# Patient Record
Sex: MALE | Race: ASIAN | Employment: OTHER | URBAN - METROPOLITAN AREA
[De-identification: names, ages, dates, MRNs, and addresses within clinical notes are randomized per-mention and may not be internally consistent; named-entity substitution may affect disease eponyms.]

---

## 2022-10-04 NOTE — LETTER
[address]  [address]  Consent for Procedure/Sedation  Date: 4-        Time: 0909    I hereby authorize Dr Gilbert my physician and his/her assistants (if applicable), which may include medical students, residents, and/or fellows, to perform the following surgical operation/ procedure and administer such anesthesia as may be determined necessary by my physician:  Operation/Procedure name (s)  Cardiac Catheterization, Left Ventricular Cineangiography, Bilateral Selective Coronary Angiography and/or Right Heart Catheterization; possible Percutaneous Transluminal Coronary Angioplasty, Coronary Atherectomy, Coronary Stent, Intracoronary Thrombolytic therapy, Antiplatelet therapy and/or Intravascular Ultrasound on Deven Atkinson   2.   I recognize that during the surgical operation/procedure, unforeseen conditions may necessitate additional or different procedures than those listed above.  I, therefore, further authorize and request that the above-named surgeon, assistants, or designees perform such procedures as are, in their judgment, necessary and desirable.    3.   My surgeon/physician has discussed prior to my surgery the potential benefits, risks and side effects of this procedure; the likelihood of achieving goals; and potential problems that might occur during recuperation.  They also discussed reasonable alternatives to the procedure, including risks, benefits, and side effects related to the alternatives and risks related to not receiving this procedure.  I have had all my questions answered and I acknowledge that no guarantee has been made as to the result that may be obtained.    4.   Should the need arise during my operation/procedure, which includes change of level of care prior to discharge, I also consent to the administration of blood and/or blood products.  Further, I understand that despite careful testing and screening of blood or blood products by collecting  agencies, I may still be subject to ill effects as a result of receiving a blood transfusion and/or blood products.  The following are some, but not all, of the potential risks that can occur: fever and allergic reactions, hemolytic reactions, transmission of diseases such as Hepatitis, AIDS and Cytomegalovirus (CMV) and fluid overload.  In the event that I wish to have an autologous transfusion of my own blood, or a directed donor transfusion, I will discuss this with my physician.  Check only if Refusing Blood or Blood Products  I understand refusal of blood or blood products as deemed necessary by my physician may have serious consequences to my condition to include possible death. I hereby assume responsibility for my refusal and release the hospital, its personnel, and my physicians from any responsibility for the consequences of my refusal.          o  Refuse      5.   I authorize the use of any specimen, organs, tissues, body parts or foreign objects that may be removed from my body during the operation/procedure for diagnosis, research or teaching purposes and their subsequent disposal by hospital authorities.  I also authorize the release of specimen test results and/or written reports to my treating physician on the hospital medical staff or other referring or consulting physicians involved in my care, at the discretion of the Pathologist or my treating physician.    6.   I consent to the photographing or videotaping of the operations or procedures to be performed, including appropriate portions of my body for medical, scientific, or educational purposes, provided my identity is not revealed by the pictures or by descriptive texts accompanying them.  If the procedure has been photographed/videotaped, the surgeon will obtain the original picture, image, videotape or CD.  The hospital will not be responsible for storage, release or maintenance of the picture, image, tape or CD.    7.   I consent to the  presence of a  or observers in the operating room as deemed necessary by my physician or their designees.    8.   I recognize that in the event my procedure results in extended X-Ray/fluoroscopy time, I may develop a skin reaction.    9. If I have a Do Not Attempt Resuscitation (DNAR) order in place, that status will be suspended while in the operating room, procedural suite, and during the recovery period unless otherwise explicitly stated by me (or a person authorized to consent on my behalf). The surgeon or my attending physician will determine when the applicable recovery period ends for purposes of reinstating the DNAR order.  10. Patients having a sterilization procedure: I understand that if the procedure is successful the results will be permanent and it will therefore be impossible for me to inseminate, conceive, or bear children.  I also understand that the procedure is intended to result in sterility, although the result has not been guaranteed.   11. I acknowledge that my physician has explained sedation/analgesia administration to me including the risk and benefits I consent to the administration of sedation/analgesia as may be necessary or desirable in the judgment of my physician.    I CERTIFY THAT I HAVE READ AND FULLY UNDERSTAND THE ABOVE CONSENT TO OPERATION and/or OTHER PROCEDURE.      ____________________________________       _________________________________      ______________________________  Signature of Patient         Signature of Responsible Person        Printed Name of Responsible Person   ____________________________________      _________________________________      ______________________________       Signature of Witness          Relationship to Patient                       Date                                       Time  Patient Name: Deven August Demetrio  : 1930    Reviewed: 2024   Printed: 2025  Medical Record #: [id number] Page 1 of 1            Xenograft Text: The defect edges were debeveled with a #15 scalpel blade.  Given the location of the defect, shape of the defect and the proximity to free margins a xenograft was deemed most appropriate.  The graft was then trimmed to fit the size of the defect.  The graft was then placed in the primary defect and oriented appropriately.

## 2024-07-02 ENCOUNTER — APPOINTMENT (OUTPATIENT)
Dept: CT IMAGING | Facility: HOSPITAL | Age: 89
End: 2024-07-02
Attending: EMERGENCY MEDICINE
Payer: MEDICARE

## 2024-07-02 ENCOUNTER — HOSPITAL ENCOUNTER (EMERGENCY)
Facility: HOSPITAL | Age: 89
Discharge: HOME | End: 2024-07-02
Attending: EMERGENCY MEDICINE
Payer: MEDICARE

## 2024-07-02 VITALS
SYSTOLIC BLOOD PRESSURE: 152 MMHG | WEIGHT: 158 LBS | OXYGEN SATURATION: 96 % | DIASTOLIC BLOOD PRESSURE: 52 MMHG | TEMPERATURE: 98 F | HEART RATE: 50 BPM | RESPIRATION RATE: 20 BRPM

## 2024-07-02 DIAGNOSIS — R10.9 ABDOMINAL PAIN, ACUTE: Primary | ICD-10-CM

## 2024-07-02 DIAGNOSIS — K59.00 CONSTIPATION, UNSPECIFIED CONSTIPATION TYPE: ICD-10-CM

## 2024-07-02 LAB
ALBUMIN SERPL-MCNC: 3.3 G/DL (ref 3.4–5)
ALBUMIN/GLOB SERPL: 0.8 {RATIO} (ref 1–2)
ALP LIVER SERPL-CCNC: 163 U/L
ALT SERPL-CCNC: 33 U/L
ANION GAP SERPL CALC-SCNC: 6 MMOL/L (ref 0–18)
AST SERPL-CCNC: 25 U/L (ref 15–37)
BASOPHILS # BLD AUTO: 0.1 X10(3) UL (ref 0–0.2)
BASOPHILS NFR BLD AUTO: 0.9 %
BILIRUB SERPL-MCNC: 0.4 MG/DL (ref 0.1–2)
BILIRUB UR QL STRIP.AUTO: NEGATIVE
BUN BLD-MCNC: 18 MG/DL (ref 9–23)
CALCIUM BLD-MCNC: 8.7 MG/DL (ref 8.5–10.1)
CHLORIDE SERPL-SCNC: 109 MMOL/L (ref 98–112)
CLARITY UR REFRACT.AUTO: CLEAR
CO2 SERPL-SCNC: 23 MMOL/L (ref 21–32)
CREAT BLD-MCNC: 1.12 MG/DL
EGFRCR SERPLBLD CKD-EPI 2021: 61 ML/MIN/1.73M2 (ref 60–?)
EOSINOPHIL # BLD AUTO: 0.17 X10(3) UL (ref 0–0.7)
EOSINOPHIL NFR BLD AUTO: 1.4 %
ERYTHROCYTE [DISTWIDTH] IN BLOOD BY AUTOMATED COUNT: 14.3 %
GLOBULIN PLAS-MCNC: 4.3 G/DL (ref 2.8–4.4)
GLUCOSE BLD-MCNC: 124 MG/DL (ref 70–99)
GLUCOSE UR STRIP.AUTO-MCNC: NORMAL MG/DL
HCT VFR BLD AUTO: 42.2 %
HGB BLD-MCNC: 13.9 G/DL
IMM GRANULOCYTES # BLD AUTO: 0.05 X10(3) UL (ref 0–1)
IMM GRANULOCYTES NFR BLD: 0.4 %
KETONES UR STRIP.AUTO-MCNC: NEGATIVE MG/DL
LEUKOCYTE ESTERASE UR QL STRIP.AUTO: NEGATIVE
LIPASE SERPL-CCNC: 42 U/L (ref 13–75)
LYMPHOCYTES # BLD AUTO: 6.21 X10(3) UL (ref 1–4)
LYMPHOCYTES NFR BLD AUTO: 52.9 %
MCH RBC QN AUTO: 28.1 PG (ref 26–34)
MCHC RBC AUTO-ENTMCNC: 32.9 G/DL (ref 31–37)
MCV RBC AUTO: 85.3 FL
MONOCYTES # BLD AUTO: 0.7 X10(3) UL (ref 0.1–1)
MONOCYTES NFR BLD AUTO: 6 %
NEUTROPHILS # BLD AUTO: 4.5 X10 (3) UL (ref 1.5–7.7)
NEUTROPHILS # BLD AUTO: 4.5 X10(3) UL (ref 1.5–7.7)
NEUTROPHILS NFR BLD AUTO: 38.4 %
NITRITE UR QL STRIP.AUTO: NEGATIVE
OSMOLALITY SERPL CALC.SUM OF ELEC: 289 MOSM/KG (ref 275–295)
PH UR STRIP.AUTO: 5 [PH] (ref 5–8)
PLATELET # BLD AUTO: 282 10(3)UL (ref 150–450)
POTASSIUM SERPL-SCNC: 4.5 MMOL/L (ref 3.5–5.1)
PROT SERPL-MCNC: 7.6 G/DL (ref 6.4–8.2)
PROT UR STRIP.AUTO-MCNC: NEGATIVE MG/DL
RBC # BLD AUTO: 4.95 X10(6)UL
RBC UR QL AUTO: NEGATIVE
SODIUM SERPL-SCNC: 138 MMOL/L (ref 136–145)
SP GR UR STRIP.AUTO: 1.02 (ref 1–1.03)
UROBILINOGEN UR STRIP.AUTO-MCNC: NORMAL MG/DL
WBC # BLD AUTO: 11.7 X10(3) UL (ref 4–11)

## 2024-07-02 PROCEDURE — 80053 COMPREHEN METABOLIC PANEL: CPT | Performed by: EMERGENCY MEDICINE

## 2024-07-02 PROCEDURE — 85025 COMPLETE CBC W/AUTO DIFF WBC: CPT

## 2024-07-02 PROCEDURE — 80053 COMPREHEN METABOLIC PANEL: CPT

## 2024-07-02 PROCEDURE — 99284 EMERGENCY DEPT VISIT MOD MDM: CPT

## 2024-07-02 PROCEDURE — 85025 COMPLETE CBC W/AUTO DIFF WBC: CPT | Performed by: EMERGENCY MEDICINE

## 2024-07-02 PROCEDURE — 81003 URINALYSIS AUTO W/O SCOPE: CPT | Performed by: EMERGENCY MEDICINE

## 2024-07-02 PROCEDURE — 83690 ASSAY OF LIPASE: CPT

## 2024-07-02 PROCEDURE — 93005 ELECTROCARDIOGRAM TRACING: CPT

## 2024-07-02 PROCEDURE — 99285 EMERGENCY DEPT VISIT HI MDM: CPT

## 2024-07-02 PROCEDURE — 83690 ASSAY OF LIPASE: CPT | Performed by: EMERGENCY MEDICINE

## 2024-07-02 PROCEDURE — 93010 ELECTROCARDIOGRAM REPORT: CPT

## 2024-07-02 PROCEDURE — 36415 COLL VENOUS BLD VENIPUNCTURE: CPT

## 2024-07-02 PROCEDURE — 74177 CT ABD & PELVIS W/CONTRAST: CPT | Performed by: EMERGENCY MEDICINE

## 2024-07-02 RX ORDER — ONDANSETRON 2 MG/ML
4 INJECTION INTRAMUSCULAR; INTRAVENOUS ONCE
Status: DISCONTINUED | OUTPATIENT
Start: 2024-07-02 | End: 2024-07-03

## 2024-07-02 RX ORDER — SODIUM CHLORIDE 9 MG/ML
INJECTION, SOLUTION INTRAVENOUS CONTINUOUS
Status: DISCONTINUED | OUTPATIENT
Start: 2024-07-02 | End: 2024-07-03

## 2024-07-02 NOTE — ED INITIAL ASSESSMENT (HPI)
Constipation x 2 days, no appetite, stomach bloating. + nausea and vomiting clear liquids. Last bowel movement was yesterday morning. RLQ and LLQ pain. 3/10 pain no meds taken for pain. Normally takes miralax. No blood thinners.

## 2024-07-03 LAB
ATRIAL RATE: 59 BPM
P AXIS: 47 DEGREES
P-R INTERVAL: 186 MS
Q-T INTERVAL: 466 MS
QRS DURATION: 138 MS
QTC CALCULATION (BEZET): 461 MS
R AXIS: -32 DEGREES
T AXIS: 134 DEGREES
VENTRICULAR RATE: 59 BPM

## 2024-07-03 NOTE — ED PROVIDER NOTES
Patient Seen in: [facility] Emergency Department      History     Chief Complaint   Patient presents with    Abdomen/Flank Pain    Constipation     Stated Complaint: abdominal pain, bloating and unable to have a BM for a couple days    Subjective:   HPI    Patient is a 89-year-old male who history obtained primarily from patient and daughter.  Patient has been having abdominal pain for last 2 days.  Patient has had some constipation as well.  Patient denies fevers or chills, no vomiting, no diarrhea, no hematuria or dysuria.  Remainder of review of systems negative.    Objective:   History reviewed. No pertinent past medical history.           History reviewed. No pertinent surgical history.             Social History     Socioeconomic History    Marital status:    Tobacco Use    Smoking status: Former     Types: Cigarettes    Smokeless tobacco: Never   Substance and Sexual Activity    Alcohol use: Not Currently    Drug use: Never     Social Determinants of Health     Food Insecurity: No Food Insecurity (10/8/2022)    Received from [facility] Vital Sign     Worried About Running Out of Food in the Last Year: Never true     Ran Out of Food in the Last Year: Never true              Review of Systems    Positive for stated Chief Complaint: Abdomen/Flank Pain and Constipation    Other systems are as noted in HPI.  Constitutional and vital signs reviewed.      All other systems reviewed and negative except as noted above.    Physical Exam     ED Triage Vitals   BP 07/02/24 1827 145/70   Pulse 07/02/24 1827 65   Resp 07/02/24 1827 20   Temp 07/02/24 1827 97.9 °F (36.6 °C)   Temp src 07/02/24 1827 Oral   SpO2 07/02/24 1827 95 %   O2 Device 07/02/24 1915 None (Room air)       Current Vitals:   Vital Signs  BP: 152/52  Pulse: 50  Resp: 20  Temp: 97.9 °F (36.6 °C)  Temp src: Oral  MAP (mmHg): 90    Oxygen Therapy  SpO2: 96 %  O2 Device: None (Room air)            Physical  Exam  GENERAL: Patient resting on the cart in no acute distress.  HEENT: Extraocular muscles intact, pupils equal round reactive to light, neck supple, no meningismus.  LUNGS: Lungs clear to auscultation bilaterally.  CARDIOVASCULAR: + S1-S2, regular rate and rhythm, no murmurs.  BACK: No CVA tenderness, no midline bony tenderness.  ABDOMEN: + Bowel sounds, soft, mild tenderness periumbilical and lower abdomen, nondistended.  No rebound, no guarding, no hepatosplenomegaly.  EXTREMITIES: Full range of motion, no tenderness, good capillary refill.  SKIN: No rash, good turgor.  NEURO: Patient answers questions appropriately.  No focal deficits appreciated.           ED Course     Labs Reviewed   COMP METABOLIC PANEL (14) - Abnormal; Notable for the following components:       Result Value    Glucose 124 (*)     Alkaline Phosphatase 163 (*)     Albumin 3.3 (*)     A/G Ratio 0.8 (*)     All other components within normal limits   CBC W/ DIFFERENTIAL - Abnormal; Notable for the following components:    WBC 11.7 (*)     Lymphocyte Absolute 6.21 (*)     All other components within normal limits   LIPASE - Normal   CBC WITH DIFFERENTIAL WITH PLATELET    Narrative:     The following orders were created for panel order CBC With Differential With Platelet.  Procedure                               Abnormality         Status                     ---------                               -----------         ------                     CBC W/ DIFFERENTIAL[[id number]]          Abnormal            Final result                 Please view results for these tests on the individual orders.   URINALYSIS WITH CULTURE REFLEX   SCAN SLIDE   PATH COMMENT CBC   RAINBOW DRAW LAVENDER   RAINBOW DRAW LIGHT GREEN   RAINBOW DRAW BLUE   RAINBOW DRAW GOLD     EKG    Rate, intervals and axes as noted on EKG Report.  Rate: 59  Rhythm: Sinus Rhythm  Reading: Sinus bradycardia, nonspecific ST changes, left bundle branch block                 CT abdomen  pelvis1. No acute abdominal-pelvic abnormality.   2. Cholelithiasis.   3. Small bladder calculus.   4. Enhancing prostate nodule.  Urologic follow up recommended.   5. Details as above.  Continued clinical correlation recommended.    Independent reviewed by myself, no free air         MDM      Patient stable throughout his stay in the emergency room.  Patient CT came back no acute abnormality.  Patient does have constipation.  Patient felt comfortable going home.  Daughter felt comfortable watching him at home.  Recommend MiraLAX or magnesium citrate as discussed.  Plenty of fluids.  Return if new or worse symptoms.  Follow-up for further evaluation.  Did consider small bowel obstruction, constipation, diverticulitis.                                   Medical Decision Making      Disposition and Plan     Clinical Impression:  1. Abdominal pain, acute    2. Constipation, unspecified constipation type         Disposition:  Discharge  7/2/2024 10:00 pm    Follow-up:  Paulo James MD  [address] [place] [id number]  [phone]    Follow up            Medications Prescribed:  There are no discharge medications for this patient.

## 2024-07-03 NOTE — DISCHARGE INSTRUCTIONS
Follow-up for further evaluation primary physician.  Call for an appointment.  Return if fever, vomiting, increased abdominal pain, new or worse symptoms or may use Tylenol or ibuprofen if needed.  Recommend magnesium citrate over-the-counter as discussed.  Plenty of fluids.  Increase fiber in the diet.

## 2024-07-03 NOTE — ED QUICK NOTES
Pt's wife speaks english and interpreting for pt.  service offered, pt's wife declined at this time.

## 2024-07-10 ENCOUNTER — HOSPITAL ENCOUNTER (EMERGENCY)
Facility: HOSPITAL | Age: 89
Discharge: HOME | End: 2024-07-10
Attending: EMERGENCY MEDICINE
Payer: MEDICARE

## 2024-07-10 VITALS
RESPIRATION RATE: 20 BRPM | TEMPERATURE: 98 F | WEIGHT: 156 LBS | HEART RATE: 52 BPM | HEIGHT: 67 IN | DIASTOLIC BLOOD PRESSURE: 54 MMHG | SYSTOLIC BLOOD PRESSURE: 137 MMHG | OXYGEN SATURATION: 100 % | BODY MASS INDEX: 24.48 KG/M2

## 2024-07-10 DIAGNOSIS — R39.11 URINARY HESITANCY: Primary | ICD-10-CM

## 2024-07-10 LAB
ALBUMIN SERPL-MCNC: 3.5 G/DL (ref 3.4–5)
ALBUMIN/GLOB SERPL: 0.9 {RATIO} (ref 1–2)
ALP LIVER SERPL-CCNC: 137 U/L
ALT SERPL-CCNC: 28 U/L
ANION GAP SERPL CALC-SCNC: 7 MMOL/L (ref 0–18)
AST SERPL-CCNC: 23 U/L (ref 15–37)
BASOPHILS # BLD AUTO: 0.07 X10(3) UL (ref 0–0.2)
BASOPHILS NFR BLD AUTO: 0.9 %
BILIRUB SERPL-MCNC: 1 MG/DL (ref 0.1–2)
BILIRUB UR QL STRIP.AUTO: NEGATIVE
BUN BLD-MCNC: 25 MG/DL (ref 9–23)
CALCIUM BLD-MCNC: 9 MG/DL (ref 8.5–10.1)
CHLORIDE SERPL-SCNC: 109 MMOL/L (ref 98–112)
CLARITY UR REFRACT.AUTO: CLEAR
CO2 SERPL-SCNC: 24 MMOL/L (ref 21–32)
CREAT BLD-MCNC: 1.15 MG/DL
EGFRCR SERPLBLD CKD-EPI 2021: 59 ML/MIN/1.73M2 (ref 60–?)
EOSINOPHIL # BLD AUTO: 0.13 X10(3) UL (ref 0–0.7)
EOSINOPHIL NFR BLD AUTO: 1.6 %
ERYTHROCYTE [DISTWIDTH] IN BLOOD BY AUTOMATED COUNT: 14.5 %
GLOBULIN PLAS-MCNC: 4 G/DL (ref 2.8–4.4)
GLUCOSE BLD-MCNC: 156 MG/DL (ref 70–99)
GLUCOSE UR STRIP.AUTO-MCNC: NORMAL MG/DL
HCT VFR BLD AUTO: 40.9 %
HGB BLD-MCNC: 13.8 G/DL
IMM GRANULOCYTES # BLD AUTO: 0.03 X10(3) UL (ref 0–1)
IMM GRANULOCYTES NFR BLD: 0.4 %
KETONES UR STRIP.AUTO-MCNC: NEGATIVE MG/DL
LEUKOCYTE ESTERASE UR QL STRIP.AUTO: NEGATIVE
LYMPHOCYTES # BLD AUTO: 3.32 X10(3) UL (ref 1–4)
LYMPHOCYTES NFR BLD AUTO: 41.2 %
MCH RBC QN AUTO: 28.2 PG (ref 26–34)
MCHC RBC AUTO-ENTMCNC: 33.7 G/DL (ref 31–37)
MCV RBC AUTO: 83.5 FL
MONOCYTES # BLD AUTO: 0.4 X10(3) UL (ref 0.1–1)
MONOCYTES NFR BLD AUTO: 5 %
NEUTROPHILS # BLD AUTO: 4.11 X10 (3) UL (ref 1.5–7.7)
NEUTROPHILS # BLD AUTO: 4.11 X10(3) UL (ref 1.5–7.7)
NEUTROPHILS NFR BLD AUTO: 50.9 %
NITRITE UR QL STRIP.AUTO: NEGATIVE
OSMOLALITY SERPL CALC.SUM OF ELEC: 298 MOSM/KG (ref 275–295)
PH UR STRIP.AUTO: 5 [PH] (ref 5–8)
PLATELET # BLD AUTO: 237 10(3)UL (ref 150–450)
POTASSIUM SERPL-SCNC: 4.8 MMOL/L (ref 3.5–5.1)
PROT SERPL-MCNC: 7.5 G/DL (ref 6.4–8.2)
PROT UR STRIP.AUTO-MCNC: NEGATIVE MG/DL
RBC # BLD AUTO: 4.9 X10(6)UL
RBC UR QL AUTO: NEGATIVE
SODIUM SERPL-SCNC: 140 MMOL/L (ref 136–145)
SP GR UR STRIP.AUTO: 1.02 (ref 1–1.03)
UROBILINOGEN UR STRIP.AUTO-MCNC: NORMAL MG/DL
VALPROATE SERPL-MCNC: 12.1 UG/ML (ref 50–100)
WBC # BLD AUTO: 8.1 X10(3) UL (ref 4–11)

## 2024-07-10 PROCEDURE — 36415 COLL VENOUS BLD VENIPUNCTURE: CPT

## 2024-07-10 PROCEDURE — 99283 EMERGENCY DEPT VISIT LOW MDM: CPT

## 2024-07-10 PROCEDURE — 85025 COMPLETE CBC W/AUTO DIFF WBC: CPT | Performed by: EMERGENCY MEDICINE

## 2024-07-10 PROCEDURE — 81003 URINALYSIS AUTO W/O SCOPE: CPT | Performed by: EMERGENCY MEDICINE

## 2024-07-10 PROCEDURE — 80164 ASSAY DIPROPYLACETIC ACD TOT: CPT | Performed by: EMERGENCY MEDICINE

## 2024-07-10 PROCEDURE — 80053 COMPREHEN METABOLIC PANEL: CPT | Performed by: EMERGENCY MEDICINE

## 2024-07-10 PROCEDURE — 99284 EMERGENCY DEPT VISIT MOD MDM: CPT

## 2024-07-10 RX ORDER — ENALAPRIL MALEATE 20 MG/1
1 TABLET ORAL 2 TIMES DAILY
COMMUNITY
Start: 2023-11-04

## 2024-07-10 RX ORDER — CLOPIDOGREL BISULFATE 75 MG/1
75 TABLET ORAL DAILY
COMMUNITY

## 2024-07-10 RX ORDER — LIDOCAINE HYDROCHLORIDE 20 MG/ML
10 JELLY TOPICAL ONCE
Status: DISCONTINUED | OUTPATIENT
Start: 2024-07-10 | End: 2024-07-10

## 2024-07-10 RX ORDER — AMOXICILLIN 250 MG
1 CAPSULE ORAL DAILY
COMMUNITY

## 2024-07-10 RX ORDER — ASPIRIN 81 MG/1
TABLET, CHEWABLE ORAL
COMMUNITY

## 2024-07-10 RX ORDER — HYDROCORTISONE 25 MG/G
CREAM TOPICAL
COMMUNITY
Start: 2021-10-22

## 2024-07-10 RX ORDER — AMLODIPINE BESYLATE 10 MG/1
10 TABLET ORAL DAILY
COMMUNITY
Start: 2022-09-29

## 2024-07-10 RX ORDER — ESCITALOPRAM OXALATE 20 MG/1
20 TABLET ORAL DAILY
COMMUNITY
Start: 2024-05-09

## 2024-07-10 RX ORDER — TAMSULOSIN HYDROCHLORIDE 0.4 MG/1
0.4 CAPSULE ORAL DAILY
COMMUNITY

## 2024-07-10 RX ORDER — MELOXICAM 7.5 MG/1
1 TABLET ORAL 2 TIMES DAILY PRN
COMMUNITY

## 2024-07-10 RX ORDER — METOCLOPRAMIDE 10 MG/1
10 TABLET ORAL 4 TIMES DAILY
COMMUNITY

## 2024-07-10 RX ORDER — MIRTAZAPINE 15 MG/1
15 TABLET, FILM COATED ORAL EVERY OTHER DAY
COMMUNITY
Start: 2024-02-19

## 2024-07-10 RX ORDER — DIPHENHYDRAMINE HCL 25 MG
1 CAPSULE ORAL
COMMUNITY

## 2024-07-10 RX ORDER — FLUTICASONE PROPIONATE 50 MCG
1 SPRAY, SUSPENSION (ML) NASAL DAILY
COMMUNITY
Start: 2024-01-10

## 2024-07-10 RX ORDER — VALPROIC ACID 250 MG/5ML
125 SOLUTION ORAL DAILY
COMMUNITY
Start: 2024-05-09

## 2024-07-10 RX ORDER — ATORVASTATIN CALCIUM 80 MG/1
80 TABLET, FILM COATED ORAL DAILY
COMMUNITY

## 2024-07-10 RX ORDER — PANTOPRAZOLE SODIUM 40 MG/1
40 TABLET, DELAYED RELEASE ORAL EVERY MORNING
COMMUNITY

## 2024-07-10 RX ORDER — DUTASTERIDE 0.5 MG/1
0.5 CAPSULE, LIQUID FILLED ORAL DAILY
COMMUNITY
Start: 2022-07-25

## 2024-07-10 NOTE — ED INITIAL ASSESSMENT (HPI)
Pt here with family due to having a hard time urinating. Per wife pt is having troubles urinating and some constipation. Pt's last bowel movement was yesterday and he urinated this morning. Pt denies CP and ANTONIO.

## 2024-07-10 NOTE — ED PROVIDER NOTES
Patient Seen in: [facility] Emergency Department      History     Chief Complaint   Patient presents with    Urinary Symptoms     Has not went since yesterday     Stated Complaint:     Subjective:   HPI    89-year-old male here for possible urinary retention he has had this 3 other times.  According to daughter who is translating he has not urinated since 24 hours.  But then later he told the nurse that he had urinated this morning.  No specific complaint of pain no reported fever or vomiting.    Objective:   Past Medical History:    Essential hypertension    Hyperlipidemia    Stroke (HCC)              History reviewed. No pertinent surgical history.             Social History     Socioeconomic History    Marital status:    Tobacco Use    Smoking status: Former     Types: Cigarettes    Smokeless tobacco: Never   Vaping Use    Vaping status: Never Used   Substance and Sexual Activity    Alcohol use: Not Currently    Drug use: Never     Social Determinants of Health     Food Insecurity: No Food Insecurity (10/8/2022)    Received from [facility] Vital Sign     Worried About Running Out of Food in the Last Year: Never true     Ran Out of Food in the Last Year: Never true              Review of Systems    Positive for stated Chief Complaint: Urinary Symptoms (Has not went since yesterday)    Other systems are as noted in HPI.  Constitutional and vital signs reviewed.      All other systems reviewed and negative except as noted above.    Physical Exam     ED Triage Vitals [07/10/24 0843]   /58   Pulse 63   Resp 20   Temp 97.6 °F (36.4 °C)   Temp src Temporal   SpO2 96 %   O2 Device None (Room air)       Current Vitals:   Vital Signs  BP: 137/54  Pulse: 52  Resp: 20  Temp: 97.6 °F (36.4 °C)  Temp src: Temporal  MAP (mmHg): 79    Oxygen Therapy  SpO2: 100 %  O2 Device: None (Room air)            Physical Exam    Patient is alert and orient x 3 no acute distress HEENT  exam within normal limits neck there is no lymphadenopathy or JVD lungs are clear cardiovascular exam shows regular rate and rhythm without murmurs abdomen soft there is a question of moderate distention of the lower abdomen.  Extremities normal skin is no rash.    ED Course     Labs Reviewed   COMP METABOLIC PANEL (14) - Abnormal; Notable for the following components:       Result Value    Glucose 156 (*)     BUN 25 (*)     Calculated Osmolality 298 (*)     eGFR-Cr 59 (*)     Alkaline Phosphatase 137 (*)     A/G Ratio 0.9 (*)     All other components within normal limits   VALPROIC ACID, (DEPAKENE) - Abnormal; Notable for the following components:    Valproic Acid 12.1 (*)     All other components within normal limits   CBC WITH DIFFERENTIAL WITH PLATELET    Narrative:     The following orders were created for panel order CBC With Differential With Platelet.  Procedure                               Abnormality         Status                     ---------                               -----------         ------                     CBC W/ DIFFERENTIAL[[id number]]                              Final result                 Please view results for these tests on the individual orders.   URINALYSIS WITH CULTURE REFLEX   RAINBOW DRAW LAVENDER   RAINBOW DRAW LIGHT GREEN   RAINBOW DRAW BLUE   RAINBOW DRAW GOLD   CBC W/ DIFFERENTIAL             Abnormal Labs Reviewed   COMP METABOLIC PANEL (14) - Abnormal; Notable for the following components:       Result Value    Glucose 156 (*)     BUN 25 (*)     Calculated Osmolality 298 (*)     eGFR-Cr 59 (*)     Alkaline Phosphatase 137 (*)     A/G Ratio 0.9 (*)     All other components within normal limits   VALPROIC ACID, (DEPAKENE) - Abnormal; Notable for the following components:    Valproic Acid 12.1 (*)     All other components within normal limits     Labs showed a BUN 25 urinalysis unremarkable.         MDM      Initial differential diagnoses considered but not limited to urinary  tract infection urinary retention constipation diverticulitis      Patient has no evidence of UTI or urinary retention I went back and interviewed the family ember who is translating and the patient felt like he had difficulty urinating has no abdominal pain fever vomiting and otherwise feels well he is ambulatory here and he is urinated twice.  Advised him to follow-up with his primary care doctor and urologist over the next 2 days return if worse                                   Medical Decision Making      Disposition and Plan     Clinical Impression:  1. Urinary hesitancy         Disposition:  Discharge  7/10/2024 11:10 am    Follow-up:  Paulo James MD  [address] [place] [id number]  [phone]    Follow up in 1 week(s)            Medications Prescribed:  Discharge Medication List as of 7/10/2024 11:12 AM

## 2024-07-25 ENCOUNTER — HOSPITAL ENCOUNTER (EMERGENCY)
Facility: HOSPITAL | Age: 89
Discharge: HOME | End: 2024-07-25
Attending: EMERGENCY MEDICINE
Payer: MEDICARE

## 2024-07-25 VITALS
WEIGHT: 156.5 LBS | DIASTOLIC BLOOD PRESSURE: 59 MMHG | OXYGEN SATURATION: 97 % | BODY MASS INDEX: 25 KG/M2 | SYSTOLIC BLOOD PRESSURE: 141 MMHG | RESPIRATION RATE: 16 BRPM | TEMPERATURE: 98 F | HEART RATE: 69 BPM

## 2024-07-25 DIAGNOSIS — R30.0 DYSURIA: ICD-10-CM

## 2024-07-25 DIAGNOSIS — R39.12 WEAK URINARY STREAM: Primary | ICD-10-CM

## 2024-07-25 LAB
BILIRUB UR QL STRIP.AUTO: NEGATIVE
CLARITY UR REFRACT.AUTO: CLEAR
GLUCOSE UR STRIP.AUTO-MCNC: NORMAL MG/DL
KETONES UR STRIP.AUTO-MCNC: NEGATIVE MG/DL
LEUKOCYTE ESTERASE UR QL STRIP.AUTO: 75
NITRITE UR QL STRIP.AUTO: NEGATIVE
PH UR STRIP.AUTO: 6 [PH] (ref 5–8)
PROT UR STRIP.AUTO-MCNC: NEGATIVE MG/DL
RBC UR QL AUTO: NEGATIVE
SP GR UR STRIP.AUTO: 1.02 (ref 1–1.03)
UROBILINOGEN UR STRIP.AUTO-MCNC: NORMAL MG/DL

## 2024-07-25 PROCEDURE — 51798 US URINE CAPACITY MEASURE: CPT

## 2024-07-25 PROCEDURE — 81001 URINALYSIS AUTO W/SCOPE: CPT | Performed by: EMERGENCY MEDICINE

## 2024-07-25 PROCEDURE — 99283 EMERGENCY DEPT VISIT LOW MDM: CPT

## 2024-07-25 NOTE — ED PROVIDER NOTES
Patient Seen in: [facility] Emergency Department      History     Chief Complaint   Patient presents with    Urinary Symptoms     Stated Complaint: Inability to urinate, abdominal pain    Subjective:   HPI    89-year-old male sent from urology clinic for evaluation of weak urinary stream.  He was seen in this emergency room 2 weeks ago for the same complaint.  He followed up on July 12 with urology and his wife states that he was started on tamsulosin.  They were having follow-up today but were told that the doctor was not in the [facility] today and was out in [place].  The  at the office told the patient to go to the emergency room.  He denies any abdominal pain.  He says he wakes up 2-3 times per night to urinate.  More recently he has been having some burning with urination.  No hematuria noted.    Objective:   Past Medical History:    Essential hypertension    Hyperlipidemia    Stroke (HCC)              History reviewed. No pertinent surgical history.             Social History     Socioeconomic History    Marital status:    Tobacco Use    Smoking status: Former     Types: Cigarettes    Smokeless tobacco: Never   Vaping Use    Vaping status: Never Used   Substance and Sexual Activity    Alcohol use: Not Currently    Drug use: Never     Social Determinants of Health     Financial Resource Strain: Low Risk  (7/12/2024)    Received from [facility]    Financial Resource Strain     In the past year, have you or any family members you live with been unable to get any of the following when it was really needed? Check all that apply.: None   Food Insecurity: Low Risk  (7/12/2024)    Received from [facility]    Food Insecurity     Within the past 12 months, you worried that your food would run out before you got money to buy more.  : Never true     Within the past 12 months, the food you bought just didn't last and you didn't have money to get more. : Never true    Transportation Needs: Not At Risk (7/12/2024)    Received from [facility] [place]    Transportation Needs     In the past 12 months, has lack of reliable transportation kept you from medical appointments, meetings, work or from getting things needed for daily living? : No   Physical Activity: Low Risk  (7/12/2024)    Received from [facility]    Exercise Vital Sign     On average, how many days per week do you engage in moderate to strenuous exercise (like a brisk walk)?: 5 days     On average, how many minutes do you engage in exercise at this level?: 30 min   Stress: Low Risk  (7/12/2024)    Received from [facility]    Stress     Stress is when someone feels tense, nervous, anxious, or can't sleep at night because their mind is troubled. How stressed are you? : Not at all   Social Connections: Low Risk  (7/12/2024)    Received from [facility]    Social Connections     How often do you see or talk to people that you care about and feel close to? (For example: talking to friends on the phone, visiting friends or family, going to [employer] or club meetings): 3 to 5 times a week              Review of Systems    Positive for stated Chief Complaint: Urinary Symptoms    Other systems are as noted in HPI.  Constitutional and vital signs reviewed.      All other systems reviewed and negative except as noted above.    Physical Exam     ED Triage Vitals   BP 07/25/24 0930 143/56   Pulse 07/25/24 0930 65   Resp 07/25/24 0930 16   Temp 07/25/24 1046 98.1 °F (36.7 °C)   Temp src 07/25/24 1046 Oral   SpO2 07/25/24 0930 97 %   O2 Device 07/25/24 0930 None (Room air)       Current Vitals:   Vital Signs  BP: 141/59  Pulse: 69  Resp: 16  Temp: 98.1 °F (36.7 °C)  Temp src: Oral    Oxygen Therapy  SpO2: 97 %  O2 Device: None (Room air)            Physical Exam    General:  Vitals as listed.  No acute distress   Lungs: good air exchange and clear   Heart: regular rate rhythm and no murmur    Abdomen: Soft and nontender.  No abdominal masses.  No peritoneal signs   Extremities: no edema, normal peripheral pulses   Neuro: Alert oriented and nonfocal   Skin: no rashes or nodules    ED Course     Labs Reviewed   URINALYSIS, ROUTINE - Abnormal; Notable for the following components:       Result Value    Leukocyte Esterase Urine 75 (*)     Squamous Epi. Cells Few (*)     All other components within normal limits   URINALYSIS WITH CULTURE REFLEX                      MDM      89-year-old male with history of BPH presents reporting weak urinary stream.  Seen for this previously.  Was trying to follow-up with urology today but went to the [place] office when they were supposed to go to the [place] office.  Staff at the urology office told him to come to the emergency room.  He has no acute complaints though he does report that more recently he has been having some burning with urination.    Additional history obtained by patient's wife who reports that he has required Jay catheters on multiple occasions in the past due to urinary retention.    Differential includes but is not limited to urinary retention, BPH, UTI, a life threat.    Urinalysis and bladder scan ordered for further evaluation.    My independent interpretation of urinalysis is that there is no evidence of UTI.    The patient is not in urinary retention here.  Postvoid residual around 80 mL.  Well-appearing.  Patient's wife did call urology and reschedule their missed appointment from today but it is not until the middle of October.  They are on the wait list.  I will provide on-call urology clinic information with [facility] as they may be able to follow-up sooner than 3 months from now.  Instructed to return with any concerns                                       Medical Decision Making      Disposition and Plan     Clinical Impression:  1. Weak urinary stream    2. Dysuria         Disposition:  Discharge  7/25/2024 10:50  am    Follow-up:  Paulo James MD  [address]  [facility] [address]  [facility] [address] [phone]    Schedule an appointment as soon as possible for a visit      Maksim Betancur MD  [address]  [facility] [address] [phone]    Follow up  Urology specialist with [facility].  They may be able to see you sooner than your currently scheduled appointment in October          Medications Prescribed:  Current Discharge Medication List

## 2024-07-25 NOTE — ED INITIAL ASSESSMENT (HPI)
Patient presenting with complains of unable to urinated normal, that not having a full stream, for weeks.

## 2024-07-26 ENCOUNTER — HOSPITAL ENCOUNTER (EMERGENCY)
Facility: HOSPITAL | Age: 89
Discharge: HOME | End: 2024-07-27
Attending: EMERGENCY MEDICINE
Payer: MEDICARE

## 2024-07-26 DIAGNOSIS — R19.7 DIARRHEA, UNSPECIFIED TYPE: ICD-10-CM

## 2024-07-26 DIAGNOSIS — K21.00 GASTROESOPHAGEAL REFLUX DISEASE WITH ESOPHAGITIS WITHOUT HEMORRHAGE: Primary | ICD-10-CM

## 2024-07-26 PROCEDURE — 80053 COMPREHEN METABOLIC PANEL: CPT | Performed by: EMERGENCY MEDICINE

## 2024-07-26 PROCEDURE — 96361 HYDRATE IV INFUSION ADD-ON: CPT

## 2024-07-26 PROCEDURE — 96374 THER/PROPH/DIAG INJ IV PUSH: CPT

## 2024-07-26 PROCEDURE — 85025 COMPLETE CBC W/AUTO DIFF WBC: CPT

## 2024-07-26 PROCEDURE — 84484 ASSAY OF TROPONIN QUANT: CPT | Performed by: EMERGENCY MEDICINE

## 2024-07-26 PROCEDURE — 83690 ASSAY OF LIPASE: CPT | Performed by: EMERGENCY MEDICINE

## 2024-07-26 PROCEDURE — 83690 ASSAY OF LIPASE: CPT

## 2024-07-26 PROCEDURE — 99285 EMERGENCY DEPT VISIT HI MDM: CPT

## 2024-07-26 PROCEDURE — 93005 ELECTROCARDIOGRAM TRACING: CPT

## 2024-07-26 PROCEDURE — 85025 COMPLETE CBC W/AUTO DIFF WBC: CPT | Performed by: EMERGENCY MEDICINE

## 2024-07-26 PROCEDURE — 80053 COMPREHEN METABOLIC PANEL: CPT

## 2024-07-26 PROCEDURE — 93010 ELECTROCARDIOGRAM REPORT: CPT

## 2024-07-27 ENCOUNTER — APPOINTMENT (OUTPATIENT)
Dept: GENERAL RADIOLOGY | Facility: HOSPITAL | Age: 89
End: 2024-07-27
Attending: EMERGENCY MEDICINE
Payer: MEDICARE

## 2024-07-27 VITALS
HEIGHT: 70 IN | DIASTOLIC BLOOD PRESSURE: 50 MMHG | WEIGHT: 156 LBS | TEMPERATURE: 97 F | HEART RATE: 56 BPM | OXYGEN SATURATION: 98 % | RESPIRATION RATE: 17 BRPM | BODY MASS INDEX: 22.33 KG/M2 | SYSTOLIC BLOOD PRESSURE: 106 MMHG

## 2024-07-27 LAB
ALBUMIN SERPL-MCNC: 4 G/DL (ref 3.2–4.8)
ALBUMIN/GLOB SERPL: 1.1 {RATIO} (ref 1–2)
ALP LIVER SERPL-CCNC: 138 U/L
ALT SERPL-CCNC: 40 U/L
ANION GAP SERPL CALC-SCNC: 6 MMOL/L (ref 0–18)
AST SERPL-CCNC: 46 U/L (ref ?–34)
ATRIAL RATE: 58 BPM
BASOPHILS # BLD AUTO: 0.12 X10(3) UL (ref 0–0.2)
BASOPHILS NFR BLD AUTO: 1.1 %
BILIRUB SERPL-MCNC: 0.4 MG/DL (ref 0.2–0.9)
BUN BLD-MCNC: 22 MG/DL (ref 9–23)
CALCIUM BLD-MCNC: 8.9 MG/DL (ref 8.7–10.4)
CHLORIDE SERPL-SCNC: 109 MMOL/L (ref 98–112)
CO2 SERPL-SCNC: 22 MMOL/L (ref 21–32)
CREAT BLD-MCNC: 1.18 MG/DL
EGFRCR SERPLBLD CKD-EPI 2021: 57 ML/MIN/1.73M2 (ref 60–?)
EOSINOPHIL # BLD AUTO: 0.2 X10(3) UL (ref 0–0.7)
EOSINOPHIL NFR BLD AUTO: 1.8 %
ERYTHROCYTE [DISTWIDTH] IN BLOOD BY AUTOMATED COUNT: 14.4 %
GLOBULIN PLAS-MCNC: 3.5 G/DL (ref 2–3.5)
GLUCOSE BLD-MCNC: 110 MG/DL (ref 70–99)
HCT VFR BLD AUTO: 37.6 %
HGB BLD-MCNC: 12.9 G/DL
IMM GRANULOCYTES # BLD AUTO: 0.05 X10(3) UL (ref 0–1)
IMM GRANULOCYTES NFR BLD: 0.5 %
LIPASE SERPL-CCNC: 38 U/L (ref 12–53)
LYMPHOCYTES # BLD AUTO: 5.82 X10(3) UL (ref 1–4)
LYMPHOCYTES NFR BLD AUTO: 52.8 %
MCH RBC QN AUTO: 28.5 PG (ref 26–34)
MCHC RBC AUTO-ENTMCNC: 34.3 G/DL (ref 31–37)
MCV RBC AUTO: 83.2 FL
MONOCYTES # BLD AUTO: 0.57 X10(3) UL (ref 0.1–1)
MONOCYTES NFR BLD AUTO: 5.2 %
NEUTROPHILS # BLD AUTO: 4.27 X10 (3) UL (ref 1.5–7.7)
NEUTROPHILS # BLD AUTO: 4.27 X10(3) UL (ref 1.5–7.7)
NEUTROPHILS NFR BLD AUTO: 38.6 %
OSMOLALITY SERPL CALC.SUM OF ELEC: 288 MOSM/KG (ref 275–295)
P AXIS: 55 DEGREES
P-R INTERVAL: 208 MS
PLATELET # BLD AUTO: 209 10(3)UL (ref 150–450)
PLATELETS.RETICULATED NFR BLD AUTO: 1.3 % (ref 0–7)
POTASSIUM SERPL-SCNC: 4.8 MMOL/L (ref 3.5–5.1)
PROT SERPL-MCNC: 7.5 G/DL (ref 5.7–8.2)
Q-T INTERVAL: 466 MS
QRS DURATION: 144 MS
QTC CALCULATION (BEZET): 457 MS
R AXIS: -31 DEGREES
RBC # BLD AUTO: 4.52 X10(6)UL
SODIUM SERPL-SCNC: 137 MMOL/L (ref 136–145)
T AXIS: 132 DEGREES
TROPONIN I SERPL HS-MCNC: 11 NG/L
VENTRICULAR RATE: 58 BPM
WBC # BLD AUTO: 11 X10(3) UL (ref 4–11)

## 2024-07-27 PROCEDURE — S0028 INJECTION, FAMOTIDINE, 20 MG: HCPCS | Performed by: EMERGENCY MEDICINE

## 2024-07-27 PROCEDURE — 71045 X-RAY EXAM CHEST 1 VIEW: CPT | Performed by: EMERGENCY MEDICINE

## 2024-07-27 RX ORDER — FAMOTIDINE 40 MG/1
40 TABLET, FILM COATED ORAL 2 TIMES DAILY PRN
Qty: 30 TABLET | Refills: 0 | Status: SHIPPED | OUTPATIENT
Start: 2024-07-27 | End: 2024-08-26

## 2024-07-27 RX ORDER — MAGNESIUM HYDROXIDE/ALUMINUM HYDROXICE/SIMETHICONE 120; 1200; 1200 MG/30ML; MG/30ML; MG/30ML
30 SUSPENSION ORAL ONCE
Status: COMPLETED | OUTPATIENT
Start: 2024-07-27 | End: 2024-07-27

## 2024-07-27 RX ORDER — FAMOTIDINE 10 MG/ML
20 INJECTION, SOLUTION INTRAVENOUS ONCE
Status: COMPLETED | OUTPATIENT
Start: 2024-07-27 | End: 2024-07-27

## 2024-07-27 NOTE — ED PROVIDER NOTES
Patient Seen in: [facility] Emergency Department      History     Chief Complaint   Patient presents with    Abdomen/Flank Pain     Stated Complaint: epigastric pain since this evening; denies cp or ANTONIO    Subjective:   HPI    The 89-year-old male patient presented with discomfort and a burning sensation rising to his nose. The symptoms started after eating chicken with cucumber. He has a history of GERD. He had been feeling unwell and had been sleeping for most of the day. He had also visited a podiatrist earlier in the day for a foot issue, but no antibiotics were prescribed. He did not have any chest pain or pressure.  Patient states he just had his nails trimmed.  States pain is mostly burning denies any pressure in his chest.  States pain goes from his stomach up into his throat    Objective:   Past Medical History:    Essential hypertension    Hyperlipidemia    Stroke (HCC)              History reviewed. No pertinent surgical history.             Social History     Socioeconomic History    Marital status:    Tobacco Use    Smoking status: Former     Types: Cigarettes    Smokeless tobacco: Never   Vaping Use    Vaping status: Never Used   Substance and Sexual Activity    Alcohol use: Not Currently    Drug use: Never     Social Determinants of Health     Financial Resource Strain: Low Risk  (7/12/2024)    Received from [facility]    Financial Resource Strain     In the past year, have you or any family members you live with been unable to get any of the following when it was really needed? Check all that apply.: None   Food Insecurity: Low Risk  (7/12/2024)    Received from [facility]    Food Insecurity     Within the past 12 months, you worried that your food would run out before you got money to buy more.  : Never true     Within the past 12 months, the food you bought just didn't last and you didn't have money to get more. : Never true   Transportation Needs: Not At Risk  (7/12/2024)    Received from [facility]    Transportation Needs     In the past 12 months, has lack of reliable transportation kept you from medical appointments, meetings, work or from getting things needed for daily living? : No   Physical Activity: Low Risk  (7/12/2024)    Received from [facility]    Exercise Vital Sign     On average, how many days per week do you engage in moderate to strenuous exercise (like a brisk walk)?: 5 days     On average, how many minutes do you engage in exercise at this level?: 30 min   Stress: Low Risk  (7/12/2024)    Received from [facility]    Stress     Stress is when someone feels tense, nervous, anxious, or can't sleep at night because their mind is troubled. How stressed are you? : Not at all   Social Connections: Low Risk  (7/12/2024)    Received from [facility]    Social Connections     How often do you see or talk to people that you care about and feel close to? (For example: talking to friends on the phone, visiting friends or family, going to [employer] or club meetings): 3 to 5 times a week              Review of Systems    Positive for stated Chief Complaint: Abdomen/Flank Pain    Other systems are as noted in HPI.  Constitutional and vital signs reviewed.      All other systems reviewed and negative except as noted above.    Physical Exam     ED Triage Vitals   BP 07/26/24 2345 157/61   Pulse 07/26/24 2345 60   Resp 07/26/24 2345 21   Temp 07/26/24 2350 96.7 °F (35.9 °C)   Temp src 07/26/24 2350 Temporal   SpO2 07/26/24 2345 95 %   O2 Device 07/26/24 2345 None (Room air)       Current Vitals:   Vital Signs  BP: 106/50  Pulse: 56  Resp: 17  Temp: 96.7 °F (35.9 °C)  Temp src: Temporal  MAP (mmHg): 67    Oxygen Therapy  SpO2: 98 %  O2 Device: None (Room air)            Physical Exam      Vital signs reviewed  General appearance: Patient is alert and in no acute distress  HEENT: Pupils equal react to light extraocular muscles  intact no scleral icterus, mucous membranes are moist, there is no erythema or exudate in the posterior pharynx  Neck: Supple no JVD no lymphadenopathy no meningismus no carotid bruit  CV: Regular rate and rhythm no murmur rub  Respiratory: Clear to auscultation bilaterally no crackles no wheezes no accessory muscle use  Abdomen: Soft nontender nondistended, no rebound no guarding  no hepatosplenomegaly bowel sounds are present , no pulsatile mass  Extremities: No clubbing cyanosis or edema 2+ distal pulses.  Neuro: Cranial nerves II through XII intact with no gross focal sensory or motor abnormality.      ED Course     Labs Reviewed   COMP METABOLIC PANEL (14) - Abnormal; Notable for the following components:       Result Value    Glucose 110 (*)     eGFR-Cr 57 (*)     AST 46 (*)     Alkaline Phosphatase 138 (*)     All other components within normal limits   CBC W/ DIFFERENTIAL - Abnormal; Notable for the following components:    HGB 12.9 (*)     HCT 37.6 (*)     Lymphocyte Absolute 5.82 (*)     All other components within normal limits   LIPASE - Normal   TROPONIN I HIGH SENSITIVITY - Normal   CBC WITH DIFFERENTIAL WITH PLATELET    Narrative:     The following orders were created for panel order CBC With Differential With Platelet.  Procedure                               Abnormality         Status                     ---------                               -----------         ------                     CBC W/ DIFFERENTIAL[[id number]]          Abnormal            Final result                 Please view results for these tests on the individual orders.   RAINBOW DRAW BLUE   GI STOOL PANEL BY PCR   C. DIFFICILE(TOXIGENIC)PCR     EKG    Rate, intervals and axes as noted on EKG Report.  Rate: 58  Rhythm: Sinus Rhythm  Reading: Bundle branch block                 Was evaluated the emergency department had a CBC chemistry lipase along with a troponin ordered.  Also get a chest x-ray.  Did review a prior EKG and it was  the same as today.  Will give some Pepcid along with some Maalox.  Will reassess.       CHEST RADIOGRAPH    No prior    IMPRESSION:  Patchy left retrocardiac opacities.  Minimal ill-defined right lower lung zone opacities.  Possible loop recorder overlying left chest wall.  Borderline cardiac size.  Moderate degenerative changes of the visualized spine.    Patient was feeling much better after the Pepcid and Maalox.  However he did have some very watery diarrhea so we will send that for analysis but will not be back till morning.  Patient feels comfortable going home.  He is not having any cough or shortness of breath and no elevated white count so I do not feel the left retrocardiac opacities are the issue.  Patient will be discharged home follow-up with his primary.  Return if worse  MDM      Differential diagnosis reflecting the complexity of care include: GERD, angina, reflux, esophagitis, diarrhea, gastroenteritis    Comorbidities that add complexity to management include: Striae of stroke hypertension hyperlipidemia        My independent interpretation of studies of: X-ray does show some slight retrocardiac opacities and borderline cardiac size but no signs of obvious pneumonia no pneumothorax no effusion      Shared decision making was done by myself patient and his wife he was feeling better after the Maalox and Pepcid.  Told him to make sure eating bland diet for the next few days.  Follow-up with his primary.  Will also call him if his diarrhea comes back with something infectious but could be just some gastroenteritis    Patient was screened and evaluated during this visit.  As the treating physician attending to the patient, I determined within reasonable clinical confidence and prior to discharge, that an emergency medical condition was not or was no longer present.  There was no indication for further evaluation, treatment, or admission on an emergency basis.  Comprehensive verbal and written discharge  and follow-up instructions were provided to help prevent relapse or worsening.  Patient was instructed to follow-up with primary care provider for further evaluation treatment, return immediately to ER for worsening, concerning, new, or changing/persisting symptoms.  I discussed the case with the patient and they had no questions, complaints, or concerns.  Patient was comfortable going home.      Dictation Disclaimer Note:   To increase efficiency this document may have been prepared using voice recognition technology. Every effort has been made to correct any errors made during preparation of this note. However, if a word or phrase is confusing, or does not make sense, this is likely due to a recognition error within the program which was not discovered during editing. Please do not hesitate to contact to address any significant errors.    Note to Patient:   The 21st Century Cures Act makes medical notes like these available to patients in the interest of transparency. Please be advised this is a medical document. Medical documents are intended to carry relevant information, facts as evident, and the clinical opinion of the practitioner. The medical note is intended as peer to peer communication and may appear blunt or direct. It is written in medical language and may contain abbreviations or verbiage that are unfamiliar.                                          Medical Decision Making      Disposition and Plan     Clinical Impression:  1. Gastroesophageal reflux disease with esophagitis without hemorrhage    2. Diarrhea, unspecified type         Disposition:  Discharge  7/27/2024  2:20 am    Follow-up:  Paulo James MD  [address] [place] [id number]  [phone]    Follow up            Medications Prescribed:  Current Discharge Medication List        START taking these medications    Details   famotidine (PEPCID) 40 MG Oral Tab Take 1 tablet (40 mg total) by mouth 2 (two) times daily as  needed for Heartburn.  Qty: 30 tablet, Refills: 0

## 2024-08-12 ENCOUNTER — HOSPITAL ENCOUNTER (INPATIENT)
Facility: HOSPITAL | Age: 89
LOS: 1 days | Discharge: HOME | End: 2024-08-14
Attending: EMERGENCY MEDICINE | Admitting: STUDENT IN AN ORGANIZED HEALTH CARE EDUCATION/TRAINING PROGRAM
Payer: MEDICARE

## 2024-08-12 ENCOUNTER — APPOINTMENT (OUTPATIENT)
Dept: GENERAL RADIOLOGY | Facility: HOSPITAL | Age: 89
End: 2024-08-12
Attending: EMERGENCY MEDICINE
Payer: MEDICARE

## 2024-08-12 DIAGNOSIS — N30.01 ACUTE CYSTITIS WITH HEMATURIA: Primary | ICD-10-CM

## 2024-08-12 DIAGNOSIS — G93.41 METABOLIC ENCEPHALOPATHY: ICD-10-CM

## 2024-08-12 PROBLEM — R79.89 AZOTEMIA: Status: ACTIVE | Noted: 2024-08-12

## 2024-08-12 PROBLEM — R73.9 HYPERGLYCEMIA: Status: ACTIVE | Noted: 2024-08-12

## 2024-08-12 PROBLEM — E87.20 METABOLIC ACIDOSIS: Status: ACTIVE | Noted: 2024-08-12

## 2024-08-12 PROBLEM — I10 PRIMARY HYPERTENSION: Status: ACTIVE | Noted: 2024-08-12

## 2024-08-12 PROBLEM — D64.9 ANEMIA: Status: ACTIVE | Noted: 2024-08-12

## 2024-08-12 PROBLEM — E78.5 HYPERLIPIDEMIA: Status: ACTIVE | Noted: 2024-08-12

## 2024-08-12 PROBLEM — I63.9 CEREBROVASCULAR ACCIDENT (CVA) (HCC): Status: ACTIVE | Noted: 2024-08-12

## 2024-08-12 PROBLEM — F32.A DEPRESSION: Status: ACTIVE | Noted: 2024-08-12

## 2024-08-12 LAB
ALBUMIN SERPL-MCNC: 4.1 G/DL (ref 3.2–4.8)
ALBUMIN/GLOB SERPL: 1.4 {RATIO} (ref 1–2)
ALP LIVER SERPL-CCNC: 123 U/L
ALT SERPL-CCNC: 17 U/L
ANION GAP SERPL CALC-SCNC: 10 MMOL/L (ref 0–18)
AST SERPL-CCNC: 22 U/L (ref ?–34)
BASOPHILS # BLD AUTO: 0.07 X10(3) UL (ref 0–0.2)
BASOPHILS NFR BLD AUTO: 0.8 %
BILIRUB SERPL-MCNC: 0.4 MG/DL (ref 0.2–0.9)
BILIRUB UR QL STRIP.AUTO: NEGATIVE
BUN BLD-MCNC: 20 MG/DL (ref 9–23)
CALCIUM BLD-MCNC: 9.4 MG/DL (ref 8.7–10.4)
CHLORIDE SERPL-SCNC: 111 MMOL/L (ref 98–112)
CLARITY UR REFRACT.AUTO: CLEAR
CO2 SERPL-SCNC: 19 MMOL/L (ref 21–32)
COLOR UR AUTO: YELLOW
CREAT BLD-MCNC: 0.99 MG/DL
EGFRCR SERPLBLD CKD-EPI 2021: 71 ML/MIN/1.73M2 (ref 60–?)
EOSINOPHIL # BLD AUTO: 0.04 X10(3) UL (ref 0–0.7)
EOSINOPHIL NFR BLD AUTO: 0.4 %
ERYTHROCYTE [DISTWIDTH] IN BLOOD BY AUTOMATED COUNT: 13.7 %
GLOBULIN PLAS-MCNC: 2.9 G/DL (ref 2–3.5)
GLUCOSE BLD-MCNC: 102 MG/DL (ref 70–99)
GLUCOSE UR STRIP.AUTO-MCNC: NORMAL MG/DL
HCT VFR BLD AUTO: 34.6 %
HGB BLD-MCNC: 11.9 G/DL
IMM GRANULOCYTES # BLD AUTO: 0.02 X10(3) UL (ref 0–1)
IMM GRANULOCYTES NFR BLD: 0.2 %
LEUKOCYTE ESTERASE UR QL STRIP.AUTO: 500
LYMPHOCYTES # BLD AUTO: 4.45 X10(3) UL (ref 1–4)
LYMPHOCYTES NFR BLD AUTO: 48.7 %
MCH RBC QN AUTO: 28.8 PG (ref 26–34)
MCHC RBC AUTO-ENTMCNC: 34.4 G/DL (ref 31–37)
MCV RBC AUTO: 83.8 FL
MONOCYTES # BLD AUTO: 0.62 X10(3) UL (ref 0.1–1)
MONOCYTES NFR BLD AUTO: 6.8 %
NEUTROPHILS # BLD AUTO: 3.94 X10 (3) UL (ref 1.5–7.7)
NEUTROPHILS # BLD AUTO: 3.94 X10(3) UL (ref 1.5–7.7)
NEUTROPHILS NFR BLD AUTO: 43.1 %
NITRITE UR QL STRIP.AUTO: NEGATIVE
OSMOLALITY SERPL CALC.SUM OF ELEC: 293 MOSM/KG (ref 275–295)
PH UR STRIP.AUTO: 6 [PH] (ref 5–8)
PLATELET # BLD AUTO: 229 10(3)UL (ref 150–450)
POTASSIUM SERPL-SCNC: 3.6 MMOL/L (ref 3.5–5.1)
PROT SERPL-MCNC: 7 G/DL (ref 5.7–8.2)
PROT UR STRIP.AUTO-MCNC: 20 MG/DL
RBC # BLD AUTO: 4.13 X10(6)UL
RBC UR QL AUTO: NEGATIVE
SARS-COV-2 RNA RESP QL NAA+PROBE: NOT DETECTED
SODIUM SERPL-SCNC: 140 MMOL/L (ref 136–145)
SP GR UR STRIP.AUTO: 1.03 (ref 1–1.03)
UROBILINOGEN UR STRIP.AUTO-MCNC: NORMAL MG/DL
WBC # BLD AUTO: 9.1 X10(3) UL (ref 4–11)

## 2024-08-12 PROCEDURE — 99223 1ST HOSP IP/OBS HIGH 75: CPT | Performed by: STUDENT IN AN ORGANIZED HEALTH CARE EDUCATION/TRAINING PROGRAM

## 2024-08-12 PROCEDURE — 71045 X-RAY EXAM CHEST 1 VIEW: CPT | Performed by: EMERGENCY MEDICINE

## 2024-08-12 RX ORDER — LORAZEPAM 1 MG/1
1 TABLET ORAL ONCE
Status: COMPLETED | OUTPATIENT
Start: 2024-08-12 | End: 2024-08-12

## 2024-08-12 RX ORDER — HALOPERIDOL 5 MG/ML
5 INJECTION INTRAMUSCULAR ONCE
Status: COMPLETED | OUTPATIENT
Start: 2024-08-12 | End: 2024-08-12

## 2024-08-12 NOTE — ED PROVIDER NOTES
Patient Seen in: [facility] Emergency Department      History     Chief Complaint   Patient presents with    Other     Stated Complaint: other    Subjective:   89-year-old male, history of hypertension, CVA, psychiatric illness, presents with chills.  Wife states they recently moved into a new senior center.  States he been complaining of feeling hot and cold all throughout the day.  Department for evaluation.  Limited HPI/ROS secondary to age.            Objective:   Past Medical History:    Essential hypertension    Hyperlipidemia    Stroke (HCC)              History reviewed. No pertinent surgical history.             Social History     Socioeconomic History    Marital status:    Tobacco Use    Smoking status: Former     Types: Cigarettes    Smokeless tobacco: Never   Vaping Use    Vaping status: Never Used   Substance and Sexual Activity    Alcohol use: Not Currently    Drug use: Never     Social Determinants of Health     Financial Resource Strain: Low Risk  (7/12/2024)    Received from [facility]    Financial Resource Strain     In the past year, have you or any family members you live with been unable to get any of the following when it was really needed? Check all that apply.: None   Food Insecurity: Low Risk  (7/12/2024)    Received from [facility]    Food Insecurity     Within the past 12 months, you worried that your food would run out before you got money to buy more.  : Never true     Within the past 12 months, the food you bought just didn't last and you didn't have money to get more. : Never true   Transportation Needs: Not At Risk (7/12/2024)    Received from [facility]    Transportation Needs     In the past 12 months, has lack of reliable transportation kept you from medical appointments, meetings, work or from getting things needed for daily living? : No   Physical Activity: Low Risk  (7/12/2024)    Received from [facility]    Exercise  Vital Sign     On average, how many days per week do you engage in moderate to strenuous exercise (like a brisk walk)?: 5 days     On average, how many minutes do you engage in exercise at this level?: 30 min   Stress: Low Risk  (7/12/2024)    Received from [facility]    Stress     Stress is when someone feels tense, nervous, anxious, or can't sleep at night because their mind is troubled. How stressed are you? : Not at all   Social Connections: Low Risk  (7/12/2024)    Received from [facility]    Social Connections     How often do you see or talk to people that you care about and feel close to? (For example: talking to friends on the phone, visiting friends or family, going to [employer] or club meetings): 3 to 5 times a week              Review of Systems   Unable to perform ROS: Age       Positive for stated Chief Complaint: Other    Other systems are as noted in HPI.  Constitutional and vital signs reviewed.      All other systems reviewed and negative except as noted above.    Physical Exam     ED Triage Vitals [08/12/24 1729]   /59   Pulse 53   Resp 20   Temp 97.5 °F (36.4 °C)   Temp src Temporal   SpO2 99 %   O2 Device None (Room air)       Current Vitals:   Vital Signs  BP: (!) 163/67  Pulse: 54  Resp: 21  Temp: 97.5 °F (36.4 °C)  Temp src: Temporal  MAP (mmHg): 96    Oxygen Therapy  SpO2: 100 %  O2 Device: None (Room air)            Physical Exam  Vitals and nursing note reviewed.   HENT:      Head: Normocephalic.      Mouth/Throat:      Pharynx: Oropharynx is clear.   Eyes:      Pupils: Pupils are equal, round, and reactive to light.   Cardiovascular:      Rate and Rhythm: Normal rate.      Pulses: Normal pulses.   Pulmonary:      Effort: Pulmonary effort is normal. No respiratory distress.   Abdominal:      General: There is no distension.      Palpations: Abdomen is soft.      Tenderness: There is no abdominal tenderness.   Musculoskeletal:      Cervical back: Neck supple.    Skin:     General: Skin is warm and dry.   Neurological:      Mental Status: He is alert.      Cranial Nerves: No cranial nerve deficit.         Limited neuroexam.  Moving all extremities.  Resting in no distress.  Patient does get agitated easily    ED Course     Labs Reviewed   COMP METABOLIC PANEL (14) - Abnormal; Notable for the following components:       Result Value    Glucose 102 (*)     CO2 19.0 (*)     Alkaline Phosphatase 123 (*)     All other components within normal limits   CBC WITH DIFFERENTIAL WITH PLATELET - Abnormal; Notable for the following components:    HGB 11.9 (*)     HCT 34.6 (*)     Lymphocyte Absolute 4.45 (*)     All other components within normal limits   URINALYSIS WITH CULTURE REFLEX - Abnormal; Notable for the following components:    Ketones Urine Trace (*)     Protein Urine 20 (*)     Leukocyte Esterase Urine 500 (*)     WBC Urine 6-10 (*)     RBC Urine 3-5 (*)     Squamous Epi. Cells Few (*)     All other components within normal limits   RAPID SARS-COV-2 BY PCR - Normal   RAINBOW DRAW LAVENDER   RAINBOW DRAW LIGHT GREEN   RAINBOW DRAW BLUE   RAINBOW DRAW GOLD   URINE CULTURE, ROUTINE     EKG    Rate, intervals and axes as noted on EKG Report.  Rate: 53  Rhythm: Sinus Rhythm  Reading: EKG sinus bradycardia 53 bpm.  Patient with a left bundle branch block and left axis deviation.  When compared to July 2024, no significant changes are noted    Patient placed on cardiac monitor for telemetry monitoring secondary to fatigue, chills. Interpretation at bedside by me is sinus rhythm.                            MDM      XR CHEST AP PORTABLE  (CPT=71045)    Result Date: 8/12/2024  CONCLUSION:  Confluent opacity left lung base is superimposed on reticular densities which could represent pneumonia superimposed on chronic postinflammatory scar.   LOCATION:  [facility]      Dictated by (CST): José Miguel Bradford MD on 8/12/2024 at 5:58 PM     Finalized by (CST): José Miguel Bradford MD on 8/12/2024 at 6:02  PM        Admission disposition: 8/12/2024  8:18 PM         I independently interpreted the x-ray the chest and note some haziness along the left hilar border    Wife at bedside helpful to provide information on the history of present illness, as is daughter over the phone    Differential diagnosis include, but not limited to, viral syndrome, bacterial infection, encephalopathy, dehydration, electrolyte disturbance    External chart review demonstrates his visits for psychiatric causes in the past    89-year-old male with chills.  Has UTI.  Given Rocephin.  Encephalopathic.  Think emanation of his depression, dementia as well as his acute infection is causing him to be a bit encephalopathic.  Wife states he been screaming at home, was walking around naked earlier today.  Think is reasonable to treat the UTI, bring him in and see if that resolves.  Can put psychiatry on a routine consult as well.  Discussed with Rajesh juarezist, awaiting bed assignment.  Wife not comfortable taking him home because of his mental history and the acute infection.  This is reasonable at his age and comorbidities.                                   Medical Decision Making      Disposition and Plan     Clinical Impression:  1. Acute cystitis with hematuria    2. Metabolic encephalopathy         Disposition:  Admit  8/12/2024  8:18 pm    Follow-up:  No follow-up provider specified.        Medications Prescribed:  Current Discharge Medication List                            Hospital Problems       Present on Admission             ICD-10-CM Noted POA    * (Principal) Acute cystitis with hematuria N30.01 8/12/2024 Unknown    Anemia D64.9 8/12/2024 Yes    Azotemia R79.89 8/12/2024 Yes    Hyperglycemia R73.9 8/12/2024 Yes    Metabolic acidosis E87.20 8/12/2024 Yes

## 2024-08-12 NOTE — ED INITIAL ASSESSMENT (HPI)
Pt arrives from home with \"feeling cold\" and pain to the bottom of his feet.  Pt mandarin speaking only.

## 2024-08-12 NOTE — ED QUICK NOTES
Pt yelling for nurses and stating that he is cold. Pt given several blankets. Wife at bedside and keeps leaving room.

## 2024-08-13 PROBLEM — G93.41 METABOLIC ENCEPHALOPATHY: Status: ACTIVE | Noted: 2024-08-13

## 2024-08-13 LAB
ATRIAL RATE: 53 BPM
P AXIS: -14 DEGREES
P-R INTERVAL: 186 MS
Q-T INTERVAL: 502 MS
QRS DURATION: 158 MS
QTC CALCULATION (BEZET): 471 MS
R AXIS: -30 DEGREES
T AXIS: 120 DEGREES
VENTRICULAR RATE: 53 BPM

## 2024-08-13 PROCEDURE — 99232 SBSQ HOSP IP/OBS MODERATE 35: CPT | Performed by: INTERNAL MEDICINE

## 2024-08-13 RX ORDER — QUETIAPINE FUMARATE 25 MG/1
25 TABLET, FILM COATED ORAL 3 TIMES DAILY PRN
Status: DISCONTINUED | OUTPATIENT
Start: 2024-08-13 | End: 2024-08-14

## 2024-08-13 RX ORDER — ASPIRIN 81 MG/1
81 TABLET, CHEWABLE ORAL DAILY
Status: DISCONTINUED | OUTPATIENT
Start: 2024-08-13 | End: 2024-08-14

## 2024-08-13 RX ORDER — ATORVASTATIN CALCIUM 40 MG/1
80 TABLET, FILM COATED ORAL DAILY
Status: DISCONTINUED | OUTPATIENT
Start: 2024-08-13 | End: 2024-08-14

## 2024-08-13 RX ORDER — ESCITALOPRAM OXALATE 20 MG/1
20 TABLET ORAL DAILY
Status: DISCONTINUED | OUTPATIENT
Start: 2024-08-13 | End: 2024-08-14

## 2024-08-13 RX ORDER — SUCRALFATE 1 G/1
1 TABLET ORAL
Status: ON HOLD | COMMUNITY

## 2024-08-13 RX ORDER — MIRTAZAPINE 7.5 MG/1
15 TABLET, FILM COATED ORAL EVERY OTHER DAY
Status: DISCONTINUED | OUTPATIENT
Start: 2024-08-13 | End: 2024-08-14

## 2024-08-13 RX ORDER — CLOPIDOGREL BISULFATE 75 MG/1
75 TABLET ORAL DAILY
Status: DISCONTINUED | OUTPATIENT
Start: 2024-08-13 | End: 2024-08-14

## 2024-08-13 RX ORDER — ENALAPRIL MALEATE 10 MG/1
20 TABLET ORAL 2 TIMES DAILY
Status: DISCONTINUED | OUTPATIENT
Start: 2024-08-13 | End: 2024-08-14

## 2024-08-13 RX ORDER — HALOPERIDOL 5 MG/ML
1 INJECTION INTRAMUSCULAR EVERY 6 HOURS PRN
Status: DISCONTINUED | OUTPATIENT
Start: 2024-08-13 | End: 2024-08-14

## 2024-08-13 RX ORDER — FINASTERIDE 5 MG/1
5 TABLET, FILM COATED ORAL DAILY
Status: DISCONTINUED | OUTPATIENT
Start: 2024-08-13 | End: 2024-08-14

## 2024-08-13 RX ORDER — TAMSULOSIN HYDROCHLORIDE 0.4 MG/1
0.4 CAPSULE ORAL
Status: DISCONTINUED | OUTPATIENT
Start: 2024-08-13 | End: 2024-08-14

## 2024-08-13 RX ORDER — AMLODIPINE BESYLATE 5 MG/1
10 TABLET ORAL DAILY
Status: DISCONTINUED | OUTPATIENT
Start: 2024-08-13 | End: 2024-08-14

## 2024-08-13 NOTE — ED QUICK NOTES
Orders for admission, patient is aware of plan and ready to go upstairs. Any questions, please call ED RN Thelma at extension [phone].     Patient Covid vaccination status: Unvaccinated     COVID Test Ordered in ED: Rapid SARS-CoV-2 by PCR    COVID Suspicion at Admission: N/A    Running Infusions:      Mental Status/LOC at time of transport: A&O x2    Other pertinent information:   CIWA score: N/A   NIH score:  N/A

## 2024-08-13 NOTE — H&P
[facility]     Deven Atkinson Patient Status:  Emergency    1930 MRN [id number]   Location [facility] EMERGENCY DEPARTMENT Attending Mauricio Ghosh, DO   Hosp Day # 0 PCP Paulo James MD     Chief Complaint: Confusion    History of Present Illness: Deven Atkinson is a 89 year old male with past medical history of hypertension, hyperlipidemia, CVA who presents for confusion per his wife.    Patient is AxO x 1, moaning and yelling out in the ER room.  Unable to give significant history despite use of Mandarin .  Patient keeps saying he is hot and wishes to leave.  Called patient's wife who notes that recently they moved into senior living facility together.  However notes that in the last 3 days has been more agitated, screaming at home, walking around naked earlier today.  Per wife patient has history of depression and prior suicide attempts, no recent SI or HI expressed by patient or wife.    Past Medical History:  Past Medical History:    Essential hypertension    Hyperlipidemia    Stroke (HCC)        Past Surgical History: History reviewed. No pertinent surgical history.    Social History:  reports that he has quit smoking. His smoking use included cigarettes. He has never used smokeless tobacco. He reports that he does not currently use alcohol. He reports that he does not use drugs.    Family History: No family history on file.    Allergies:   Allergies   Allergen Reactions    Losartan NAUSEA AND VOMITING     Adverse Reaction           (historical)    Adverse Reaction         Adverse Reaction        Adverse Reaction           (historical)       Medications:    Current Facility-Administered Medications on File Prior to Encounter   Medication Dose Route Frequency Provider Last Rate Last Admin    [COMPLETED] famotidine (Pepcid) 20 mg/2mL injection 20 mg  20 mg Intravenous Once Rick Holland MD   20 mg at 24 0039    [COMPLETED] sodium chloride 0.9  % IV bolus 1,000 mL  1,000 mL Intravenous Once Rick Holland MD   Stopped at 07/27/24 0141    [COMPLETED] alum-mag hydroxide-simethicone (Maalox) 200-200-20 MG/5ML oral suspension 30 mL  30 mL Oral Once Rick Holland MD   30 mL at 07/27/24 0039    [COMPLETED] iopamidol 76% (ISOVUE-370) injection for power injector  80 mL Intravenous ONCE PRN Joshua Harley MD   80 mL at 07/02/24 6193     Current Outpatient Medications on File Prior to Encounter   Medication Sig Dispense Refill    famotidine (PEPCID) 40 MG Oral Tab Take 1 tablet (40 mg total) by mouth 2 (two) times daily as needed for Heartburn. 30 tablet 0    amLODIPine 10 MG Oral Tab Take 1 tablet (10 mg total) by mouth daily.      aspirin 81 MG Oral Chew Tab CHEW AND SWALLOW ONE TABLET EVERY DAY FOR PROPHYLAXIS      atorvastatin 80 MG Oral Tab Take 1 tablet (80 mg total) by mouth daily.      clopidogrel 75 MG Oral Tab Take 1 tablet (75 mg total) by mouth daily.      Dextran 70-Hypromellose (ARTIFICIAL TEARS) 0.1-0.3 % Ophthalmic Solution Apply 1 drop to eye.      dutasteride 0.5 MG Oral Cap Take 1 capsule (0.5 mg total) by mouth daily.      enalapril 20 MG Oral Tab Take 1 tablet (20 mg total) by mouth 2 (two) times daily.      escitalopram 20 MG Oral Tab Take 1 tablet (20 mg total) by mouth daily.      fluticasone propionate 50 MCG/ACT Nasal Suspension 1 spray by Nasal route daily.      hydrocortisone 2.5 % External Cream       Meloxicam 7.5 MG Oral Tab Take 1 tablet (7.5 mg total) by mouth 2 (two) times daily as needed.      metoclopramide 10 MG Oral Tab Take 1 tablet (10 mg total) by mouth 4 (four) times daily.      mirtazapine 15 MG Oral Tab Take 1 tablet (15 mg total) by mouth every other day.      pantoprazole 40 MG Oral Tab EC Take 1 tablet (40 mg total) by mouth every morning.      Sennosides 17.2 MG Oral Tab Take 1 tablet (17.2 mg total) by mouth daily as needed.      sennosides-docusate 8.6-50 MG Oral Tab Take 1 tablet by mouth daily.      tamsulosin 0.4 MG  Oral Cap 1 capsule (0.4 mg total) daily.      Valproate Sodium (VALPROIC ACID) 250 MG/5ML Oral Solution Take 125 mg by mouth daily.         Review of Systems:   A comprehensive 14 point review of systems was completed.    Pertinent positives and negatives noted in the HPI.    Physical Exam:    BP (!) 163/67   Pulse 54   Temp 97.5 °F (36.4 °C) (Temporal)   Resp 21   Wt 156 lb 1.4 oz (70.8 kg)   SpO2 100%   BMI 22.40 kg/m²   General: No acute distress. Alert and oriented x 1.  HEENT: Normocephalic atraumatic. Moist mucous membranes. EOM-I. PERRLA. Anicteric.  Neck: No lymphadenopathy. No JVD. No carotid bruits.  Respiratory: Clear to auscultation bilaterally. No wheezes. No rhonchi.  Cardiovascular: S1, S2. Regular rate and rhythm. No murmurs, rubs or gallops. Equal pulses.   Chest and Back: No tenderness or deformity.  Abdomen: Soft, nontender, nondistended.  Positive bowel sounds. No rebound, guarding or organomegaly.  Neurologic: No focal neurological deficits. CNII-XII grossly intact.  Musculoskeletal: Moves all extremities.  Extremities: No edema or cyanosis.  Integument: No rashes or lesions.   Psychiatric: Appropriate mood and affect.    Diagnostic Data:      Labs:  Recent Labs   Lab 08/12/24  1730   WBC 9.1   HGB 11.9*   MCV 83.8   .0       Recent Labs   Lab 08/12/24  1730   *   BUN 20   CREATSERUM 0.99   CA 9.4   ALB 4.1      K 3.6      CO2 19.0*   ALKPHO 123*   AST 22   ALT 17   BILT 0.4   TP 7.0       Estimated Creatinine Clearance: 45.7 mL/min (based on SCr of 0.99 mg/dL).    No results for input(s): \"PTP\", \"INR\" in the last 168 hours.    No results for input(s): \"TROP\", \"CK\" in the last 168 hours.    Imaging: Imaging data reviewed in [facility].  XR CHEST AP PORTABLE  (CPT=71045)    Result Date: 8/12/2024  CONCLUSION:  Confluent opacity left lung base is superimposed on reticular densities which could represent pneumonia superimposed on chronic postinflammatory scar.   LOCATION:   [facility]      Dictated by (CST): José Miguel Bradford MD on 8/12/2024 at 5:58 PM     Finalized by (CST): José Miguel Bradford MD on 8/12/2024 at 6:02 PM          ASSESSMENT / PLAN:     # Acute metabolic encephalopathy  # UTI  # Suspected underlying dementia    - IV abx, f/u Ucx   - Psychiatry consulted given hx depression, unspecified psych hx per wife   - outpatient evaluation by neuropsych recommended      # Hypertension - Continue home oral antihypertensives  # Hyperlipidemia - continue home statin   # CVA - ASA, plavix, statin   # Depression - lexapro, valproate, remeron; further adjustments per psych on consult         Code Status: Not on file    Plan of care discussed with patient, ED physician    Jacob Edmondson MD  8/12/2024    Supplementary Documentation:      MDM : Patient's ER labs, imaging reviewed.  A.m. labs ordered.  ER management discussed with ED physician, decision made for patient to be admitted to the hospital for further medical management.

## 2024-08-13 NOTE — ED QUICK NOTES
Pt resting on ED cart. Occasionally c/o \"feeling hot and cold.\" Pt A&O x 2 at baseline per wife. Awaiting urine results. Pt kept comfortable, all needs attended to at this time. Wife at bedside.

## 2024-08-13 NOTE — ED QUICK NOTES
Pt sleeping comfortably on ED cart in NAD. RR even and non-labored. VSS. Pt within RN's line of sight.

## 2024-08-13 NOTE — PLAN OF CARE
NURSING ADMISSION NOTE      Patient admitted via Cart  Oriented to room.  Safety precautions initiated.  Bed in low position.  Call light in reach.  Patient lethargic upon arrival. Mandarin-speaking.  Attempted to orientate w/ language line, unsuccesful.  Admission simone and med rec done w/ spouse, Radha, over the phone.    AxO2 @ baseline per spouse. VSS on RA. Appears to be resting comfortably. Unknown LBM - per spouse. On IV rocephin Q24 for UTI. farooq Heller @ baseline. Recently moved at [facility]. Spouse updated on POC. Says plan to visit  later today. Safety measures placed. Care ongoing.

## 2024-08-13 NOTE — PLAN OF CARE
Alert and oriented x 3, forgetful. Mandarin speaking.  line used for assessments. Vitals stable on room air. Denies pain or discomfort. Voiding without difficulty. Tolerating regular diet. Tolerating antibiotic.  Safety precautions in place.

## 2024-08-13 NOTE — CM/SW NOTE
08/13/24 1300   CM/SW Referral Data   Referral Source Social Work (self-referral)   Reason for Referral Discharge planning;PHQ4/PHQ9   Informant EMR;Clinical Staff Member   Patient Info   Patient's Current Mental Status at Time of Assessment Alert;Memory Impairments   Patient Communication Issues Language barrier   Patient's Home Environment Condo/Apt with elevator   Number of Levels in Home 1   Patient lives with Spouse/Significant other   Discharge Needs   Anticipated D/C needs To be determined       Patient is a 89 y/o man admitted with UTI with hematuria and confusion.  Pt lives with his wife in the [facility] apartments.  Order received for PHQ4.  Spoke with RN who stated psych has been consulted - will defer to them for assessment and resources.  SW to follow for MD and therapy recommendations for any other DC needs.    Yamilex Haile, [facility]  Discharge Planner  [phone]

## 2024-08-14 VITALS
SYSTOLIC BLOOD PRESSURE: 149 MMHG | OXYGEN SATURATION: 95 % | TEMPERATURE: 98 F | BODY MASS INDEX: 22 KG/M2 | DIASTOLIC BLOOD PRESSURE: 58 MMHG | HEART RATE: 62 BPM | WEIGHT: 156.06 LBS | RESPIRATION RATE: 18 BRPM

## 2024-08-14 LAB — PROCALCITONIN SERPL-MCNC: 0.08 NG/ML (ref ?–0.05)

## 2024-08-14 PROCEDURE — 99239 HOSP IP/OBS DSCHRG MGMT >30: CPT | Performed by: INTERNAL MEDICINE

## 2024-08-14 RX ORDER — NIACIN 500 MG
500 TABLET ORAL
Status: ON HOLD | COMMUNITY

## 2024-08-14 RX ORDER — SENNOSIDES 8.6 MG
17.2 TABLET ORAL NIGHTLY PRN
Status: DISCONTINUED | OUTPATIENT
Start: 2024-08-14 | End: 2024-08-14

## 2024-08-14 RX ORDER — CEPHALEXIN 500 MG/1
500 CAPSULE ORAL 4 TIMES DAILY
Qty: 20 CAPSULE | Refills: 0 | Status: ON HOLD | OUTPATIENT
Start: 2024-08-14 | End: 2024-08-19

## 2024-08-14 RX ORDER — POLYETHYLENE GLYCOL 3350 17 G/17G
17 POWDER, FOR SOLUTION ORAL DAILY PRN
Status: DISCONTINUED | OUTPATIENT
Start: 2024-08-14 | End: 2024-08-14

## 2024-08-14 RX ORDER — ENEMA 19; 7 G/133ML; G/133ML
1 ENEMA RECTAL ONCE AS NEEDED
Status: DISCONTINUED | OUTPATIENT
Start: 2024-08-14 | End: 2024-08-14

## 2024-08-14 RX ORDER — ACETAMINOPHEN 500 MG
500 TABLET ORAL EVERY 4 HOURS PRN
Status: DISCONTINUED | OUTPATIENT
Start: 2024-08-14 | End: 2024-08-14

## 2024-08-14 RX ORDER — TRAZODONE HYDROCHLORIDE 50 MG/1
50 TABLET ORAL NIGHTLY
Status: ON HOLD | COMMUNITY

## 2024-08-14 RX ORDER — MELATONIN
3 NIGHTLY PRN
Status: DISCONTINUED | OUTPATIENT
Start: 2024-08-14 | End: 2024-08-14

## 2024-08-14 RX ORDER — BISACODYL 10 MG
10 SUPPOSITORY, RECTAL RECTAL
Status: DISCONTINUED | OUTPATIENT
Start: 2024-08-14 | End: 2024-08-14

## 2024-08-14 RX ORDER — ZINC SULFATE 50(220)MG
220 CAPSULE ORAL DAILY
Status: ON HOLD | COMMUNITY

## 2024-08-14 RX ORDER — ONDANSETRON 2 MG/ML
4 INJECTION INTRAMUSCULAR; INTRAVENOUS EVERY 6 HOURS PRN
Status: DISCONTINUED | OUTPATIENT
Start: 2024-08-14 | End: 2024-08-14

## 2024-08-14 RX ORDER — ENOXAPARIN SODIUM 100 MG/ML
40 INJECTION SUBCUTANEOUS DAILY
Status: DISCONTINUED | OUTPATIENT
Start: 2024-08-14 | End: 2024-08-14

## 2024-08-14 RX ORDER — MULTIVIT WITH MINERALS/LUTEIN
250 TABLET ORAL DAILY
Status: ON HOLD | COMMUNITY

## 2024-08-14 NOTE — PROGRESS NOTES
NURSING DISCHARGE NOTE    Discharged Home via Wheelchair.  Accompanied by Spouse and Support staff  Belongings Taken by patient/family.    Discharge instructions reviewed with wife at bedside, stated understanding. Patient taken down to [facility] via wheelchair for discharge home.

## 2024-08-14 NOTE — PLAN OF CARE
Pt A&Ox2, pleasantly confused and Mandarin speaking. VSS on RA. Pt denies pain at this time. Up SBA voiding in the bathroom. IV abx Q24 NOC shift, saline lock otherwise. Plan to DC home when medically cleared. Call light in reach, safety measures in place.

## 2024-08-14 NOTE — PLAN OF CARE
Patient alert, oriented x2. Pleasantly confused. Denies pain. Up with SBA. Voiding in the bathroom. Tolerating diet, no N/V. Took all medications this AM without issues.     Plan: PT/OT to see, discharge home with PO abx.

## 2024-08-14 NOTE — PHYSICAL THERAPY NOTE
PHYSICAL THERAPY EVALUATION - INPATIENT     Room Number: 366/366-A  Evaluation Date: 2024  Type of Evaluation: Initial  Physician Order: PT Eval and Treat    Presenting Problem: AMS  Co-Morbidities : HTN, HLD, CVA  Reason for Therapy: Mobility Dysfunction and Discharge Planning    PHYSICAL THERAPY ASSESSMENT   Patient is a 89 year old male admitted 2024 for AMS.   Patient is currently functioning near baseline with bed mobility, transfers, and gait. Prior to admission, patient's baseline is independent with no AD.     Patient is functioning near baseline and does not have skilled PT needs upon DC.     PLAN  Patient has been evaluated and presents with no skilled Physical Therapy needs at this time.  Patient discharged from Physical Therapy services.  Please re-order if a new functional limitation presents during this admission.    GOALS  Patient was able to achieve the following goals ...    Patient was able to transfer At previous, functional level   Patient able to ambulate on level surfaces At previous, functional level         HOME SITUATION  Type of Home: House (senior living)   Home Layout: One level  Stairs to Enter : 0     Stairs to Bedroom: 0       Lives With: Spouse  Drives: No  Patient Owned Equipment: None       Prior Level of Collin: Pt is typically mod ind for dressing and toileting but spouse assist with showers and occasionally with pulling up pants after toileting. Pt ambulates independently with no AD.     SUBJECTIVE  Pt pleasant and cooperative      OBJECTIVE  Precautions: Bed/chair alarm; needed  Fall Risk: Standard fall risk    WEIGHT BEARING RESTRICTION  Weight Bearing Restriction: None                PAIN ASSESSMENT  Ratin          COGNITION  Not formally assessed, pt answering questions appropriately and following commands well     RANGE OF MOTION AND STRENGTH ASSESSMENT  Upper extremity ROM and strength are within functional limits     Lower extremity ROM is  within functional limits     Lower extremity strength is within functional limits       BALANCE  Static Sitting: Good  Dynamic Sitting: Good  Static Standing: Good  Dynamic Standing: Good    ADDITIONAL TESTS                                    ACTIVITY TOLERANCE   Denies dizziness                      O2 WALK       NEUROLOGICAL FINDINGS                        AM-PAC '6-Clicks' INPATIENT SHORT FORM - BASIC MOBILITY  How much difficulty does the patient currently have...  Patient Difficulty: Turning over in bed (including adjusting bedclothes, sheets and blankets)?: None   Patient Difficulty: Sitting down on and standing up from a chair with arms (e.g., wheelchair, bedside commode, etc.): None   Patient Difficulty: Moving from lying on back to sitting on the side of the bed?: None   How much help from another person does the patient currently need...   Help from Another: Moving to and from a bed to a chair (including a wheelchair)?: A Little   Help from Another: Need to walk in hospital room?: A Little   Help from Another: Climbing 3-5 steps with a railing?: A Little       AM-PAC Score:  Raw Score: 21   Approx Degree of Impairment: 28.97%   Standardized Score (AM-PAC Scale): 50.25   CMS Modifier (G-Code): CJ    FUNCTIONAL ABILITY STATUS  Gait Assessment   Functional Mobility/Gait Assessment  Gait Assistance: Supervision  Distance (ft): 300  Assistive Device: None  Pattern: Within Functional Limits    Skilled Therapy Provided: Per RN valerio to work with pt. Pt received in supine and was agreeable to PT session.  utilized for session.     Bed Mobility:  Rolling: NT  Supine to sit: mod ind    Sit to supine: NT     Transfer Mobility:  Sit to stand: independent    Stand to sit: independent   Gait = pt ambulated in steele with no AD and supervision. Pt with quick nikolas. Pt steady, no LOB noted    Therapist's comments:Pt educated on role of therapy, goals for session, safety, fall prevention, and activity  recommendations.     Exercise/Education Provided:  Bed mobility  Energy conservation  Functional activity tolerated  Gait training  Posture  Strengthening  Transfer training    Patient End of Session: Up in chair;Needs met;Call light within reach;RN aware of session/findings;All patient questions and concerns addressed;Alarm set;Family present;Discussed recommendations with /    Patient Evaluation Complexity Level:  History Moderate - 1 or 2 personal factors and/or co-morbidities   Examination of body systems Low -  addressing 1-2 elements   Clinical Presentation Low- Stable   Clinical Decision Making Low Complexity       PT Session Time: 25 minutes  Gait Trainin minutes

## 2024-08-14 NOTE — DISCHARGE SUMMARY
[facility]     Deven Atkinson Patient Status:  Inpatient    1930 MRN [id number]   Location [facility] 3SW-A Attending Fiona Lee MD   Hosp Day # 1 PCP Paulo James MD     Date of Admission: 2024  Date of Discharge:   2024    Discharge Disposition: Home or Self Care    Discharge Diagnosis:  Altered mental status suspected secondary to delirium  Possible UTI  Hypertension  Hyperlipidemia  Cerebrovascular disease with history of CVA  Depression  Suspected dementia    History of Present Illness:   89 year old male with past medical history of hypertension, hyperlipidemia, CVA who presents for confusion per his wife.     Patient is AxO x 1, moaning and yelling out in the ER room.  Unable to give significant history despite use of Mandarin .  Patient keeps saying he is hot and wishes to leave.  Called patient's wife who notes that recently they moved into senior living facility together.  However notes that in the last 3 days has been more agitated, screaming at home, walking around naked earlier today.  Per wife patient has history of depression and prior suicide attempts, no recent SI or HI expressed by patient or wife.    Brief Synopsis:   #Altered mental status secondary to delirium  #Possible UTI  Per patient's spouse, patient recently moved into a new senior living facility with her.  He complained of feeling cold on 1 side of the body.  Spouse also reports confusion.  Admission blood work overall unremarkable.  Chest x-ray on admission with confluent opacity in left lung base.  Patient without pneumonia symptoms.  This finding was present on chest x-ray about 3 weeks prior to this admission.  Doubt this is a pneumonia.  Urine culture grew less than 10,000 CFU Proteus mirabilis sensitive to Keflex.  Patient was discharged with 5 days of Keflex.  Per patient's wife, he was back to his baseline mental status.    Lace+ Score: 58  59-90 High  Risk  29-58 Medium Risk  0-28   Low Risk       TCM Follow-Up Recommendation:  LACE > 58: High Risk of readmission after discharge from the hospital.  **Certification    Admission date was 8/12/2024.  Inpatient stay was shorter than expected.  Patient's Acute cystitis with hematuria was initially serious enough to expect a more lengthy hospitalization but patient improved faster than expected.                 Procedures during hospitalization:   NA    Incidental or significant findings and recommendations (brief descriptions):  NA    Lab/Test results pending at Discharge:   NA    Consultants:  NA    Discharge Medication List:     Discharge Medications        START taking these medications        Instructions Prescription details   cephalexin 500 MG Caps  Commonly known as: Keflex      Take 1 capsule (500 mg total) by mouth 4 (four) times daily for 5 days.   Stop taking on: August 19, 2024  Quantity: 20 capsule  Refills: 0            CONTINUE taking these medications        Instructions Prescription details   Artificial Tears 0.1-0.3 % Soln  Generic drug: Dextran 70-Hypromellose      Apply 1 drop to eye.   Refills: 0     ascorbic acid 250 MG Tabs  Commonly known as: Vitamin C      Take 1 tablet (250 mg total) by mouth daily.   Refills: 0     aspirin 81 MG Chew      Chew 1 tablet (81 mg total) by mouth daily.   Refills: 0     atorvastatin 80 MG Tabs  Commonly known as: Lipitor      Take 1 tablet (80 mg total) by mouth daily.   Refills: 0     cholecalciferol 1000 UNITS Caps  Commonly known as: Vitamin D3      Take 1 capsule (1,000 Units total) by mouth daily.   Refills: 0     clopidogrel 75 MG Tabs  Commonly known as: Plavix      Take 1 tablet (75 mg total) by mouth daily.   Refills: 0     dutasteride 0.5 MG Caps  Commonly known as: Avodart      Take 1 capsule (0.5 mg total) by mouth daily.   Refills: 0     enalapril 20 MG Tabs  Commonly known as: Vasotec      Take 1 tablet (20 mg total) by mouth 2 (two) times daily.    189.23 Refills: 0     escitalopram 20 MG Tabs  Commonly known as: Lexapro      Take 1 tablet (20 mg total) by mouth daily.   Refills: 0     mirtazapine 15 MG Tabs  Commonly known as: Remeron      Take 1 tablet (15 mg total) by mouth every other day.   Refills: 0     niacin 500 MG Tabs      Take 1 tablet (500 mg total) by mouth daily with breakfast.   Refills: 0     sennosides-docusate 8.6-50 MG Tabs  Commonly known as: Pericolace      Take 1 tablet by mouth daily.   Refills: 0     sucralfate 1 g Tabs  Commonly known as: Carafate      Take 1 tablet (1 g total) by mouth 3 (three) times daily before meals.   Refills: 0     tamsulosin 0.4 MG Caps  Commonly known as: Flomax      1 capsule (0.4 mg total) daily.   Refills: 0     traZODone 50 MG Tabs  Commonly known as: Desyrel      Take 1 tablet (50 mg total) by mouth nightly.   Refills: 0     Valproic Acid 250 MG/5ML Soln      Take 125 mg by mouth daily.   Refills: 0     zinc sulfate 220 (50 Zn) MG Caps  Commonly known as: Zincate      Take 1 capsule (220 mg total) by mouth daily.   Refills: 0            STOP taking these medications      amLODIPine 10 MG Tabs  Commonly known as: Norvasc        famotidine 40 MG Tabs  Commonly known as: Pepcid        Meloxicam 7.5 MG Tabs        metoclopramide 10 MG Tabs  Commonly known as: Reglan        pantoprazole 40 MG Tbec  Commonly known as: Protonix                  Where to Get Your Medications        These medications were sent to [id number] - [place], [place] - [address] [phone], [phone]  [address], [facility] [address]      Phone: [phone]   cephalexin 500 MG Caps         [place]    Follow-up appointment:   Jose Manuel Barajas PsyD [address] [facility] [address]  [place] [id number] [phone]    Call in 1 week(s)  Establish care for full neuropsych evaluation    Paulo James MD  [address] [facility] [address]  [facility] [address] [phone]    Call in 1  week(s)      Appointments for Next 30 Days 8/15/2024 - 2024      None            Vital signs:       Physical Exam:    General: No acute distress   Lungs: clear to auscultation  Cardiovascular: S1, S2  Abdomen: Soft    -----------------------------------------------------------------------------------------------  PATIENT DISCHARGE INSTRUCTIONS: See electronic chart    Fiona Lee MD    Total time spent on discharge plannin minutes     The  [employer] Cures Act makes medical notes like these available to patients in the interest of transparency. Please be advised this is a medical document. Medical documents are intended to carry relevant information, facts as evident, and the clinical opinion of the practitioner. The medical note is intended as peer to peer communication and may appear blunt or direct. It is written in medical language and may contain abbreviations or verbiage that are unfamiliar.

## 2024-08-14 NOTE — PROGRESS NOTES
[facility]   part of [facility]     Hospitalist Progress Note     Deven Mata Atkinson Patient Status:  Inpatient    1930 MRN [id number]   Location [facility] 3SW-A Attending Fiona Lee MD   Hosp Day # 0 PCP Paulo James MD     Chief Complaint: AMS    Subjective:     Mandarin interpretor assisted with today's evaluation. Patient is oriented to self, place, but lacks insight. Denies LUTS, chest pain, SOB, cough. Confused.     Objective:    Review of Systems:   A comprehensive review of systems was completed; pertinent positive and negatives stated in subjective.    Vital signs:  Temp:  [97.3 °F (36.3 °C)-98.7 °F (37.1 °C)] 98.2 °F (36.8 °C)  Pulse:  [51-98] 52  Resp:  [15-19] 16  BP: (127-155)/(54-70) 133/54  SpO2:  [93 %-99 %] 96 %    Physical Exam:    General: No acute distress  Respiratory: No wheezes, no rhonchi  Cardiovascular: S1, S2, regular rate and rhythm  Abdomen: Soft, Non-tender, non-distended, positive bowel sounds  Neuro: Confused. Oriented to self and place only.   Extremities: No edema    Diagnostic Data:    Labs:  Recent Labs   Lab 24  1730   WBC 9.1   HGB 11.9*   MCV 83.8   .0       Recent Labs   Lab 24  1730   *   BUN 20   CREATSERUM 0.99   CA 9.4   ALB 4.1      K 3.6      CO2 19.0*   ALKPHO 123*   AST 22   ALT 17   BILT 0.4   TP 7.0       Estimated Creatinine Clearance: 45.7 mL/min (based on SCr of 0.99 mg/dL).    No results for input(s): \"TROP\", \"TROPHS\", \"CK\" in the last 168 hours.    No results for input(s): \"PTP\", \"INR\" in the last 168 hours.               Microbiology    No results found for this visit on 24.      Imaging: Reviewed in Epic.    Medications:    cefTRIAXone  1 g Intravenous Q24H    amLODIPine  10 mg Oral Daily    aspirin  81 mg Oral Daily    atorvastatin  80 mg Oral Daily    clopidogrel  75 mg Oral Daily    finasteride  5 mg Oral Daily    enalapril  20 mg Oral BID    escitalopram  20 mg Oral Daily     tamsulosin  0.4 mg Oral Daily @ 0700    mirtazapine  15 mg Oral QOD    valproic acid  125 mg Oral Daily       Assessment & Plan:      #AMS, suspect delirium   Unclear etiology. UA is positive, unclear if this is a UTI   -Continue empiric antibiotics  -Awaiting cultures   -Will contact family to discuss further in AM     #Hypertension - continue home meds  #HLD-statin  #CVA-aspirin, statin, plavix  #Depression - continue home meds    Fiona Lee MD    Supplementary Documentation:     Quality:  DVT Mechanical Prophylaxis:        DVT Pharmacologic Prophylaxis   Medication   None      DVT Pharmacologic prophylaxis: Aspirin 162 mg         Code Status: Not on file  Jay: No urinary catheter in place  Jay Duration (in days):   Central line:    MAG:     Discharge is dependent on: course  At this point Mr. Atkinson is expected to be discharge to: home    The 21st Century Cures Act makes medical notes like these available to patients in the interest of transparency. Please be advised this is a medical document. Medical documents are intended to carry relevant information, facts as evident, and the clinical opinion of the practitioner. The medical note is intended as peer to peer communication and may appear blunt or direct. It is written in medical language and may contain abbreviations or verbiage that are unfamiliar.              **Certification      PHYSICIAN Certification of Need for Inpatient Hospitalization - Initial Certification    Patient will require inpatient services that will reasonably be expected to span two midnight's based on the clinical documentation in H+P.   Based on patients current state of illness, I anticipate that, after discharge, patient will require TBD.

## 2024-08-14 NOTE — PAYOR COMM NOTE
--------------  ADMISSION REVIEW     Payor: BCBS MEDICARE ADV PPO  Subscriber #:  [id number]  Authorization Number: [id number]    Admit date: 8/13/24  Admit time:  1:55 AM       REVIEW DOCUMENTATION:     ED Provider Notes          Stated Complaint: other    Subjective:   89-year-old male, history of hypertension, CVA, psychiatric illness, presents with chills.  Wife states they recently moved into a new senior center.  States he been complaining of feeling hot and cold all throughout the day.  Department for evaluation.  Limited HPI/ROS secondary to age.      Physical Exam     ED Triage Vitals [08/12/24 1729]   /59   Pulse 53   Resp 20   Temp 97.5 °F (36.4 °C)   Temp src Temporal   SpO2 99 %   O2 Device None (Room air)       Current Vitals:   Vital Signs  BP: (!) 163/67  Pulse: 54  Resp: 21  Temp: 97.5 °F (36.4 °C)  Temp src: Temporal  MAP (mmHg): 96    Oxygen Therapy  SpO2: 100 %  O2 Device: None (Room air)        Physical Exam  Vitals and nursing note reviewed.   HENT:      Head: Normocephalic.      Mouth/Throat:      Pharynx: Oropharynx is clear.   Eyes:      Pupils: Pupils are equal, round, and reactive to light.   Cardiovascular:      Rate and Rhythm: Normal rate.      Pulses: Normal pulses.   Pulmonary:      Effort: Pulmonary effort is normal. No respiratory distress.   Abdominal:      General: There is no distension.      Palpations: Abdomen is soft.      Tenderness: There is no abdominal tenderness.   Musculoskeletal:      Cervical back: Neck supple.   Skin:     General: Skin is warm and dry.   Neurological:      Mental Status: He is alert.      Cranial Nerves: No cranial nerve deficit.         Limited neuroexam.  Moving all extremities.  Resting in no distress.  Patient does get agitated easily    ED Course     Labs Reviewed   COMP METABOLIC PANEL (14) - Abnormal; Notable for the following components:       Result Value    Glucose 102 (*)     CO2 19.0 (*)     Alkaline Phosphatase 123 (*)     All  other components within normal limits   CBC WITH DIFFERENTIAL WITH PLATELET - Abnormal; Notable for the following components:    HGB 11.9 (*)     HCT 34.6 (*)     Lymphocyte Absolute 4.45 (*)     All other components within normal limits   URINALYSIS WITH CULTURE REFLEX - Abnormal; Notable for the following components:    Ketones Urine Trace (*)     Protein Urine 20 (*)     Leukocyte Esterase Urine 500 (*)     WBC Urine 6-10 (*)     RBC Urine 3-5 (*)     Squamous Epi. Cells Few (*)     All other components within normal limits   EKG    Rate, intervals and axes as noted on EKG Report.  Rate: 53  Rhythm: Sinus Rhythm  Reading: EKG sinus bradycardia 53 bpm.  Patient with a left bundle branch block and left axis deviation.  When compared to July 2024, no significant changes are noted        MDM      XR CHEST AP PORTABLE  (CPT=71045)    Result Date: 8/12/2024  CONCLUSION:  Confluent opacity left lung base is superimposed on reticular densities which could represent pneumonia superimposed on chronic postinflammatory scar.   LOCATION:  [place]      Dictated by (CST): José Miguel Bradford MD on 8/12/2024 at 5:58 PM     Finalized by (CST): José Miguel Bradford MD on 8/12/2024 at 6:02 PM        Admission disposition: 8/12/2024  8:18 PM      89-year-old male with chills.  Has UTI.  Given Rocephin.  Encephalopathic.  Think emanation of his depression, dementia as well as his acute infection is causing him to be a bit encephalopathic.  Wife states he been screaming at home, was walking around naked earlier today.  Think is reasonable to treat the UTI, bring him in and see if that resolves.  Can put psychiatry on a routine consult as well.  Discussed with Rajesh hospitalist, awaiting bed assignment.  Wife not comfortable taking him home because of his mental history and the acute infection.  This is reasonable at his age and comorbidities.        Disposition and Plan     Clinical Impression:  1. Acute cystitis with hematuria    2. Metabolic  encephalopathy         Disposition:  Admit  2024  8:18 pm    Hospital Problems       Present on Admission             ICD-10-CM Noted POA    * (Principal) Acute cystitis with hematuria N30.01 2024 Unknown    Anemia D64.9 2024 Yes    Azotemia R79.89 2024 Yes    Hyperglycemia R73.9 2024 Yes    Metabolic acidosis E87.20 2024 Yes             [place] HOSPITALIST  History and Physical            Deven Atkinson Patient Status:  Emergency    1930 MRN [id number]   Location [facility] EMERGENCY DEPARTMENT Attending Mauricio Ghosh, DO   Hosp Day # 0 PCP Paulo James MD      Chief Complaint: Confusion     History of Present Illness: Deven Atkinson is a 89 year old male with past medical history of hypertension, hyperlipidemia, CVA who presents for confusion per his wife.     Patient is AxO x 1, moaning and yelling out in the ER room.  Unable to give significant history despite use of Mandarin .  Patient keeps saying he is hot and wishes to leave.  Called patient's wife who notes that recently they moved into senior living facility together.  However notes that in the last 3 days has been more agitated, screaming at home, walking around naked earlier today.  Per wife patient has history of depression and prior suicide attempts, no recent SI or HI expressed by patient or wife.      ASSESSMENT / PLAN:      # Acute metabolic encephalopathy  # UTI  # Suspected underlying dementia    - IV abx, f/u Ucx   - Psychiatry consulted given hx depression, unspecified psych hx per wife   - outpatient evaluation by neuropsych recommended      # Hypertension - Continue home oral antihypertensives  # Hyperlipidemia - continue home statin   # CVA - ASA, plavix, statin   # Depression - lexapro, valproate, remeron; further adjustments per psych on consult           Code Status: Not on file     Plan of care discussed with patient, ED physician     Jacob Edmondson,  MD  8/12/2024 8/13 HOSPITALIST:      Chief Complaint: AMS        Subjective:  Mandarin interpretor assisted with today's evaluation. Patient is oriented to self, place, but lacks insight. Denies LUTS, chest pain, SOB, cough. Confused.            Objective:  Review of Systems:   A comprehensive review of systems was completed; pertinent positive and negatives stated in subjective.     Vital signs:  Temp:  [97.3 °F (36.3 °C)-98.7 °F (37.1 °C)] 98.2 °F (36.8 °C)  Pulse:  [51-98] 52  Resp:  [15-19] 16  BP: (127-155)/(54-70) 133/54  SpO2:  [93 %-99 %] 96 %     Physical Exam:    General: No acute distress  Respiratory: No wheezes, no rhonchi  Cardiovascular: S1, S2, regular rate and rhythm  Abdomen: Soft, Non-tender, non-distended, positive bowel sounds  Neuro: Confused. Oriented to self and place only.   Extremities: No edema     Diagnostic Data:    Labs:      Recent Labs   Lab 08/12/24  1730   WBC 9.1   HGB 11.9*   MCV 83.8   .0             Recent Labs   Lab 08/12/24  1730   *   BUN 20   CREATSERUM 0.99   CA 9.4   ALB 4.1      K 3.6      CO2 19.0*   ALKPHO 123*   AST 22   ALT 17   BILT 0.4   TP 7.0              Assessment & Plan:  #AMS, suspect delirium   Unclear etiology. UA is positive, unclear if this is a UTI   -Continue empiric antibiotics  -Awaiting cultures   -Will contact family to discuss further in AM      #Hypertension - continue home meds  #HLD-statin  #CVA-aspirin, statin, plavix  #Depression - continue home meds     Fiona Lee MD           Discharge is dependent on: course  At this point Mr. Atkinson is expected to be discharge to: home         **Certification        PHYSICIAN Certification of Need for Inpatient Hospitalization - Initial Certification     Patient will require inpatient services that will reasonably be expected to span two midnight's based on the clinical documentation in H+P.   Based on patients current state of illness, I anticipate that, after discharge,  patient will require TBD.                                 MEDICATIONS ADMINISTERED IN LAST 1 DAY:  amLODIPine (Norvasc) tab 10 mg       Date Action Dose Route User    8/14/2024 0807 Given 10 mg Oral Kori Tan RN          aspirin chewable tab 81 mg       Date Action Dose Route User    8/14/2024 0807 Given 81 mg Oral Kori Tan RN          atorvastatin (Lipitor) tab 80 mg       Date Action Dose Route User    8/14/2024 0807 Given 80 mg Oral Kori Tan RN          cefTRIAXone (Rocephin) 1 g in sodium chloride 0.9% 100 mL IVPB-ADDV       Date Action Dose Route User    8/13/2024 2027 New Bag 1 g Intravenous Oneyda Bernardo RN          clopidogrel (Plavix) tab 75 mg       Date Action Dose Route User    8/14/2024 0807 Given 75 mg Oral Kori Tan RN          enalapril (Vasotec) tab 20 mg       Date Action Dose Route User    8/14/2024 0807 Given 20 mg Oral Kori Tan RN    8/13/2024 2039 Given 20 mg Oral Oneyda Bernardo RN          escitalopram (Lexapro) tab 20 mg       Date Action Dose Route User    8/14/2024 0807 Given 20 mg Oral Kori Tan RN          finasteride (Proscar) tab 5 mg       Date Action Dose Route User    8/14/2024 0807 Given 5 mg Oral Kori Tan RN          tamsulosin (Flomax) cap 0.4 mg       Date Action Dose Route User    8/14/2024 0553 Given 0.4 mg Oral Oneyda Bernardo RN          valproic acid (Depakene) 250 MG/5ML oral solution 125 mg       Date Action Dose Route User    8/14/2024 0807 Given 125 mg Oral Kori Tan RN            Vitals (last day)       Date/Time Temp Pulse Resp BP SpO2 Weight O2 Device O2 Flow Rate (L/min) Who    08/14/24 0730 97.7 °F (36.5 °C) 62 18 149/58 95 % -- None (Room air) -- SM    08/14/24 0609 -- 74 -- -- 95 % -- -- -- AE    08/14/24 0345 97.4 °F (36.3 °C) 55 18 149/58 94 % -- None (Room air) -- AE    08/13/24 2200 -- 52 -- -- 96 % -- -- -- AE 08/13/24 2030 98.2 °F (36.8 °C) 53 16 133/54 94 % -- None (Room air) -- AE     08/13/24 1828 -- 73 -- -- 96 % -- -- -- MG    08/13/24 1700 97.8 °F (36.6 °C) 57 19 127/57 95 % -- None (Room air) -- SF    08/13/24 1340 -- 98 -- -- 94 % -- -- -- MG    08/13/24 1242 97.9 °F (36.6 °C) 60 17 129/59 95 % -- None (Room air) -- SF    08/13/24 0830 -- 67 -- -- 94 % -- -- -- MG    08/13/24 0802 97.3 °F (36.3 °C) 79 17 155/70 95 % -- None (Room air) --     08/13/24 0500 -- 51 -- -- 94 % -- -- -- AE    08/13/24 0350 98.3 °F (36.8 °C) 59 16 142/62 93 % -- None (Room air) -- AE    08/13/24 0252 -- -- -- -- -- 156 lb 1.4 oz (70.8 kg) -- -- CE    08/13/24 0204 98.7 °F (37.1 °C) 52 16 140/56 95 % -- None (Room air) -- AE

## 2024-08-15 NOTE — PAYOR COMM NOTE
--------------  DISCHARGE REVIEW    Payor: BCBS MEDICARE ADV PPO  Subscriber #:  [id number]  Authorization Number: [id number]    Admit date: 24  Admit time:   1:55 AM  Discharge Date: 2024  1:23 PM       [facility]     Deven Atkinson Patient Status:  Inpatient    1930 MRN [id number]   Location [facility] 3SW-A Attending Fiona Lee MD   Hosp Day # 1 PCP Paulo James MD     Date of Admission: 2024  Date of Discharge:   2024    Discharge Disposition: Home or Self Care    Discharge Diagnosis:  Altered mental status suspected secondary to delirium  Possible UTI  Hypertension  Hyperlipidemia  Cerebrovascular disease with history of CVA  Depression  Suspected dementia    History of Present Illness:   89 year old male with past medical history of hypertension, hyperlipidemia, CVA who presents for confusion per his wife.     Patient is AxO x 1, moaning and yelling out in the ER room.  Unable to give significant history despite use of Mandarin .  Patient keeps saying he is hot and wishes to leave.  Called patient's wife who notes that recently they moved into senior living facility together.  However notes that in the last 3 days has been more agitated, screaming at home, walking around naked earlier today.  Per wife patient has history of depression and prior suicide attempts, no recent SI or HI expressed by patient or wife.    Brief Synopsis:   #Altered mental status secondary to delirium  #Possible UTI  Per patient's spouse, patient recently moved into a new senior living facility with her.  He complained of feeling cold on 1 side of the body.  Spouse also reports confusion.  Admission blood work overall unremarkable.  Chest x-ray on admission with confluent opacity in left lung base.  Patient without pneumonia symptoms.  This finding was present on chest x-ray about 3 weeks prior to this admission.  Doubt this is a pneumonia.  Urine  culture grew less than 10,000 CFU Proteus mirabilis sensitive to Keflex.  Patient was discharged with 5 days of Keflex.  Per patient's wife, he was back to his baseline mental status.    Lace+ Score: 58  59-90 High Risk  29-58 Medium Risk  0-28   Low Risk       TCM Follow-Up Recommendation:  LACE > 58: High Risk of readmission after discharge from the hospital.  **Certification    Admission date was 8/12/2024.  Inpatient stay was shorter than expected.  Patient's Acute cystitis with hematuria was initially serious enough to expect a more lengthy hospitalization but patient improved faster than expected.                 Procedures during hospitalization:   NA    Incidental or significant findings and recommendations (brief descriptions):  NA    Lab/Test results pending at Discharge:   NA    Consultants:  ZORAIDA    Discharge Medication List:     Discharge Medications        START taking these medications        Instructions Prescription details   cephalexin 500 MG Caps  Commonly known as: Keflex      Take 1 capsule (500 mg total) by mouth 4 (four) times daily for 5 days.   Stop taking on: August 19, 2024  Quantity: 20 capsule  Refills: 0            CONTINUE taking these medications        Instructions Prescription details   Artificial Tears 0.1-0.3 % Soln  Generic drug: Dextran 70-Hypromellose      Apply 1 drop to eye.   Refills: 0     ascorbic acid 250 MG Tabs  Commonly known as: Vitamin C      Take 1 tablet (250 mg total) by mouth daily.   Refills: 0     aspirin 81 MG Chew      Chew 1 tablet (81 mg total) by mouth daily.   Refills: 0     atorvastatin 80 MG Tabs  Commonly known as: Lipitor      Take 1 tablet (80 mg total) by mouth daily.   Refills: 0     cholecalciferol 1000 UNITS Caps  Commonly known as: Vitamin D3      Take 1 capsule (1,000 Units total) by mouth daily.   Refills: 0     clopidogrel 75 MG Tabs  Commonly known as: Plavix      Take 1 tablet (75 mg total) by mouth daily.   Refills: 0     dutasteride 0.5 MG  Caps  Commonly known as: Avodart      Take 1 capsule (0.5 mg total) by mouth daily.   Refills: 0     enalapril 20 MG Tabs  Commonly known as: Vasotec      Take 1 tablet (20 mg total) by mouth 2 (two) times daily.   Refills: 0     escitalopram 20 MG Tabs  Commonly known as: Lexapro      Take 1 tablet (20 mg total) by mouth daily.   Refills: 0     mirtazapine 15 MG Tabs  Commonly known as: Remeron      Take 1 tablet (15 mg total) by mouth every other day.   Refills: 0     niacin 500 MG Tabs      Take 1 tablet (500 mg total) by mouth daily with breakfast.   Refills: 0     sennosides-docusate 8.6-50 MG Tabs  Commonly known as: Pericolace      Take 1 tablet by mouth daily.   Refills: 0     sucralfate 1 g Tabs  Commonly known as: Carafate      Take 1 tablet (1 g total) by mouth 3 (three) times daily before meals.   Refills: 0     tamsulosin 0.4 MG Caps  Commonly known as: Flomax      1 capsule (0.4 mg total) daily.   Refills: 0     traZODone 50 MG Tabs  Commonly known as: Desyrel      Take 1 tablet (50 mg total) by mouth nightly.   Refills: 0     Valproic Acid 250 MG/5ML Soln      Take 125 mg by mouth daily.   Refills: 0     zinc sulfate 220 (50 Zn) MG Caps  Commonly known as: Zincate      Take 1 capsule (220 mg total) by mouth daily.   Refills: 0            STOP taking these medications      amLODIPine 10 MG Tabs  Commonly known as: Norvasc        famotidine 40 MG Tabs  Commonly known as: Pepcid        Meloxicam 7.5 MG Tabs        metoclopramide 10 MG Tabs  Commonly known as: Reglan        pantoprazole 40 MG Tbec  Commonly known as: Protonix                  Where to Get Your Medications        These medications were sent to [id number] - [place], [place] - [address] [phone], [phone]  [address], [facility] [address]      Phone: [phone]   cephalexin 500 MG Caps         [place] reviewed[place]    Follow-up appointment:   Jose Manuel Barajas PsyD [address]  [place] [address]  [phone]    Call in 1 week(s)  Establish care for full neuropsych evaluation    Paulo James MD  [address]  [address] [address]  [phone]    Call in 1 week(s)      Appointments for Next 30 Days 8/15/2024 - 2024      None            Vital signs:       Physical Exam:    General: No acute distress   Lungs: clear to auscultation  Cardiovascular: S1, S2  Abdomen: Soft    -----------------------------------------------------------------------------------------------  PATIENT DISCHARGE INSTRUCTIONS: See electronic chart    Fiona Lee MD    Total time spent on discharge plannin minutes     The  Cures Act makes medical notes like these available to patients in the interest of transparency. Please be advised this is a medical document. Medical documents are intended to carry relevant information, facts as evident, and the clinical opinion of the practitioner. The medical note is intended as peer to peer communication and may appear blunt or direct. It is written in medical language and may contain abbreviations or verbiage that are unfamiliar.       Electronically signed by Fiona Lee MD on 8/15/2024  3:55 PM         REVIEWER COMMENTS

## 2024-08-16 ENCOUNTER — HOSPITAL ENCOUNTER (EMERGENCY)
Facility: HOSPITAL | Age: 89
Discharge: HOME | End: 2024-08-16
Attending: EMERGENCY MEDICINE
Payer: MEDICARE

## 2024-08-16 VITALS
OXYGEN SATURATION: 95 % | BODY MASS INDEX: 24.48 KG/M2 | HEIGHT: 67 IN | WEIGHT: 156 LBS | HEART RATE: 58 BPM | DIASTOLIC BLOOD PRESSURE: 53 MMHG | SYSTOLIC BLOOD PRESSURE: 148 MMHG | RESPIRATION RATE: 20 BRPM | TEMPERATURE: 98 F

## 2024-08-16 DIAGNOSIS — R39.15 URINARY URGENCY: ICD-10-CM

## 2024-08-16 DIAGNOSIS — N30.00 ACUTE CYSTITIS WITHOUT HEMATURIA: Primary | ICD-10-CM

## 2024-08-16 LAB
BILIRUB UR QL STRIP.AUTO: NEGATIVE
CLARITY UR REFRACT.AUTO: CLEAR
GLUCOSE UR STRIP.AUTO-MCNC: NORMAL MG/DL
KETONES UR STRIP.AUTO-MCNC: NEGATIVE MG/DL
LEUKOCYTE ESTERASE UR QL STRIP.AUTO: NEGATIVE
NITRITE UR QL STRIP.AUTO: NEGATIVE
PH UR STRIP.AUTO: 5 [PH] (ref 5–8)
PROT UR STRIP.AUTO-MCNC: NEGATIVE MG/DL
RBC UR QL AUTO: NEGATIVE
SP GR UR STRIP.AUTO: 1.01 (ref 1–1.03)
UROBILINOGEN UR STRIP.AUTO-MCNC: NORMAL MG/DL

## 2024-08-16 PROCEDURE — 51702 INSERT TEMP BLADDER CATH: CPT

## 2024-08-16 PROCEDURE — 99283 EMERGENCY DEPT VISIT LOW MDM: CPT

## 2024-08-16 PROCEDURE — 87086 URINE CULTURE/COLONY COUNT: CPT | Performed by: EMERGENCY MEDICINE

## 2024-08-16 PROCEDURE — 99284 EMERGENCY DEPT VISIT MOD MDM: CPT

## 2024-08-16 PROCEDURE — 81003 URINALYSIS AUTO W/O SCOPE: CPT | Performed by: EMERGENCY MEDICINE

## 2024-08-16 NOTE — ED PROVIDER NOTES
Patient Seen in: [facility] Emergency Department      History     Chief Complaint   Patient presents with    Urinary Retention     Stated Complaint: urinary retention, last output \"sometime last night\"    Subjective:   HPI    Patient is a 89-year-old gentleman, DNR select care only, presents the emergency room with difficulty urinating.  Per wife has not urine since last night and voices urge to urinate.  Patient was just hospitalized for urinary tract infection.  He is on Keflex.  History of stroke.  Patient is answering questions here.  Nonfocal.  Wife helps with his history.    Objective:   Past Medical History:    Essential hypertension    Hyperlipidemia    Stroke (HCC)              History reviewed. No pertinent surgical history.             Social History     Socioeconomic History    Marital status:    Tobacco Use    Smoking status: Former     Types: Cigarettes    Smokeless tobacco: Never   Vaping Use    Vaping status: Never Used   Substance and Sexual Activity    Alcohol use: Not Currently    Drug use: Never     Social Determinants of Health     Financial Resource Strain: Low Risk  (7/12/2024)    Received from [facility]    Financial Resource Strain     In the past year, have you or any family members you live with been unable to get any of the following when it was really needed? Check all that apply.: None   Food Insecurity: No Food Insecurity (8/13/2024)    Food Insecurity     Food Insecurity: Never true   Transportation Needs: No Transportation Needs (8/13/2024)    Transportation Needs     Lack of Transportation: No   Physical Activity: Low Risk  (7/12/2024)    Received from [facility]    Exercise Vital Sign     On average, how many days per week do you engage in moderate to strenuous exercise (like a brisk walk)?: 5 days     On average, how many minutes do you engage in exercise at this level?: 30 min   Stress: Low Risk  (7/12/2024)    Received from [facility]    Stress     Stress is when someone feels tense, nervous, anxious, or can't sleep at night because their mind is troubled. How stressed are you? : Not at all   Social Connections: Low Risk  (7/12/2024)    Received from [facility]    Social Connections     How often do you see or talk to people that you care about and feel close to? (For example: talking to friends on the phone, visiting friends or family, going to Sikh or club meetings): 3 to 5 times a week   Housing Stability: Low Risk  (8/13/2024)    Housing Stability     Housing Instability: No              Review of Systems    Positive for stated Chief Complaint: Urinary Retention    Other systems are as noted in HPI.  Constitutional and vital signs reviewed.      All other systems reviewed and negative except as noted above.    Physical Exam     ED Triage Vitals [08/16/24 0639]   /64   Pulse 69   Resp 20   Temp 97.7 °F (36.5 °C)   Temp src Oral   SpO2 95 %   O2 Device None (Room air)       Current Vitals:   Vital Signs  BP: 148/53  Pulse: 58  Resp: 20  Temp: 97.7 °F (36.5 °C)  Temp src: Oral  MAP (mmHg): 80    Oxygen Therapy  SpO2: 95 %  O2 Device: None (Room air)            Physical Exam    General: Well-appearing patient of stated age resting comfortably  HEENT: Normocephalic atraumatic.  Nonicteric sclera.  Moist mucous membranes  Lungs: No tachypnea  Cardiac: No tachycardia  Abdomen: Soft.  Mild suprapubic tenderness.  Skin: No rashes, pallor  Neuro: No focal deficits.  Answers questions.  Nonfocal  Extremities: No cyanosis/edema    ED Course     Labs Reviewed   URINALYSIS WITH CULTURE REFLEX             Urine reviewed.  Will repeat culture.       MDM      Patient presents with urge to urinate.  By reports has not urinated since last night.  Differential includes acute urinary retention, urinary tract infection, dysuria, other.  History of a recent urinary tract infection on Keflex.  Patient had a catheter placed.  Minimal urine  obtained.  Will be sent for culture.  Jay removed.  Discussed with wife and patient.  Will continue Keflex.  Stay well-hydrated.  Follow-up with his primary care doctor.  Return if worsening symptoms, new complaints.                                   MDM    Disposition and Plan     Clinical Impression:  1. Acute cystitis without hematuria    2. Urinary urgency         Disposition:  Discharge  8/16/2024  7:11 am    Follow-up:  Paulo James MD  [address] [address]  [phone]    Follow up in 1 week(s)            Medications Prescribed:  Current Discharge Medication List

## 2024-08-19 ENCOUNTER — APPOINTMENT (OUTPATIENT)
Dept: CT IMAGING | Facility: HOSPITAL | Age: 89
End: 2024-08-19
Attending: EMERGENCY MEDICINE
Payer: MEDICARE

## 2024-08-19 ENCOUNTER — HOSPITAL ENCOUNTER (OUTPATIENT)
Facility: HOSPITAL | Age: 89
Setting detail: OBSERVATION
Discharge: HOME | End: 2024-08-21
Attending: EMERGENCY MEDICINE | Admitting: HOSPITALIST
Payer: MEDICARE

## 2024-08-19 ENCOUNTER — HOSPITAL ENCOUNTER (OUTPATIENT)
Age: 89
Discharge: ANOTHER HOSPITAL | End: 2024-08-19
Payer: MEDICARE

## 2024-08-19 VITALS
DIASTOLIC BLOOD PRESSURE: 70 MMHG | HEART RATE: 79 BPM | OXYGEN SATURATION: 96 % | WEIGHT: 156 LBS | HEIGHT: 67 IN | SYSTOLIC BLOOD PRESSURE: 133 MMHG | TEMPERATURE: 98 F | BODY MASS INDEX: 24.48 KG/M2 | RESPIRATION RATE: 20 BRPM

## 2024-08-19 DIAGNOSIS — R19.7 DIARRHEA, UNSPECIFIED TYPE: Primary | ICD-10-CM

## 2024-08-19 DIAGNOSIS — R53.1 WEAKNESS: ICD-10-CM

## 2024-08-19 DIAGNOSIS — R41.82 ALTERED MENTAL STATUS, UNSPECIFIED ALTERED MENTAL STATUS TYPE: Primary | ICD-10-CM

## 2024-08-19 DIAGNOSIS — R29.6 FALLS: ICD-10-CM

## 2024-08-19 DIAGNOSIS — A04.72 CLOSTRIDIUM DIFFICILE COLITIS: ICD-10-CM

## 2024-08-19 DIAGNOSIS — F03.90 DEMENTIA, UNSPECIFIED DEMENTIA SEVERITY, UNSPECIFIED DEMENTIA TYPE, UNSPECIFIED WHETHER BEHAVIORAL, PSYCHOTIC, OR MOOD DISTURBANCE OR ANXIETY (HCC): ICD-10-CM

## 2024-08-19 LAB
ALBUMIN SERPL-MCNC: 4 G/DL (ref 3.2–4.8)
ALBUMIN/GLOB SERPL: 1.3 {RATIO} (ref 1–2)
ALP LIVER SERPL-CCNC: 131 U/L
ALT SERPL-CCNC: 17 U/L
ANION GAP SERPL CALC-SCNC: 5 MMOL/L (ref 0–18)
AST SERPL-CCNC: 18 U/L (ref ?–34)
BASOPHILS # BLD AUTO: 0.06 X10(3) UL (ref 0–0.2)
BASOPHILS NFR BLD AUTO: 0.6 %
BILIRUB SERPL-MCNC: 0.4 MG/DL (ref 0.2–0.9)
BILIRUB UR QL STRIP.AUTO: NEGATIVE
BUN BLD-MCNC: 14 MG/DL (ref 9–23)
C DIFF GDH + TOXINS A+B STL QL IA.RAPID: DETECTED
C DIFF TOX B STL QL: POSITIVE
CALCIUM BLD-MCNC: 8.6 MG/DL (ref 8.7–10.4)
CHLORIDE SERPL-SCNC: 110 MMOL/L (ref 98–112)
CLARITY UR REFRACT.AUTO: CLEAR
CO2 SERPL-SCNC: 25 MMOL/L (ref 21–32)
COLOR UR AUTO: YELLOW
CREAT BLD-MCNC: 1 MG/DL
EGFRCR SERPLBLD CKD-EPI 2021: 70 ML/MIN/1.73M2 (ref 60–?)
EOSINOPHIL # BLD AUTO: 0.06 X10(3) UL (ref 0–0.7)
EOSINOPHIL NFR BLD AUTO: 0.6 %
ERYTHROCYTE [DISTWIDTH] IN BLOOD BY AUTOMATED COUNT: 14.2 %
GLOBULIN PLAS-MCNC: 3 G/DL (ref 2–3.5)
GLUCOSE BLD-MCNC: 136 MG/DL (ref 70–99)
GLUCOSE UR STRIP.AUTO-MCNC: NORMAL MG/DL
HCT VFR BLD AUTO: 36.7 %
HGB BLD-MCNC: 12.5 G/DL
IMM GRANULOCYTES # BLD AUTO: 0.02 X10(3) UL (ref 0–1)
IMM GRANULOCYTES NFR BLD: 0.2 %
KETONES UR STRIP.AUTO-MCNC: NEGATIVE MG/DL
LEUKOCYTE ESTERASE UR QL STRIP.AUTO: NEGATIVE
LYMPHOCYTES # BLD AUTO: 3.39 X10(3) UL (ref 1–4)
LYMPHOCYTES NFR BLD AUTO: 33.4 %
MCH RBC QN AUTO: 28.9 PG (ref 26–34)
MCHC RBC AUTO-ENTMCNC: 34.1 G/DL (ref 31–37)
MCV RBC AUTO: 84.8 FL
MONOCYTES # BLD AUTO: 0.57 X10(3) UL (ref 0.1–1)
MONOCYTES NFR BLD AUTO: 5.6 %
NEUTROPHILS # BLD AUTO: 6.05 X10 (3) UL (ref 1.5–7.7)
NEUTROPHILS # BLD AUTO: 6.05 X10(3) UL (ref 1.5–7.7)
NEUTROPHILS NFR BLD AUTO: 59.6 %
NITRITE UR QL STRIP.AUTO: NEGATIVE
OSMOLALITY SERPL CALC.SUM OF ELEC: 293 MOSM/KG (ref 275–295)
PH UR STRIP.AUTO: 5.5 [PH] (ref 5–8)
PLATELET # BLD AUTO: 246 10(3)UL (ref 150–450)
POTASSIUM SERPL-SCNC: 3.7 MMOL/L (ref 3.5–5.1)
PROT SERPL-MCNC: 7 G/DL (ref 5.7–8.2)
PROT UR STRIP.AUTO-MCNC: 30 MG/DL
RBC # BLD AUTO: 4.33 X10(6)UL
RBC UR QL AUTO: NEGATIVE
SODIUM SERPL-SCNC: 140 MMOL/L (ref 136–145)
SP GR UR STRIP.AUTO: 1.03 (ref 1–1.03)
UROBILINOGEN UR STRIP.AUTO-MCNC: 2 MG/DL
WBC # BLD AUTO: 10.2 X10(3) UL (ref 4–11)

## 2024-08-19 PROCEDURE — 99205 OFFICE O/P NEW HI 60 MIN: CPT | Performed by: PHYSICIAN ASSISTANT

## 2024-08-19 PROCEDURE — 99223 1ST HOSP IP/OBS HIGH 75: CPT | Performed by: INTERNAL MEDICINE

## 2024-08-19 PROCEDURE — 70450 CT HEAD/BRAIN W/O DYE: CPT | Performed by: EMERGENCY MEDICINE

## 2024-08-19 RX ORDER — ACETAMINOPHEN 500 MG
500 TABLET ORAL EVERY 4 HOURS PRN
Status: DISCONTINUED | OUTPATIENT
Start: 2024-08-19 | End: 2024-08-21

## 2024-08-19 RX ORDER — VANCOMYCIN HYDROCHLORIDE 125 MG/1
125 CAPSULE ORAL 4 TIMES DAILY
Status: DISCONTINUED | OUTPATIENT
Start: 2024-08-19 | End: 2024-08-21

## 2024-08-19 RX ORDER — SODIUM CHLORIDE 9 MG/ML
INJECTION, SOLUTION INTRAVENOUS CONTINUOUS
Status: ACTIVE | OUTPATIENT
Start: 2024-08-19 | End: 2024-08-20

## 2024-08-19 RX ORDER — BISACODYL 10 MG
10 SUPPOSITORY, RECTAL RECTAL
Status: DISCONTINUED | OUTPATIENT
Start: 2024-08-19 | End: 2024-08-21

## 2024-08-19 RX ORDER — SODIUM PHOSPHATE, DIBASIC AND SODIUM PHOSPHATE, MONOBASIC 7; 19 G/230ML; G/230ML
1 ENEMA RECTAL ONCE AS NEEDED
Status: DISCONTINUED | OUTPATIENT
Start: 2024-08-19 | End: 2024-08-21

## 2024-08-19 RX ORDER — POLYETHYLENE GLYCOL 3350 17 G/17G
17 POWDER, FOR SOLUTION ORAL DAILY PRN
Status: DISCONTINUED | OUTPATIENT
Start: 2024-08-19 | End: 2024-08-21

## 2024-08-19 RX ORDER — SENNOSIDES 8.6 MG
17.2 TABLET ORAL NIGHTLY PRN
Status: DISCONTINUED | OUTPATIENT
Start: 2024-08-19 | End: 2024-08-21

## 2024-08-19 RX ORDER — MELATONIN
3 NIGHTLY PRN
Status: DISCONTINUED | OUTPATIENT
Start: 2024-08-19 | End: 2024-08-21

## 2024-08-19 RX ORDER — ONDANSETRON 2 MG/ML
4 INJECTION INTRAMUSCULAR; INTRAVENOUS EVERY 6 HOURS PRN
Status: DISCONTINUED | OUTPATIENT
Start: 2024-08-19 | End: 2024-08-21

## 2024-08-19 RX ORDER — ENOXAPARIN SODIUM 100 MG/ML
40 INJECTION SUBCUTANEOUS DAILY
Status: DISCONTINUED | OUTPATIENT
Start: 2024-08-20 | End: 2024-08-21

## 2024-08-19 RX ORDER — METOCLOPRAMIDE HYDROCHLORIDE 5 MG/ML
10 INJECTION INTRAMUSCULAR; INTRAVENOUS EVERY 8 HOURS PRN
Status: DISCONTINUED | OUTPATIENT
Start: 2024-08-19 | End: 2024-08-21

## 2024-08-19 NOTE — DISCHARGE INSTRUCTIONS
Cardiologist recommend Mobile Cardiac Telemetry heart monitoring for 7 days. Cardiologist's office will call you for appointment.      Stitches  on head needs to be removed at Doctor's office in 1 week.

## 2024-08-19 NOTE — ED PROVIDER NOTES
Patient Seen in: [facility] Emergency Department      History     Chief Complaint   Patient presents with    Abdomen/Flank Pain    Fall     Stated Complaint: Abd pain, weakness, falls    Subjective:   HPI    Patient is a 89-year-old gentleman known to me from a recent previous visit, DNR selective care here with his wife who gives most of his history.  Patient has recent urinary retention and ER tract infection.  Treated with Keflex.  Developed diarrhea.  3-4 episodes a day.  Nausea, fatigue.  Crampy abdominal pain.  Concerned that he may have C. difficile per wife.  Went to the immediate care and they sent him to the ER.  No other specific complaints.  Patient is otherwise at his medical baseline.    Objective:   Past Medical History:    Essential hypertension    Hyperlipidemia    Stroke (HCC)              History reviewed. No pertinent surgical history.             Social History     Socioeconomic History    Marital status:    Tobacco Use    Smoking status: Former     Types: Cigarettes    Smokeless tobacco: Never   Vaping Use    Vaping status: Never Used   Substance and Sexual Activity    Alcohol use: Not Currently    Drug use: Never     Social Determinants of Health     Financial Resource Strain: Low Risk  (7/12/2024)    Received from [facility]    Financial Resource Strain     In the past year, have you or any family members you live with been unable to get any of the following when it was really needed? Check all that apply.: None   Food Insecurity: No Food Insecurity (8/13/2024)    Food Insecurity     Food Insecurity: Never true   Transportation Needs: No Transportation Needs (8/13/2024)    Transportation Needs     Lack of Transportation: No   Physical Activity: Low Risk  (7/12/2024)    Received from [facility]    Exercise Vital Sign     On average, how many days per week do you engage in moderate to strenuous exercise (like a brisk walk)?: 5 days     On average, how many  minutes do you engage in exercise at this level?: 30 min   Stress: Low Risk  (7/12/2024)    Received from [facility]    Stress     Stress is when someone feels tense, nervous, anxious, or can't sleep at night because their mind is troubled. How stressed are you? : Not at all   Social Connections: Low Risk  (7/12/2024)    Received from [facility]    Social Connections     How often do you see or talk to people that you care about and feel close to? (For example: talking to friends on the phone, visiting friends or family, going to Latter-day or club meetings): 3 to 5 times a week   Housing Stability: Low Risk  (8/13/2024)    Housing Stability     Housing Instability: No              Review of Systems    Positive for stated Chief Complaint: Abdomen/Flank Pain and Fall    Other systems are as noted in HPI.  Constitutional and vital signs reviewed.      All other systems reviewed and negative except as noted above.    Physical Exam     ED Triage Vitals [08/19/24 1442]   BP (!) 163/49   Pulse 75   Resp 20   Temp 97.8 °F (36.6 °C)   Temp src Temporal   SpO2 96 %   O2 Device None (Room air)       Current Vitals:   Vital Signs  BP: 134/57  Pulse: 50  Resp: 17  Temp: 97.8 °F (36.6 °C)  Temp src: Temporal  MAP (mmHg): 80    Oxygen Therapy  SpO2: 95 %  O2 Device: None (Room air)            Physical Exam    General: Well-appearing patient of stated age resting comfortably  HEENT: Normocephalic atraumatic.  Nonicteric sclera.  Mildly dry mucous membranes  Lungs: No tachypnea  Cardiac: No tachycardia  Abdomen: Soft.  No focal tenderness.  Skin: No rashes, pallor  Neuro: No focal deficits.  Extremities: No cyanosis/edema    ED Course     Labs Reviewed   CBC WITH DIFFERENTIAL WITH PLATELET - Abnormal; Notable for the following components:       Result Value    HGB 12.5 (*)     HCT 36.7 (*)     All other components within normal limits   COMP METABOLIC PANEL (14) - Abnormal; Notable for the following components:     Glucose 136 (*)     Calcium, Total 8.6 (*)     Alkaline Phosphatase 131 (*)     All other components within normal limits             Blood work reviewed.  Electrolytes normal.  White count 10.2.  Patient unable to produce stool or urine here.       CT brain: No acute bleed.  Parenchymal loss.  Report reviewed  MDM      Patient is a 89-year-old gentleman with a recent hospitalization followed by an episode of UTI with urinary retention who presents with diarrhea, fatigue.  Differential includes viral gastroenteritis, C. difficile, other.  Will hydrate.  Will check CBC, electrolytes.  Will attempt to get stool studies for C. difficile, other cultures.  Will reassess after fluids.    Patient unable to produce stool or urine here.  I did have  evaluate patient and talk to his wife that his wife is having trouble caring for patient.  They gave resources.  Unable to produce stool here.  Switch to an outpatient order and they can bring stool back.  Encouraged return if new symptoms or complaints.    Patient was to be discharged.  Patient fell, reportedly tripped when getting out of bed.  Hit his head.  Has a 3 cm left parietal laceration.  1% lidocaine instilled locally.  Copiously irrigated through an 18-gauge pressurized system.  Wound closed with 5-0 Prolene sutures.  Head CT performed to rule out intercerebral hemorrhage, subdural which was negative.  Long discussion with patient's wife.  She feels very uncomfortable taking him home at this point.  Advancing dementia, weakness.  Diarrhea.  Frequent falls.  Did have  evaluate.  At this point will admit for treatment of his C. difficile, hydration, consider placement as wife is having increasing difficulty caring for him at home.                           Medical Decision Making      Disposition and Plan     Clinical Impression:  1. Diarrhea, unspecified type    2. Weakness    3. Dementia, unspecified dementia severity, unspecified dementia  type, unspecified whether behavioral, psychotic, or mood disturbance or anxiety (HCC)         Disposition:  There is no disposition on file for this visit.  There is no disposition time on file for this visit.    Follow-up:  Paulo James MD  [address] [place] [id number]  [phone]    Follow up in 1 week(s)            Medications Prescribed:  Current Discharge Medication List

## 2024-08-19 NOTE — ED PROVIDER NOTES
Patient Seen in: [facility]      History     Chief Complaint   Patient presents with    Abdomen/Flank Pain     Stated Complaint: weak, stomach pain    Subjective:   HPI    89 YO male with PMHx of metabolic encephalopathy, CVA, hypertension, hyperlipidemia, anemia presents to immediate care with his wife with weakness, altered mental status, generalized abdominal pain, diarrhea, persistent dysuria despite being on Keflex for acute cystitis. Symptoms started 4 days ago.   Of note, patient's wife assisted with HPI interpretation.    Chart reviewed. Recent admission on 8/12/24, discharged on Keflex for acute cystitis with hematuria, LACE score >58.        Objective:   Past Medical History:    Essential hypertension    Hyperlipidemia    Stroke (HCC)              History reviewed. No pertinent surgical history.             Social History     Socioeconomic History    Marital status:    Tobacco Use    Smoking status: Former     Types: Cigarettes    Smokeless tobacco: Never   Vaping Use    Vaping status: Never Used   Substance and Sexual Activity    Alcohol use: Not Currently    Drug use: Never     Social Determinants of Health     Financial Resource Strain: Low Risk  (7/12/2024)    Received from [facility]    Financial Resource Strain     In the past year, have you or any family members you live with been unable to get any of the following when it was really needed? Check all that apply.: None   Food Insecurity: No Food Insecurity (8/13/2024)    Food Insecurity     Food Insecurity: Never true   Transportation Needs: No Transportation Needs (8/13/2024)    Transportation Needs     Lack of Transportation: No   Physical Activity: Low Risk  (7/12/2024)    Received from [facility]    Exercise Vital Sign     On average, how many days per week do you engage in moderate to strenuous exercise (like a brisk walk)?: 5 days     On average, how many minutes do you engage in exercise  at this level?: 30 min   Stress: Low Risk  (7/12/2024)    Received from [facility]    Stress     Stress is when someone feels tense, nervous, anxious, or can't sleep at night because their mind is troubled. How stressed are you? : Not at all   Social Connections: Low Risk  (7/12/2024)    Received from [facility]    Social Connections     How often do you see or talk to people that you care about and feel close to? (For example: talking to friends on the phone, visiting friends or family, going to Gnosticism or club meetings): 3 to 5 times a week   Housing Stability: Low Risk  (8/13/2024)    Housing Stability     Housing Instability: No              Review of Systems    Positive for stated Chief Complaint: Abdomen/Flank Pain    Other systems are as noted in HPI.  Constitutional and vital signs reviewed.      All other systems reviewed and negative except as noted above.    Physical Exam     ED Triage Vitals [08/19/24 0918]   /70   Pulse 79   Resp 20   Temp 97.6 °F (36.4 °C)   Temp src Temporal   SpO2 96 %   O2 Device None (Room air)       Current Vitals:   Vital Signs  BP: 133/70  Pulse: 79  Resp: 20  Temp: 97.6 °F (36.4 °C)  Temp src: Temporal    Oxygen Therapy  SpO2: 96 %  O2 Device: None (Room air)            Physical Exam  Vitals and nursing note reviewed.   Constitutional:       General: He is not in acute distress.     Appearance: Normal appearance. He is well-developed. He is not ill-appearing, toxic-appearing or diaphoretic.   Cardiovascular:      Rate and Rhythm: Normal rate.   Pulmonary:      Effort: Pulmonary effort is normal. No respiratory distress.   Abdominal:      Palpations: Abdomen is soft.      Tenderness: There is abdominal tenderness. There is no guarding.   Neurological:      Mental Status: He is alert and oriented to person, place, and time.   Psychiatric:         Mood and Affect: Mood normal.         Behavior: Behavior normal.         ED Course   Labs Reviewed - No  data to display         MDM      Differential diagnosis considered but not limited to acute cystitis, pyelonephritis, electrolyte abnormality, cardiac arrhythmia, gastroenteritis, C.diff, diverticulitis, other acute intra-abdominal pathology    Physical exam as above.  I reviewed the limitations of this immediate care and recommended patient seek higher level of care.    Dr. Taylor was consulted and is in agreement with ER transfer.      Medical Decision Making      Disposition and Plan     Clinical Impression:  1. Altered mental status, unspecified altered mental status type         Disposition:  Ic to ed  8/19/2024  9:59 am    Follow-up:  No follow-up provider specified.        Medications Prescribed:  Current Discharge Medication List

## 2024-08-19 NOTE — ED INITIAL ASSESSMENT (HPI)
Attempted to use interpretor line (#[id number])  Pt stated hard of hearing and could not understand interpretor.  Wife hear - speaks fluent English & interpreting.  Extensive medical hx.  On cephalexin for urinary issues..  Denies hematuria but still having pain w urination.  Also having abd pain.  Recent admission.  Having diarrhea x 4 days, going 3x/hour- hx of c diff.  C/o dizziness, fatigue & unsteady gait.  Wife is only care giver.   No vomiting.

## 2024-08-19 NOTE — ED INITIAL ASSESSMENT (HPI)
Patient recently discharged from inpatient d/t recent infection- wife does not remember why he was inpatient. Patient is also on keflex- wife does not know why he is on antibiotic but finished the course this AM. Patient here today d/t excessive diarrhea, fatigue and abd pain. Wife is concerned patient has c-diff.

## 2024-08-19 NOTE — CM/SW NOTE
[facility] assistance requested.  Talked to spouse at bedside.  Patient and spouse moved to [facility] in [place] 2 weeks ago.  They previously lived in [place].  Per spouse, she has been taking care of patient for the past three years \"when he got sick\".  Per wife, patient does have a dementia diagnosis and takes medication for it.  Spouse does not leave patient alone.  Spouse cannot think of anything she needs assistance with at this time.  Spouse is going to call the [facility] office to get more information on available services.  [facility] provided spouse with a caregiver list and encouraged her to call Anuja at [facility] [employer].  Spouse verbalized understanding.  RN and MD updated.

## 2024-08-20 LAB
ADENOVIRUS F 40/41 PCR: NEGATIVE
ALBUMIN SERPL-MCNC: 3.6 G/DL (ref 3.2–4.8)
ALBUMIN/GLOB SERPL: 1.3 {RATIO} (ref 1–2)
ALP LIVER SERPL-CCNC: 115 U/L
ALT SERPL-CCNC: 12 U/L
ANION GAP SERPL CALC-SCNC: 5 MMOL/L (ref 0–18)
AST SERPL-CCNC: 18 U/L (ref ?–34)
ASTROVIRUS PCR: NEGATIVE
BILIRUB SERPL-MCNC: 0.7 MG/DL (ref 0.2–0.9)
BUN BLD-MCNC: 14 MG/DL (ref 9–23)
C CAYETANENSIS DNA SPEC QL NAA+PROBE: NEGATIVE
CALCIUM BLD-MCNC: 8.3 MG/DL (ref 8.7–10.4)
CAMPY SP DNA.DIARRHEA STL QL NAA+PROBE: NEGATIVE
CHLORIDE SERPL-SCNC: 114 MMOL/L (ref 98–112)
CO2 SERPL-SCNC: 22 MMOL/L (ref 21–32)
CREAT BLD-MCNC: 0.81 MG/DL
CRYPTOSP DNA SPEC QL NAA+PROBE: NEGATIVE
EAEC PAA PLAS AGGR+AATA ST NAA+NON-PRB: NEGATIVE
EC STX1+STX2 + H7 FLIC SPEC NAA+PROBE: NEGATIVE
EGFRCR SERPLBLD CKD-EPI 2021: 82 ML/MIN/1.73M2 (ref 60–?)
ENTAMOEBA HISTOLYTICA PCR: NEGATIVE
EPEC EAE GENE STL QL NAA+NON-PROBE: NEGATIVE
ERYTHROCYTE [DISTWIDTH] IN BLOOD BY AUTOMATED COUNT: 14 %
ETEC LTA+ST1A+ST1B TOX ST NAA+NON-PROBE: NEGATIVE
GIARDIA LAMBLIA PCR: NEGATIVE
GLOBULIN PLAS-MCNC: 2.7 G/DL (ref 2–3.5)
GLUCOSE BLD-MCNC: 125 MG/DL (ref 70–99)
HCT VFR BLD AUTO: 34.3 %
HGB BLD-MCNC: 11.7 G/DL
MAGNESIUM SERPL-MCNC: 1.7 MG/DL (ref 1.6–2.6)
MCH RBC QN AUTO: 29 PG (ref 26–34)
MCHC RBC AUTO-ENTMCNC: 34.1 G/DL (ref 31–37)
MCV RBC AUTO: 85.1 FL
NOROVIRUS GI/GII PCR: NEGATIVE
OSMOLALITY SERPL CALC.SUM OF ELEC: 294 MOSM/KG (ref 275–295)
P SHIGELLOIDES DNA STL QL NAA+PROBE: NEGATIVE
PHOSPHATE SERPL-MCNC: 2.4 MG/DL (ref 2.4–5.1)
PLATELET # BLD AUTO: 224 10(3)UL (ref 150–450)
POTASSIUM SERPL-SCNC: 3.6 MMOL/L (ref 3.5–5.1)
PROT SERPL-MCNC: 6.3 G/DL (ref 5.7–8.2)
RBC # BLD AUTO: 4.03 X10(6)UL
ROTAVIRUS A PCR: NEGATIVE
SALMONELLA DNA SPEC QL NAA+PROBE: NEGATIVE
SAPOVIRUS PCR: NEGATIVE
SHIGELLA SP+EIEC IPAH ST NAA+NON-PROBE: NEGATIVE
SODIUM SERPL-SCNC: 141 MMOL/L (ref 136–145)
V CHOLERAE DNA SPEC QL NAA+PROBE: NEGATIVE
VIBRIO DNA SPEC NAA+PROBE: NEGATIVE
WBC # BLD AUTO: 7.4 X10(3) UL (ref 4–11)
YERSINIA DNA SPEC NAA+PROBE: NEGATIVE

## 2024-08-20 PROCEDURE — 99232 SBSQ HOSP IP/OBS MODERATE 35: CPT | Performed by: STUDENT IN AN ORGANIZED HEALTH CARE EDUCATION/TRAINING PROGRAM

## 2024-08-20 RX ORDER — SODIUM CHLORIDE 9 MG/ML
INJECTION, SOLUTION INTRAVENOUS CONTINUOUS
Status: DISCONTINUED | OUTPATIENT
Start: 2024-08-20 | End: 2024-08-20

## 2024-08-20 RX ORDER — SODIUM CHLORIDE 9 MG/ML
INJECTION, SOLUTION INTRAVENOUS CONTINUOUS
Status: DISCONTINUED | OUTPATIENT
Start: 2024-08-20 | End: 2024-08-21

## 2024-08-20 RX ORDER — ENALAPRIL MALEATE 10 MG/1
20 TABLET ORAL 2 TIMES DAILY
Status: DISCONTINUED | OUTPATIENT
Start: 2024-08-20 | End: 2024-08-21

## 2024-08-20 RX ORDER — FINASTERIDE 5 MG/1
5 TABLET, FILM COATED ORAL DAILY
Status: DISCONTINUED | OUTPATIENT
Start: 2024-08-20 | End: 2024-08-21

## 2024-08-20 RX ORDER — MIRTAZAPINE 7.5 MG/1
15 TABLET, FILM COATED ORAL EVERY OTHER DAY
Status: DISCONTINUED | OUTPATIENT
Start: 2024-08-20 | End: 2024-08-21

## 2024-08-20 RX ORDER — TAMSULOSIN HYDROCHLORIDE 0.4 MG/1
0.4 CAPSULE ORAL
Status: DISCONTINUED | OUTPATIENT
Start: 2024-08-20 | End: 2024-08-21

## 2024-08-20 RX ORDER — SUCRALFATE 1 G/1
1 TABLET ORAL
Status: DISCONTINUED | OUTPATIENT
Start: 2024-08-20 | End: 2024-08-21

## 2024-08-20 RX ORDER — ATORVASTATIN CALCIUM 80 MG/1
80 TABLET, FILM COATED ORAL DAILY
Status: DISCONTINUED | OUTPATIENT
Start: 2024-08-20 | End: 2024-08-21

## 2024-08-20 RX ORDER — CLOPIDOGREL BISULFATE 75 MG/1
75 TABLET ORAL DAILY
Status: DISCONTINUED | OUTPATIENT
Start: 2024-08-20 | End: 2024-08-21

## 2024-08-20 RX ORDER — ESCITALOPRAM OXALATE 10 MG/1
20 TABLET ORAL DAILY
Status: DISCONTINUED | OUTPATIENT
Start: 2024-08-20 | End: 2024-08-21

## 2024-08-20 RX ORDER — SENNA AND DOCUSATE SODIUM 50; 8.6 MG/1; MG/1
1 TABLET, FILM COATED ORAL DAILY
Status: DISCONTINUED | OUTPATIENT
Start: 2024-08-20 | End: 2024-08-21

## 2024-08-20 RX ORDER — TRAZODONE HYDROCHLORIDE 50 MG/1
50 TABLET, FILM COATED ORAL NIGHTLY
Status: DISCONTINUED | OUTPATIENT
Start: 2024-08-20 | End: 2024-08-21

## 2024-08-20 RX ORDER — ASPIRIN 81 MG/1
81 TABLET, CHEWABLE ORAL DAILY
Status: DISCONTINUED | OUTPATIENT
Start: 2024-08-20 | End: 2024-08-21

## 2024-08-20 NOTE — ED QUICK NOTES
Orders for admission, patient is aware of plan and ready to go upstairs. Any questions, please call ED RN Naveen at extension [phone].     Patient Covid vaccination status: Unvaccinated     COVID Test Ordered in ED: None    COVID Suspicion at Admission: N/A    Running Infusions:  None    Mental Status/LOC at time of transport: Patient only speaks mandarin. Wife is at bedside to translate. Patient is alert to self only sometimes    Other pertinent information:   CIWA score: N/A   NIH score:  N/A

## 2024-08-20 NOTE — CM/SW NOTE
[facility] assistance requested. Spoke to Dr. PRIMITIVO Hodges. Patient was to discharge home but fell while trying to get into the wheelchair, hit his head and got a laceration. Per Dr. PRIMITIVO Hodges, patient to get a CT scan.    Patient in bed, noted stitches to left side of head, talking in Mandarin mainly. Spouse at bedside who verbalized she doesn't feel safe bringing patient home. They have no help at home, they have no kids, patient has no other family and spouse has brothers who are out of state.     Dr. PRIMITIVO Hodges and pt's RN updated of the above.

## 2024-08-20 NOTE — PHYSICAL THERAPY NOTE
PHYSICAL THERAPY EVALUATION - INPATIENT     Room Number: 413/413-A  Evaluation Date: 8/20/2024  Type of Evaluation: Initial  Physician Order: PT Eval and Treat    Presenting Problem: Diarrhea, dementia  Co-Morbidities : CVA, primary HTN  Reason for Therapy: Mobility Dysfunction and Discharge Planning    PHYSICAL THERAPY ASSESSMENT   Patient is a 89 year old male admitted on 8/19/2024 for diarrhea, C.Diff, and dementia, and a fall in the ED.   Patient is currently functioning at baseline with bed mobility, transfers, and gait. Prior to admission, patient's baseline is ambulatory with inconsistent use of a rollator. Per the pt's spouse, the pt's falls frequently, up to daily.    Given the pt's dementia and chronic balance impairments, the pt's baseline functional needs are supervision to minimum assistance for safety with ambulation with the use of a rollator and I/ADL's.  The pt would benefit from a more supportive living situation.  Memory care would be ideal.  The pt would benefit from home health care for home safety evaluation and to address/reduce falls risk.    PLAN  Patient will remain on Inpatient Mobility Team and will continue with ambulation to maintain current level of mobility. The rehab aide will perform treatment activities prescribed by this physical therapist. The rehab aide will communicate with overseeing PT regarding any change in functional mobility. RN aware.       GOALS  Patient was able to achieve the following goals ...    Patient was able to transfer At previous, functional level   Patient able to ambulate on level surfaces At previous, functional level         HOME SITUATION  Type of Home: Apartment (Senior Living)   Home Layout: One level                Lives With: Spouse  Drives: No  Patient Owned Equipment: Rollator       Prior Level of Rice: Spoke with pt's spouse, Radha, via telephone at [phone].  Radha reports that she provides the pt assistance with all I/ADL's.  Pt has a  rollator and uses it consistently when leaving the home, inconsistently when in the apartment.  Radha estimates that the pt falls on average every 2-3 days, but at times up to everyday.    SUBJECTIVE  Mandarin  used throughout session.  \"I'm okay.  I can walk.\"        OBJECTIVE  Precautions: Bed/chair alarm  Fall Risk: High fall risk    WEIGHT BEARING RESTRICTION  Weight Bearing Restriction: None                PAIN ASSESSMENT  Rating:  (Denies pain)          COGNITION  Overall Cognitive Status:  Impaired  Memory:  decreased recall of biographical information, decreased recall of precautions, and decreased recall of recent events  Following Commands:  follows one step commands consistently, follows multistep commands with increased time, and follows multistep commands with repetition  Safety Judgement:  decreased awareness of need for assistance and decreased awareness of need for safety  Awareness of Deficits:  decreased awareness of deficits    RANGE OF MOTION AND STRENGTH ASSESSMENT  Upper extremity ROM and strength are within functional limits     Lower extremity ROM is within functional limits     Lower extremity strength is within functional limits, grossly 5/5      BALANCE  Static Sitting: Good  Dynamic Sitting: Fair +  Static Standing: Fair -  Dynamic Standing: Poor +      AM-PAC '6-Clicks' INPATIENT SHORT FORM - BASIC MOBILITY  How much difficulty does the patient currently have...  Patient Difficulty: Turning over in bed (including adjusting bedclothes, sheets and blankets)?: None   Patient Difficulty: Sitting down on and standing up from a chair with arms (e.g., wheelchair, bedside commode, etc.): A Little   Patient Difficulty: Moving from lying on back to sitting on the side of the bed?: None   How much help from another person does the patient currently need...   Help from Another: Moving to and from a bed to a chair (including a wheelchair)?: A Little   Help from Another: Need to walk in  hospital room?: A Little   Help from Another: Climbing 3-5 steps with a railing?: A Little       AM-PAC Score:  Raw Score: 20   Approx Degree of Impairment: 35.83%   Standardized Score (AM-PAC Scale): 47.67   CMS Modifier (G-Code): CJ    FUNCTIONAL ABILITY STATUS  Gait Assessment   Functional Mobility/Gait Assessment  Gait Assistance: Contact guard assist  Distance (ft): 300  Assistive Device: Rolling walker  Pattern:  (increased trunk flexion, step through gait, impulsive with turning)    Skilled Therapy Provided     Bed Mobility:  Rolling: Mod I  Supine to sit: Mod I   Sit to supine: Mod I     Transfer Mobility:  Sit to stand: SBA   Stand to sit: SBA  Gait = CGA    Therapist's comments:The pt requires verbal cues for safety throughout session as he is impulsive with movement.  For example, standing without being cued and without regard for lines and upon standing immediately donning slip on shoes in standing.  Pt required Min A for stability to don shoes in standing.  Pt was then provided a RW.  Pt required verbal cues for upright posture and RW throughout session.  Pt responsive to cues and makes corrections appropriately.  When cued to turn for path, pt turns quickly, requiring CGA for safety.    Exercise/Education Provided:  Gait training  Neuromuscular re-educate  Transfer training    Patient End of Session: In bed;Needs met;Call light within reach;RN aware of session/findings;All patient questions and concerns addressed;Alarm set    Patient Evaluation Complexity Level:  History Moderate - 1 or 2 personal factors and/or co-morbidities   Examination of body systems Low -  addressing 1-2 elements   Clinical Presentation  Moderate - Evolving   Clinical Decision Making Low Complexity       PT Session Time: 20 minutes  Gait Training: 10 minutes  Therapeutic Activity: 5 minutes

## 2024-08-20 NOTE — PROGRESS NOTES
[facility]   part of [facility]     Hospitalist Progress Note     Deven Atkinson Patient Status:  Observation    1930 MRN [id number]   Location [facility] 4NW-A Attending Bertha Moy MD   Hosp Day # 0 PCP Paulo James MD     Chief Complaint: Fall, Diarrhea     Subjective:     Patient  resting in bed    Objective:    Review of Systems:   A comprehensive review of systems was NOT completed; pertinent positive and negatives stated in subjective.    Vital signs:  Temp:  [97.8 °F (36.6 °C)-98.4 °F (36.9 °C)] 97.9 °F (36.6 °C)  Pulse:  [49-75] 50  Resp:  [16-20] 18  BP: (134-163)/(49-57) 139/50  SpO2:  [95 %-98 %] 97 %    Physical Exam:    General: No acute distress, scalp laceration   Respiratory: No wheezes, no rhonchi  Cardiovascular: S1, S2, regular rate and rhythm  Abdomen: Soft, Non-tender, non-distended, positive bowel sounds  Neuro: No new focal deficits.   Extremities: No edema      Diagnostic Data:    Labs:  Recent Labs   Lab 24  1619 24  0821   WBC 10.2 7.4   HGB 12.5* 11.7*   MCV 84.8 85.1   .0 224.0       Recent Labs   Lab 24  1619 24  0821   * 125*   BUN 14 14   CREATSERUM 1.00 0.81   CA 8.6* 8.3*   ALB 4.0 3.6    141   K 3.7 3.6    114*   CO2 25.0 22.0   ALKPHO 131* 115   AST 18 18   ALT 17 12   BILT 0.4 0.7   TP 7.0 6.3       Estimated Creatinine Clearance: 55.8 mL/min (based on SCr of 0.81 mg/dL).    No results for input(s): \"TROP\", \"TROPHS\", \"CK\" in the last 168 hours.    No results for input(s): \"PTP\", \"INR\" in the last 168 hours.               Microbiology    No results found for this visit on 24.      Imaging: Reviewed in Epic.    Medications:    aspirin  81 mg Oral Daily    atorvastatin  80 mg Oral Daily    clopidogrel  75 mg Oral Daily    finasteride  5 mg Oral Daily    enalapril  20 mg Oral BID    escitalopram  20 mg Oral Daily    mirtazapine  15 mg Oral QOD    senna-docusate  1 tablet Oral Daily    sucralfate   1 g Oral TID AC    tamsulosin  0.4 mg Oral Daily @ 0700    traZODone  50 mg Oral Nightly    valproic acid  125 mg Oral BID    vancomycin  125 mg Oral QID    enoxaparin  40 mg Subcutaneous Daily       Assessment & Plan:      #C Diff diarrhea  PO vnaco    #? Urine retention  Conitnue PVR  #Fall  PT/OT   #Head laceration s/p fall   #h/o CVA  DAPT, statin   #Hypertension     Bertha Moy MD    Supplementary Documentation:     Quality:  DVT Mechanical Prophylaxis:     Early ambuation  DVT Pharmacologic Prophylaxis   Medication    enoxaparin (Lovenox) 40 MG/0.4ML SUBQ injection 40 mg      DVT Pharmacologic prophylaxis: Aspirin 162 mg         Code Status: DNAR/Selective Treatment  Jay: No urinary catheter in place  Jay Duration (in days):   Central line:    MAG:     Discharge is dependent on: clinical   At this point Mr. Atkinson is expected to be discharge to: home     The 21st Century Cures Act makes medical notes like these available to patients in the interest of transparency. Please be advised this is a medical document. Medical documents are intended to carry relevant information, facts as evident, and the clinical opinion of the practitioner. The medical note is intended as peer to peer communication and may appear blunt or direct. It is written in medical language and may contain abbreviations or verbiage that are unfamiliar.

## 2024-08-20 NOTE — OCCUPATIONAL THERAPY NOTE
OCCUPATIONAL THERAPY EVALUATION - INPATIENT    Room Number: 413/413-A  Evaluation Date: 8/20/2024     Type of Evaluation: Initial  Presenting Problem: diarrhea    Physician Order: IP Consult to Occupational Therapy  Reason for Therapy:  ADL/IADL Dysfunction and Discharge Planning      OCCUPATIONAL THERAPY ASSESSMENT   Patient is a 89 year old male admitted on 8/19/2024 with Presenting Problem: diarrhea. Co-Morbidities : CVA, primary HTN  Patient is currently functioning near baseline with ADLs and functional mobility.  Prior to admission, patient's baseline is MOD I at home with self-care and ADLs, spouse assists with medication management.  Patient met all OT goals at or close to baseline level.  Patient reports no further questions/concerns at this time.     Patient  is cleared for discharge from acute OT services viewpoint. OT will sign off.       WEIGHT BEARING RESTRICTION  Weight Bearing Restriction: None                Recommendations for nursing staff:   Transfers: one person with RW  Toileting location: Toilet    EVALUATION SESSION:  Patient at start of session: semi-supine in bed  FUNCTIONAL TRANSFER ASSESSMENT  Sit to Stand: Edge of Bed  Edge of Bed: Supervision    BED MOBILITY  Supine to Sit : Supervision  Sit to Supine (OT): Supervision  Scooting: Supervision    BALANCE ASSESSMENT  Static Sitting: Supervision  Sitting Bilateral: Supervision  Static Standing: Supervision  Standing Unilateral: Supervision    FUNCTIONAL ADL ASSESSMENT  LB Dressing Seated: Supervision      ACTIVITY TOLERANCE: WFL                         O2 SATURATIONS       COGNITION  Overall Cognitive Status:  Impaired  Following Commands:  follows all commands and directions without difficulty  Safety Judgement:  decreased awareness of need for assistance and decreased awareness of need for safety  COGNITION ASSESSMENTS       Upper Extremity:   ROM: within functional limits   Strength: is within functional limits   Coordination:  Gross  motor: WFL  Fine motor: WFL  Sensation: Light touch:  intact    EDUCATION PROVIDED  Patient: Role of Occupational Therapy; Plan of Care; Functional Transfer Techniques; Fall Prevention; Posture/Positioning; Proper Body Mechanics  Patient's Response to Education: Verbalized Understanding    Equipment used: RW  Demonstrates functional use    Therapist comments: Pt is pleasant and cooperative throughout session. Pt requires verbal cues for safety during mobility and dressing tasks.     Patient End of Session: In bed;Needs met;Call light within reach;RN aware of session/findings;All patient questions and concerns addressed;Alarm set    OCCUPATIONAL PROFILE    HOME SITUATION  Type of Home: House  Home Layout: One level  Lives With: Spouse               Occupation/Status: RW     Drives: No       Prior Level of Function: Pt is typically mod ind for dressing and toileting but spouse assist with showers and occasionally with pulling up pants after toileting. Pt ambulates independently with no AD.     SUBJECTIVE  \"Thank you.\"    PAIN ASSESSMENT  Ratin  Location: denies       OBJECTIVE  Precautions: Bed/chair alarm  Fall Risk: High fall risk    WEIGHT BEARING RESTRICTION  Weight Bearing Restriction: None                AM-PAC ‘6-Clicks’ Inpatient Daily Activity Short Form  -   Putting on and taking off regular lower body clothing?: A Little  -   Bathing (including washing, rinsing, drying)?: A Little  -   Toileting, which includes using toilet, bedpan or urinal? : A Little  -   Putting on and taking off regular upper body clothing?: None  -   Taking care of personal grooming such as brushing teeth?: None  -   Eating meals?: None    AM-PAC Score:  Score: 21  Approx Degree of Impairment: 32.79%  Standardized Score (AM-PAC Scale): 44.27      ADDITIONAL TESTS     NEUROLOGICAL FINDINGS        PLAN   Patient has been evaluated and presents with no skilled Occupational Therapy needs at this time.  Patient discharged from  Occupational Therapy services.  Please re-order if a new functional limitation presents during this admission.      Patient Evaluation Complexity Level:   Occupational Profile/Medical History LOW - Brief history including review of medical or therapy records    Specific performance deficits impacting engagement in ADL/IADL LOW  1 - 3 performance deficits    Client Assessment/Performance Deficits MODERATE - Comorbidities and min to mod modifications of tasks    Clinical Decision Making LOW - Analysis of occupational profile, problem-focused assessments, limited treatment options    Overall Complexity LOW     OT Session Time: 19 minutes  Self-Care Home Management: 6 minutes  Therapeutic Activity: 10 minutes

## 2024-08-20 NOTE — PROGRESS NOTES
Pt A&Ox 2 confused and forgetful, completed admission with wife over the phone. IVF infusing. Laceration from fall in the ED on forehead. No complaints of pain. VSS. Call light within reach, frequent checks made, needs met.     0300 pt urinated and we did a PVR bladder scanner showed 131.

## 2024-08-20 NOTE — PLAN OF CARE
Problem: GASTROINTESTINAL - ADULT  Goal: Maintains or returns to baseline bowel function  Description: INTERVENTIONS:  - Assess bowel function  - Maintain adequate hydration with IV or PO as ordered and tolerated  - Evaluate effectiveness of GI medications  - Encourage mobilization and activity  - Obtain nutritional consult as needed  - Establish a toileting routine/schedule  - Consider collaborating with pharmacy to review patient's medication profile  Outcome: Not Progressing   Diarrhea slowing down,Denies any pain, Maintained on isolation for c/diff,  Confused and forgetful,Has fair appetite, Laceration on left side of head is dry.  Seen by PT/OT, Up w/ walker x 1 assist.

## 2024-08-20 NOTE — H&P
[facility]     Deven Atkinson Patient Status:  Emergency    1930 MRN [id number]   Location [facility] EMERGENCY DEPARTMENT Attending Mekhi Hodges MD   Hosp Day # 0 PCP Paulo James MD     Chief Complaint: Weakness, AMS, abdominal pain, diarrhea, dysuria    Subjective:    History of Present Illness:     Deven Atkinson is a 89 year old male with history of cerebrovascular disease with history of CVA, hypertension, hyperlipidemia, depression, suspected dementia is being readmitted with abdominal pain, diarrhea.    Patient was admitted 2024 to 2020 for with altered mental status suspected to be secondary to delirium.  Patient is Mandarin speaking only and lives at a senior living facility with his spouse.  He recently moved into this new facility.  During prior admission he was treated for possible UTI with course of Keflex.  Per his spouse, patient has not returned to his baseline mental status at the time of discharge.  He cleared PT and OT.  He presented to the ER 2 days after his discharge with difficulty urinating.  He was discharged home.  He presented today with abdominal pain, diarrhea up to 3-4 episodes daily with associated nausea and fatigue.  He was sent from the immediate care to the ER for further evaluation.    He was afebrile, nontachycardic and hypertensive to 163/49 upon arrival to the ER.  CMP and CBC overall unremarkable.  UA is not concerning for UTI.  C. difficile PCR is positive/EIA pending.  GI PCR pending.  Patient was given IV fluids and oral vancomycin.  Plan was to discharge him home however he fell.  He reportedly tripped when getting out of bed.  There was reported head trauma with 3 cm left parietal laceration.  This was sutured in the ER.  CT head was negative for acute bleed.  Spouse was very uncomfortable taking patient home given advancing dementia, weakness.    History/Other:    Past Medical History:  Past Medical  History:    Essential hypertension    Hyperlipidemia    Stroke (HCC)     Past Surgical History:   History reviewed. No pertinent surgical history.   Family History:   History reviewed. No pertinent family history.  Social History:    reports that he has quit smoking. His smoking use included cigarettes. He has never used smokeless tobacco. He reports that he does not currently use alcohol. He reports that he does not use drugs.     Allergies:   Allergies   Allergen Reactions    Losartan NAUSEA AND VOMITING     Adverse Reaction           (historical)    Adverse Reaction         Adverse Reaction        Adverse Reaction           (historical)       Medications:    Current Facility-Administered Medications on File Prior to Encounter   Medication Dose Route Frequency Provider Last Rate Last Admin    [COMPLETED] cefTRIAXone (Rocephin) 1 g in sodium chloride 0.9% 100 mL IVPB-ADDV  1 g Intravenous Once Mauricio Ghosh DO   Stopped at 08/12/24 2102    [COMPLETED] haloperidol lactate (Haldol) 5 MG/ML injection 5 mg  5 mg Intramuscular Once Mauricio Ghosh DO   5 mg at 08/12/24 2108    [COMPLETED] LORazepam (Ativan) tab 1 mg  1 mg Oral Once Mauricio Ghosh DO   1 mg at 08/12/24 2244    [COMPLETED] famotidine (Pepcid) 20 mg/2mL injection 20 mg  20 mg Intravenous Once Rick Holland MD   20 mg at 07/27/24 0039    [COMPLETED] sodium chloride 0.9 % IV bolus 1,000 mL  1,000 mL Intravenous Once Rick Holland MD   Stopped at 07/27/24 0141    [COMPLETED] alum-mag hydroxide-simethicone (Maalox) 200-200-20 MG/5ML oral suspension 30 mL  30 mL Oral Once Rick Holland MD   30 mL at 07/27/24 0039    [COMPLETED] iopamidol 76% (ISOVUE-370) injection for power injector  80 mL Intravenous ONCE PRN Joshua Harley MD   80 mL at 07/02/24 2109     Current Outpatient Medications on File Prior to Encounter   Medication Sig Dispense Refill    traZODone 50 MG Oral Tab Take 1 tablet (50 mg total) by mouth nightly.      zinc sulfate 220 (50 Zn) MG  Oral Cap Take 1 capsule (220 mg total) by mouth daily.      ascorbic acid 250 MG Oral Tab Take 1 tablet (250 mg total) by mouth daily.      cholecalciferol 1000 UNITS Oral Cap Take 1 capsule (1,000 Units total) by mouth daily.      [] cephalexin 500 MG Oral Cap Take 1 capsule (500 mg total) by mouth 4 (four) times daily for 5 days. 20 capsule 0    aspirin 81 MG Oral Chew Tab Chew 1 tablet (81 mg total) by mouth daily.      atorvastatin 80 MG Oral Tab Take 1 tablet (80 mg total) by mouth daily.      clopidogrel 75 MG Oral Tab Take 1 tablet (75 mg total) by mouth daily.      Dextran 70-Hypromellose (ARTIFICIAL TEARS) 0.1-0.3 % Ophthalmic Solution Apply 1 drop to eye.      dutasteride 0.5 MG Oral Cap Take 1 capsule (0.5 mg total) by mouth daily.      enalapril 20 MG Oral Tab Take 1 tablet (20 mg total) by mouth 2 (two) times daily.      escitalopram 20 MG Oral Tab Take 1 tablet (20 mg total) by mouth daily.      mirtazapine 15 MG Oral Tab Take 1 tablet (15 mg total) by mouth every other day.      sennosides-docusate 8.6-50 MG Oral Tab Take 1 tablet by mouth daily.      tamsulosin 0.4 MG Oral Cap 1 capsule (0.4 mg total) daily.      Valproate Sodium (VALPROIC ACID) 250 MG/5ML Oral Solution Take 125 mg by mouth daily.      niacin 500 MG Oral Tab Take 1 tablet (500 mg total) by mouth daily with breakfast.      sucralfate 1 g Oral Tab Take 1 tablet (1 g total) by mouth 3 (three) times daily before meals.         Review of Systems:   A comprehensive review of systems was completed.    Pertinent positives and negatives noted in the HPI.    Objective:   Physical Exam:    /57 (BP Location: Left arm)   Pulse 52   Temp 98.4 °F (36.9 °C) (Oral)   Resp 16   Ht 5' 9\" (1.753 m)   Wt 157 lb (71.2 kg)   SpO2 98%   BMI 23.18 kg/m²   General: No acute distress, Alert  Respiratory: No rhonchi, no wheezes  Cardiovascular: S1, S2. Regular rate and rhythm  Abdomen: Soft, lower abdominal discomfort, non-distended,  positive bowel sounds  Neuro: No new focal deficits  Extremities: No edema    Results:    Labs:      Labs Last 24 Hours:    Recent Labs   Lab 08/19/24  1619   RBC 4.33   HGB 12.5*   HCT 36.7*   MCV 84.8   MCH 28.9   MCHC 34.1   RDW 14.2   NEPRELIM 6.05   WBC 10.2   .0       Recent Labs   Lab 08/19/24  1619   *   BUN 14   CREATSERUM 1.00   EGFRCR 70   CA 8.6*   ALB 4.0      K 3.7      CO2 25.0   ALKPHO 131*   AST 18   ALT 17   BILT 0.4   TP 7.0       No results found for: \"PT\", \"INR\"    No results for input(s): \"TROP\", \"TROPHS\", \"CK\" in the last 168 hours.    No results for input(s): \"TROP\", \"PBNP\" in the last 168 hours.    Recent Labs   Lab 08/14/24  1206   PCT 0.08*       Imaging: Imaging data reviewed in Epic.    Assessment & Plan:      # Abdominal pain, diarrhea  Recent antibiotic use for UTI.  Suspected C. difficile.  -C. difficile PCR positive/EIA pending  -GI PCR pending  -Continue oral vancomycin; may discontinue pending GI PCR/C. difficile EIA results    #Concern for urinary retention-monitor postvoid residuals    #Fatigue  -PT, OT, social work consult    #Mechanical fall while in the ER  #Head laceration status postrepair in the ER  CT brain negative.  -Monitor    #Mild normocytic anemia-trend    #Hypertension-continue home meds  #Hyperlipidemia-statin  #CVA-aspirin, statin, Plavix  #Depression-continue home meds    Plan of care discussed with patient    Fiona Lee MD    Supplementary Documentation:     The 21st Century Cures Act makes medical notes like these available to patients in the interest of transparency. Please be advised this is a medical document. Medical documents are intended to carry relevant information, facts as evident, and the clinical opinion of the practitioner. The medical note is intended as peer to peer communication and may appear blunt or direct. It is written in medical language and may contain abbreviations or verbiage that are unfamiliar.

## 2024-08-20 NOTE — CM/SW NOTE
SW completed chart review and noted that patient may benefit from home health services at [place]. SW sent referral for HH via [facility] and will follow up with patient once list of accepting agencies is available.    SW noted that patient was presented with information for [facility] in the ER.     SW will continue to follow for plan of care changes and remain available for any additional DC needs or concerns.     Xena Crockett MSW, LSW  Discharge Planner   [id number]

## 2024-08-21 VITALS
DIASTOLIC BLOOD PRESSURE: 49 MMHG | HEIGHT: 69 IN | RESPIRATION RATE: 18 BRPM | WEIGHT: 157 LBS | BODY MASS INDEX: 23.25 KG/M2 | HEART RATE: 58 BPM | OXYGEN SATURATION: 97 % | TEMPERATURE: 98 F | SYSTOLIC BLOOD PRESSURE: 135 MMHG

## 2024-08-21 LAB
ATRIAL RATE: 52 BPM
ATRIAL RATE: 54 BPM
P AXIS: 18 DEGREES
P AXIS: 55 DEGREES
P-R INTERVAL: 196 MS
P-R INTERVAL: 222 MS
Q-T INTERVAL: 494 MS
Q-T INTERVAL: 496 MS
QRS DURATION: 134 MS
QRS DURATION: 146 MS
QTC CALCULATION (BEZET): 461 MS
QTC CALCULATION (BEZET): 468 MS
R AXIS: -31 DEGREES
R AXIS: -36 DEGREES
T AXIS: 177 DEGREES
T AXIS: 182 DEGREES
VENTRICULAR RATE: 52 BPM
VENTRICULAR RATE: 54 BPM

## 2024-08-21 PROCEDURE — 99239 HOSP IP/OBS DSCHRG MGMT >30: CPT | Performed by: HOSPITALIST

## 2024-08-21 RX ORDER — VANCOMYCIN HYDROCHLORIDE 125 MG/1
125 CAPSULE ORAL 4 TIMES DAILY
Qty: 28 CAPSULE | Refills: 0 | Status: SHIPPED | OUTPATIENT
Start: 2024-08-21 | End: 2024-08-28

## 2024-08-21 RX ORDER — MAGNESIUM OXIDE 400 MG/1
400 TABLET ORAL ONCE
Status: COMPLETED | OUTPATIENT
Start: 2024-08-21 | End: 2024-08-21

## 2024-08-21 NOTE — PROGRESS NOTES
NURSING DISCHARGE NOTE    Discharged Home via Ambulatory.  Accompanied by Spouse  Belongings Taken by patient/family.

## 2024-08-21 NOTE — CM/SW NOTE
SW attempted to speak with patient's wife at bedside but no family was present. SW left voicemail for patient's wife to discuss HH services.    SW will continue to follow for plan of care changes and remain available for any additional DC needs or concerns.     Xena Crockett MSW, LSW  Discharge Planner   [id number]

## 2024-08-21 NOTE — DISCHARGE SUMMARY
[facility]     Deven Atkinson Patient Status:  Observation    1930 MRN [id number]   Location [facility] 4NW-A Attending Dom Damico MD   Hosp Day # 0 PCP Paulo James MD     Date of Admission: 2024  Date of Discharge:   24    Discharge Disposition: home    Discharge Diagnosis:  #C Diff diarrhea  PO vnaco    Improving    #bradycardia, sinus  -dizziness, neg orthos  -cards consulted. Outpt holter to monitor    #? Urine retention  Conitnue PVR    #Fall  PT/OT     #Head laceration s/p fall   #h/o CVA  DAPT, statin   #Hypertension     History of Present Illness: Deven Atkinson is a 89 year old male with history of cerebrovascular disease with history of CVA, hypertension, hyperlipidemia, depression, suspected dementia is being readmitted with abdominal pain, diarrhea.     Patient was admitted 2024 to 2020 for with altered mental status suspected to be secondary to delirium.  Patient is Mandarin speaking only and lives at a senior living facility with his spouse.  He recently moved into this new facility.  During prior admission he was treated for possible UTI with course of Keflex.  Per his spouse, patient has not returned to his baseline mental status at the time of discharge.  He cleared PT and OT.  He presented to the ER 2 days after his discharge with difficulty urinating.  He was discharged home.  He presented today with abdominal pain, diarrhea up to 3-4 episodes daily with associated nausea and fatigue.  He was sent from the immediate care to the ER for further evaluation.     He was afebrile, nontachycardic and hypertensive to 163/49 upon arrival to the ER.  CMP and CBC overall unremarkable.  UA is not concerning for UTI.  C. difficile PCR is positive/EIA pending.  GI PCR pending.  Patient was given IV fluids and oral vancomycin.  Plan was to discharge him home however he fell.  He reportedly tripped when getting out of bed.  There was reported  head trauma with 3 cm left parietal laceration.  This was sutured in the ER.  CT head was negative for acute bleed.  Spouse was very uncomfortable taking patient home given advancing dementia, weakness.    Brief Synopsis: pt w/ cdiff diarrhea after recent abx for UTI. Improving slowly on PO vanc. Dizzines and noted sinus damion. Cards suspects 2/2 diarrhea rather than HR. Orthos neg. Cleared for DC on PO vanc.     Lace+ Score: 82  59-90 High Risk  29-58 Medium Risk  0-28   Low Risk       TCM Follow-Up Recommendation:  LACE > 58: High Risk of readmission after discharge from the hospital.      Procedures during hospitalization:   none    Incidental or significant findings and recommendations (brief descriptions):  none    Lab/Test results pending at Discharge:   none    Consultants:  lorelei    Discharge Medication List:     Discharge Medications        START taking these medications        Instructions Prescription details   vancomycin 125 MG Caps  Commonly known as: Vancocin      Take 1 capsule (125 mg total) by mouth 4 (four) times daily for 7 days.   Stop taking on: August 28, 2024  Quantity: 28 capsule  Refills: 0            CONTINUE taking these medications        Instructions Prescription details   Artificial Tears 0.1-0.3 % Soln  Generic drug: Dextran 70-Hypromellose      Apply 1 drop to eye.   Refills: 0     ascorbic acid 250 MG Tabs  Commonly known as: Vitamin C      Take 1 tablet (250 mg total) by mouth daily.   Refills: 0     aspirin 81 MG Chew      Chew 1 tablet (81 mg total) by mouth daily.   Refills: 0     atorvastatin 80 MG Tabs  Commonly known as: Lipitor      Take 1 tablet (80 mg total) by mouth daily.   Refills: 0     cholecalciferol 1000 UNITS Caps  Commonly known as: Vitamin D3      Take 1 capsule (1,000 Units total) by mouth daily.   Refills: 0     clopidogrel 75 MG Tabs  Commonly known as: Plavix      Take 1 tablet (75 mg total) by mouth daily.   Refills: 0     dutasteride 0.5 MG Caps  Commonly  known as: Avodart      Take 1 capsule (0.5 mg total) by mouth daily.   Refills: 0     enalapril 20 MG Tabs  Commonly known as: Vasotec      Take 1 tablet (20 mg total) by mouth 2 (two) times daily.   Refills: 0     escitalopram 20 MG Tabs  Commonly known as: Lexapro      Take 1 tablet (20 mg total) by mouth daily.   Refills: 0     mirtazapine 15 MG Tabs  Commonly known as: Remeron      Take 1 tablet (15 mg total) by mouth every other day.   Refills: 0     niacin 500 MG Tabs      Take 1 tablet (500 mg total) by mouth daily with breakfast.   Refills: 0     sennosides-docusate 8.6-50 MG Tabs  Commonly known as: Pericolace      Take 1 tablet by mouth daily.   Refills: 0     sucralfate 1 g Tabs  Commonly known as: Carafate      Take 1 tablet (1 g total) by mouth 3 (three) times daily before meals.   Refills: 0     tamsulosin 0.4 MG Caps  Commonly known as: Flomax      1 capsule (0.4 mg total) daily.   Refills: 0     traZODone 50 MG Tabs  Commonly known as: Desyrel      Take 1 tablet (50 mg total) by mouth nightly.   Refills: 0     Valproic Acid 250 MG/5ML Soln      Take 125 mg by mouth daily.   Refills: 0     zinc sulfate 220 (50 Zn) MG Caps  Commonly known as: Zincate      Take 1 capsule (220 mg total) by mouth daily.   Refills: 0            STOP taking these medications      cephalexin 500 MG Caps  Commonly known as: Keflex                  Where to Get Your Medications        These medications were sent to [id number] - [place], [place] - [address] [phone], [phone]  [address], [facility] [address]      Phone: [phone]   vancomycin 125 MG Caps         [place] reviewed[place]    Follow-up appointment:   Paulo James MD  [address] [place] [id number] [phone]    Follow up in 1 week(s)      [place] [address] [address] [facility] [address] [phone]  Follow up  Our office will call to schedule appointment for cardiac monitor and  electrophysiology follow-up    Appointments for Next 30 Days 2024 - 2024      None            Vital signs:  Temp:  [97.6 °F (36.4 °C)-98.7 °F (37.1 °C)] 98.4 °F (36.9 °C)  Pulse:  [41-58] 58  Resp:  [14-18] 18  BP: (132-153)/(45-53) 135/49  SpO2:  [94 %-97 %] 97 %    Physical Exam:    General: No acute distress   Lungs: clear to auscultation  Cardiovascular: S1, S2  Abdomen: Soft      -----------------------------------------------------------------------------------------------  PATIENT DISCHARGE INSTRUCTIONS: See electronic chart    Dom Damico MD    Total time spent on discharge plannin minutes     The  [employer] Cures Act makes medical notes like these available to patients in the interest of transparency. Please be advised this is a medical document. Medical documents are intended to carry relevant information, facts as evident, and the clinical opinion of the practitioner. The medical note is intended as peer to peer communication and may appear blunt or direct. It is written in medical language and may contain abbreviations or verbiage that are unfamiliar.

## 2024-08-21 NOTE — PROGRESS NOTES
08/21/24 1131 08/21/24 1134 08/21/24 1136   Vital Signs   /49 132/49 135/49   MAP (mmHg) 70 72 71   Patient Position Lying Sitting Standing

## 2024-08-21 NOTE — CM/SW NOTE
SW was able to reach patient's wife to discuss DC planning. She reported that she is no longer in the ER and was cleared for DC this morning. She is agreeable to patient having [facility] .     She reported that they have a family friend that will transport her and the patient home today. RUBY notified MD.     SW will continue to follow for plan of care changes and remain available for any additional DC needs or concerns.     Xena Crockett MSW, LSW  Discharge Planner   [id number]

## 2024-08-21 NOTE — CONSULTS
[facility]  Consultation    Deven Mata Atkinson Patient Status:  Observation    1930 MRN [id number]   Location [facility] 4NW-A Attending Dom Damico MD   Hosp Day # 0 PCP Paulo James MD       Reason for consultation;  Bradycardia     HPI:  This is a 89 year old male patient with PMH of HTN. He presented following a fall, and with diarrhea. He was found to have C.Diff and urinary retention. Cardiology were consulted for bradycardia and dizziness. Hx limited due to language barrier.     MDM / Diagnostics reviewed & interpreted:  ECG shows sinus bradycardia    Assessment:    Sinus bradycardia  HTN    Plan:  -Dizziness and bradycardia, not surprising in the setting of C.Diff diarrhea, and urinary retention. Noted to have chronic asymptomatic sinus bradycardia before  -Recommend outpt MCT x 1 week and follow up with EP. Avoid rate control agents  -Tele while inpatient    Cardiology will sign off.    Please call with questions. Thank you for your consult.    Level of care: C4      Nicholas Saeed MD  Interventional Cardiology  [facility]    --------------------------------------------------------------------------------------------------------------------------------  ROS 10 systems reviewed, pertinent findings above.    History:  Past Medical History:    Essential hypertension    Hyperlipidemia    Stroke (HCC)     History reviewed. No pertinent surgical history.  History reviewed. No pertinent family history.   reports that he has quit smoking. His smoking use included cigarettes. He has never used smokeless tobacco. He reports that he does not currently use alcohol. He reports that he does not use drugs.    Objective:   Temp: 98.4 °F (36.9 °C)  Pulse: 58  Resp: 18  BP: 135/49    Intake/Output:     Intake/Output Summary (Last 24 hours) at 2024 1407  Last data filed at 2024 2200  Gross per 24 hour   Intake 100 ml   Output --   Net 100 ml       Physical Exam:      General: NAD. Conversant.   HEENT: Normocephalic, atraumatic.  Neck: Supple.  Cardiac: Regular, bradycardic. Normal S1, S2 . No murmur.  Lungs: GAEB, no wheezing.  GI: Soft, non-distended  Extremities: Rt radial pulses 2+. No edema.  Skin: Warm and dry.      Nicholas Saeed MD  8/21/2024  2:07 PM

## 2024-08-21 NOTE — PLAN OF CARE
Patient had 2 formed stool this shift. Patient c/o dizziness when getting up, Dr. Damico aware. Tele shows Sinus Bradycardia with bundle branc block, heart rate high 40's when resting,50's when moving around, patient seen by Dr. Kuo, Cardiology okay with discharge.

## 2024-08-22 LAB
ATRIAL RATE: 53 BPM
P AXIS: 15 DEGREES
P-R INTERVAL: 186 MS
Q-T INTERVAL: 500 MS
QRS DURATION: 152 MS
QTC CALCULATION (BEZET): 469 MS
R AXIS: -28 DEGREES
T AXIS: 192 DEGREES
VENTRICULAR RATE: 53 BPM

## 2024-08-23 ENCOUNTER — HOSPITAL ENCOUNTER (EMERGENCY)
Facility: HOSPITAL | Age: 89
Discharge: HOME | End: 2024-08-23
Attending: EMERGENCY MEDICINE
Payer: MEDICARE

## 2024-08-23 VITALS
BODY MASS INDEX: 24.33 KG/M2 | TEMPERATURE: 98 F | RESPIRATION RATE: 18 BRPM | SYSTOLIC BLOOD PRESSURE: 156 MMHG | OXYGEN SATURATION: 98 % | HEIGHT: 67 IN | HEART RATE: 46 BPM | WEIGHT: 155 LBS | DIASTOLIC BLOOD PRESSURE: 78 MMHG

## 2024-08-23 DIAGNOSIS — R33.9 URINARY RETENTION: Primary | ICD-10-CM

## 2024-08-23 LAB
ALBUMIN SERPL-MCNC: 3.4 G/DL (ref 3.2–4.8)
ALBUMIN/GLOB SERPL: 1.3 {RATIO} (ref 1–2)
ALP LIVER SERPL-CCNC: 109 U/L
ALT SERPL-CCNC: 16 U/L
ANION GAP SERPL CALC-SCNC: <0 MMOL/L (ref 0–18)
AST SERPL-CCNC: 22 U/L (ref ?–34)
BASOPHILS # BLD AUTO: 0.04 X10(3) UL (ref 0–0.2)
BASOPHILS NFR BLD AUTO: 0.6 %
BILIRUB SERPL-MCNC: 0.4 MG/DL (ref 0.2–0.9)
BILIRUB UR QL STRIP.AUTO: NEGATIVE
BUN BLD-MCNC: 10 MG/DL (ref 9–23)
CALCIUM BLD-MCNC: 8.4 MG/DL (ref 8.7–10.4)
CHLORIDE SERPL-SCNC: 112 MMOL/L (ref 98–112)
CLARITY UR REFRACT.AUTO: CLEAR
CO2 SERPL-SCNC: 31 MMOL/L (ref 21–32)
CREAT BLD-MCNC: 0.83 MG/DL
EGFRCR SERPLBLD CKD-EPI 2021: 81 ML/MIN/1.73M2 (ref 60–?)
EOSINOPHIL # BLD AUTO: 0.06 X10(3) UL (ref 0–0.7)
EOSINOPHIL NFR BLD AUTO: 0.9 %
ERYTHROCYTE [DISTWIDTH] IN BLOOD BY AUTOMATED COUNT: 14.1 %
GLOBULIN PLAS-MCNC: 2.6 G/DL (ref 2–3.5)
GLUCOSE BLD-MCNC: 118 MG/DL (ref 70–99)
GLUCOSE UR STRIP.AUTO-MCNC: NORMAL MG/DL
HCT VFR BLD AUTO: 32.9 %
HGB BLD-MCNC: 11 G/DL
IMM GRANULOCYTES # BLD AUTO: 0.01 X10(3) UL (ref 0–1)
IMM GRANULOCYTES NFR BLD: 0.2 %
KETONES UR STRIP.AUTO-MCNC: NEGATIVE MG/DL
LEUKOCYTE ESTERASE UR QL STRIP.AUTO: NEGATIVE
LYMPHOCYTES # BLD AUTO: 2.67 X10(3) UL (ref 1–4)
LYMPHOCYTES NFR BLD AUTO: 41 %
MCH RBC QN AUTO: 28.8 PG (ref 26–34)
MCHC RBC AUTO-ENTMCNC: 33.4 G/DL (ref 31–37)
MCV RBC AUTO: 86.1 FL
MONOCYTES # BLD AUTO: 0.39 X10(3) UL (ref 0.1–1)
MONOCYTES NFR BLD AUTO: 6 %
NEUTROPHILS # BLD AUTO: 3.34 X10 (3) UL (ref 1.5–7.7)
NEUTROPHILS # BLD AUTO: 3.34 X10(3) UL (ref 1.5–7.7)
NEUTROPHILS NFR BLD AUTO: 51.3 %
NITRITE UR QL STRIP.AUTO: NEGATIVE
OSMOLALITY SERPL CALC.SUM OF ELEC: 294 MOSM/KG (ref 275–295)
PH UR STRIP.AUTO: 5.5 [PH] (ref 5–8)
PLATELET # BLD AUTO: 222 10(3)UL (ref 150–450)
POTASSIUM SERPL-SCNC: 4.1 MMOL/L (ref 3.5–5.1)
PROT SERPL-MCNC: 6 G/DL (ref 5.7–8.2)
PROT UR STRIP.AUTO-MCNC: NEGATIVE MG/DL
RBC # BLD AUTO: 3.82 X10(6)UL
RBC UR QL AUTO: NEGATIVE
SODIUM SERPL-SCNC: 142 MMOL/L (ref 136–145)
SP GR UR STRIP.AUTO: 1.01 (ref 1–1.03)
UROBILINOGEN UR STRIP.AUTO-MCNC: NORMAL MG/DL
WBC # BLD AUTO: 6.5 X10(3) UL (ref 4–11)

## 2024-08-23 PROCEDURE — 96360 HYDRATION IV INFUSION INIT: CPT

## 2024-08-23 PROCEDURE — 85025 COMPLETE CBC W/AUTO DIFF WBC: CPT | Performed by: EMERGENCY MEDICINE

## 2024-08-23 PROCEDURE — 99285 EMERGENCY DEPT VISIT HI MDM: CPT

## 2024-08-23 PROCEDURE — 80053 COMPREHEN METABOLIC PANEL: CPT | Performed by: EMERGENCY MEDICINE

## 2024-08-23 PROCEDURE — 81003 URINALYSIS AUTO W/O SCOPE: CPT | Performed by: EMERGENCY MEDICINE

## 2024-08-23 PROCEDURE — 96361 HYDRATE IV INFUSION ADD-ON: CPT

## 2024-08-23 PROCEDURE — 51798 US URINE CAPACITY MEASURE: CPT

## 2024-08-23 RX ORDER — SODIUM CHLORIDE 9 MG/ML
INJECTION, SOLUTION INTRAVENOUS CONTINUOUS
Status: DISCONTINUED | OUTPATIENT
Start: 2024-08-23 | End: 2024-08-23

## 2024-08-23 NOTE — ED PROVIDER NOTES
Patient Seen in: [facility] Emergency Department      History     Chief Complaint   Patient presents with    Urinary Symptoms     Ongoing issue. Spouse states, it's been like this for 6 months.      Stated Complaint: unable to urinate    Subjective:   HPI    Patient is a 89-year-old male presents with recurrent urinary retention.  Patient primarily speaks Mandarin so his wife is at bedside to help with history.  She states this has been happening recurrently to him for the last several months.  Started having decreased urinary frequency again last night, only very small amounts of dribbling overnight and he feels uncomfortable.  No fevers, no vomiting.  Patient was recently admitted here for diarrhea, found to have C. difficile and he is still on oral vancomycin.  Per his wife diarrhea is almost resolved.    Objective:   Past Medical History:    Essential hypertension    Hyperlipidemia    Stroke (HCC)              History reviewed. No pertinent surgical history.             Social History     Socioeconomic History    Marital status:    Tobacco Use    Smoking status: Former     Types: Cigarettes    Smokeless tobacco: Never   Vaping Use    Vaping status: Never Used   Substance and Sexual Activity    Alcohol use: Not Currently    Drug use: Never     Social Determinants of Health     Financial Resource Strain: Low Risk  (7/12/2024)    Received from [facility]    Financial Resource Strain     In the past year, have you or any family members you live with been unable to get any of the following when it was really needed? Check all that apply.: None   Food Insecurity: No Food Insecurity (8/19/2024)    Food Insecurity     Food Insecurity: Never true   Transportation Needs: No Transportation Needs (8/19/2024)    Transportation Needs     Lack of Transportation: No   Physical Activity: Low Risk  (7/12/2024)    Received from [facility]    Exercise Vital Sign     On average, how many days  per week do you engage in moderate to strenuous exercise (like a brisk walk)?: 5 days     On average, how many minutes do you engage in exercise at this level?: 30 min   Stress: Low Risk  (7/12/2024)    Received from [facility]    Stress     Stress is when someone feels tense, nervous, anxious, or can't sleep at night because their mind is troubled. How stressed are you? : Not at all   Social Connections: Low Risk  (7/12/2024)    Received from [facility]    Social Connections     How often do you see or talk to people that you care about and feel close to? (For example: talking to friends on the phone, visiting friends or family, going to Christian or club meetings): 3 to 5 times a week   Housing Stability: Low Risk  (8/19/2024)    Housing Stability     Housing Instability: No              Review of Systems    Positive for stated Chief Complaint: Urinary Symptoms (Ongoing issue. Spouse states, it's been like this for 6 months. )    Other systems are as noted in HPI.  Constitutional and vital signs reviewed.      All other systems reviewed and negative except as noted above.    Physical Exam     ED Triage Vitals   BP 08/23/24 0836 (!) 171/57   Pulse 08/23/24 0834 (!) 48   Resp 08/23/24 0834 16   Temp 08/23/24 0956 98.1 °F (36.7 °C)   Temp src 08/23/24 0956 Temporal   SpO2 08/23/24 0834 98 %   O2 Device 08/23/24 0834 None (Room air)       Current Vitals:   Vital Signs  BP: (!) 167/55  Pulse: (!) 44  Resp: 12  Temp: 98.1 °F (36.7 °C)  Temp src: Temporal  MAP (mmHg): 89    Oxygen Therapy  SpO2: 99 %  O2 Device: None (Room air)            Physical Exam  Vitals and nursing note reviewed.   Constitutional:       Appearance: He is well-developed.   HENT:      Head: Normocephalic and atraumatic.   Eyes:      Conjunctiva/sclera: Conjunctivae normal.      Pupils: Pupils are equal, round, and reactive to light.   Cardiovascular:      Rate and Rhythm: Normal rate and regular rhythm.      Heart sounds: Normal  heart sounds.   Pulmonary:      Effort: Pulmonary effort is normal.      Breath sounds: Normal breath sounds.   Abdominal:      General: Bowel sounds are normal.      Palpations: Abdomen is soft.   Musculoskeletal:         General: Normal range of motion.   Skin:     General: Skin is warm and dry.   Neurological:      Mental Status: He is alert and oriented to person, place, and time.               ED Course     Labs Reviewed   COMP METABOLIC PANEL (14) - Abnormal; Notable for the following components:       Result Value    Glucose 118 (*)     Anion Gap <0 (*)     Calcium, Total 8.4 (*)     All other components within normal limits   CBC WITH DIFFERENTIAL WITH PLATELET - Abnormal; Notable for the following components:    HGB 11.0 (*)     HCT 32.9 (*)     All other components within normal limits   URINALYSIS WITH CULTURE REFLEX                      MDM      Pleasant 89-year-old male presenting for evaluation of possible urinary retention.  This has been a recurrent problem for him over the last few months, has followed up with urology and has a cystoscopy scheduled in the office in a couple of weeks.  Recent admission for C. difficile colitis and he is still on vancomycin.  Symptoms of retention started again last night with dribbling, his wife states really no significant urine output overnight at all.  He appears fairly comfortable here.  Check a bladder scan and if it does look like he is retaining he will need Jay catheter.      Update at 12:35 PM.  Labs unremarkable.  No leukocytosis, no renal insufficiency patient was able to urinate spontaneously here.  Postvoid residual after that was about 150 cc on bladder scan.  Case was discussed with Dr. Rosales of urology, he would not recommend Jay catheter placement and the patient appears comfortable, no distress.  No evidence of UTI.  Close follow-up with urology in the office and patient and wife are comfortable with the plan.        Past Medical  History-hypertension, dementia, BPH    Differential diagnosis before testing included PRABHA, infection    Co-morbidities that add to the complexity of management include: Frequent episodes of urinary retention    Testing ordered during this visit included labs, UA      External chart review showed reviewed prior urology notes    History obtained by an independent source included from wife at bedside    Discussion of management with urology            Disposition:          Discharge  I have discussed with the patient the results of test, differential diagnosis, treatment plan, warning signs and symptoms which should prompt immediate return.  They expressed understanding of these instructions and agrees to the following plan provided.  They were given written discharge instructions and agrees to return for any concerns and voiced understanding and all questions were answered.                           Medical Decision Making      Disposition and Plan     Clinical Impression:  1. Urinary retention         Disposition:  Discharge  8/23/2024 12:40 pm    Follow-up:  Ryan Yeung MD  [address] [place] [id number] [phone]    Call            Medications Prescribed:  Current Discharge Medication List

## 2024-08-23 NOTE — DISCHARGE INSTRUCTIONS
Continue to push fluids to stay hydrated.  Continue the vancomycin you are prescribed for the C. difficile.  Call Dr. Yeung's office to make a follow-up appointment.

## 2024-08-23 NOTE — ED INITIAL ASSESSMENT (HPI)
Pt to ER via wheelchair with wife, only speaks Mandarin. States was admitted 2 days ago for dificulty urinating, states \"only dribble\" Feels as if he has to urinate but is unable to. Abd bloating pain 4/10

## 2024-08-25 ENCOUNTER — HOSPITAL ENCOUNTER (EMERGENCY)
Facility: HOSPITAL | Age: 89
Discharge: HOME | End: 2024-08-25
Attending: EMERGENCY MEDICINE
Payer: MEDICARE

## 2024-08-25 VITALS
SYSTOLIC BLOOD PRESSURE: 132 MMHG | TEMPERATURE: 98 F | DIASTOLIC BLOOD PRESSURE: 54 MMHG | RESPIRATION RATE: 18 BRPM | OXYGEN SATURATION: 93 % | HEART RATE: 54 BPM

## 2024-08-25 DIAGNOSIS — N13.8 BPH W URINARY OBS/LUTS: Primary | ICD-10-CM

## 2024-08-25 DIAGNOSIS — N40.1 BPH W URINARY OBS/LUTS: Primary | ICD-10-CM

## 2024-08-25 LAB
ANION GAP SERPL CALC-SCNC: 6 MMOL/L (ref 0–18)
BASOPHILS # BLD AUTO: 0.05 X10(3) UL (ref 0–0.2)
BASOPHILS NFR BLD AUTO: 0.9 %
BILIRUB UR QL STRIP.AUTO: NEGATIVE
BUN BLD-MCNC: 8 MG/DL (ref 9–23)
CALCIUM BLD-MCNC: 8.4 MG/DL (ref 8.7–10.4)
CHLORIDE SERPL-SCNC: 110 MMOL/L (ref 98–112)
CLARITY UR REFRACT.AUTO: CLEAR
CO2 SERPL-SCNC: 24 MMOL/L (ref 21–32)
CREAT BLD-MCNC: 0.88 MG/DL
EGFRCR SERPLBLD CKD-EPI 2021: 80 ML/MIN/1.73M2 (ref 60–?)
EOSINOPHIL # BLD AUTO: 0.05 X10(3) UL (ref 0–0.7)
EOSINOPHIL NFR BLD AUTO: 0.9 %
ERYTHROCYTE [DISTWIDTH] IN BLOOD BY AUTOMATED COUNT: 14 %
GLUCOSE BLD-MCNC: 140 MG/DL (ref 70–99)
GLUCOSE UR STRIP.AUTO-MCNC: NORMAL MG/DL
HCT VFR BLD AUTO: 32.2 %
HGB BLD-MCNC: 11.1 G/DL
IMM GRANULOCYTES # BLD AUTO: 0.02 X10(3) UL (ref 0–1)
IMM GRANULOCYTES NFR BLD: 0.4 %
KETONES UR STRIP.AUTO-MCNC: NEGATIVE MG/DL
LEUKOCYTE ESTERASE UR QL STRIP.AUTO: NEGATIVE
LYMPHOCYTES # BLD AUTO: 2.48 X10(3) UL (ref 1–4)
LYMPHOCYTES NFR BLD AUTO: 44.1 %
MCH RBC QN AUTO: 29.1 PG (ref 26–34)
MCHC RBC AUTO-ENTMCNC: 34.5 G/DL (ref 31–37)
MCV RBC AUTO: 84.3 FL
MONOCYTES # BLD AUTO: 0.27 X10(3) UL (ref 0.1–1)
MONOCYTES NFR BLD AUTO: 4.8 %
NEUTROPHILS # BLD AUTO: 2.75 X10 (3) UL (ref 1.5–7.7)
NEUTROPHILS # BLD AUTO: 2.75 X10(3) UL (ref 1.5–7.7)
NEUTROPHILS NFR BLD AUTO: 48.9 %
NITRITE UR QL STRIP.AUTO: NEGATIVE
OSMOLALITY SERPL CALC.SUM OF ELEC: 291 MOSM/KG (ref 275–295)
PH UR STRIP.AUTO: 6.5 [PH] (ref 5–8)
PLATELET # BLD AUTO: 233 10(3)UL (ref 150–450)
POTASSIUM SERPL-SCNC: 3.8 MMOL/L (ref 3.5–5.1)
PROT UR STRIP.AUTO-MCNC: NEGATIVE MG/DL
RBC # BLD AUTO: 3.82 X10(6)UL
RBC UR QL AUTO: NEGATIVE
SODIUM SERPL-SCNC: 140 MMOL/L (ref 136–145)
SP GR UR STRIP.AUTO: 1.01 (ref 1–1.03)
UROBILINOGEN UR STRIP.AUTO-MCNC: NORMAL MG/DL
WBC # BLD AUTO: 5.6 X10(3) UL (ref 4–11)

## 2024-08-25 PROCEDURE — 36415 COLL VENOUS BLD VENIPUNCTURE: CPT

## 2024-08-25 PROCEDURE — 99284 EMERGENCY DEPT VISIT MOD MDM: CPT

## 2024-08-25 PROCEDURE — 80048 BASIC METABOLIC PNL TOTAL CA: CPT | Performed by: EMERGENCY MEDICINE

## 2024-08-25 PROCEDURE — 85025 COMPLETE CBC W/AUTO DIFF WBC: CPT | Performed by: EMERGENCY MEDICINE

## 2024-08-25 PROCEDURE — 99283 EMERGENCY DEPT VISIT LOW MDM: CPT

## 2024-08-25 PROCEDURE — 81003 URINALYSIS AUTO W/O SCOPE: CPT | Performed by: EMERGENCY MEDICINE

## 2024-08-25 PROCEDURE — 51798 US URINE CAPACITY MEASURE: CPT

## 2024-08-25 NOTE — ED INITIAL ASSESSMENT (HPI)
Pt arrives in wheelchair with wife, has been having urinary retention x2 days with irritation to urethra. Pt has had aaron several times per wife. A&O x2 per norm.

## 2024-08-25 NOTE — ED PROVIDER NOTES
Patient Seen in: [facility] Emergency Department      History     Chief Complaint   Patient presents with    Urinary Symptoms     UNABLE TO URINATE FOR 1 DAY      Stated Complaint: URINARY SYMPTOMS    Subjective:   HPI    89-year-old male with a past medical history as below including dementia, hypertension, hyperlipidemia, CVA and BPH brought by wife due to difficulty urinating.  Patient is mandrin speaking with wife interpreting, declined video .  Wife states that he is only dribbling small amounts of urine at a time.  Patient denies any abdominal pain, dysuria or hematuria.  Wife states he was recently admitted for C. difficile and is taking medication with improvement in diarrhea.  Denies fever or chills.  Denies flank pain.  Denies nausea or vomiting.    Objective:   Past Medical History:    Essential hypertension    Hyperlipidemia    Stroke (HCC)              History reviewed. No pertinent surgical history.             Social History     Socioeconomic History    Marital status:    Tobacco Use    Smoking status: Former     Types: Cigarettes    Smokeless tobacco: Never   Vaping Use    Vaping status: Never Used   Substance and Sexual Activity    Alcohol use: Not Currently    Drug use: Never     Social Determinants of Health     Financial Resource Strain: Low Risk  (7/12/2024)    Received from [facility]    Financial Resource Strain     In the past year, have you or any family members you live with been unable to get any of the following when it was really needed? Check all that apply.: None   Food Insecurity: No Food Insecurity (8/19/2024)    Food Insecurity     Food Insecurity: Never true   Transportation Needs: No Transportation Needs (8/19/2024)    Transportation Needs     Lack of Transportation: No   Physical Activity: Low Risk  (7/12/2024)    Received from [facility]    Exercise Vital Sign     On average, how many days per week do you engage in moderate to  strenuous exercise (like a brisk walk)?: 5 days     On average, how many minutes do you engage in exercise at this level?: 30 min   Stress: Low Risk  (7/12/2024)    Received from [facility]    Stress     Stress is when someone feels tense, nervous, anxious, or can't sleep at night because their mind is troubled. How stressed are you? : Not at all   Social Connections: Low Risk  (7/12/2024)    Received from [facility]    Social Connections     How often do you see or talk to people that you care about and feel close to? (For example: talking to friends on the phone, visiting friends or family, going to Jain or club meetings): 3 to 5 times a week   Housing Stability: Low Risk  (8/19/2024)    Housing Stability     Housing Instability: No              Review of Systems    Positive for stated Chief Complaint: Urinary Symptoms (UNABLE TO URINATE FOR 1 DAY )    Other systems are as noted in HPI.  Constitutional and vital signs reviewed.      All other systems reviewed and negative except as noted above.    Physical Exam     ED Triage Vitals [08/25/24 0912]   BP (!) 168/73   Pulse 60   Resp 16   Temp 97.9 °F (36.6 °C)   Temp src Temporal   SpO2 95 %   O2 Device None (Room air)       Current Vitals:   Vital Signs  BP: 132/54  Pulse: 54  Resp: 18  Temp: 97.9 °F (36.6 °C)  Temp src: Temporal  MAP (mmHg): 75    Oxygen Therapy  SpO2: 93 %  O2 Device: None (Room air)            Physical Exam  Vitals and nursing note reviewed.   Constitutional:       General: He is not in acute distress.     Appearance: He is well-developed. He is not ill-appearing.   HENT:      Head: Normocephalic and atraumatic.      Mouth/Throat:      Mouth: Mucous membranes are moist.   Eyes:      General: No scleral icterus.     Extraocular Movements: Extraocular movements intact.   Cardiovascular:      Rate and Rhythm: Normal rate and regular rhythm.   Pulmonary:      Effort: Pulmonary effort is normal.      Breath sounds: Normal  breath sounds.   Abdominal:      General: There is no distension.      Palpations: Abdomen is soft.      Comments: Minimal suprapubic tenderness   Musculoskeletal:      Cervical back: Neck supple.   Skin:     General: Skin is warm and dry.      Capillary Refill: Capillary refill takes less than 2 seconds.   Neurological:      Mental Status: He is alert and oriented to person, place, and time.      GCS: GCS eye subscore is 4. GCS verbal subscore is 5. GCS motor subscore is 6.   Psychiatric:         Mood and Affect: Mood normal.         Behavior: Behavior normal.               ED Course     Labs Reviewed   CBC WITH DIFFERENTIAL WITH PLATELET - Abnormal; Notable for the following components:       Result Value    HGB 11.1 (*)     HCT 32.2 (*)     All other components within normal limits   BASIC METABOLIC PANEL (8) - Abnormal; Notable for the following components:    Glucose 140 (*)     BUN 8 (*)     Calcium, Total 8.4 (*)     All other components within normal limits   URINALYSIS WITH CULTURE REFLEX                    MDM      89-year-old male with a past medical history as below including dementia, hypertension, hyperlipidemia, CVA and BPH brought by wife due to difficulty urinating.      Chart review shows patient has been seen multiple times in the ED and has seen his urologist for similar symptoms.  Patient last seen in ED 2 days ago and had urinalysis that showed no evidence of UTI.  Postvoid residual was 150 mL.  Case discussed with urology who did not recommend Jay catheter.    Differential includes but is not limited to urinary retention, UTI, BPH with weak stream, PRABHA    Labs are unremarkable.  UA without evidence UTI.  Renal function is normal.    Patient was able to void 50 mL and postvoid residual was only 125 mL    Suspect patient's lower urinary tract symptoms are due to BPH.  Chart review shows patient was seen by urology for similar symptoms on 8/8/2024.  Patient is already taking tamsulosin,  dutasteride and was given samples for mirabegron.  Patient was instructed to follow-up in 4 weeks for possible cystoscopy.  Wife states they have a follow-up appointment in 4 days.  Instructed to follow-up as scheduled.                                   Medical Decision Making  Amount and/or Complexity of Data Reviewed  Independent Historian: spouse  External Data Reviewed: labs and notes.     Details: See MDM  Labs: ordered. Decision-making details documented in ED Course.        Disposition and Plan     Clinical Impression:  1. BPH w urinary obs/LUTS         Disposition:  Discharge  8/25/2024 10:47 am    Follow-up:  Ryan Yeung MD  [address]  [address]  [facility] [id number] [phone]    Go in 4 day(s)      Paulo James MD  [address] [facility] [address]  [facility] [address] [phone]    Schedule an appointment as soon as possible for a visit            Medications Prescribed:  Current Discharge Medication List

## 2024-08-30 ENCOUNTER — HOSPITAL ENCOUNTER (EMERGENCY)
Facility: HOSPITAL | Age: 89
Discharge: HOME | End: 2024-08-30
Attending: EMERGENCY MEDICINE
Payer: MEDICARE

## 2024-08-30 VITALS
SYSTOLIC BLOOD PRESSURE: 159 MMHG | TEMPERATURE: 98 F | OXYGEN SATURATION: 98 % | RESPIRATION RATE: 16 BRPM | HEART RATE: 78 BPM | BODY MASS INDEX: 24.33 KG/M2 | HEIGHT: 67 IN | WEIGHT: 155 LBS | DIASTOLIC BLOOD PRESSURE: 66 MMHG

## 2024-08-30 DIAGNOSIS — N39.0 URINARY TRACT INFECTION WITH HEMATURIA, SITE UNSPECIFIED: ICD-10-CM

## 2024-08-30 DIAGNOSIS — R30.0 DYSURIA: Primary | ICD-10-CM

## 2024-08-30 DIAGNOSIS — R31.9 URINARY TRACT INFECTION WITH HEMATURIA, SITE UNSPECIFIED: ICD-10-CM

## 2024-08-30 LAB
BILIRUB UR QL STRIP.AUTO: NEGATIVE
COLOR UR AUTO: YELLOW
GLUCOSE UR STRIP.AUTO-MCNC: NORMAL MG/DL
KETONES UR STRIP.AUTO-MCNC: NEGATIVE MG/DL
LEUKOCYTE ESTERASE UR QL STRIP.AUTO: 500
NITRITE UR QL STRIP.AUTO: NEGATIVE
PH UR STRIP.AUTO: 7 [PH] (ref 5–8)
PROT UR STRIP.AUTO-MCNC: 50 MG/DL
RBC #/AREA URNS AUTO: >10 /HPF
SP GR UR STRIP.AUTO: 1.01 (ref 1–1.03)
UROBILINOGEN UR STRIP.AUTO-MCNC: NORMAL MG/DL
WBC #/AREA URNS AUTO: >50 /HPF
WBC CLUMPS UR QL AUTO: PRESENT /HPF

## 2024-08-30 PROCEDURE — 87186 SC STD MICRODIL/AGAR DIL: CPT | Performed by: EMERGENCY MEDICINE

## 2024-08-30 PROCEDURE — 87184 SC STD DISK METHOD PER PLATE: CPT | Performed by: EMERGENCY MEDICINE

## 2024-08-30 PROCEDURE — 99284 EMERGENCY DEPT VISIT MOD MDM: CPT

## 2024-08-30 PROCEDURE — 99283 EMERGENCY DEPT VISIT LOW MDM: CPT

## 2024-08-30 PROCEDURE — 51798 US URINE CAPACITY MEASURE: CPT

## 2024-08-30 PROCEDURE — 87077 CULTURE AEROBIC IDENTIFY: CPT | Performed by: EMERGENCY MEDICINE

## 2024-08-30 PROCEDURE — 87086 URINE CULTURE/COLONY COUNT: CPT | Performed by: EMERGENCY MEDICINE

## 2024-08-30 PROCEDURE — 81001 URINALYSIS AUTO W/SCOPE: CPT | Performed by: EMERGENCY MEDICINE

## 2024-08-30 RX ORDER — CEPHALEXIN 500 MG/1
500 CAPSULE ORAL ONCE
Status: COMPLETED | OUTPATIENT
Start: 2024-08-30 | End: 2024-08-30

## 2024-08-30 RX ORDER — CEFUROXIME AXETIL 500 MG/1
500 TABLET ORAL 2 TIMES DAILY
Qty: 20 TABLET | Refills: 0 | Status: SHIPPED | OUTPATIENT
Start: 2024-08-30 | End: 2024-09-09

## 2024-08-30 RX ORDER — PHENAZOPYRIDINE HYDROCHLORIDE 200 MG/1
200 TABLET, FILM COATED ORAL ONCE
Status: COMPLETED | OUTPATIENT
Start: 2024-08-30 | End: 2024-08-30

## 2024-08-30 RX ORDER — CEFUROXIME AXETIL 500 MG/1
500 TABLET ORAL 2 TIMES DAILY
Qty: 20 TABLET | Refills: 0 | Status: SHIPPED | OUTPATIENT
Start: 2024-08-30 | End: 2024-08-30

## 2024-08-30 RX ORDER — PHENAZOPYRIDINE HYDROCHLORIDE 200 MG/1
200 TABLET, FILM COATED ORAL 3 TIMES DAILY PRN
Qty: 6 TABLET | Refills: 0 | Status: SHIPPED | OUTPATIENT
Start: 2024-08-30 | End: 2024-09-06

## 2024-08-30 NOTE — ED INITIAL ASSESSMENT (HPI)
Pt in with his wife. Pt has hx of dementia and his wife is translating for him. Pt has been having to urinate a little bit every half hour according to his wife. Pt reports pain with urination. This has been happening for 3 years and has a urologist. Pt's wife says he can't pee and is worried about retention, and yesterday he started getting up every half an hour which is abnormal for him. Pt usually has to get up a few times but not every half an hour.

## 2024-08-30 NOTE — ED PROVIDER NOTES
Patient Seen in: [facility] Emergency Department      History     Chief Complaint   Patient presents with    Urinary Symptoms     Stated Complaint: Urinary retention    Subjective:   HPI    This is a 89-year-old gentleman, speaks Mandarin, prefers that his wife interpret for him.  Presents today with complaints of frequent urination discomfort with urination patient reportedly had a cystoscopy yesterday.  Denies any fevers, is able to urinate, denies any gross hematuria or any other complaints or concerns.    Objective:   Past Medical History:    Essential hypertension    Hyperlipidemia    Stroke (HCC)              History reviewed. No pertinent surgical history.             Social History     Socioeconomic History    Marital status:    Tobacco Use    Smoking status: Former     Types: Cigarettes    Smokeless tobacco: Never   Vaping Use    Vaping status: Never Used   Substance and Sexual Activity    Alcohol use: Not Currently    Drug use: Never     Social Determinants of Health     Financial Resource Strain: Low Risk  (7/12/2024)    Received from [facility]    Financial Resource Strain     In the past year, have you or any family members you live with been unable to get any of the following when it was really needed? Check all that apply.: None   Food Insecurity: No Food Insecurity (8/19/2024)    Food Insecurity     Food Insecurity: Never true   Transportation Needs: No Transportation Needs (8/19/2024)    Transportation Needs     Lack of Transportation: No   Physical Activity: Low Risk  (7/12/2024)    Received from [facility]    Exercise Vital Sign     On average, how many days per week do you engage in moderate to strenuous exercise (like a brisk walk)?: 5 days     On average, how many minutes do you engage in exercise at this level?: 30 min   Stress: Low Risk  (7/12/2024)    Received from [facility]    Stress     Stress is when someone feels tense, nervous, anxious,  or can't sleep at night because their mind is troubled. How stressed are you? : Not at all   Social Connections: Low Risk  (7/12/2024)    Received from [facility]    Social Connections     How often do you see or talk to people that you care about and feel close to? (For example: talking to friends on the phone, visiting friends or family, going to Yarsanism or club meetings): 3 to 5 times a week   Housing Stability: Low Risk  (8/19/2024)    Housing Stability     Housing Instability: No              Review of Systems    Positive for stated Chief Complaint: Urinary Symptoms    Other systems are as noted in HPI.  Constitutional and vital signs reviewed.      All other systems reviewed and negative except as noted above.    Physical Exam     ED Triage Vitals [08/30/24 0936]   BP (!) 167/69   Pulse 69   Resp 16   Temp 98 °F (36.7 °C)   Temp src Temporal   SpO2 97 %   O2 Device None (Room air)       Current Vitals:   Vital Signs  BP: 159/66  Pulse: 78  Resp: 16  Temp: 98 °F (36.7 °C)  Temp src: Temporal  MAP (mmHg): 91    Oxygen Therapy  SpO2: 98 %  O2 Device: None (Room air)            Physical Exam        Physical Exam  Vitals signs and nursing note reviewed.   General:   Elderly gentleman sitting in the bed in no acute distress   HEENT:  Mucous membranes are moist.   Cardiovascular:  Normal rate and regular rhythm.  No Edema  Pulmonary:  Pulmonary effort is normal.  Normal breath sounds. No wheezing, rhonchi or rales.   Abdominal: Soft nontender nondistended, normal bowel sounds, no guarding no rebound tenderness  Skin: Warm and dry  Neurological: Awake alert, speech is normal        ED Course     Labs Reviewed   URINALYSIS WITH CULTURE REFLEX - Abnormal; Notable for the following components:       Result Value    Clarity Urine Ex.Turbid (*)     Blood Urine 2+ (*)     Protein Urine 50 (*)     Leukocyte Esterase Urine 500 (*)     WBC Urine >50 (*)     RBC Urine >10 (*)     Bacteria Urine 1+ (*)     WBC Clump  Present (*)     All other components within normal limits   URINE CULTURE, ROUTINE             I performed bedside ultrasound shows 100 cc of urine postvoid residual       MDM      This is a 89-year-old gentleman here for evaluation of discomfort with urination increased urinary frequency after recent cystoscopy yesterday.  Differential includes urinary retention urinary tract infection, normal symptoms after recent cystoscopy.  Postvoid residuals 100 cc, urinalysis does show elevated leuk esterase white counts and bacteria.  Given patient's symptoms we will treat with a course of antibiotics, did receive 1 dose of antibiotics here, urine culture sent, also will start patient on Pyridium which should help with discomfort he will follow-up with his urologist return precautions discussed with patient's wife who is in agreement with plan.    Reviewed urology note from yesterday, cystoscopy performed for evaluation of BPH and urinary retention                           Medical Decision Making      Disposition and Plan     Clinical Impression:  1. Dysuria    2. Urinary tract infection with hematuria, site unspecified         Disposition:  Discharge  8/30/2024 12:57 pm    Follow-up:  Paulo James MD  [address] [place] [address] [address]  [facility] [id number] [phone]    Follow up      Ryan Yeung MD  [address] [facility]  [address]  [facility] [id number] [phone]    Call in 1 day(s)            Medications Prescribed:  Discharge Medication List as of 8/30/2024  1:15 PM        START taking these medications    Details   phenazopyridine 200 MG Oral Tab Take 1 tablet (200 mg total) by mouth 3 (three) times daily as needed for Pain., Normal, Disp-6 tablet, R-0      cefuroxime 500 MG Oral Tab Take 1 tablet (500 mg total) by mouth 2 (two) times daily for 10 days., Normal, Disp-20 tablet, R-0

## 2024-08-30 NOTE — ED QUICK NOTES
Pt reevaluated by er physician. Pt and wife both informed pt's test report and plan of care. Verbalizing understanding

## 2024-08-31 ENCOUNTER — APPOINTMENT (OUTPATIENT)
Dept: CT IMAGING | Facility: HOSPITAL | Age: 89
End: 2024-08-31
Attending: EMERGENCY MEDICINE
Payer: MEDICARE

## 2024-08-31 ENCOUNTER — HOSPITAL ENCOUNTER (EMERGENCY)
Facility: HOSPITAL | Age: 89
Discharge: HOME | End: 2024-08-31
Attending: EMERGENCY MEDICINE
Payer: MEDICARE

## 2024-08-31 ENCOUNTER — APPOINTMENT (OUTPATIENT)
Dept: GENERAL RADIOLOGY | Facility: HOSPITAL | Age: 89
End: 2024-08-31
Attending: EMERGENCY MEDICINE
Payer: MEDICARE

## 2024-08-31 VITALS
DIASTOLIC BLOOD PRESSURE: 56 MMHG | HEIGHT: 67 IN | HEART RATE: 45 BPM | TEMPERATURE: 97 F | WEIGHT: 155 LBS | RESPIRATION RATE: 14 BRPM | BODY MASS INDEX: 24.33 KG/M2 | OXYGEN SATURATION: 99 % | SYSTOLIC BLOOD PRESSURE: 149 MMHG
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
## 2024-08-31 DIAGNOSIS — S51.019A SKIN TEAR OF ELBOW WITHOUT COMPLICATION, INITIAL ENCOUNTER: ICD-10-CM

## 2024-08-31 PROCEDURE — 99284 EMERGENCY DEPT VISIT MOD MDM: CPT

## 2024-08-31 PROCEDURE — 70450 CT HEAD/BRAIN W/O DYE: CPT | Performed by: EMERGENCY MEDICINE

## 2024-08-31 PROCEDURE — 73080 X-RAY EXAM OF ELBOW: CPT | Performed by: EMERGENCY MEDICINE

## 2024-08-31 NOTE — ED INITIAL ASSESSMENT (HPI)
Per pts wife, they were out at [employer], pt tried to get out of wheelchair at [employer] and fell noted skin tear to left elbow. Pt deneis any other pain. Pt is Mandarin speaking, wife does speak english and is translating for pt. Per wife pt did hit his head, no LOC, pt is on thinners.

## 2024-09-02 RX ORDER — SULFAMETHOXAZOLE/TRIMETHOPRIM 800-160 MG
1 TABLET ORAL 2 TIMES DAILY
Qty: 14 TABLET | Refills: 0 | Status: SHIPPED | OUTPATIENT
Start: 2024-09-02 | End: 2024-09-09

## 2024-09-02 RX ORDER — SULFAMETHOXAZOLE/TRIMETHOPRIM 800-160 MG
1 TABLET ORAL 2 TIMES DAILY
Qty: 6 TABLET | Refills: 0 | Status: SHIPPED | OUTPATIENT
Start: 2024-09-02 | End: 2024-09-02 | Stop reason: CLARIF

## 2024-09-02 NOTE — ED PROVIDER NOTES
Urine cultures resistant to Ceftin though sensitive to Bactrim.  Will stop Ceftin and start Bactrim 1 tablet twice a day for 3 days.  Return if fevers, vomiting, new complaints.

## 2024-09-04 NOTE — PROGRESS NOTES
ED Culture Callback Results Review    Pharmacist reviewed culture results from ED visit .    Final urine culture positive for Enterobacter cloacae complex that is not susceptible to previously prescribed Cefuroxime (Zinacef) therapy.     A new prescription for Bactrim was electronically sent to [facility] as discussed with Dr. Hodges. Unable to reach patient after three attempts. A certified letter has been requested.    Patient's primary language is Mandarin. Communication was conducted via  from the language line.    Booker Cornejo, Clint  Emergency Medicine Pharmacist Specialist  09/03/24; 8:18 PM

## 2024-09-13 ENCOUNTER — HOSPITAL ENCOUNTER (EMERGENCY)
Facility: HOSPITAL | Age: 89
Discharge: HOME | End: 2024-09-13
Attending: EMERGENCY MEDICINE
Payer: MEDICARE

## 2024-09-13 ENCOUNTER — APPOINTMENT (OUTPATIENT)
Dept: GENERAL RADIOLOGY | Facility: HOSPITAL | Age: 89
End: 2024-09-13
Attending: EMERGENCY MEDICINE
Payer: MEDICARE

## 2024-09-13 VITALS
HEIGHT: 66 IN | DIASTOLIC BLOOD PRESSURE: 64 MMHG | WEIGHT: 170 LBS | TEMPERATURE: 99 F | SYSTOLIC BLOOD PRESSURE: 171 MMHG | HEART RATE: 61 BPM | RESPIRATION RATE: 24 BRPM | BODY MASS INDEX: 27.32 KG/M2 | OXYGEN SATURATION: 100 %

## 2024-09-13 DIAGNOSIS — R06.02 SHORTNESS OF BREATH: Primary | ICD-10-CM

## 2024-09-13 LAB
ALBUMIN SERPL-MCNC: 3.9 G/DL (ref 3.2–4.8)
ALBUMIN/GLOB SERPL: 1.3 {RATIO} (ref 1–2)
ALP LIVER SERPL-CCNC: 132 U/L
ALT SERPL-CCNC: 51 U/L
ANION GAP SERPL CALC-SCNC: 8 MMOL/L (ref 0–18)
AST SERPL-CCNC: 38 U/L (ref ?–34)
ATRIAL RATE: 63 BPM
BASOPHILS # BLD AUTO: 0.08 X10(3) UL (ref 0–0.2)
BASOPHILS NFR BLD AUTO: 0.9 %
BILIRUB SERPL-MCNC: 0.6 MG/DL (ref 0.2–0.9)
BUN BLD-MCNC: 20 MG/DL (ref 9–23)
CALCIUM BLD-MCNC: 9.3 MG/DL (ref 8.7–10.4)
CHLORIDE SERPL-SCNC: 107 MMOL/L (ref 98–112)
CO2 SERPL-SCNC: 24 MMOL/L (ref 21–32)
CREAT BLD-MCNC: 0.91 MG/DL
D DIMER PPP FEU-MCNC: 0.61 UG/ML FEU (ref ?–0.94)
EGFRCR SERPLBLD CKD-EPI 2021: 78 ML/MIN/1.73M2 (ref 60–?)
EOSINOPHIL # BLD AUTO: 0.12 X10(3) UL (ref 0–0.7)
EOSINOPHIL NFR BLD AUTO: 1.3 %
ERYTHROCYTE [DISTWIDTH] IN BLOOD BY AUTOMATED COUNT: 13.4 %
FLUAV + FLUBV RNA SPEC NAA+PROBE: NEGATIVE
FLUAV + FLUBV RNA SPEC NAA+PROBE: NEGATIVE
GLOBULIN PLAS-MCNC: 3.1 G/DL (ref 2–3.5)
GLUCOSE BLD-MCNC: 127 MG/DL (ref 70–99)
HCT VFR BLD AUTO: 38.9 %
HGB BLD-MCNC: 13.3 G/DL
IMM GRANULOCYTES # BLD AUTO: 0.03 X10(3) UL (ref 0–1)
IMM GRANULOCYTES NFR BLD: 0.3 %
LYMPHOCYTES # BLD AUTO: 3.56 X10(3) UL (ref 1–4)
LYMPHOCYTES NFR BLD AUTO: 38.5 %
MCH RBC QN AUTO: 29.2 PG (ref 26–34)
MCHC RBC AUTO-ENTMCNC: 34.2 G/DL (ref 31–37)
MCV RBC AUTO: 85.3 FL
MONOCYTES # BLD AUTO: 0.46 X10(3) UL (ref 0.1–1)
MONOCYTES NFR BLD AUTO: 5 %
NEUTROPHILS # BLD AUTO: 5 X10 (3) UL (ref 1.5–7.7)
NEUTROPHILS # BLD AUTO: 5 X10(3) UL (ref 1.5–7.7)
NEUTROPHILS NFR BLD AUTO: 54 %
NT-PROBNP SERPL-MCNC: 558 PG/ML (ref ?–450)
OSMOLALITY SERPL CALC.SUM OF ELEC: 292 MOSM/KG (ref 275–295)
P AXIS: 34 DEGREES
P-R INTERVAL: 206 MS
PLATELET # BLD AUTO: 246 10(3)UL (ref 150–450)
POTASSIUM SERPL-SCNC: 4.3 MMOL/L (ref 3.5–5.1)
PROT SERPL-MCNC: 7 G/DL (ref 5.7–8.2)
Q-T INTERVAL: 460 MS
QRS DURATION: 138 MS
QTC CALCULATION (BEZET): 470 MS
R AXIS: -36 DEGREES
RBC # BLD AUTO: 4.56 X10(6)UL
RSV RNA SPEC NAA+PROBE: NEGATIVE
SARS-COV-2 RNA RESP QL NAA+PROBE: NOT DETECTED
SODIUM SERPL-SCNC: 139 MMOL/L (ref 136–145)
T AXIS: 132 DEGREES
TROPONIN I SERPL HS-MCNC: 8 NG/L
VENTRICULAR RATE: 63 BPM
WBC # BLD AUTO: 9.3 X10(3) UL (ref 4–11)

## 2024-09-13 PROCEDURE — 99285 EMERGENCY DEPT VISIT HI MDM: CPT

## 2024-09-13 PROCEDURE — 83880 ASSAY OF NATRIURETIC PEPTIDE: CPT | Performed by: EMERGENCY MEDICINE

## 2024-09-13 PROCEDURE — 84484 ASSAY OF TROPONIN QUANT: CPT | Performed by: EMERGENCY MEDICINE

## 2024-09-13 PROCEDURE — 0241U SARS-COV-2/FLU A AND B/RSV BY PCR (GENEXPERT): CPT | Performed by: EMERGENCY MEDICINE

## 2024-09-13 PROCEDURE — 93005 ELECTROCARDIOGRAM TRACING: CPT

## 2024-09-13 PROCEDURE — 96374 THER/PROPH/DIAG INJ IV PUSH: CPT

## 2024-09-13 PROCEDURE — 93010 ELECTROCARDIOGRAM REPORT: CPT

## 2024-09-13 PROCEDURE — 80053 COMPREHEN METABOLIC PANEL: CPT | Performed by: EMERGENCY MEDICINE

## 2024-09-13 PROCEDURE — 85379 FIBRIN DEGRADATION QUANT: CPT | Performed by: EMERGENCY MEDICINE

## 2024-09-13 PROCEDURE — 85025 COMPLETE CBC W/AUTO DIFF WBC: CPT | Performed by: EMERGENCY MEDICINE

## 2024-09-13 PROCEDURE — 71045 X-RAY EXAM CHEST 1 VIEW: CPT | Performed by: EMERGENCY MEDICINE

## 2024-09-13 RX ORDER — KETOROLAC TROMETHAMINE 15 MG/ML
15 INJECTION, SOLUTION INTRAMUSCULAR; INTRAVENOUS ONCE
Status: COMPLETED | OUTPATIENT
Start: 2024-09-13 | End: 2024-09-13

## 2024-09-13 NOTE — ED INITIAL ASSESSMENT (HPI)
Pt presents with c/o shortness of breath this morning. SOB resolved at the moment. No other complaints. Denies CP or recent illness.

## 2024-09-13 NOTE — ED PROVIDER NOTES
Patient Seen in: [facility] Emergency Department      History     Chief Complaint   Patient presents with    Difficulty Breathing     Stated Complaint: ANTONIO SOB this morning    Subjective:   HPI    89-year-old male presents reporting shortness of breath.  His wife is at the bedside and is interpreting.  Offered video interpretive services but she declined.  She states when she woke up this morning he asked to be taken to the hospital.  She states they have frequent visits to the emergency room.  She denies any fevers or cough.  She says he feels out of breath today.  He is denying any chest pain or palpitations.  Later in the visit he complained of feeling bloated in his abdomen.  He does not use oxygen at home.  No chronic lung issues according to the patient's wife.    Objective:   Past Medical History:    Essential hypertension    Hyperlipidemia    Stroke (HCC)              History reviewed. No pertinent surgical history.             Social History     Socioeconomic History    Marital status:    Tobacco Use    Smoking status: Former     Types: Cigarettes    Smokeless tobacco: Never   Vaping Use    Vaping status: Never Used   Substance and Sexual Activity    Alcohol use: Not Currently    Drug use: Never     Social Determinants of Health     Financial Resource Strain: Low Risk  (7/12/2024)    Received from [facility]    Financial Resource Strain     In the past year, have you or any family members you live with been unable to get any of the following when it was really needed? Check all that apply.: None   Food Insecurity: No Food Insecurity (8/19/2024)    Food Insecurity     Food Insecurity: Never true   Transportation Needs: No Transportation Needs (8/19/2024)    Transportation Needs     Lack of Transportation: No   Physical Activity: Low Risk  (7/12/2024)    Received from [facility]    Exercise Vital Sign     On average, how many days per week do you engage in moderate to  strenuous exercise (like a brisk walk)?: 5 days     On average, how many minutes do you engage in exercise at this level?: 30 min   Stress: Low Risk  (7/12/2024)    Received from [facility]    Stress     Stress is when someone feels tense, nervous, anxious, or can't sleep at night because their mind is troubled. How stressed are you? : Not at all   Social Connections: Low Risk  (7/12/2024)    Received from [facility]    Social Connections     How often do you see or talk to people that you care about and feel close to? (For example: talking to friends on the phone, visiting friends or family, going to Hinduism or club meetings): 3 to 5 times a week   Housing Stability: Low Risk  (8/19/2024)    Housing Stability     Housing Instability: No              Review of Systems    Positive for stated Chief Complaint: Difficulty Breathing    Other systems are as noted in HPI.  Constitutional and vital signs reviewed.      All other systems reviewed and negative except as noted above.    Physical Exam     ED Triage Vitals [09/13/24 1030]   /60   Pulse 69   Resp 22   Temp 98.6 °F (37 °C)   Temp src Temporal   SpO2 98 %   O2 Device None (Room air)       Current Vitals:   Vital Signs  BP: (!) 171/64  Pulse: 61  Resp: 24  Temp: 98.6 °F (37 °C)  Temp src: Temporal  MAP (mmHg): 94    Oxygen Therapy  SpO2: 100 %  O2 Device: None (Room air)            Physical Exam    General:  Vitals as listed.  No acute distress   HEENT: Sclerae anicteric.  Conjunctivae show no pallor.  Oropharynx clear, mucous membranes moist   Neck: supple, no rigidity   Lungs: good air exchange and clear   Heart: regular rate rhythm and no murmur   Abdomen: Soft and nontender.  No abdominal masses.  No peritoneal signs   Extremities: no edema, normal peripheral pulses   Neuro: Alert oriented and nonfocal   Skin: no rashes or nodules    ED Course     Labs Reviewed   COMP METABOLIC PANEL (14) - Abnormal; Notable for the following  components:       Result Value    Glucose 127 (*)     AST 38 (*)     ALT 51 (*)     Alkaline Phosphatase 132 (*)     All other components within normal limits   CBC WITH DIFFERENTIAL WITH PLATELET - Abnormal; Notable for the following components:    HCT 38.9 (*)     All other components within normal limits   PRO BETA NATRIURETIC PEPTIDE - Abnormal; Notable for the following components:    Pro-Beta Natriuretic Peptide 558 (*)     All other components within normal limits   TROPONIN I HIGH SENSITIVITY - Normal   D-DIMER - Normal   SARS-COV-2/FLU A AND B/RSV BY PCR (GENEXPERT) - Normal    Narrative:     This test is intended for the qualitative detection and differentiation of SARS-CoV-2, influenza A, influenza B, and respiratory syncytial virus (RSV) viral RNA in nasopharyngeal or nares swabs from individuals suspected of respiratory viral infection consistent with COVID-19 by their healthcare provider. Signs and symptoms of respiratory viral infection due to SARS-CoV-2, influenza, and RSV can be similar.    Test performed using the Xpert Xpress SARS-CoV-2/FLU/RSV (real time RT-PCR)  assay on the [employer] instrument, [employer], [place] [address].   This test is being used under the Food and Drug Administration's Emergency Use Authorization.    The authorized Fact Sheet for Healthcare Providers for this assay is available upon request from the laboratory.   RAINBOW DRAW LAVENDER   RAINBOW DRAW LIGHT GREEN   RAINBOW DRAW BLUE     EKG    Rate, intervals and axes as noted on EKG Report.  Rate: 63  Rhythm: Sinus Rhythm  Reading: No evidence of acute ischemia.  LBBB                 XR CHEST AP PORTABLE  (CPT=71045)    Result Date: 9/13/2024  PROCEDURE:  XR CHEST AP PORTABLE  (CPT=71045)  TECHNIQUE:  AP chest radiograph was obtained.  COMPARISON:  [facility] , XR, XR CHEST AP PORTABLE  (CPT=71045), 8/12/2024, 5:41 PM.  INDICATIONS:  ANTONIO SOB this morning  PATIENT STATED HISTORY: (As transcribed by Technologist)  Per patient's  guest, patient has been short of breath since this morning.               CONCLUSION:   Stable cardiac and mediastinal contours.  Loop recorder device of the left anterior chest wall noted.  No pulmonary edema or focal airspace consolidation.  Few calcified granulomata noted.  The pleural spaces are clear.   LOCATION:  [facility]      Dictated by (CST): Niki Jiménez MD on 9/13/2024 at 11:31 AM     Finalized by (CST): Niki Jiménez MD on 9/13/2024 at 11:32 AM                MDM        89-year-old male brought in for evaluation of shortness of breath.  Lungs are clear to auscultation.  O2 saturation within the normal range.    Differential includes but is not limited to anxiety, pneumonia, viral URI, cardiac ischemia, anemia, metabolic disturbances, a life threat.    CBC, CMP, viral nasal swab, chest x-ray, proBNP, D-dimer, troponin ordered for further evaluation.    My independent interpretation of chest x-ray is that there is no significant cardiomegaly    Radiology reports no acute findings on chest x-ray.  Cardiac enzyme within normal limits.  D-dimer is negative and does not suggest pulmonary embolism or cardiac ischemia.  Slight increase in proBNP with a chest x-ray that does not show evidence of fluid overload.  Lungs are clear to auscultation on examination.  O2 saturation is 99% on room air.  He is in no respiratory distress.  He continues to point to the chest wall implant (watchman?) as something that is making him have pain and shortness of breath.  His wife says he has been complaining about this for months.  Explained to his wife that there is no evidence of any treatable abnormalities.  She feels this might be related more to anxiety.  She is requesting clinic information for a cardiologist locally since they now live in this area and previously followed with cardiology in the city.  I did provide [facility] clinic information.  Also encouraged to follow-up with primary care doctor.  I had them meet with the case  manager to discuss home health options versus nursing home options as well.  Should continue monitoring symptoms and return with worsening or any concerns.                                     Medical Decision Making      Disposition and Plan     Clinical Impression:  1. Shortness of breath         Disposition:  Discharge  9/13/2024  2:35 pm    Follow-up:  Paulo James MD  [address] [place] [id number]  [phone]    Schedule an appointment as soon as possible for a visit      [address] [address] [phone]  Follow up  Cardiology, As needed          Medications Prescribed:  Current Discharge Medication List

## 2024-09-13 NOTE — CM/SW NOTE
EDCM asked to provide resources for the family for placement/aide upon discharge. Spoke to wife, Radha, at the bedside. They recently moved into a senior living and is looking for a little more care for her spouse. Provided a list of assisted living facilities in the area, caregiver list, and information on Respite as well as the card for Anuja at [facility] as a resource to reach out to to help set up care once she has decided the level of care needed upon return home. She was informed to call and tour facilities as well.

## 2024-09-28 ENCOUNTER — APPOINTMENT (OUTPATIENT)
Dept: GENERAL RADIOLOGY | Facility: HOSPITAL | Age: 89
End: 2024-09-28
Attending: EMERGENCY MEDICINE
Payer: MEDICARE

## 2024-09-28 ENCOUNTER — HOSPITAL ENCOUNTER (EMERGENCY)
Facility: HOSPITAL | Age: 89
Discharge: HOME | End: 2024-09-28
Attending: EMERGENCY MEDICINE
Payer: MEDICARE

## 2024-09-28 VITALS
TEMPERATURE: 98 F | HEIGHT: 68.9 IN | BODY MASS INDEX: 25.14 KG/M2 | HEART RATE: 48 BPM | DIASTOLIC BLOOD PRESSURE: 64 MMHG | SYSTOLIC BLOOD PRESSURE: 169 MMHG | OXYGEN SATURATION: 99 % | RESPIRATION RATE: 22 BRPM | WEIGHT: 169.75 LBS

## 2024-09-28 DIAGNOSIS — K56.0 PARALYTIC ILEUS (HCC): ICD-10-CM

## 2024-09-28 DIAGNOSIS — N39.0 URINARY TRACT INFECTION WITHOUT HEMATURIA, SITE UNSPECIFIED: Primary | ICD-10-CM

## 2024-09-28 LAB
ALBUMIN SERPL-MCNC: 4.3 G/DL (ref 3.2–4.8)
ALBUMIN/GLOB SERPL: 1.3 {RATIO} (ref 1–2)
ALP LIVER SERPL-CCNC: 137 U/L
ALT SERPL-CCNC: 43 U/L
ANION GAP SERPL CALC-SCNC: 8 MMOL/L (ref 0–18)
AST SERPL-CCNC: 30 U/L (ref ?–34)
BASOPHILS # BLD AUTO: 0.08 X10(3) UL (ref 0–0.2)
BASOPHILS NFR BLD AUTO: 0.9 %
BILIRUB SERPL-MCNC: 0.4 MG/DL (ref 0.2–0.9)
BILIRUB UR QL STRIP.AUTO: NEGATIVE
BUN BLD-MCNC: 18 MG/DL (ref 9–23)
CALCIUM BLD-MCNC: 9.6 MG/DL (ref 8.7–10.4)
CHLORIDE SERPL-SCNC: 108 MMOL/L (ref 98–112)
CO2 SERPL-SCNC: 24 MMOL/L (ref 21–32)
COLOR UR AUTO: YELLOW
CREAT BLD-MCNC: 0.92 MG/DL
EGFRCR SERPLBLD CKD-EPI 2021: 77 ML/MIN/1.73M2 (ref 60–?)
EOSINOPHIL # BLD AUTO: 0.08 X10(3) UL (ref 0–0.7)
EOSINOPHIL NFR BLD AUTO: 0.9 %
ERYTHROCYTE [DISTWIDTH] IN BLOOD BY AUTOMATED COUNT: 13 %
GLOBULIN PLAS-MCNC: 3.2 G/DL (ref 2–3.5)
GLUCOSE BLD-MCNC: 87 MG/DL (ref 70–99)
GLUCOSE UR STRIP.AUTO-MCNC: NORMAL MG/DL
HCT VFR BLD AUTO: 39.4 %
HGB BLD-MCNC: 13.6 G/DL
IMM GRANULOCYTES # BLD AUTO: 0.03 X10(3) UL (ref 0–1)
IMM GRANULOCYTES NFR BLD: 0.3 %
KETONES UR STRIP.AUTO-MCNC: NEGATIVE MG/DL
LEUKOCYTE ESTERASE UR QL STRIP.AUTO: 500
LYMPHOCYTES # BLD AUTO: 3.58 X10(3) UL (ref 1–4)
LYMPHOCYTES NFR BLD AUTO: 38.5 %
MCH RBC QN AUTO: 29.1 PG (ref 26–34)
MCHC RBC AUTO-ENTMCNC: 34.5 G/DL (ref 31–37)
MCV RBC AUTO: 84.2 FL
MONOCYTES # BLD AUTO: 0.67 X10(3) UL (ref 0.1–1)
MONOCYTES NFR BLD AUTO: 7.2 %
NEUTROPHILS # BLD AUTO: 4.87 X10 (3) UL (ref 1.5–7.7)
NEUTROPHILS # BLD AUTO: 4.87 X10(3) UL (ref 1.5–7.7)
NEUTROPHILS NFR BLD AUTO: 52.2 %
NITRITE UR QL STRIP.AUTO: NEGATIVE
OSMOLALITY SERPL CALC.SUM OF ELEC: 291 MOSM/KG (ref 275–295)
PH UR STRIP.AUTO: 7.5 [PH] (ref 5–8)
PLATELET # BLD AUTO: 269 10(3)UL (ref 150–450)
POTASSIUM SERPL-SCNC: 4.6 MMOL/L (ref 3.5–5.1)
PROT SERPL-MCNC: 7.5 G/DL (ref 5.7–8.2)
PROT UR STRIP.AUTO-MCNC: 30 MG/DL
RBC # BLD AUTO: 4.68 X10(6)UL
RBC #/AREA URNS AUTO: >10 /HPF
SODIUM SERPL-SCNC: 140 MMOL/L (ref 136–145)
SP GR UR STRIP.AUTO: 1.02 (ref 1–1.03)
UROBILINOGEN UR STRIP.AUTO-MCNC: NORMAL MG/DL
WBC # BLD AUTO: 9.3 X10(3) UL (ref 4–11)
WBC #/AREA URNS AUTO: >50 /HPF
WBC CLUMPS UR QL AUTO: PRESENT /HPF

## 2024-09-28 PROCEDURE — 87086 URINE CULTURE/COLONY COUNT: CPT | Performed by: EMERGENCY MEDICINE

## 2024-09-28 PROCEDURE — 96360 HYDRATION IV INFUSION INIT: CPT

## 2024-09-28 PROCEDURE — 87184 SC STD DISK METHOD PER PLATE: CPT | Performed by: EMERGENCY MEDICINE

## 2024-09-28 PROCEDURE — 87186 SC STD MICRODIL/AGAR DIL: CPT | Performed by: EMERGENCY MEDICINE

## 2024-09-28 PROCEDURE — 96361 HYDRATE IV INFUSION ADD-ON: CPT

## 2024-09-28 PROCEDURE — 81001 URINALYSIS AUTO W/SCOPE: CPT | Performed by: EMERGENCY MEDICINE

## 2024-09-28 PROCEDURE — 87077 CULTURE AEROBIC IDENTIFY: CPT | Performed by: EMERGENCY MEDICINE

## 2024-09-28 PROCEDURE — 99284 EMERGENCY DEPT VISIT MOD MDM: CPT

## 2024-09-28 PROCEDURE — 85025 COMPLETE CBC W/AUTO DIFF WBC: CPT | Performed by: EMERGENCY MEDICINE

## 2024-09-28 PROCEDURE — 80053 COMPREHEN METABOLIC PANEL: CPT | Performed by: EMERGENCY MEDICINE

## 2024-09-28 PROCEDURE — 74019 RADEX ABDOMEN 2 VIEWS: CPT | Performed by: EMERGENCY MEDICINE

## 2024-09-28 RX ORDER — DICYCLOMINE HYDROCHLORIDE 10 MG/1
10 CAPSULE ORAL
COMMUNITY
Start: 2024-09-18 | End: 2024-09-28

## 2024-09-28 RX ORDER — LEVOFLOXACIN 500 MG/1
500 TABLET, FILM COATED ORAL ONCE
Status: COMPLETED | OUTPATIENT
Start: 2024-09-28 | End: 2024-09-28

## 2024-09-28 RX ORDER — CIPROFLOXACIN 250 MG/1
250 TABLET, FILM COATED ORAL 2 TIMES DAILY
Qty: 14 TABLET | Refills: 0 | Status: SHIPPED | OUTPATIENT
Start: 2024-09-28 | End: 2024-10-05

## 2024-09-28 NOTE — ED INITIAL ASSESSMENT (HPI)
Pt states w/interpretor that he is having \"a constipation and urination issue\"; when asked what prompted him coming to ED today, pt stated \"he has come to the hospital about 8 times\"; pt is [place] and mandarin speaking;  Pt states \"I pee'd a little this morning but he forced it out\"; Pt states \"he [pooped] a little bit\"; pt's wife states he has catheterized 3x in the recent past; wife states hx of UTIs; denies n/v; has had fever at home; eating and drinking normally; pt states he took tylenol at 0630 this morning 500mg  Hx of cardiac stents and blood thinners

## 2024-09-28 NOTE — DISCHARGE INSTRUCTIONS
Push fluids.  [place] diet.  You are given a dose of antibiotics today you will not need to take another dose until tomorrow morning.

## 2024-09-28 NOTE — ED QUICK NOTES
Jay placement NOT COMPLETED; straight cath done instead per MD; pt only had 89mls on bladder scan and voided a small amount prior to admission to ED; straight cath done to get sample for UTI evaluation due to hx and complaints

## 2024-09-28 NOTE — ED PROVIDER NOTES
Patient Seen in: [facility] Emergency Department      History     Chief Complaint   Patient presents with    Urinary Symptoms     Stated Complaint: unable to void urine or stool today. able to yesterday per family    Subjective:   HPI    89-year-old male presents to the emergency department with issues with urination.  Patient is a poor historian despite using a [employer] .  He states that he is only been able to urinate a little bit.  There does seem to be history of urinary tract infections as well as urinary retention.  He is concerned about the potential for urinary retention.  He also is constipated.  He states he had a small bowel movement yesterday.  Per the family he takes very little orally.  But this is his baseline.  No vomiting.  No other acute complaints    Objective:   Past Medical History:    Essential hypertension    Hyperlipidemia    Stroke (HCC)              Past Surgical History:   Procedure Laterality Date    Surgical stent      wife states Lft side of [facility]                Social History     Socioeconomic History    Marital status:    Tobacco Use    Smoking status: Former     Types: Cigarettes    Smokeless tobacco: Never   Vaping Use    Vaping status: Never Used   Substance and Sexual Activity    Alcohol use: Not Currently    Drug use: Never     Social Determinants of Health     Financial Resource Strain: Low Risk  (7/12/2024)    Received from [facility]    Financial Resource Strain     In the past year, have you or any family members you live with been unable to get any of the following when it was really needed? Check all that apply.: None   Food Insecurity: No Food Insecurity (8/19/2024)    Food Insecurity     Food Insecurity: Never true   Transportation Needs: No Transportation Needs (8/19/2024)    Transportation Needs     Lack of Transportation: No   Physical Activity: Low Risk  (7/12/2024)    Received from [facility]     Exercise Vital Sign     On average, how many days per week do you engage in moderate to strenuous exercise (like a brisk walk)?: 5 days     On average, how many minutes do you engage in exercise at this level?: 30 min   Stress: Low Risk  (7/12/2024)    Received from [facility]    Stress     Stress is when someone feels tense, nervous, anxious, or can't sleep at night because their mind is troubled. How stressed are you? : Not at all   Social Connections: Low Risk  (7/12/2024)    Received from [facility]    Social Connections     How often do you see or talk to people that you care about and feel close to? (For example: talking to friends on the phone, visiting friends or family, going to [employer] or club meetings): 3 to 5 times a week   Housing Stability: Low Risk  (8/19/2024)    Housing Stability     Housing Instability: No              Review of Systems   All other systems reviewed and are negative.      Positive for stated Chief Complaint: Urinary Symptoms    Other systems are as noted in HPI.  Constitutional and vital signs reviewed.      All other systems reviewed and negative except as noted above.    Physical Exam     ED Triage Vitals [09/28/24 1030]   BP (!) 172/66   Pulse 58   Resp 22   Temp 98.1 °F (36.7 °C)   Temp src Oral   SpO2 99 %   O2 Device None (Room air)       Current Vitals:   Vital Signs  BP: (!) 169/64  Pulse: (!) 48  Resp: 22  Temp: 98.1 °F (36.7 °C)  Temp src: Oral  MAP (mmHg): 95    Oxygen Therapy  SpO2: 99 %  O2 Device: None (Room air)            Physical Exam  Vitals and nursing note reviewed.   Constitutional:       General: He is not in acute distress.     Appearance: Normal appearance. He is well-developed.   HENT:      Head: Normocephalic and atraumatic.      Mouth/Throat:      Mouth: Mucous membranes are dry.   Cardiovascular:      Rate and Rhythm: Normal rate and regular rhythm.      Pulses: Normal pulses.      Heart sounds: Normal heart sounds.   Pulmonary:       Effort: Pulmonary effort is normal.      Breath sounds: Normal breath sounds. No stridor.   Abdominal:      General: Bowel sounds are normal.      Palpations: Abdomen is soft.      Tenderness: There is no abdominal tenderness. There is no right CVA tenderness or left CVA tenderness.   Musculoskeletal:         General: Normal range of motion.      Cervical back: Normal range of motion and neck supple.   Lymphadenopathy:      Cervical: No cervical adenopathy.   Skin:     General: Skin is warm and dry.   Neurological:      General: No focal deficit present.      Mental Status: He is alert.      Comments: Sometimes needs to be asked repeatedly and does seem to get confused per the Mandarin  but per family this is his baseline              ED Course     Labs Reviewed   COMP METABOLIC PANEL (14) - Abnormal; Notable for the following components:       Result Value    Alkaline Phosphatase 137 (*)     All other components within normal limits   URINALYSIS WITH CULTURE REFLEX - Abnormal; Notable for the following components:    Clarity Urine Ex.Turbid (*)     Blood Urine 2+ (*)     Protein Urine 30 (*)     Leukocyte Esterase Urine 500 (*)     WBC Urine >50 (*)     RBC Urine >10 (*)     Squamous Epi. Cells Few (*)     WBC Clump Present (*)     All other components within normal limits   CBC WITH DIFFERENTIAL WITH PLATELET   RAINBOW DRAW LAVENDER   RAINBOW DRAW LIGHT GREEN   RAINBOW DRAW BLUE   RAINBOW DRAW GOLD   URINE CULTURE, ROUTINE             XR ABDOMEN OBSTRUCTIVE SERIES ROUTINE(2 VW)(CPT=74019)    Result Date: 9/28/2024  PROCEDURE:  XR ABDOMEN OBSTRUCTIVE SERIES ROUTINE(2 VW)(CPT=74019)  TECHNIQUE:  2 view obstructive series of the abdomen and pelvis were obtained.  COMPARISON:  None.  INDICATIONS:  unable to void urine or stool today. able to yesterday per family  PATIENT STATED HISTORY: (As transcribed by Technologist)  Patient stated not having a bowel movement for today.    FINDINGS:  Gas in multiple  nondistended small bowel loops throughout the entire abdomen.  Mild colonic stool and gas.  Atherosclerotic calcifications.  No mass.  Degenerative changes of spine.            CONCLUSION:  Moderate small bowel gas without distention, suggestive of mild paralytic ileus.   LOCATION:  [facility]    Dictated by (CST): Drew Kennedy MD on 9/28/2024 at 12:19 PM     Finalized by (CST): Drew Kennedy MD on 9/28/2024 at 12:19 PM              MDM      Patient had an IV established and baseline blood work obtained.  His previous records were reviewed and he does have a history of urinary tract infections as well as urinary retention.  We did a bladder scan and patient only had about 85 cc of urine in his bladder.  A straight cath was performed and he had right around 100 cc this was sent for urinalysis.  Urine does seem consistent with infection.  Patient had other blood work obtained he does not have a significantly elevated white count nor does he have a fever and is nontoxic-appearing.  He is eating and drinking.  He was concerned about being very constipated but on obstructive series that personally reviewed he is not have any free air or any significant obstruction he does seem to have quite a bit of gas.  Radiology reviewed the films as well and felt that he may have an early ileus but there is no bowel obstruction.  This is most likely all related to the urinary tract infection.  I reviewed his previous records and previous culture results.  His urine typically is resistant to cephalosporins and sulfa drugs.  Therefore he was given quinolones.  He was given a dose of Levaquin while in the emergency department he was given this orally and he tolerated it without any difficulty.  Results were reviewed with the patient.  Patient is to continue to push fluids.  He is return for any sudden worsening or any evidence of vomiting.  He is to have a bland diet.  He may start his antibiotics tomorrow.  He was discharged in good  condition                                   Medical Decision Making      Disposition and Plan     Clinical Impression:  1. Urinary tract infection without hematuria, site unspecified    2. Paralytic ileus (HCC)         Disposition:  Discharge  9/28/2024  1:15 pm    Follow-up:  Paulo James MD  [address] [place] [id number]  [phone]    Schedule an appointment as soon as possible for a visit            Medications Prescribed:  Discharge Medication List as of 9/28/2024  1:42 PM        START taking these medications    Details   ciprofloxacin 250 MG Oral Tab Take 1 tablet (250 mg total) by mouth 2 (two) times daily for 7 days., Normal, Disp-14 tablet, R-0

## 2024-09-29 ENCOUNTER — APPOINTMENT (OUTPATIENT)
Dept: GENERAL RADIOLOGY | Facility: HOSPITAL | Age: 89
End: 2024-09-29
Payer: MEDICARE

## 2024-09-29 ENCOUNTER — HOSPITAL ENCOUNTER (EMERGENCY)
Facility: HOSPITAL | Age: 89
Discharge: HOME | End: 2024-09-29
Attending: EMERGENCY MEDICINE
Payer: MEDICARE

## 2024-09-29 VITALS
WEIGHT: 155 LBS | TEMPERATURE: 98 F | HEIGHT: 67 IN | BODY MASS INDEX: 24.33 KG/M2 | HEART RATE: 48 BPM | RESPIRATION RATE: 21 BRPM | OXYGEN SATURATION: 100 % | DIASTOLIC BLOOD PRESSURE: 71 MMHG | SYSTOLIC BLOOD PRESSURE: 179 MMHG

## 2024-09-29 DIAGNOSIS — J34.89 IRRITATION OF NOSE: Primary | ICD-10-CM

## 2024-09-29 PROCEDURE — 99283 EMERGENCY DEPT VISIT LOW MDM: CPT

## 2024-09-29 PROCEDURE — 71045 X-RAY EXAM CHEST 1 VIEW: CPT | Performed by: EMERGENCY MEDICINE

## 2024-09-29 PROCEDURE — 93010 ELECTROCARDIOGRAM REPORT: CPT

## 2024-09-29 PROCEDURE — 99284 EMERGENCY DEPT VISIT MOD MDM: CPT

## 2024-09-29 PROCEDURE — 93005 ELECTROCARDIOGRAM TRACING: CPT

## 2024-09-29 NOTE — ED INITIAL ASSESSMENT (HPI)
Pt wheeled into ED, per wife patient was drinking water this morning and began coughing, states he now feels shortness of breath/nasal congestion and is concerned he aspirated while drinking, speaking clearly in triage/swallowing without difficulty

## 2024-09-29 NOTE — ED PROVIDER NOTES
Patient Seen in: [facility] Emergency Department      History     Chief Complaint   Patient presents with    Difficulty Breathing    Swallowing Problem     Stated Complaint: pt drank a cup of water and felt his ears and nose get clogged.  pt thinks \"nara*    Subjective:   HPI    89-year-old male presents today for evaluation.  Patient was in the ER yesterday and diagnosed with a urinary tract infection.  He has been trying to drink water at home to stay hydrated.  While drinking earlier today, he felt the water go up his nose.  He felt like his nose was plugged up and he could not blow the water out.  He did not have any headache, chest pain, shortness of breath, fevers or vomiting.  Since that time, he feels better.  He denies any other symptoms and both he and his wife are requesting discharge at the time of my assessment.  Patient and wife declined mandarin .  Wife served as     Objective:   Past Medical History:    Essential hypertension    Hyperlipidemia    Stroke (HCC)              Past Surgical History:   Procedure Laterality Date    Surgical stent      wife states Lft side of heart- [facility]                Social History     Socioeconomic History    Marital status:    Tobacco Use    Smoking status: Former     Types: Cigarettes    Smokeless tobacco: Never   Vaping Use    Vaping status: Never Used   Substance and Sexual Activity    Alcohol use: Not Currently    Drug use: Never     Social Determinants of Health     Financial Resource Strain: Low Risk  (7/12/2024)    Received from [facility]    Financial Resource Strain     In the past year, have you or any family members you live with been unable to get any of the following when it was really needed? Check all that apply.: None   Food Insecurity: No Food Insecurity (8/19/2024)    Food Insecurity     Food Insecurity: Never true   Transportation Needs: No Transportation Needs (8/19/2024)     Transportation Needs     Lack of Transportation: No   Physical Activity: Low Risk  (7/12/2024)    Received from [facility]    Exercise Vital Sign     On average, how many days per week do you engage in moderate to strenuous exercise (like a brisk walk)?: 5 days     On average, how many minutes do you engage in exercise at this level?: 30 min   Stress: Low Risk  (7/12/2024)    Received from [facility] [facility]    Stress     Stress is when someone feels tense, nervous, anxious, or can't sleep at night because their mind is troubled. How stressed are you? : Not at all   Social Connections: Low Risk  (7/12/2024)    Received from [facility]    Social Connections     How often do you see or talk to people that you care about and feel close to? (For example: talking to friends on the phone, visiting friends or family, going to [employer] or club meetings): 3 to 5 times a week   Housing Stability: Low Risk  (8/19/2024)    Housing Stability     Housing Instability: No              Review of Systems    Positive for stated Chief Complaint: Difficulty Breathing and Swallowing Problem    Other systems are as noted in HPI.  Constitutional and vital signs reviewed.      All other systems reviewed and negative except as noted above.    Physical Exam     ED Triage Vitals [09/29/24 1704]   BP (!) 170/76   Pulse 67   Resp 20   Temp 97.5 °F (36.4 °C)   Temp src Temporal   SpO2 99 %   O2 Device None (Room air)       Current Vitals:   Vital Signs  BP: (!) 179/71  Pulse: (!) 48  Resp: 21  Temp: 97.5 °F (36.4 °C)  Temp src: Temporal  MAP (mmHg): (!) 103    Oxygen Therapy  SpO2: 100 %  O2 Device: None (Room air)            Physical Exam  Vitals and nursing note reviewed.   Constitutional:       Appearance: Normal appearance.   HENT:      Head: Normocephalic and atraumatic.      Nose: Nose normal.      Mouth/Throat:      Mouth: Mucous membranes are moist.   Eyes:      Extraocular Movements: Extraocular movements  intact.      Pupils: Pupils are equal, round, and reactive to light.   Cardiovascular:      Rate and Rhythm: Normal rate and regular rhythm.   Pulmonary:      Effort: Pulmonary effort is normal.      Breath sounds: Normal breath sounds.   Abdominal:      Palpations: Abdomen is soft.      Tenderness: There is no abdominal tenderness.   Musculoskeletal:         General: Normal range of motion.      Cervical back: Neck supple.   Skin:     General: Skin is warm.   Neurological:      General: No focal deficit present.      Mental Status: He is alert.      Cranial Nerves: No cranial nerve deficit.      Sensory: No sensory deficit.      Motor: No weakness.      Coordination: Coordination normal.   Psychiatric:         Mood and Affect: Mood normal.           ED Course   Labs Reviewed - No data to display  EKG    Rate, intervals and axes as noted on EKG Report.  Rate: 60  Rhythm: Sinus Rhythm  Reading: Left bundle branch block                 XR CHEST AP PORTABLE  (CPT=71045)    Result Date: 9/29/2024  PROCEDURE:  XR CHEST AP PORTABLE  (CPT=71045)  TECHNIQUE:  AP chest radiograph was obtained.  COMPARISON:  [facility] , XR, XR CHEST AP PORTABLE  (CPT=71045), 9/13/2024, 10:51 AM.  INDICATIONS:  pt drank a cup of water and felt his ears and nose get clogged.  pt thinks water stuck in his throat  PATIENT STATED HISTORY: (As transcribed by Technologist)  Patient offered no additional history at this time.    FINDINGS:  Lung volumes are low.  Reticular increased density in lower lungs is probably related to portable technique and dependent atelectasis.  There is cardiomegaly.  Cardiac monitoring device projects over left chest.  Mediastinum and krunal are unchanged.  Chest wall structures are unchanged.            CONCLUSION:  There are low lung volumes.  Dependent reticular densities in lungs are probably related to low lung volumes and atelectasis.   LOCATION:  [facility]      Dictated by (CST): José Miguel Bradford MD on 9/29/2024 at 7:04 PM      Finalized by (CST): José Miguel Bradford MD on 9/29/2024 at 7:05 PM                [facility]      Differential Diagnosis  89-year-old male presents today after some water inadvertently got to his nasal cavity his nose.  He presently reports feeling better and both he and his wife are requesting discharge home.  His lungs are grossly clear and his breathing is nonlabored.  He is moving all extremities equally and has no facial asymmetry.  His abdomen is soft without any tenderness, guarding or rigidity.  He denies any pain.  Chest x-ray ordered prior to my evaluation, will await to assess for any signs of aspiration, although based on the history my suspicion is low.    7:10 pm  X-ray does not reveal significant acute abnormality.  Patient and wife have requested discharge multiple times now.  He is asymptomatic, will DC at their request.    External Chart Reviewed  Patient's urine culture from yesterday's preliminary although did grow Enterobacter.  The previous urine culture from August 30 was susceptible to Cipro which she was prescribed.  The current sensitivity is pending    History from [facility]  Wife helps supplement history                                   Medical Decision Making      Disposition and Plan     Clinical Impression:  1. Irritation of nose         Disposition:  Discharge  9/29/2024  7:10 pm    Follow-up:  Paulo James MD  [address] [place] [id number] [phone]    Call            Medications Prescribed:  Discharge Medication List as of 9/29/2024  7:10 PM

## 2024-09-30 LAB
ATRIAL RATE: 60 BPM
P AXIS: 36 DEGREES
P-R INTERVAL: 200 MS
Q-T INTERVAL: 476 MS
QRS DURATION: 138 MS
QTC CALCULATION (BEZET): 476 MS
R AXIS: -3 DEGREES
T AXIS: 186 DEGREES
VENTRICULAR RATE: 60 BPM

## 2024-09-30 NOTE — DISCHARGE INSTRUCTIONS
Follow-up with your primary care doctor within the next week for reassessment.  Return for any new or worsening pain, fevers, difficulty breathing or any other concerning symptoms.

## 2024-10-24 ENCOUNTER — APPOINTMENT (OUTPATIENT)
Dept: GENERAL RADIOLOGY | Facility: HOSPITAL | Age: 89
End: 2024-10-24
Attending: EMERGENCY MEDICINE
Payer: MEDICARE

## 2024-10-24 ENCOUNTER — HOSPITAL ENCOUNTER (EMERGENCY)
Facility: HOSPITAL | Age: 89
Discharge: HOME | End: 2024-10-24
Attending: EMERGENCY MEDICINE
Payer: MEDICARE

## 2024-10-24 VITALS
TEMPERATURE: 99 F | WEIGHT: 155 LBS | SYSTOLIC BLOOD PRESSURE: 157 MMHG | RESPIRATION RATE: 14 BRPM | DIASTOLIC BLOOD PRESSURE: 61 MMHG | OXYGEN SATURATION: 100 % | BODY MASS INDEX: 24 KG/M2 | HEART RATE: 62 BPM

## 2024-10-24 DIAGNOSIS — R09.81 NASAL CONGESTION: Primary | ICD-10-CM

## 2024-10-24 LAB
ALBUMIN SERPL-MCNC: 4.5 G/DL (ref 3.2–4.8)
ALBUMIN/GLOB SERPL: 1.4 {RATIO} (ref 1–2)
ALP LIVER SERPL-CCNC: 146 U/L
ALT SERPL-CCNC: 25 U/L
ANION GAP SERPL CALC-SCNC: 8 MMOL/L (ref 0–18)
APTT PPP: 30.6 SECONDS (ref 23–36)
AST SERPL-CCNC: 27 U/L (ref ?–34)
BASOPHILS # BLD AUTO: 0.05 X10(3) UL (ref 0–0.2)
BASOPHILS NFR BLD AUTO: 0.6 %
BILIRUB SERPL-MCNC: 0.4 MG/DL (ref 0.2–0.9)
BUN BLD-MCNC: 18 MG/DL (ref 9–23)
CALCIUM BLD-MCNC: 10 MG/DL (ref 8.7–10.4)
CHLORIDE SERPL-SCNC: 104 MMOL/L (ref 98–112)
CO2 SERPL-SCNC: 21 MMOL/L (ref 21–32)
CREAT BLD-MCNC: 1.15 MG/DL
EGFRCR SERPLBLD CKD-EPI 2021: 59 ML/MIN/1.73M2 (ref 60–?)
EOSINOPHIL # BLD AUTO: 0.02 X10(3) UL (ref 0–0.7)
EOSINOPHIL NFR BLD AUTO: 0.2 %
ERYTHROCYTE [DISTWIDTH] IN BLOOD BY AUTOMATED COUNT: 12.7 %
GLOBULIN PLAS-MCNC: 3.3 G/DL (ref 2–3.5)
GLUCOSE BLD-MCNC: 133 MG/DL (ref 70–99)
HCT VFR BLD AUTO: 38.7 %
HGB BLD-MCNC: 13.4 G/DL
IMM GRANULOCYTES # BLD AUTO: 0.04 X10(3) UL (ref 0–1)
IMM GRANULOCYTES NFR BLD: 0.5 %
INR BLD: 0.97 (ref 0.8–1.2)
LYMPHOCYTES # BLD AUTO: 3.06 X10(3) UL (ref 1–4)
LYMPHOCYTES NFR BLD AUTO: 35 %
MCH RBC QN AUTO: 28.7 PG (ref 26–34)
MCHC RBC AUTO-ENTMCNC: 34.6 G/DL (ref 31–37)
MCV RBC AUTO: 82.9 FL
MONOCYTES # BLD AUTO: 0.5 X10(3) UL (ref 0.1–1)
MONOCYTES NFR BLD AUTO: 5.7 %
NEUTROPHILS # BLD AUTO: 5.08 X10 (3) UL (ref 1.5–7.7)
NEUTROPHILS # BLD AUTO: 5.08 X10(3) UL (ref 1.5–7.7)
NEUTROPHILS NFR BLD AUTO: 58 %
NT-PROBNP SERPL-MCNC: 447 PG/ML (ref ?–450)
OSMOLALITY SERPL CALC.SUM OF ELEC: 280 MOSM/KG (ref 275–295)
PLATELET # BLD AUTO: 276 10(3)UL (ref 150–450)
POTASSIUM SERPL-SCNC: 4.4 MMOL/L (ref 3.5–5.1)
PROT SERPL-MCNC: 7.8 G/DL (ref 5.7–8.2)
PROTHROMBIN TIME: 13 SECONDS (ref 11.6–14.8)
Q-T INTERVAL: 458 MS
QRS DURATION: 142 MS
QTC CALCULATION (BEZET): 472 MS
R AXIS: -14 DEGREES
RBC # BLD AUTO: 4.67 X10(6)UL
SODIUM SERPL-SCNC: 133 MMOL/L (ref 136–145)
T AXIS: 166 DEGREES
TROPONIN I SERPL HS-MCNC: 9 NG/L
VENTRICULAR RATE: 64 BPM
WBC # BLD AUTO: 8.8 X10(3) UL (ref 4–11)

## 2024-10-24 PROCEDURE — 85610 PROTHROMBIN TIME: CPT | Performed by: EMERGENCY MEDICINE

## 2024-10-24 PROCEDURE — 93010 ELECTROCARDIOGRAM REPORT: CPT

## 2024-10-24 PROCEDURE — 93005 ELECTROCARDIOGRAM TRACING: CPT

## 2024-10-24 PROCEDURE — 83880 ASSAY OF NATRIURETIC PEPTIDE: CPT | Performed by: EMERGENCY MEDICINE

## 2024-10-24 PROCEDURE — 80053 COMPREHEN METABOLIC PANEL: CPT | Performed by: EMERGENCY MEDICINE

## 2024-10-24 PROCEDURE — 99284 EMERGENCY DEPT VISIT MOD MDM: CPT

## 2024-10-24 PROCEDURE — 84484 ASSAY OF TROPONIN QUANT: CPT | Performed by: EMERGENCY MEDICINE

## 2024-10-24 PROCEDURE — 85730 THROMBOPLASTIN TIME PARTIAL: CPT | Performed by: EMERGENCY MEDICINE

## 2024-10-24 PROCEDURE — 36415 COLL VENOUS BLD VENIPUNCTURE: CPT

## 2024-10-24 PROCEDURE — 99285 EMERGENCY DEPT VISIT HI MDM: CPT

## 2024-10-24 PROCEDURE — 85025 COMPLETE CBC W/AUTO DIFF WBC: CPT | Performed by: EMERGENCY MEDICINE

## 2024-10-24 PROCEDURE — 71045 X-RAY EXAM CHEST 1 VIEW: CPT | Performed by: EMERGENCY MEDICINE

## 2024-10-24 NOTE — ED PROVIDER NOTES
No food for anybody  Patient Seen in: [facility] Emergency Department      History     Chief Complaint   Patient presents with    Difficulty Breathing     Stated Complaint: C/O DB since yesterday    Subjective:   HPI      Patient history through his wife who is interpreting, as well as toya .  Somewhat difficult history, but patient appears to have multiple ED visits for shortness of breath and constipation.  He has many episodes of what he calls shortness of breath, but on further discussion seem to be episodes where his nostrils seem to be clogged and he has to breathe through his mouth.  He terms this shortness of breath.  He does not actually feel starved for air.  According to his wife he is using some sort of saline nasal spray and a prescription nasal spray, though I am unable to find this in his medication list.  Similar episode occurred today.  Patient was swallowing something felt like it was stuck in his throat, this combined with the feeling of nasal congestion made him feel like she could not breathe properly.  We discussed the possibility of anxiety.  Patient's wife says she thinks this is at the root of many of his problems.  Objective:     Past Medical History:    Essential hypertension    Hyperlipidemia    Stroke (HCC)              Past Surgical History:   Procedure Laterality Date    Surgical stent      wife states Lft side of heart- [facility]                Social History     Socioeconomic History    Marital status:    Tobacco Use    Smoking status: Former     Types: Cigarettes    Smokeless tobacco: Never   Vaping Use    Vaping status: Never Used   Substance and Sexual Activity    Alcohol use: Not Currently    Drug use: Never     Social Drivers of Health     Financial Resource Strain: Low Risk  (7/12/2024)    Received from [facility]    Financial Resource Strain     In the past year, have you or any family members you live with been unable to get  any of the following when it was really needed? Check all that apply.: None   Food Insecurity: No Food Insecurity (8/19/2024)    Food Insecurity     Food Insecurity: Never true   Transportation Needs: No Transportation Needs (8/19/2024)    Transportation Needs     Lack of Transportation: No   Physical Activity: Low Risk  (10/14/2024)    Received from [facility]    Exercise Vital Sign     On average, how many days per week do you engage in moderate to strenuous exercise (like a brisk walk)?: 5 days     On average, how many minutes do you engage in exercise at this level?: 30 min   Stress: Low Risk  (7/12/2024)    Received from [facility]    Stress     Stress is when someone feels tense, nervous, anxious, or can't sleep at night because their mind is troubled. How stressed are you? : Not at all   Social Connections: Low Risk  (7/12/2024)    Received from [email or web address]    Social Connections     How often do you see or talk to people that you care about and feel close to? (For example: talking to friends on the phone, visiting friends or family, going to [employer] or club meetings): 3 to 5 times a week   Housing Stability: Low Risk  (8/19/2024)    Housing Stability     Housing Instability: No                  Physical Exam     ED Triage Vitals   BP 10/24/24 0729 (!) 161/63   Pulse 10/24/24 0729 67   Resp 10/24/24 0729 18   Temp 10/24/24 0729 98.6 °F (37 °C)   Temp src 10/24/24 0729 Oral   SpO2 10/24/24 0729 97 %   O2 Device 10/24/24 0806 None (Room air)       Current Vitals:   Vital Signs  BP: 157/61  Pulse: 62  Resp: 14  Temp: 98.9 °F (37.2 °C)  Temp src: Temporal  MAP (mmHg): 90    Oxygen Therapy  SpO2: 100 %  O2 Device: None (Room air)        Physical Exam  General: Well-developed, well-nourished, comfortable, no acute distress  Head and neck: Normocephalic, atraumatic, prominent turbinates on the left  Cardiovascular: Regular rate and rhythm, normal S1 and S2, no obvious murmurs, rubs,  or gallops   Lungs: Clear to auscultation bilaterally with equal breath sounds, no wheezing, no rhonchi   Abdomen: Positive bowel sounds,  soft, no tenderness, no distension, no rebound, no guarding   Extremities: No cyanosis, no clubbing, no edema   Musculoskeletal: No tenderness, swelling, deformity   Skin: No significant lesions   Neuro: Grossly intact to patient's baseline    ED Course     Labs Reviewed   COMP METABOLIC PANEL (14) - Abnormal; Notable for the following components:       Result Value    Glucose 133 (*)     Sodium 133 (*)     eGFR-Cr 59 (*)     Alkaline Phosphatase 146 (*)     All other components within normal limits   CBC WITH DIFFERENTIAL WITH PLATELET - Abnormal; Notable for the following components:    HCT 38.7 (*)     All other components within normal limits   TROPONIN I HIGH SENSITIVITY - Normal   PRO BETA NATRIURETIC PEPTIDE - Normal   PROTHROMBIN TIME (PT) - Normal   PTT, ACTIVATED - Normal     EKG    Rate, intervals and axes as noted on EKG Report.  Rate: 64  Rhythm: Sinus Rhythm  Reading: Left bundle branch block, no significant change from the 29th           Differential diagnosis includes nasal congestion, respiratory issues, cardiac issues, among other processes       Chest x-ray image reviewed by myself shows no acute process.  Radiology read concurs       MDM      89-year-old, history of hypertension, hyperlipidemia, stroke, multiple ED visits, all seem to be around the same issue of nasal congestion which he interprets as shortness of breath.  While in the ED patient started to mouth breathe to show the level of issues he is having with his nose.  He was in no actual respiratory distress during any of this.  Workup started here.  Blood work including troponin, BNP within normal limits.  Chest x-ray shows stable cardiomegaly.  No acute issues.  We discussed using saline spray, Flonase, humidifier in the bedroom, offered ENT follow-up rather than repetitive ER visits.  Patient's  wife feels that he is extremely anxious.  As the at some point during his stay, patient somewhat suddenly decided that he wanted to go home.  He did not want to discuss any of these issues further.  His wife did verbalize understanding of ENT referral and the above recommendations.  His anxiety I do not believe is being addressed.  He does not wish to have any further discussion at this time.        Medical Decision Making      Disposition and Plan     Clinical Impression:  1. Nasal congestion         Disposition:  Discharge  10/24/2024  9:37 am    Follow-up:  Paulo James MD  [address] [facility] [address]  [place] [id number] [phone]    Follow up      Terrence Mcgarry MD  [address]  [address]  [place] [id number] [phone]    Follow up            Medications Prescribed:  Discharge Medication List as of 10/24/2024  9:46 AM              Supplementary Documentation:

## 2024-10-24 NOTE — DISCHARGE INSTRUCTIONS
Please use Flonase nasal spray every day regularly.  Nasal saline spray twice a day  Consider a humidifier in the bedroom to keep the nasal passages moisturized.  Return for any problems

## 2024-10-24 NOTE — ED INITIAL ASSESSMENT (HPI)
Pt speaks Mandarin. Pt was eating his breakfast and suddenly felt like the food got stuck and he couldn't breathe. He felt dizzy and warm all over. Pt states he cannot breathe through his nose.. Pt also c/o constipation x 2 years.

## 2024-10-24 NOTE — ED QUICK NOTES
[facility]  [id number] used for assessment at this time. PT confirms that he is in the ED today due to constipation. PT adds that he also developed shortness of breath since October 18 ( six days ago) . PT states that he feel that a medication caused the symptoms. PT is concerned about insurance coverage .

## 2024-10-28 ENCOUNTER — HOSPITAL ENCOUNTER (EMERGENCY)
Facility: HOSPITAL | Age: 89
Discharge: HOME | End: 2024-10-28
Attending: EMERGENCY MEDICINE
Payer: MEDICARE

## 2024-10-28 ENCOUNTER — HOSPITAL ENCOUNTER (EMERGENCY)
Facility: HOSPITAL | Age: 89
Discharge: LEFT BEFORE DISCHARGE | End: 2024-10-28
Payer: MEDICARE

## 2024-10-28 VITALS
WEIGHT: 155 LBS | RESPIRATION RATE: 18 BRPM | BODY MASS INDEX: 24 KG/M2 | SYSTOLIC BLOOD PRESSURE: 137 MMHG | TEMPERATURE: 98 F | DIASTOLIC BLOOD PRESSURE: 69 MMHG | HEART RATE: 76 BPM | OXYGEN SATURATION: 97 %

## 2024-10-28 VITALS
TEMPERATURE: 99 F | SYSTOLIC BLOOD PRESSURE: 147 MMHG | OXYGEN SATURATION: 100 % | HEART RATE: 64 BPM | DIASTOLIC BLOOD PRESSURE: 56 MMHG | RESPIRATION RATE: 16 BRPM

## 2024-10-28 DIAGNOSIS — R30.0 DYSURIA: Primary | ICD-10-CM

## 2024-10-28 LAB
BILIRUB UR QL STRIP.AUTO: NEGATIVE
CLARITY UR REFRACT.AUTO: CLEAR
GLUCOSE UR STRIP.AUTO-MCNC: NORMAL MG/DL
KETONES UR STRIP.AUTO-MCNC: NEGATIVE MG/DL
LEUKOCYTE ESTERASE UR QL STRIP.AUTO: NEGATIVE
NITRITE UR QL STRIP.AUTO: NEGATIVE
PH UR STRIP.AUTO: 6 [PH] (ref 5–8)
PROT UR STRIP.AUTO-MCNC: NEGATIVE MG/DL
RBC UR QL AUTO: NEGATIVE
SP GR UR STRIP.AUTO: 1.02 (ref 1–1.03)
UROBILINOGEN UR STRIP.AUTO-MCNC: NORMAL MG/DL

## 2024-10-28 PROCEDURE — 99284 EMERGENCY DEPT VISIT MOD MDM: CPT

## 2024-10-28 PROCEDURE — 81003 URINALYSIS AUTO W/O SCOPE: CPT | Performed by: EMERGENCY MEDICINE

## 2024-10-28 NOTE — ED PROVIDER NOTES
Patient Seen in: [facility] Emergency Department      History     Chief Complaint   Patient presents with    Urinary Symptoms     Stated Complaint: difficulty urinating    Subjective:   HPI      This is a 89-year-old gentleman history of BPH, previous urinary tract infections, here for evaluation of discomfort with urination starting today.  Denies any fevers vomiting any focal abdominal pain any other complaints or concerns at this time.    Objective:     Past Medical History:    Essential hypertension    Hyperlipidemia    Stroke (HCC)              Past Surgical History:   Procedure Laterality Date    Surgical stent      wife states Lft side of heart- [facility]                Social History     Socioeconomic History    Marital status:    Tobacco Use    Smoking status: Former     Types: Cigarettes    Smokeless tobacco: Never   Vaping Use    Vaping status: Never Used   Substance and Sexual Activity    Alcohol use: Not Currently    Drug use: Never     Social Drivers of Health     Financial Resource Strain: Low Risk  (7/12/2024)    Received from [facility]    Financial Resource Strain     In the past year, have you or any family members you live with been unable to get any of the following when it was really needed? Check all that apply.: None   Food Insecurity: No Food Insecurity (8/19/2024)    Food Insecurity     Food Insecurity: Never true   Transportation Needs: No Transportation Needs (8/19/2024)    Transportation Needs     Lack of Transportation: No   Physical Activity: Low Risk  (10/14/2024)    Received from [facility]    Exercise Vital Sign     On average, how many days per week do you engage in moderate to strenuous exercise (like a brisk walk)?: 5 days     On average, how many minutes do you engage in exercise at this level?: 30 min   Stress: Low Risk  (7/12/2024)    Received from [facility]    Stress     Stress is when someone feels tense,  nervous, anxious, or can't sleep at night because their mind is troubled. How stressed are you? : Not at all   Social Connections: Low Risk  (7/12/2024)    Received from [facility]    Social Connections     How often do you see or talk to people that you care about and feel close to? (For example: talking to friends on the phone, visiting friends or family, going to [employer] or club meetings): 3 to 5 times a week   Housing Stability: Low Risk  (8/19/2024)    Housing Stability     Housing Instability: No                  Physical Exam     ED Triage Vitals [10/28/24 1023]   /66   Pulse 91   Resp 16   Temp 98.9 °F (37.2 °C)   Temp src Temporal   SpO2 100 %   O2 Device None (Room air)       Current Vitals:   Vital Signs  BP: 147/56  Pulse: 64  Resp: 16  Temp: 98.9 °F (37.2 °C)  Temp src: Temporal  MAP (mmHg): 83    Oxygen Therapy  SpO2: 100 %  O2 Device: None (Room air)        Physical Exam      Physical Exam  Vitals signs and nursing note reviewed.   General:  Patient laying supine in the bed in no acute distress  Head: Normocephalic and atraumatic.   HEENT:  Mucous membranes are moist.   Cardiovascular:  Normal rate and regular rhythm.  No Edema  Pulmonary:  Pulmonary effort is normal.  Normal breath sounds. No wheezing, rhonchi or rales.   Abdominal: Soft nontender nondistended, normal bowel sounds, no guarding no rebound tenderness  Skin: Warm and dry  Neurological: Awake alert, speech is normal        ED Course     Labs Reviewed   URINALYSIS WITH CULTURE REFLEX                   MDM      89-year-old gentleman here for evaluation of discomfort with urination, differential includes UTI dysuria urinary retention.  Bedside ultrasound was used to evaluate patient's PVR, shows approximately 90 cc of urine.  Urinalysis is bland, not consistent with infection.    I reviewed the urology note from 10/24 show patient is on empiric prophylaxis with Bactrim daily, wife at bedside states patient is continuing to  take this medication.  Given the dysuria, recommend patient follow-up with urology for further evaluation return precautions discussed patient and wife in agreement with plan.      Medical Decision Making      Disposition and Plan     Clinical Impression:  1. Dysuria         Disposition:  Discharge  10/28/2024 11:50 am    Follow-up:  Ryan Yeung MD  [address]  [facility] [address]  [place] [address]  [phone]    Schedule an appointment as soon as possible for a visit in 1 day(s)            Medications Prescribed:  Discharge Medication List as of 10/28/2024 11:54 AM              Supplementary Documentation:

## 2024-10-28 NOTE — ED INITIAL ASSESSMENT (HPI)
Pt states today when going to the bathroom only a few drops, pt has urge to urinate but is unable to.

## 2024-10-28 NOTE — ED INITIAL ASSESSMENT (HPI)
Patient arrives to the ED in wheelchair with spouse. Patient endorses \"I blew my nose and there were chunks of black this afternoon\" Patient denies any pain. Patient is on blood thinners per spouse

## 2024-11-02 ENCOUNTER — HOSPITAL ENCOUNTER (OUTPATIENT)
Facility: HOSPITAL | Age: 89
Setting detail: OBSERVATION
Discharge: HOME | End: 2024-11-03
Attending: EMERGENCY MEDICINE | Admitting: INTERNAL MEDICINE
Payer: MEDICARE

## 2024-11-02 ENCOUNTER — APPOINTMENT (OUTPATIENT)
Dept: CT IMAGING | Facility: HOSPITAL | Age: 89
End: 2024-11-02
Attending: EMERGENCY MEDICINE
Payer: MEDICARE

## 2024-11-02 ENCOUNTER — APPOINTMENT (OUTPATIENT)
Dept: GENERAL RADIOLOGY | Facility: HOSPITAL | Age: 89
End: 2024-11-02
Attending: EMERGENCY MEDICINE
Payer: MEDICARE

## 2024-11-02 DIAGNOSIS — K35.80 ACUTE APPENDICITIS, UNSPECIFIED ACUTE APPENDICITIS TYPE: ICD-10-CM

## 2024-11-02 DIAGNOSIS — R19.7 DIARRHEA OF PRESUMED INFECTIOUS ORIGIN: Primary | ICD-10-CM

## 2024-11-02 LAB
ALBUMIN SERPL-MCNC: 4.5 G/DL (ref 3.2–4.8)
ALBUMIN/GLOB SERPL: 1.5 {RATIO} (ref 1–2)
ALP LIVER SERPL-CCNC: 132 U/L
ALT SERPL-CCNC: 18 U/L
ANION GAP SERPL CALC-SCNC: 11 MMOL/L (ref 0–18)
AST SERPL-CCNC: 22 U/L (ref ?–34)
BASOPHILS # BLD AUTO: 0.05 X10(3) UL (ref 0–0.2)
BASOPHILS NFR BLD AUTO: 0.6 %
BILIRUB SERPL-MCNC: 0.6 MG/DL (ref 0.2–0.9)
BUN BLD-MCNC: 20 MG/DL (ref 9–23)
CALCIUM BLD-MCNC: 9.4 MG/DL (ref 8.7–10.4)
CHLORIDE SERPL-SCNC: 105 MMOL/L (ref 98–112)
CO2 SERPL-SCNC: 20 MMOL/L (ref 21–32)
CREAT BLD-MCNC: 1.33 MG/DL
EGFRCR SERPLBLD CKD-EPI 2021: 50 ML/MIN/1.73M2 (ref 60–?)
EOSINOPHIL # BLD AUTO: 0.04 X10(3) UL (ref 0–0.7)
EOSINOPHIL NFR BLD AUTO: 0.4 %
ERYTHROCYTE [DISTWIDTH] IN BLOOD BY AUTOMATED COUNT: 12.8 %
FLUAV + FLUBV RNA SPEC NAA+PROBE: NEGATIVE
FLUAV + FLUBV RNA SPEC NAA+PROBE: NEGATIVE
GLOBULIN PLAS-MCNC: 3.1 G/DL (ref 2–3.5)
GLUCOSE BLD-MCNC: 174 MG/DL (ref 70–99)
HCT VFR BLD AUTO: 38.9 %
HGB BLD-MCNC: 13.5 G/DL
IMM GRANULOCYTES # BLD AUTO: 0.03 X10(3) UL (ref 0–1)
IMM GRANULOCYTES NFR BLD: 0.3 %
LYMPHOCYTES # BLD AUTO: 3.18 X10(3) UL (ref 1–4)
LYMPHOCYTES NFR BLD AUTO: 35.3 %
MCH RBC QN AUTO: 29 PG (ref 26–34)
MCHC RBC AUTO-ENTMCNC: 34.7 G/DL (ref 31–37)
MCV RBC AUTO: 83.5 FL
MONOCYTES # BLD AUTO: 0.4 X10(3) UL (ref 0.1–1)
MONOCYTES NFR BLD AUTO: 4.4 %
NEUTROPHILS # BLD AUTO: 5.31 X10 (3) UL (ref 1.5–7.7)
NEUTROPHILS # BLD AUTO: 5.31 X10(3) UL (ref 1.5–7.7)
NEUTROPHILS NFR BLD AUTO: 59 %
OSMOLALITY SERPL CALC.SUM OF ELEC: 289 MOSM/KG (ref 275–295)
PLATELET # BLD AUTO: 250 10(3)UL (ref 150–450)
POTASSIUM SERPL-SCNC: 4.1 MMOL/L (ref 3.5–5.1)
PROT SERPL-MCNC: 7.6 G/DL (ref 5.7–8.2)
RBC # BLD AUTO: 4.66 X10(6)UL
RSV RNA SPEC NAA+PROBE: NEGATIVE
SARS-COV-2 RNA RESP QL NAA+PROBE: NOT DETECTED
SODIUM SERPL-SCNC: 136 MMOL/L (ref 136–145)
TROPONIN I SERPL HS-MCNC: 7 NG/L
WBC # BLD AUTO: 9 X10(3) UL (ref 4–11)

## 2024-11-02 PROCEDURE — 74176 CT ABD & PELVIS W/O CONTRAST: CPT | Performed by: EMERGENCY MEDICINE

## 2024-11-02 PROCEDURE — 99223 1ST HOSP IP/OBS HIGH 75: CPT | Performed by: STUDENT IN AN ORGANIZED HEALTH CARE EDUCATION/TRAINING PROGRAM

## 2024-11-02 PROCEDURE — 99223 1ST HOSP IP/OBS HIGH 75: CPT | Performed by: INTERNAL MEDICINE

## 2024-11-02 PROCEDURE — 71045 X-RAY EXAM CHEST 1 VIEW: CPT | Performed by: EMERGENCY MEDICINE

## 2024-11-02 RX ORDER — ALFUZOSIN HYDROCHLORIDE 10 MG/1
1 TABLET, EXTENDED RELEASE ORAL DAILY
COMMUNITY
Start: 2024-10-03

## 2024-11-02 RX ORDER — ENOXAPARIN SODIUM 100 MG/ML
40 INJECTION SUBCUTANEOUS DAILY
Status: DISCONTINUED | OUTPATIENT
Start: 2024-11-03 | End: 2024-11-03

## 2024-11-02 RX ORDER — TAMSULOSIN HYDROCHLORIDE 0.4 MG/1
0.4 CAPSULE ORAL
Status: DISCONTINUED | OUTPATIENT
Start: 2024-11-03 | End: 2024-11-03

## 2024-11-02 RX ORDER — SODIUM CHLORIDE 9 MG/ML
INJECTION, SOLUTION INTRAVENOUS CONTINUOUS
Status: ACTIVE | OUTPATIENT
Start: 2024-11-02 | End: 2024-11-03

## 2024-11-02 RX ORDER — ESCITALOPRAM OXALATE 20 MG/1
20 TABLET ORAL NIGHTLY
Status: DISCONTINUED | OUTPATIENT
Start: 2024-11-02 | End: 2024-11-03

## 2024-11-02 RX ORDER — TRAZODONE HYDROCHLORIDE 50 MG/1
50 TABLET, FILM COATED ORAL NIGHTLY
Status: DISCONTINUED | OUTPATIENT
Start: 2024-11-02 | End: 2024-11-03

## 2024-11-02 RX ORDER — SUCRALFATE 1 G/1
1 TABLET ORAL
Status: DISCONTINUED | OUTPATIENT
Start: 2024-11-03 | End: 2024-11-03

## 2024-11-02 RX ORDER — ASPIRIN 81 MG/1
81 TABLET, CHEWABLE ORAL DAILY
Status: DISCONTINUED | OUTPATIENT
Start: 2024-11-03 | End: 2024-11-03

## 2024-11-02 RX ORDER — TOBRAMYCIN AND DEXAMETHASONE 3; 1 MG/ML; MG/ML
1 SUSPENSION/ DROPS OPHTHALMIC 2 TIMES DAILY
Status: DISCONTINUED | OUTPATIENT
Start: 2024-11-02 | End: 2024-11-03

## 2024-11-02 RX ORDER — TOBRAMYCIN AND DEXAMETHASONE 3; 1 MG/ML; MG/ML
1 SUSPENSION/ DROPS OPHTHALMIC 2 TIMES DAILY
COMMUNITY
Start: 2024-10-18

## 2024-11-02 RX ORDER — DICYCLOMINE HCL 20 MG
20 TABLET ORAL 4 TIMES DAILY PRN
Qty: 30 TABLET | Refills: 0 | Status: SHIPPED | OUTPATIENT
Start: 2024-11-02 | End: 2024-11-03

## 2024-11-02 RX ORDER — POLYETHYLENE GLYCOL 3350 17 G/17G
17 POWDER, FOR SOLUTION ORAL DAILY PRN
Status: DISCONTINUED | OUTPATIENT
Start: 2024-11-02 | End: 2024-11-03

## 2024-11-02 RX ORDER — OXYMETAZOLINE HYDROCHLORIDE 0.05 G/100ML
1 SPRAY NASAL EVERY 12 HOURS PRN
Status: DISCONTINUED | OUTPATIENT
Start: 2024-11-02 | End: 2024-11-03

## 2024-11-02 RX ORDER — IPRATROPIUM BROMIDE 42 UG/1
1 SPRAY, METERED NASAL 2 TIMES DAILY
COMMUNITY
Start: 2024-10-30

## 2024-11-02 RX ORDER — METOCLOPRAMIDE HYDROCHLORIDE 5 MG/ML
5 INJECTION INTRAMUSCULAR; INTRAVENOUS EVERY 8 HOURS PRN
Status: DISCONTINUED | OUTPATIENT
Start: 2024-11-02 | End: 2024-11-03

## 2024-11-02 RX ORDER — CLOPIDOGREL BISULFATE 75 MG/1
75 TABLET ORAL DAILY
Status: DISCONTINUED | OUTPATIENT
Start: 2024-11-03 | End: 2024-11-03

## 2024-11-02 RX ORDER — NIACIN 100 MG
500 TABLET ORAL
Status: DISCONTINUED | OUTPATIENT
Start: 2024-11-03 | End: 2024-11-03

## 2024-11-02 RX ORDER — SENNOSIDES 8.6 MG
17.2 TABLET ORAL NIGHTLY PRN
Status: DISCONTINUED | OUTPATIENT
Start: 2024-11-02 | End: 2024-11-03

## 2024-11-02 RX ORDER — SODIUM PHOSPHATE, DIBASIC AND SODIUM PHOSPHATE, MONOBASIC 7; 19 G/230ML; G/230ML
1 ENEMA RECTAL ONCE AS NEEDED
Status: DISCONTINUED | OUTPATIENT
Start: 2024-11-02 | End: 2024-11-03

## 2024-11-02 RX ORDER — FLUTICASONE PROPIONATE 50 MCG
1 SPRAY, SUSPENSION (ML) NASAL DAILY
Status: DISCONTINUED | OUTPATIENT
Start: 2024-11-02 | End: 2024-11-03

## 2024-11-02 RX ORDER — ONDANSETRON 2 MG/ML
4 INJECTION INTRAMUSCULAR; INTRAVENOUS EVERY 6 HOURS PRN
Status: DISCONTINUED | OUTPATIENT
Start: 2024-11-02 | End: 2024-11-03

## 2024-11-02 RX ORDER — MIRTAZAPINE 15 MG/1
15 TABLET, FILM COATED ORAL NIGHTLY
Status: DISCONTINUED | OUTPATIENT
Start: 2024-11-02 | End: 2024-11-03

## 2024-11-02 RX ORDER — IPRATROPIUM BROMIDE 42 UG/1
1 SPRAY, METERED NASAL 2 TIMES DAILY
Status: DISCONTINUED | OUTPATIENT
Start: 2024-11-02 | End: 2024-11-03

## 2024-11-02 RX ORDER — ENALAPRIL MALEATE 10 MG/1
10 TABLET ORAL 2 TIMES DAILY
COMMUNITY
Start: 2024-10-14

## 2024-11-02 RX ORDER — CEFDINIR 300 MG/1
300 CAPSULE ORAL 2 TIMES DAILY
COMMUNITY
Start: 2024-10-31 | End: 2024-11-03

## 2024-11-02 RX ORDER — POLYETHYLENE GLYCOL 3350 17 G/17G
17 POWDER, FOR SOLUTION ORAL DAILY
COMMUNITY
Start: 2024-10-18

## 2024-11-02 RX ORDER — TRIMETHOPRIM 100 MG/1
100 TABLET ORAL 2 TIMES DAILY
COMMUNITY
Start: 2024-10-07 | End: 2024-11-03

## 2024-11-02 RX ORDER — ATORVASTATIN CALCIUM 80 MG/1
80 TABLET, FILM COATED ORAL DAILY
Status: DISCONTINUED | OUTPATIENT
Start: 2024-11-03 | End: 2024-11-03

## 2024-11-02 RX ORDER — BISACODYL 10 MG
10 SUPPOSITORY, RECTAL RECTAL
Status: DISCONTINUED | OUTPATIENT
Start: 2024-11-02 | End: 2024-11-03

## 2024-11-02 RX ORDER — ACETAMINOPHEN 500 MG
500 TABLET ORAL EVERY 4 HOURS PRN
Status: DISCONTINUED | OUTPATIENT
Start: 2024-11-02 | End: 2024-11-03

## 2024-11-02 RX ORDER — FINASTERIDE 5 MG/1
5 TABLET, FILM COATED ORAL DAILY
Status: DISCONTINUED | OUTPATIENT
Start: 2024-11-03 | End: 2024-11-03

## 2024-11-02 RX ORDER — ECHINACEA PURPUREA EXTRACT 125 MG
1 TABLET ORAL
Status: DISCONTINUED | OUTPATIENT
Start: 2024-11-02 | End: 2024-11-03

## 2024-11-02 NOTE — ED PROVIDER NOTES
Patient Seen in: [facility] Emergency Department      History     Chief Complaint   Patient presents with    Difficulty Breathing    Diarrhea     Diarrhea 4 days 7x a day     Stated Complaint: runny stool    Subjective:   HPI      89-year-old male presents to the emergency department with diarrhea.  Per the wife she just noticed that he had diarrhea in the last day.  He was put on cefdinir for a sinus infection a couple days ago.  She states he still having some shortness of breath but it is more nasal congestion.  He did have C. difficile in the past.  No blood in the stool that wife is aware of.  Patient states he feels bloated but does not have distinct chest pain or abdominal pain.    Objective:     Past Medical History:    Essential hypertension    Hyperlipidemia    Stroke (HCC)              Past Surgical History:   Procedure Laterality Date    Surgical stent      wife states Lft side of heart- [facility]                Social History     Socioeconomic History    Marital status:    Tobacco Use    Smoking status: Former     Types: Cigarettes    Smokeless tobacco: Never   Vaping Use    Vaping status: Never Used   Substance and Sexual Activity    Alcohol use: Not Currently    Drug use: Never     Social Drivers of Health     Financial Resource Strain: Low Risk  (7/12/2024)    Received from [facility]    Financial Resource Strain     In the past year, have you or any family members you live with been unable to get any of the following when it was really needed? Check all that apply.: None   Food Insecurity: No Food Insecurity (8/19/2024)    Food Insecurity     Food Insecurity: Never true   Transportation Needs: No Transportation Needs (8/19/2024)    Transportation Needs     Lack of Transportation: No   Physical Activity: Low Risk  (10/14/2024)    Received from [employer] [facility]    Exercise Vital Sign     On average, how many days per week do you engage in moderate to  strenuous exercise (like a brisk walk)?: 5 days     On average, how many minutes do you engage in exercise at this level?: 30 min   Stress: Low Risk  (7/12/2024)    Received from [facility]    Stress     Stress is when someone feels tense, nervous, anxious, or can't sleep at night because their mind is troubled. How stressed are you? : Not at all   Social Connections: Low Risk  (7/12/2024)    Received from [facility]    Social Connections     How often do you see or talk to people that you care about and feel close to? (For example: talking to friends on the phone, visiting friends or family, going to [employer] or club meetings): 3 to 5 times a week   Housing Stability: Low Risk  (8/19/2024)    Housing Stability     Housing Instability: No                  Physical Exam     ED Triage Vitals   BP 11/02/24 0814 152/71   Pulse 11/02/24 0812 74   Resp 11/02/24 0812 22   Temp 11/02/24 0812 98.2 °F (36.8 °C)   Temp src 11/02/24 0812 Temporal   SpO2 11/02/24 0812 99 %   O2 Device 11/02/24 0812 None (Room air)       Current Vitals:   Vital Signs  BP: 152/71  Pulse: 62  Resp: 21  Temp: 98.2 °F (36.8 °C)  Temp src: Temporal    Oxygen Therapy  SpO2: 100 %  O2 Device: None (Room air)        Physical Exam  Vitals and nursing note reviewed. Chaperone present: Wife in room helping with interpretation.   Constitutional:       General: He is not in acute distress.     Appearance: Normal appearance. He is well-developed.   HENT:      Head: Normocephalic and atraumatic.      Nose: Congestion and rhinorrhea present.   Cardiovascular:      Rate and Rhythm: Normal rate and regular rhythm.      Pulses: Normal pulses.      Heart sounds: Normal heart sounds.   Pulmonary:      Effort: Pulmonary effort is normal.      Breath sounds: Normal breath sounds. No stridor.   Abdominal:      General: Bowel sounds are normal.      Palpations: Abdomen is soft.      Comments: Patient is slightly distended with diffuse lower  abdominal tenderness slightly more on the right than the left but no flank pain   Musculoskeletal:         General: Normal range of motion.      Cervical back: Normal range of motion and neck supple.   Lymphadenopathy:      Cervical: No cervical adenopathy.   Skin:     General: Skin is warm and dry.      Capillary Refill: Capillary refill takes less than 2 seconds.   Neurological:      General: No focal deficit present.      Mental Status: He is alert and oriented to person, place, and time.            ED Course     Labs Reviewed   COMP METABOLIC PANEL (14) - Abnormal; Notable for the following components:       Result Value    Glucose 174 (*)     CO2 20.0 (*)     Creatinine 1.33 (*)     eGFR-Cr 50 (*)     Alkaline Phosphatase 132 (*)     All other components within normal limits   CBC WITH DIFFERENTIAL WITH PLATELET - Abnormal; Notable for the following components:    HCT 38.9 (*)     All other components within normal limits   TROPONIN I HIGH SENSITIVITY - Normal   SARS-COV-2/FLU A AND B/RSV BY PCR (GENEXPERT) - Normal    Narrative:     This test is intended for the qualitative detection and differentiation of SARS-CoV-2, influenza A, influenza B, and respiratory syncytial virus (RSV) viral RNA in nasopharyngeal or nares swabs from individuals suspected of respiratory viral infection consistent with COVID-19 by their healthcare provider. Signs and symptoms of respiratory viral infection due to SARS-CoV-2, influenza, and RSV can be similar.    Test performed using the Xpert Xpress SARS-CoV-2/FLU/RSV (real time RT-PCR)  assay on the GeneXpert instrument, [employer], [place] [address].   This test is being used under the Food and Drug Administration's Emergency Use Authorization.    The authorized Fact Sheet for Healthcare Providers for this assay is available upon request from the laboratory.   RAINBOW DRAW LAVENDER   RAINBOW DRAW LIGHT GREEN   RAINBOW DRAW BLUE   C. DIFFICILE(TOXIGENIC)PCR   GI STOOL PANEL BY PCR      EKG    Rate, intervals and axes as noted on EKG Report.  Rate: 67  Rhythm: Sinus Rhythm  Reading: Left bundle branch block with PVC, no change from previous EKG October 24, 2024                CT ABDOMEN+PELVIS(CPT=74176)    Result Date: 11/2/2024  PROCEDURE:  CT ABDOMEN+PELVIS (CPT=74176)  COMPARISON:  [facility] , CT, CT ABDOMEN+PELVIS(CONTRAST ONLY)(CPT=74177), 7/02/2024, 9:05 PM.  INDICATIONS:  runny stool  TECHNIQUE:  Unenhanced multislice CT scanning was performed from the dome of the diaphragm to the pubic symphysis.  Dose reduction techniques were used. Dose information is transmitted to the ACR (American College of Radiology) NRDR (National Radiology Data Registry) which includes the Dose Index Registry.  PATIENT STATED HISTORY: (As transcribed by Technologist)  Patient is here with diarrhea since 4 days    FINDINGS:  LUNG BASE:  Atelectasis. LIVER:  Calcified granulomata in the right lobe of the liver. BILIARY:  Gallbladder is contracted.  No biliary ductal dilatation. PANCREAS:  Unremarkable SPLEEN:  13 cm in diameter KIDNEYS:  No obstructing urinary calculus or hydronephrosis.  ADRENALS:  Normal. AORTA/VASCULAR:  Dense aortoiliac calcification.  No aneurysm.  RETROPERITONEUM:  No enlarged adenopathy. BOWEL/MESENTERY:  Prominent appendix measuring up to 7 mm in maximum diameter.  There is some intraluminal gas.  There is mild wall thickening with pericolonic soft tissue stranding adjacent to the ascending colon and base of the cecum with scattered normal caliber mesenteric lymph nodes in the right lower quadrant.  Moderate stool in the transverse and descending colon.  There is scattered colonic diverticula. ABDOMINAL WALL:  Fat containing inguinal hernias.  PELVIC ORGANS:  There is diverticula arising from the anterior wall the urinary bladder.  There is urinary bladder wall thickening and trabeculation.  Recommend clinical correlation to exclude cystitis.  There is prostatic hypertrophy and  calcification.  BONES:  Mild degenerative changes in the lower lumbar facets.             CONCLUSION:  The appendix is mildly prominent measuring up to 7 mm.  Mild wall thickening with pericolonic inflammatory stranding involving the ascending colon.  There is some adjacent normal caliber lymph nodes with some ground-glass opacity in the mesentery.  Findings may represent a right-sided colitis or diverticulitis.  The pellet ping appendicitis cannot entirely be excluded.  Bladder wall trabeculation and diverticula with bladder wall thickening.  There is prostatic hypertrophy and impingement of the bladder base.  No significant change from 7/2/2024.     LOCATION:  [facility]   Dictated by (CST): Tyesha Lind MD on 11/02/2024 at 9:34 AM     Finalized by (CST): Tyesha Lind MD on 11/02/2024 at 9:41 AM       XR CHEST AP PORTABLE  (CPT=71045)    Result Date: 11/2/2024  PROCEDURE:  XR CHEST AP PORTABLE  (CPT=71045)  TECHNIQUE:  AP chest radiograph was obtained.  COMPARISON:  [facility] , XR, XR CHEST AP PORTABLE  (CPT=71045), 10/24/2024, 9:18 AM.  INDICATIONS:  Chest pain, cardiac device  PATIENT STATED HISTORY: (As transcribed by Technologist)  Patient offered no additional history at this time.             CONCLUSION:    Stable cardiomegaly.  No sign of CHF.  Bilateral mild basilar atelectasis.  No sign of pleural effusion or pneumothorax.  Calcified granuloma left upper lobe.  LOCATION:  [facility]      Dictated by (CST): Yovany Fonseca MD on 11/02/2024 at 8:49 AM     Finalized by (CST): Yovany Fonseca MD on 11/02/2024 at 8:49 AM       XR CHEST AP PORTABLE  (CPT=71045)    Result Date: 10/24/2024  PROCEDURE:  XR CHEST AP PORTABLE  (CPT=71045)  TECHNIQUE:  AP chest radiograph was obtained.  COMPARISON:  [facility] , XR, XR CHEST AP PORTABLE  (CPT=71045), 9/29/2024, 6:10 PM.  INDICATIONS:  C/O DB since yesterday  PATIENT STATED HISTORY: (As transcribed by Technologist)  Per patient's family member, patient has had difficulty  breathing since yesterday.    FINDINGS:  Stable cardiomegaly.  Stable cardiac monitoring device overlying the anterior chest wall.  Lungs are clear.  No evidence of pleural disease.  Normal pulmonary vasculature.            CONCLUSION:  1. Stable cardiomegaly. 2. No acute process or significant interval changes.   LOCATION:  [facility]      Dictated by (CST): Cristal Cummings, DO on 10/24/2024 at 9:35 AM     Finalized by (CST): Cristal Cummings,  on 10/24/2024 at 9:35 AM            MDM    Patient had an IV established and blood work obtained.  We initially collected a stool specimen but he had urinated in it.  Certainly there is concern about him having C. difficile.  There would be concern also with colitis or diverticulitis.  They are concerned about his congestion.  We did do a chest x-ray reviewed this film there is no clear consolidation pneumothorax or pleural effusion.  Reviewed radiology report they do not feel that there is any acute process they did note that he has cardiomegaly.  He has blood work consistent with a diarrhea with a metabolic acidosis with a decrease in his bicarb otherwise unremarkable he does not have a significantly elevated white count.  Patient did undergo CT scan I do not appreciate any obvious free air or bowel obstruction reviewed radiology report and there is some concerns about a colitis and potentially a more generous appendix I did go back and reevaluate him he is tender in the that right lower quadrant but also diffusely in his lower abdomen.  Perhaps slightly more again on that right side but no distinct rebound.  Patient initially wanted to sign out AGAINST MEDICAL ADVICE I advised that he would be wise to stay in for observation at minimum and repeat abdominal exams and evaluation by general surgery.  General surgery did review the films and at this time they agree this most likely a colitis and mild inflammatory changes related to that but we will do serial exams.  At this time they  request that we hold antibiotics.  I discussed the case with the [facility] hospitalist.  Further care and treatment will be per the admitting team      Admission disposition: 11/2/2024 10:06 AM           Medical Decision Making      Disposition and Plan     Clinical Impression:  1. Diarrhea of presumed infectious origin    2. Acute appendicitis, unspecified acute appendicitis type         Disposition:  Admit  11/2/2024 10:06 am    Follow-up:  Paulo James MD  [address] [address]  [phone]    Schedule an appointment as soon as possible for a visit      [facility] Emergency Department  [address] [facility] [address]  [phone]  Follow up  As needed          Medications Prescribed:  Current Discharge Medication List        START taking these medications    Details   dicyclomine 20 MG Oral Tab Take 1 tablet (20 mg total) by mouth 4 (four) times daily as needed.  Qty: 30 tablet, Refills: 0                 Supplementary Documentation:         Hospital Problems       Present on Admission  Date Reviewed: 9/28/2024            ICD-10-CM Noted POA    * (Principal) Diarrhea of presumed infectious origin R19.7 11/2/2024 Unknown

## 2024-11-02 NOTE — CONSULTS
[facility]  Report of Consultation    Deven Atkinson Patient Status:  Emergency    1930 MRN [id number]   Location [facility] EMERGENCY DEPARTMENT Attending Liv Ferreira MD   Hosp Day # 0 PCP Paulo James MD     Requesting Physician:  Dr. Ferreira    Reason for Consultation:  Diarrhea    Chief Complaint:  Abdominal pain, diarrhea    History of Present Illness:  Deven Atkinson is a 89 year old male who presents to [facility] with right lower quadrant abdominal pain and diarrhea.    The patient's wife aids in translation as the patient is primarily Mandarin speaking.    Per the patient's wife, the patient has had diarrhea for the past 4 days.  She reports about 7 episodes of loose bowel movements a day.  He was recently put on cefdinir for a sinus infection a couple days ago.  He continues to report nasal congestion.  The patient also reports sudden onset of right lower quadrant abdominal pain today.  He denies nausea or vomiting.  He denies fevers or chills.    Upon presentation to the hospital, the patient was hemodynamically stable.  CBC reveals WBC 9.1 with no left shift, hemoglobin 13.5, platelets 250,000.  Hyperglycemic at 174.  No electrolyte abnormalities.  BUN 20, creatinine 1.33.  C. difficile pending and stool sample pending.    CT of the abdomen and pelvis reveals a prominent appendix measuring up to 7 mm.  There is some intraluminal gas.  There is mild wall thickening with pericolonic soft tissue stranding adjacent to the ascending colon and base of the cecum with scattered normal caliber mesenteric lymph nodes in the right lower quadrant.  Moderate stool in the transverse and descending colon.  There is scattered colonic diverticula.    The patient denies significant past surgical history.  He has a significant past medical history of CVA, hypertension, hyperlipidemia.  He takes Plavix daily.      History:  Past Medical History:    Essential hypertension    Hyperlipidemia     Stroke (HCC)     Past Surgical History:   Procedure Laterality Date    Surgical stent      wife states Lft side of heart- [facility]     No family history on file.   reports that he has quit smoking. His smoking use included cigarettes. He has never used smokeless tobacco. He reports that he does not currently use alcohol. He reports that he does not use drugs.    Allergies:  Allergies[1]    Medications:  (Not in a hospital admission)        Current Facility-Administered Medications:     oxymetazoline (Nasal Decongestant) 0.05 % nasal solution 1 spray, 1 spray, Nasal, Q12H PRN    Review of Systems:  Pertinent items are noted in HPI.  Allergic/Immuno:  Review of patient's allergies performed.  Cardiovascular:  Negative for cool extremity and irregular heartbeat/palpitations  Constitutional:  Negative for decreased activity, fever, irritability and lethargy  Endocrine:  Negative for abnormal sleep patterns, increased activity, polydipsia and polyphagia  ENMT:  Negative for ear drainage, hearing loss and nasal drainage  Eyes:  Negative for eye discharge and vision loss  Gastrointestinal:  Positive for abdominal pain, diarrhea.   Genitourinary:  Negative for dysuria and hematuria  Hema/Lymph:  Negative for easy bleeding and easy bruising  Integumentary:  Negative for pruritus and rash  Musculoskeletal:  Negative for bone/joint symptoms  Neurological:  Negative for gait disturbance  Psychiatric:  Negative for inappropriate interaction and psychiatric symptoms  Respiratory:  Negative for cough, dyspnea and wheezing      Physical Exam:  Blood pressure 152/71, pulse 65, temperature 98.2 °F (36.8 °C), temperature source Temporal, resp. rate 21, height 71\", weight 190 lb (86.2 kg), SpO2 100%.    General: Alert, orientated x3.  Cooperative.  No apparent distress.    HEENT: Exam is unremarkable.  Without scleral icterus.  Mucous membranes are moist. EOM are WNL.    Neck: No tenderness to palpitation.  Full range of  motion to flexion and extension, lateral rotation and lateral flexion of cervical spine.  No JVD. Supple.     Lungs: Clear to auscultation bilaterally. No wheezes, no rales, no rhonchi. Good excursion of the diaphragms. No secondary use of accessory respiratory musculature.    Cardiac: Regular rate and rhythm. No murmur.    Abdomen:   Soft, distended, tender to palpation in RLQ, tympanic to percussion. No peritoneal signs. No guarding.     Extremities:  No lower extremity edema noted.  Without clubbing or cyanosis.      Skin: Normal texture and turgor.    Laboratory Data:  Recent Labs   Lab 11/02/24  0818   RBC 4.66   HGB 13.5   HCT 38.9*   MCV 83.5   MCH 29.0   MCHC 34.7   RDW 12.8   NEPRELIM 5.31   WBC 9.0   .0       Recent Labs   Lab 11/02/24  0818   *   BUN 20   CREATSERUM 1.33*   CA 9.4   ALB 4.5      K 4.1      CO2 20.0*   ALKPHO 132*   AST 22   ALT 18   BILT 0.6   TP 7.6         No results for input(s): \"PTP\", \"INR\", \"PTT\" in the last 168 hours.      CT ABDOMEN+PELVIS(CPT=74176)    Result Date: 11/2/2024  CONCLUSION:  The appendix is mildly prominent measuring up to 7 mm.  Mild wall thickening with pericolonic inflammatory stranding involving the ascending colon.  There is some adjacent normal caliber lymph nodes with some ground-glass opacity in the mesentery.  Findings may represent a right-sided colitis or diverticulitis.  The pellet ping appendicitis cannot entirely be excluded.  Bladder wall trabeculation and diverticula with bladder wall thickening.  There is prostatic hypertrophy and impingement of the bladder base.  No significant change from 7/2/2024.     LOCATION:  [facility]   Dictated by (CST): Tyesha Lind MD on 11/02/2024 at 9:34 AM     Finalized by (CST): Tyesha Lind MD on 11/02/2024 at 9:41 AM       XR CHEST AP PORTABLE  (CPT=71045)    Result Date: 11/2/2024  CONCLUSION:    Stable cardiomegaly.  No sign of CHF.  Bilateral mild basilar atelectasis.  No sign of pleural  effusion or pneumothorax.  Calcified granuloma left upper lobe.  LOCATION:  [facility]      Dictated by (CST): Yovany Fonseca MD on 11/02/2024 at 8:49 AM     Finalized by (CST): Yovany Fonseca MD on 11/02/2024 at 8:49 AM          NIKI Olivia  11/2/2024  11:22 AM    Is this a shared or split note between Advanced Practice Provider and Physician? Yes      Medical Decision Making         Impression:  Patient Active Problem List   Diagnosis    Hyperglycemia    Azotemia    Anemia    Metabolic acidosis    Acute cystitis with hematuria    Depression    Cerebrovascular accident (CVA) (HCC)    Primary hypertension    Hyperlipidemia    Metabolic encephalopathy    Diarrhea, unspecified type    Weakness    Dementia, unspecified dementia severity, unspecified dementia type, unspecified whether behavioral, psychotic, or mood disturbance or anxiety (HCC)    Clostridium difficile colitis    Falls    Diarrhea of presumed infectious origin       89 year old male with history of c diff colitis presents with 4 days of diarrhea.  I reviewed his lab work and his CT scan. He has no leukocytosis. He has no tenderness in the right lower quadrant. His CT scan shows a 7 mm appendix that contains air and fluid. There are no signs of inflammation surrounding the appendix. The ascending colon has mild stranding adjacent to it. The clinical and radiographic picture is not consistent with acute appendicitis and more so of colitis and potentially c diff colitis. I discussed this with the patient and his wife. Plan to obtain C diff testing. Ok for clear liquid. No antibiotics at this time. Will re-assess tomorrow with serial abdominal exam and repeat CBC.       Nancy Dillon MD                                    [1]   Allergies  Allergen Reactions    Losartan NAUSEA AND VOMITING and OTHER (SEE COMMENTS)     Adverse Reaction           (historical)    Adverse Reaction         Adverse Reaction        Adverse Reaction            (historical)    Adverse Reaction      Adverse Reaction       (historical)  Adverse Reaction       Adverse Reaction      Adverse Reaction       (historical)    Adverse Reaction       (historical)  Adverse Reaction       Adverse Reaction      Adverse Reaction       (historical)      Adverse Reaction       (historical)      Adverse Reaction      Adverse Reaction       (historical)      Adverse Reaction  Adverse Reaction       (historical)  Adverse Reaction       Adverse Reaction      Adverse Reaction       (historical)

## 2024-11-02 NOTE — Clinical Note
Date: 11/2/2024  Patient: Deven Atkinson  Admitted: 11/2/2024  8:06 AM  Attending Provider: Liv Ferreira MD    Deven Atkinson or his authorized caregiver has made the decision for the patient to leave the emergency department against the advice of the e mergency department staff. He or his authorized caregiver has been informed and understands the inherent risks, including death, dehydration, perforation of bowel.  He or his authorized caregiver has decided to accept the responsibility for this deci grupo. Deven Atkinson and all necessary parties have been advised that he may return for further evaluation or treatment. His condition at time of discharge was stable.  Deven Atkinson had current vital signs as follows:  /71   Pulse 74   Temp 98.2  °F (36.8 °C) (Temporal)   Resp 22   Ht 180.3 cm (5' 11\")   Wt 86.2 kg

## 2024-11-02 NOTE — ED QUICK NOTES
Orders for admission, patient is aware of plan and ready to go upstairs. Any questions, please call ED RN Logan  at extension [phone].     Vaccinated?  Type of COVID test sent:genexpert  COVID Suspicion level: Low/High      Titratable drug(s) infusing:none  Rate:    LOC at time of transport:alert    Other pertinent information:  Speaks mandarin, wife translates.   Has been up to commode many times.  CIWA score=  NIH score=

## 2024-11-02 NOTE — H&P
[facility]  [facility]     Deven Atkinson Patient Status:  Emergency    1930 MRN [id number]   Location [facility] EMERGENCY DEPARTMENT Attending Liv Ferreira MD   Hosp Day # 0 PCP Paulo James MD     Chief Complaint: diarrhea    Subjective:    History of Present Illness:     Deven Atkinson is a 89 year old male with history of hypertension, HLD, hx of CDI, dementia presented with 4 day history of diarrhea. He is mandarin speaker and unable to provide history. Spouse left and was unavailable for history at the time of this evaluation so history obtained from chart review.     Patient was recently on course of cefdinir for sinus infection. He developed diarrhea 4 days ago. Today, he complained of abdominal pain on the right side. No nausea, vomiting.     CT AP in the ER concerning for acute appendicitis. Surgery was consulted.     History/Other:    Past Medical History:  Past Medical History:    Essential hypertension    Hyperlipidemia    Stroke (HCC)     Past Surgical History:   Past Surgical History:   Procedure Laterality Date    Surgical stent      wife states Lft side of heart- [facility]      Family History:   History reviewed. No pertinent family history.  Social History:    reports that he has quit smoking. His smoking use included cigarettes. He has never used smokeless tobacco. He reports that he does not currently use alcohol. He reports that he does not use drugs.     Allergies: Allergies[1]    Medications:  Medications Ordered Prior to Encounter[2]    Review of Systems:   A comprehensive review of systems was completed.    Pertinent positives and negatives noted in the HPI.    Objective:   Physical Exam:    /61 (BP Location: Right arm)   Pulse 60   Temp 98.2 °F (36.8 °C) (Oral)   Resp 18   Ht 5' 11\" (1.803 m)   Wt 190 lb (86.2 kg)   SpO2 99%   BMI 26.50 kg/m²   General: No acute distress, Alert  Respiratory: No rhonchi, no wheezes. Nasal  congestion  Cardiovascular: S1, S2. Regular rate and rhythm  Abdomen: Soft, Non-tender, non-distended, positive bowel sounds  Neuro: No new focal deficits  Extremities: No edema    Results:    Labs:      Labs Last 24 Hours:    Recent Labs   Lab 11/02/24  0818   RBC 4.66   HGB 13.5   HCT 38.9*   MCV 83.5   MCH 29.0   MCHC 34.7   RDW 12.8   NEPRELIM 5.31   WBC 9.0   .0       Recent Labs   Lab 11/02/24  0818   *   BUN 20   CREATSERUM 1.33*   EGFRCR 50*   CA 9.4   ALB 4.5      K 4.1      CO2 20.0*   ALKPHO 132*   AST 22   ALT 18   BILT 0.6   TP 7.6       Lab Results   Component Value Date    INR 0.97 10/24/2024       Recent Labs   Lab 11/02/24  0818   TROPHS 7       No results for input(s): \"TROP\", \"PBNP\" in the last 168 hours.    No results for input(s): \"PCT\" in the last 168 hours.    Imaging: Imaging data reviewed in Epic.    Assessment & Plan:      #Abdominal pain, diarrhea  #Possible acute appendicitis  -Stool studies  -Surgery following  -Supportive care for diarrhea    #Renal insufficiency - monitor   #Metabolic acidosis 2/2 diarrhea - monitor     #Nasal congestion - supportive care    #Htn- hold home acei  #Hx of CDI  #Dementia    Plan of care discussed with RN    Fiona Lee MD    Supplementary Documentation:     The 21st Century Cures Act makes medical notes like these available to patients in the interest of transparency. Please be advised this is a medical document. Medical documents are intended to carry relevant information, facts as evident, and the clinical opinion of the practitioner. The medical note is intended as peer to peer communication and may appear blunt or direct. It is written in medical language and may contain abbreviations or verbiage that are unfamiliar.                                       [1]   Allergies  Allergen Reactions    Losartan NAUSEA AND VOMITING and OTHER (SEE COMMENTS)     Adverse Reaction           (historical)    Adverse Reaction          Adverse Reaction        Adverse Reaction           (historical)    Adverse Reaction      Adverse Reaction       (historical)  Adverse Reaction       Adverse Reaction      Adverse Reaction       (historical)    Adverse Reaction       (historical)  Adverse Reaction       Adverse Reaction      Adverse Reaction       (historical)      Adverse Reaction       (historical)      Adverse Reaction      Adverse Reaction       (historical)      Adverse Reaction  Adverse Reaction       (historical)  Adverse Reaction       Adverse Reaction      Adverse Reaction       (historical)   [2]   Current Facility-Administered Medications on File Prior to Encounter   Medication Dose Route Frequency Provider Last Rate Last Admin    [COMPLETED] sodium chloride 0.9 % IV bolus 1,000 mL  1,000 mL Intravenous Once Liv Ferreira MD   Stopped at 24 1341    [COMPLETED] levoFLOXacin (Levaquin) tab 500 mg  500 mg Oral Once Liv Ferreira MD   500 mg at 24 1301    [COMPLETED] ketorolac (Toradol) 15 MG/ML injection 15 mg  15 mg Intravenous Once Jero Wright MD   15 mg at 24 1149    [COMPLETED] phenazopyridine (Pyridum) tab 200 mg  200 mg Oral Once Joshua Ferreira MD   200 mg at 24 1304    [COMPLETED] cephALEXin (Keflex) cap 500 mg  500 mg Oral Once Joshua Ferreira MD   500 mg at 24 1304    [COMPLETED] sodium chloride 0.9 % IV bolus 500 mL  500 mL Intravenous Once Dk Silver MD   Paused at 24 1020    [COMPLETED] magnesium oxide (Mag-Ox) tab 400 mg  400 mg Oral Once Dom Damico MD   400 mg at 24 0837    [COMPLETED] sodium phosphate 15 mmol in 0.9% NaCl 100mL IVPB premix  15 mmol Intravenous Once Dom Damico MD   15 mmol at 24 0836    [COMPLETED] sodium chloride 0.9 % IV bolus 1,000 mL  1,000 mL Intravenous Once Mekhi Hodegs MD   Stopped at 24 1835    [] sodium chloride 0.9% infusion   Intravenous Continuous Fiona Lee MD   Stopped at 24 1000    [COMPLETED]  cefTRIAXone (Rocephin) 1 g in sodium chloride 0.9% 100 mL IVPB-ADDV  1 g Intravenous Once Mauricio Ghosh,    Stopped at 08/12/24 2102    [COMPLETED] haloperidol lactate (Haldol) 5 MG/ML injection 5 mg  5 mg Intramuscular Once Mauricio Ghosh DO   5 mg at 08/12/24 2108    [COMPLETED] LORazepam (Ativan) tab 1 mg  1 mg Oral Once Mauricio Ghosh DO   1 mg at 08/12/24 2244     Current Outpatient Medications on File Prior to Encounter   Medication Sig Dispense Refill    enalapril 10 MG Oral Tab Take 1 tablet (10 mg total) by mouth 2 (two) times daily.      alfuzosin ER 10 MG Oral Tablet 24 Hr Take 1 tablet (10 mg total) by mouth daily.      cefdinir 300 MG Oral Cap Take 1 capsule (300 mg total) by mouth 2 (two) times daily.      ipratropium 0.06 % Nasal Solution 1 spray by Nasal route in the morning and 1 spray before bedtime.      Polyethylene Glycol 3350 17 g Oral Powd Pack Take 17 g by mouth daily.      tobramycin-dexamethasone 0.3-0.1 % Ophthalmic Suspension Place 1 drop into both eyes in the morning and 1 drop before bedtime.      trimethoprim 100 MG Oral Tab Take 1 tablet (100 mg total) by mouth 2 (two) times daily.      dicyclomine 10 MG Oral Cap Take 1 capsule (10 mg total) by mouth.      traZODone 50 MG Oral Tab Take 1 tablet (50 mg total) by mouth nightly.      zinc sulfate 220 (50 Zn) MG Oral Cap Take 1 capsule (220 mg total) by mouth daily.      niacin 500 MG Oral Tab Take 1 tablet (500 mg total) by mouth daily with breakfast.      sucralfate 1 g Oral Tab Take 1 tablet (1 g total) by mouth 3 (three) times daily before meals.      aspirin 81 MG Oral Chew Tab Chew 1 tablet (81 mg total) by mouth daily.      atorvastatin 80 MG Oral Tab Take 1 tablet (80 mg total) by mouth daily.      clopidogrel 75 MG Oral Tab Take 1 tablet (75 mg total) by mouth daily.      Dextran 70-Hypromellose (ARTIFICIAL TEARS) 0.1-0.3 % Ophthalmic Solution Apply 1 drop to eye.      dutasteride 0.5 MG Oral Cap Take 1 capsule  (0.5 mg total) by mouth daily.      escitalopram 20 MG Oral Tab Take 1 tablet (20 mg total) by mouth at bedtime.      mirtazapine 15 MG Oral Tab Take 1 tablet (15 mg total) by mouth nightly.      sennosides-docusate 8.6-50 MG Oral Tab Take 1 tablet by mouth daily.      [] ciprofloxacin 250 MG Oral Tab Take 1 tablet (250 mg total) by mouth 2 (two) times daily for 7 days. 14 tablet 0

## 2024-11-02 NOTE — ED INITIAL ASSESSMENT (HPI)
Pt to ER via wheelchair with wife, speaks Mandarin, wife is translating. States loose stool for approximately 4 days approximately 7x diarrhea a day. States chills unknown fever. Denies abd pain but feels bloated. States nausea denies vomiting. States feels dizzy and SOB with CP for about 1 week. During triage pt stating having difficulty breathing, asking wife to hit his back. To finish triage in room

## 2024-11-03 VITALS
SYSTOLIC BLOOD PRESSURE: 128 MMHG | TEMPERATURE: 98 F | HEART RATE: 56 BPM | WEIGHT: 190 LBS | OXYGEN SATURATION: 98 % | BODY MASS INDEX: 26.6 KG/M2 | RESPIRATION RATE: 18 BRPM | DIASTOLIC BLOOD PRESSURE: 55 MMHG | HEIGHT: 71 IN

## 2024-11-03 LAB
ADENOVIRUS F 40/41 PCR: NEGATIVE
ADENOVIRUS F 40/41 PCR: NEGATIVE
ALBUMIN SERPL-MCNC: 3.9 G/DL (ref 3.2–4.8)
ALBUMIN/GLOB SERPL: 1.5 {RATIO} (ref 1–2)
ALP LIVER SERPL-CCNC: 113 U/L
ALT SERPL-CCNC: 15 U/L
ANION GAP SERPL CALC-SCNC: 8 MMOL/L (ref 0–18)
AST SERPL-CCNC: 22 U/L (ref ?–34)
ASTROVIRUS PCR: NEGATIVE
ASTROVIRUS PCR: NEGATIVE
BILIRUB SERPL-MCNC: 0.7 MG/DL (ref 0.2–0.9)
BUN BLD-MCNC: 15 MG/DL (ref 9–23)
C CAYETANENSIS DNA SPEC QL NAA+PROBE: NEGATIVE
C CAYETANENSIS DNA SPEC QL NAA+PROBE: NEGATIVE
C DIFF GDH + TOXINS A+B STL QL IA.RAPID: DETECTED
C DIFF TOX B STL QL: POSITIVE
CALCIUM BLD-MCNC: 8.3 MG/DL (ref 8.7–10.4)
CAMPY SP DNA.DIARRHEA STL QL NAA+PROBE: NEGATIVE
CAMPY SP DNA.DIARRHEA STL QL NAA+PROBE: NEGATIVE
CHLORIDE SERPL-SCNC: 112 MMOL/L (ref 98–112)
CO2 SERPL-SCNC: 18 MMOL/L (ref 21–32)
CREAT BLD-MCNC: 1.01 MG/DL
CRYPTOSP DNA SPEC QL NAA+PROBE: NEGATIVE
CRYPTOSP DNA SPEC QL NAA+PROBE: NEGATIVE
EAEC PAA PLAS AGGR+AATA ST NAA+NON-PRB: NEGATIVE
EAEC PAA PLAS AGGR+AATA ST NAA+NON-PRB: NEGATIVE
EC STX1+STX2 + H7 FLIC SPEC NAA+PROBE: NEGATIVE
EC STX1+STX2 + H7 FLIC SPEC NAA+PROBE: NEGATIVE
EGFRCR SERPLBLD CKD-EPI 2021: 69 ML/MIN/1.73M2 (ref 60–?)
ENTAMOEBA HISTOLYTICA PCR: NEGATIVE
ENTAMOEBA HISTOLYTICA PCR: NEGATIVE
EPEC EAE GENE STL QL NAA+NON-PROBE: NEGATIVE
EPEC EAE GENE STL QL NAA+NON-PROBE: NEGATIVE
ERYTHROCYTE [DISTWIDTH] IN BLOOD BY AUTOMATED COUNT: 13.1 %
ETEC LTA+ST1A+ST1B TOX ST NAA+NON-PROBE: NEGATIVE
ETEC LTA+ST1A+ST1B TOX ST NAA+NON-PROBE: NEGATIVE
GIARDIA LAMBLIA PCR: NEGATIVE
GIARDIA LAMBLIA PCR: NEGATIVE
GLOBULIN PLAS-MCNC: 2.6 G/DL (ref 2–3.5)
GLUCOSE BLD-MCNC: 100 MG/DL (ref 70–99)
HCT VFR BLD AUTO: 35.3 %
HGB BLD-MCNC: 12.1 G/DL
MAGNESIUM SERPL-MCNC: 1.9 MG/DL (ref 1.6–2.6)
MCH RBC QN AUTO: 28.9 PG (ref 26–34)
MCHC RBC AUTO-ENTMCNC: 34.3 G/DL (ref 31–37)
MCV RBC AUTO: 84.2 FL
NOROVIRUS GI/GII PCR: NEGATIVE
NOROVIRUS GI/GII PCR: NEGATIVE
OSMOLALITY SERPL CALC.SUM OF ELEC: 287 MOSM/KG (ref 275–295)
P SHIGELLOIDES DNA STL QL NAA+PROBE: NEGATIVE
P SHIGELLOIDES DNA STL QL NAA+PROBE: NEGATIVE
PHOSPHATE SERPL-MCNC: 3.3 MG/DL (ref 2.4–5.1)
PLATELET # BLD AUTO: 209 10(3)UL (ref 150–450)
POTASSIUM SERPL-SCNC: 4.1 MMOL/L (ref 3.5–5.1)
PROT SERPL-MCNC: 6.5 G/DL (ref 5.7–8.2)
RBC # BLD AUTO: 4.19 X10(6)UL
ROTAVIRUS A PCR: NEGATIVE
ROTAVIRUS A PCR: NEGATIVE
SALMONELLA DNA SPEC QL NAA+PROBE: NEGATIVE
SALMONELLA DNA SPEC QL NAA+PROBE: NEGATIVE
SAPOVIRUS PCR: NEGATIVE
SAPOVIRUS PCR: NEGATIVE
SHIGELLA SP+EIEC IPAH ST NAA+NON-PROBE: NEGATIVE
SHIGELLA SP+EIEC IPAH ST NAA+NON-PROBE: NEGATIVE
SODIUM SERPL-SCNC: 138 MMOL/L (ref 136–145)
V CHOLERAE DNA SPEC QL NAA+PROBE: NEGATIVE
V CHOLERAE DNA SPEC QL NAA+PROBE: NEGATIVE
VIBRIO DNA SPEC NAA+PROBE: NEGATIVE
VIBRIO DNA SPEC NAA+PROBE: NEGATIVE
WBC # BLD AUTO: 8.6 X10(3) UL (ref 4–11)
YERSINIA DNA SPEC NAA+PROBE: NEGATIVE
YERSINIA DNA SPEC NAA+PROBE: NEGATIVE

## 2024-11-03 PROCEDURE — 99239 HOSP IP/OBS DSCHRG MGMT >30: CPT | Performed by: INTERNAL MEDICINE

## 2024-11-03 RX ORDER — OXYMETAZOLINE HYDROCHLORIDE 0.05 G/100ML
1 SPRAY NASAL EVERY 12 HOURS PRN
Qty: 15 ML | Refills: 0 | Status: SHIPPED | OUTPATIENT
Start: 2024-11-03

## 2024-11-03 RX ORDER — DICYCLOMINE HCL 20 MG
20 TABLET ORAL 4 TIMES DAILY PRN
Qty: 30 TABLET | Refills: 0 | Status: SHIPPED | OUTPATIENT
Start: 2024-11-03 | End: 2024-12-03

## 2024-11-03 RX ORDER — VANCOMYCIN HYDROCHLORIDE 125 MG/1
CAPSULE ORAL
Qty: 100 CAPSULE | Refills: 0 | Status: SHIPPED | OUTPATIENT
Start: 2024-11-03 | End: 2024-12-15

## 2024-11-03 RX ORDER — ENALAPRIL MALEATE 10 MG/1
10 TABLET ORAL 2 TIMES DAILY
Status: DISCONTINUED | OUTPATIENT
Start: 2024-11-03 | End: 2024-11-03

## 2024-11-03 RX ORDER — VANCOMYCIN HYDROCHLORIDE 125 MG/1
125 CAPSULE ORAL 4 TIMES DAILY
Status: DISCONTINUED | OUTPATIENT
Start: 2024-11-03 | End: 2024-11-03

## 2024-11-03 NOTE — PLAN OF CARE
A&Ox4. Hard of Hearing. Madarin speaking, refuses a , relies on his wife for translation. VSS. RA.   GI: Abdomen soft, rounded. Patient has not had a BM this shift to collect a sample for c diff testing. GI panel is pending. He reports having an appetite and repeatedly asked to eat and drink.  : Up to the bedside commode for voids.  Up independently with SBA. Wife has been at the bedside the whole shift.  Diet: NPO after midnight.  IVF running per order. Surgery to evaluate the patient. Per his note, the patient is dealing with colitis vs cdiff colitis.        Please verify with the surgeon if patient should resume his lovenox, aspirin and plavix today.    All appropriate safety measures in place. All questions and concerns addressed to the best of my ability

## 2024-11-03 NOTE — PROGRESS NOTES
[facility]   part of [facility]     Hospitalist Progress Note     Deven Atkinson Patient Status:  Observation    1930 MRN [id number]   Location [facility] 3NW-A Attending Fiona Lee MD   Hosp Day # 0 PCP Paulo James MD     Chief Complaint: Diarrhea    Subjective:     Patient reports nasal congestion is improved. Pebbling stools per nursing.     Objective:    Review of Systems:   A comprehensive review of systems was completed; pertinent positive and negatives stated in subjective.    Vital signs:  Temp:  [97.7 °F (36.5 °C)-98.4 °F (36.9 °C)] 97.7 °F (36.5 °C)  Pulse:  [48-67] 54  Resp:  [16-20] 18  BP: (125-168)/(61-78) 143/78  SpO2:  [98 %-100 %] 98 %    Physical Exam:    General: No acute distress  Respiratory: No wheezes, no rhonchi  Cardiovascular: S1, S2, regular rate and rhythm  Abdomen: Soft, Non-tender, non-distended, positive bowel sounds  Neuro: No new focal deficits.   Extremities: No edema    Diagnostic Data:    Labs:  Recent Labs   Lab 24  0818 24  0555   WBC 9.0 8.6   HGB 13.5 12.1*   MCV 83.5 84.2   .0 209.0       Recent Labs   Lab 24  0818 24  0555   * 100*   BUN 20 15   CREATSERUM 1.33* 1.01   CA 9.4 8.3*   ALB 4.5 3.9    138   K 4.1 4.1    112   CO2 20.0* 18.0*   ALKPHO 132* 113   AST 22 22   ALT 18 15   BILT 0.6 0.7   TP 7.6 6.5       Estimated Creatinine Clearance: 47.6 mL/min (based on SCr of 1.01 mg/dL).    Recent Labs   Lab 24  0818   TROPHS 7       No results for input(s): \"PTP\", \"INR\" in the last 168 hours.               Microbiology    No results found for this visit on 24.      Imaging: Reviewed in Epic.    Medications:    enalapril  10 mg Oral BID    enoxaparin  40 mg Subcutaneous Daily    fluticasone propionate  1 spray Each Nare Daily    tamsulosin  0.4 mg Oral Daily @ 0700    aspirin  81 mg Oral Daily    atorvastatin  80 mg Oral Daily    clopidogrel  75 mg Oral Daily    finasteride  5 mg  Oral Daily    escitalopram  20 mg Oral Nightly    ipratropium  1 spray Nasal BID    mirtazapine  15 mg Oral Nightly    niacin  500 mg Oral Daily with breakfast    sucralfate  1 g Oral TID AC    traZODone  50 mg Oral Nightly    tobramycin-dexamethasone  1 drop Both Eyes BID       Assessment & Plan:      #Abdominal pain, diarrhea  #Cdiff colitis  #Exam not consistent with acute appendicitis, ruled out  -Consult ID; this is first recurrence of CDI so will defer dificid vs. Vancomycin for treatment though per nursing patient has formed stools. Per spouse, 2 days of diarrhea prior to admission.   -Patient also dx with acute sinusitis and was on cefdinir prior to admission   -he is also on trimethoprim to prevent UTIs   -GI PCR pending    #Acute uncomplicated sinusitis   Evaluated by ENT 4 days PTA and was placed on course of cefdinir   -Patient with persistent symptoms  -Was placed on 14 day course of Cefdinir   -Continue supportive care with flonase, atrovent nasal spray, saline nasal spra, short course of oxymetazoline   -Monitor off antibiotics for now given risk outweighs benefit     #Renal insufficiency - monitor   #Metabolic acidosis 2/2 diarrhea - monitor      #Nasal congestion - supportive care     #Htn- enalapril  #Dementia    Fiona Lee MD    Supplementary Documentation:     Quality:  DVT Mechanical Prophylaxis:     Early ambuation  DVT Pharmacologic Prophylaxis   Medication    enoxaparin (Lovenox) 40 MG/0.4ML SUBQ injection 40 mg      DVT Pharmacologic prophylaxis: Aspirin 162 mg         Code Status: DNAR/Selective Treatment  Jay: No urinary catheter in place  Jay Duration (in days):   Central line:    MAG:     Discharge is dependent on: course  At this point Mr. Atkinson is expected to be discharge to: home    The 21st Century Cures Act makes medical notes like these available to patients in the interest of transparency. Please be advised this is a medical document. Medical documents are intended to carry  relevant information, facts as evident, and the clinical opinion of the practitioner. The medical note is intended as peer to peer communication and may appear blunt or direct. It is written in medical language and may contain abbreviations or verbiage that are unfamiliar.              **Certification      PHYSICIAN Certification of Need for Inpatient Hospitalization - Initial Certification    Patient will require inpatient services that will reasonably be expected to span two midnight's based on the clinical documentation in H+P.   Based on patients current state of illness, I anticipate that, after discharge, patient will require TBD.

## 2024-11-03 NOTE — PHYSICAL THERAPY NOTE
PHYSICAL THERAPY EVALUATION - INPATIENT     Room Number: 323/323-A  Evaluation Date: 11/3/2024  Type of Evaluation: Initial  Physician Order: PT Eval and Treat    Presenting Problem: diarrhea  Co-Morbidities : HTN, HLD, h/o CDI, dementia, h/o CVA  Reason for Therapy: Mobility Dysfunction and Discharge Planning    PHYSICAL THERAPY ASSESSMENT   Patient is a 89 year old male admitted 11/2/2024 for diarrhea.   Patient is currently functioning at baseline with bed mobility, transfers, gait, and stair negotiation. Prior to admission, patient's baseline is independent. Pt currently functioning at baseline and does not require the need for skilled PT services.     PLAN  Patient has been evaluated and presents with no skilled Physical Therapy needs at this time.  Patient discharged from Physical Therapy services.  Please re-order if a new functional limitation presents during this admission.    PT Device Recommendation: None    GOALS  Patient was able to achieve the following goals ...    Patient was able to transfer At previous, functional level  Safely and independently   Patient able to ambulate on level surfaces At previous, functional level  Safely and independently     HOME SITUATION  Type of Home: Apartment (senior living, 5 story building, they live on 5th floor, with elevator)  Home Layout: One level;Elevator                     Lives With: Spouse    Drives: No         Prior Level of [place]: Pt independent with ADL, and ambulating without device. Sometimes uses a RW for going to grocery store, the doctor, etc.     Spoke with pt's spouse over the phone to get information regarding home environment and pt's PLOF.       SUBJECTIVE  Pt is pleasant and cooperative. Spouse not present, pt declined any interpretation services such as ipad or phone. Therefore, used some English and pt was able to understanding and hand gestures.     OBJECTIVE  Precautions: Bed/chair alarm  Fall Risk: Standard fall risk    WEIGHT  BEARING RESTRICTION     PAIN ASSESSMENT  Ratin  Location: denies pain       COGNITION  Unable to assess cognition, but pt able to follow commands 100% of the time    RANGE OF MOTION AND STRENGTH ASSESSMENT  Upper extremity ROM and strength are within functional limits     Lower extremity ROM is within functional limits     Lower extremity strength is within functional limits     BALANCE  Static Sitting: Good  Dynamic Sitting: Good  Static Standing: Good  Dynamic Standing: Good    ADDITIONAL TESTS  Additional Tests: Elderly Mobility Scale     Elderly Mobility Scale: 20/20                           ACTIVITY TOLERANCE  Pulse: 55  Heart Rate Source: Monitor                   O2 WALK  Oxygen Therapy  SPO2% on Room Air at Rest: 100    NEUROLOGICAL FINDINGS                        AM-PAC '6-Clicks' INPATIENT SHORT FORM - BASIC MOBILITY  How much difficulty does the patient currently have...  Patient Difficulty: Turning over in bed (including adjusting bedclothes, sheets and blankets)?: None   Patient Difficulty: Sitting down on and standing up from a chair with arms (e.g., wheelchair, bedside commode, etc.): None   Patient Difficulty: Moving from lying on back to sitting on the side of the bed?: None   How much help from another person does the patient currently need...   Help from Another: Moving to and from a bed to a chair (including a wheelchair)?: None   Help from Another: Need to walk in hospital room?: None   Help from Another: Climbing 3-5 steps with a railing?: None       AM-PAC Score:  Raw Score: 24   Approx Degree of Impairment: 0%   Standardized Score (AM-PAC Scale): 61.14   CMS Modifier (G-Code): CH    FUNCTIONAL ABILITY STATUS  Gait Assessment   Functional Mobility/Gait Assessment  Gait Assistance: Independent  Distance (ft): 200  Assistive Device: None  Pattern: Within Functional Limits  Stairs: Stairs  How Many Stairs: 12  Device: 1 Rail  Assist: Independent  Pattern: Ascend and Descend  Ascend and  Descend : Reciprocal    Skilled Therapy Provided     Bed Mobility:  Rolling: independent  Supine to sit: independent   Sit to supine: independent     Transfer Mobility:  Sit to stand: independent   Stand to sit: independent  Gait = independent    Therapist's comments:PT orders received, chart reviewed, and RN approved PT session. Pt in bathroom alone. When finished, pt agreeable to amb, perform stair assessment. Pt demonstrates independence with bathroom activities, transfers, ambulation, and stairs. Pt demonstrates good safety awareness. Pt left in bed, alarm on. All needs in reach. RN notified of session.         Patient End of Session: In bed;Needs met;Call light within reach;RN aware of session/findings;All patient questions and concerns addressed;Hospital anti-slip socks;Alarm set    Patient Evaluation Complexity Level:  History Moderate - 1 or 2 personal factors and/or co-morbidities   Examination of body systems Moderate - addressing a total of 3 or more elements   Clinical Presentation Low- Stable   Clinical Decision Making Low Complexity       PT Session Time: 20 minutes  Gait Trainin minutes  Therapeutic Activity: 0 minutes  Neuromuscular Re-education: 0 minutes  Therapeutic Exercise: 0 minutes   Eucrisa Pregnancy And Lactation Text: This medication has not been assigned a Pregnancy Risk Category but animal studies failed to show danger with the topical medication. It is unknown if the medication is excreted in breast milk.

## 2024-11-03 NOTE — PROGRESS NOTES
NURSING DISCHARGE NOTE    Discharged Home via Ambulatory.  Wheelchair offered, pt declined.  Accompanied by Family member  Belongings Taken by patient/family.    VSS, tolerating diet, voiding adequately, pain controlled, tolerating ambulating. Per ID, Hospitalist, and SX, pt ok to dc. Discharge education provided. Reviewed medications and follow up appts. All questions answered and concerns addressed, pt verbalized understanding. Pt dc in stable condition.    Pt declines , prefers wife to translate. Upon assessment, A&Ox4. Forgetful at times. RA.  WDL. Congested, see MAR. Lovenox for VTE proph. Tolerating diet. Abdomen soft. Active bowel sounds. Voids. Denies SOB, CP, lightheadedness, N/V, and pain. SBA, tolerating ambulating. Consulted ID. POC discussed, all questions answered and concerns addressed. Safety precautions in place. Frequent rounds performed.

## 2024-11-03 NOTE — CONSULTS
[facility]   part of [facility] ID CONSULT NOTE    Deven Atkinson Patient Status:  Observation    1930 MRN [id number]   Location [facility] 3NW-A Attending No att. providers found   Hosp Day # 0 PCP Paulo James MD       Reason for Consultation:   C diff colitis    Antibiotics: none    ASSESSMENT:    # recurrent C diff  Hx of C diff in Aug 2024. Per wife, had initial episode in 2024.   Diarrhea has now improved  C diff EIA and toxin positive    #HTN, HLD    PLAN:    -  start PO vanc. Ok to discharge home on prolonged PO vanc taper.   -  supportive care  -  Follow fever curve, wbc  -  Reviewed labs, micro, imaging reports, available old records    D/w pt, staff.    Thank you for allowing us to participate in the care of this patient. Please do not hesitate to call if you have any questions.   We will continue to follow with you and will make further recommendations based on his progress.    History of Present Illness:  Deven Atkinson is a a(n) 89 year old male with PMHx of HTN, HLD and stroke who presented to ED on 24 with diarrhea at home. He has hx of C diff and completed PO vanc course in the past. He denies any abd pain and reports no diarrhea at this time. He is anxious to go home. Afebrile. C diff EIA and toxin is positive. ID consulted for recurrent C diff infection.     History:  Past Medical History:    Essential hypertension    Hyperlipidemia    Stroke (HCC)     Past Surgical History:   Procedure Laterality Date    Surgical stent      wife states Lft side of heart- [facility] [place]     History reviewed. No pertinent family history.   reports that he has quit smoking. His smoking use included cigarettes. He has never used smokeless tobacco. He reports that he does not currently use alcohol. He reports that he does not use drugs.    Allergies:  Allergies[1]    Medications:    Current Facility-Administered Medications:     enalapril (Vasotec) tab 10 mg, 10  mg, Oral, BID    vancomycin (Vancocin) cap 125 mg, 125 mg, Oral, QID    enoxaparin (Lovenox) 40 MG/0.4ML SUBQ injection 40 mg, 40 mg, Subcutaneous, Daily    acetaminophen (Tylenol Extra Strength) tab 500 mg, 500 mg, Oral, Q4H PRN    melatonin tab 3 mg, 3 mg, Oral, Nightly PRN    polyethylene glycol (PEG 3350) (Miralax) 17 g oral packet 17 g, 17 g, Oral, Daily PRN    sennosides (Senokot) tab 17.2 mg, 17.2 mg, Oral, Nightly PRN    bisacodyl (Dulcolax) 10 MG rectal suppository 10 mg, 10 mg, Rectal, Daily PRN    fleet enema (Fleet) rectal enema 133 mL, 1 enema, Rectal, Once PRN    ondansetron (Zofran) 4 MG/2ML injection 4 mg, 4 mg, Intravenous, Q6H PRN    metoclopramide (Reglan) 5 mg/mL injection 5 mg, 5 mg, Intravenous, Q8H PRN    oxymetazoline (Nasal Decongestant) 0.05 % nasal solution 1 spray, 1 spray, Nasal, Q12H PRN    fluticasone propionate (Flonase) 50 MCG/ACT nasal suspension 1 spray, 1 spray, Each Nare, Daily    sodium chloride (Saline Mist) 0.65 % nasal solution 1 spray, 1 spray, Each Nare, Q3H PRN    tamsulosin (Flomax) cap 0.4 mg, 0.4 mg, Oral, Daily @ 0700    aspirin chewable tab 81 mg, 81 mg, Oral, Daily    atorvastatin (Lipitor) tab 80 mg, 80 mg, Oral, Daily    clopidogrel (Plavix) tab 75 mg, 75 mg, Oral, Daily    finasteride (Proscar) tab 5 mg, 5 mg, Oral, Daily    escitalopram (Lexapro) tab 20 mg, 20 mg, Oral, Nightly    ipratropium (Atrovent) 0.06 % nasal solution 1 spray, 1 spray, Nasal, BID    mirtazapine (Remeron) tab 15 mg, 15 mg, Oral, Nightly    niacin tab 500 mg, 500 mg, Oral, Daily with breakfast    sucralfate (Carafate) tab 1 g, 1 g, Oral, TID AC    traZODone (Desyrel) tab 50 mg, 50 mg, Oral, Nightly    tobramycin-dexamethasone (Tobradex) 0.3-0.1 % ophthalmic suspension 1 drop, 1 drop, Both Eyes, BID    Review of Systems:  CONSTITUTIONAL:  No weight loss, weakness or fatigue.  HEENT:  Eyes:  No visual loss, blurred vision, double vision or yellow sclerae. Ears, Nose, Throat:  No hearing loss,  sneezing, congestion, runny nose or sore throat.  SKIN:  No rash or itching.  CARDIOVASCULAR:  No chest pain, chest pressure or chest discomfort  RESPIRATORY:  No shortness of breath, cough or sputum.  GASTROINTESTINAL:  No anorexia, nausea, vomiting or abdominal pain. +diarrhea GENITOURINARY:  No Burning on urination.   NEUROLOGICAL:  No headache, dizziness, syncope, paralysis, ataxia, numbness or tingling in the extremities.  MUSCULOSKELETAL:  No muscle, back pain, joint pain or stiffness.    Physical Exam:  Vital signs: Blood pressure 128/55, pulse 56, temperature 97.7 °F (36.5 °C), temperature source Oral, resp. rate 18, height 5' 11\" (1.803 m), weight 190 lb (86.2 kg), SpO2 98%.    General: Alert, oriented, NAD  HEENT: Moist mucous membranes. EOMI  Neck: No lymphadenopathy.  Supple.  Cardiovascular: RRR  Respiratory: Clear to auscultation bilaterally.  No wheezes. No rhonchi.  Abdomen: Soft, nontender, nondistended.   Musculoskeletal: No edema noted  Integument: No lesions. No erythema.    Laboratory Data:  Recent Labs   Lab 11/02/24  0818 11/03/24  0555   RBC 4.66 4.19   HGB 13.5 12.1*   HCT 38.9* 35.3*   MCV 83.5 84.2   MCH 29.0 28.9   MCHC 34.7 34.3   RDW 12.8 13.1   NEPRELIM 5.31  --    WBC 9.0 8.6   .0 209.0     Recent Labs   Lab 11/02/24  0818 11/03/24  0555   * 100*   BUN 20 15   CREATSERUM 1.33* 1.01   CA 9.4 8.3*   ALB 4.5 3.9    138   K 4.1 4.1    112   CO2 20.0* 18.0*   ALKPHO 132* 113   AST 22 22   ALT 18 15   BILT 0.6 0.7   TP 7.6 6.5       Microbiology: Reviewed in EMR    Radiology: Reviewed      MD [facility]  [phone]  11/3/2024       [1]   Allergies  Allergen Reactions    Losartan NAUSEA AND VOMITING and OTHER (SEE COMMENTS)     Adverse Reaction           (historical)    Adverse Reaction         Adverse Reaction        Adverse Reaction           (historical)    Adverse Reaction      Adverse Reaction       (historical)   Adverse Reaction       Adverse Reaction      Adverse Reaction       (historical)    Adverse Reaction       (historical)  Adverse Reaction       Adverse Reaction      Adverse Reaction       (historical)      Adverse Reaction       (historical)      Adverse Reaction      Adverse Reaction       (historical)      Adverse Reaction  Adverse Reaction       (historical)  Adverse Reaction       Adverse Reaction      Adverse Reaction       (historical)

## 2024-11-03 NOTE — PROGRESS NOTES
[facility]  Progress Note    Deven Atkinson Patient Status:  Observation    1930 MRN [id number]   Location [facility] 3NW-A Attending Fiona Lee MD   Hosp Day # 0 PCP Paulo James MD     Subjective:  89 year old male who presented with diarrhea and nausea. Patient has no pain today. C diff was positive.     Objective/Physical Exam:  /55 (BP Location: Right arm)   Pulse 56   Temp 97.7 °F (36.5 °C) (Oral)   Resp 18   Ht 71\"   Wt 190 lb (86.2 kg)   SpO2 98%   BMI 26.50 kg/m²     Intake/Output Summary (Last 24 hours) at 11/3/2024 1443  Last data filed at 11/3/2024 0500  Gross per 24 hour   Intake 2208 ml   Output 100 ml   Net 2108 ml       General: Alert, orientated x3.  Cooperative.  No apparent distress.  Abdomen:  Soft, non-distended,non tender, with no rebound or guarding.  No peritoneal signs.    Labs:  Lab Results   Component Value Date    WBC 8.6 2024    HGB 12.1 2024    HCT 35.3 2024    .0 2024      Lab Results   Component Value Date    INR 0.97 10/24/2024     Lab Results   Component Value Date     2024    K 4.1 2024     2024    CO2 18.0 2024    BUN 15 2024    CREATSERUM 1.01 2024     2024    CA 8.3 2024    ALKPHO 113 2024    ALT 15 2024    AST 22 2024    BILT 0.7 2024    ALB 3.9 2024    TP 6.5 2024          Assessment/Plan:  89 year old male who presented with diarrhea. Has no pain today. Non tender in the right lower quadrant. No leukocytosis or left shift. C diff positive. Diagnosis consistent with c diff colitis. No appendicitis. ID for management of c diff. Surgery will sign off. Ok for discharge from my standpoint.     DVT Prophylaxis while inpatient     Nancy Dillon MD  11/3/2024  2:43 PM

## 2024-11-04 LAB
ATRIAL RATE: 67 BPM
P AXIS: 55 DEGREES
P-R INTERVAL: 174 MS
Q-T INTERVAL: 452 MS
QRS DURATION: 140 MS
QTC CALCULATION (BEZET): 477 MS
R AXIS: -26 DEGREES
T AXIS: 148 DEGREES
VENTRICULAR RATE: 67 BPM

## 2024-11-05 NOTE — DISCHARGE SUMMARY
[facility]     Deven Atkinson Patient Status:  Observation    1930 MRN [id number]   Location [facility] 3NW-A Attending No att. providers found   Hosp Day # 0 PCP Paulo James MD     Date of Admission: 2024  Date of Discharge:  11/3/2024     Discharge Disposition: Home or Self Care    Discharge Diagnosis:  Cdiff diarrhea  Renal insufficiency   Acute uncomplicated sinusitis  Metabolic acidosis   Nasal congestion   Hypertension   Dementia     History of Present Illness:   89 year old male with history of hypertension, HLD, hx of CDI, dementia presented with 4 day history of diarrhea. He is mandarin speaker and unable to provide history. Spouse left and was unavailable for history at the time of this evaluation so history obtained from chart review.      Patient was recently on course of cefdinir for sinus infection. He developed diarrhea 4 days ago. Today, he complained of abdominal pain on the right side. No nausea, vomiting.      CT AP in the ER concerning for acute appendicitis. Surgery was consulted.     Brief Synopsis:   #Abdominal pain, diarrhea  #Cdiff colitis  #Exam not consistent with acute appendicitis, ruled out  This is first recurrence of CDI. Per spouse, 2 days of diarrhea prior to admission. Patient also dx with acute sinusitis and was on cefdinir prior to admission. He is also on trimethoprim to prevent UTIs. Patient's spouse to discuss with urologist about continuing trimethoprim. GI PCR negative.      #Acute uncomplicated sinusitis   Evaluated by ENT 4 days PTA and was placed on course of cefdinir. Patient with persistent symptoms at the time of admission. Continue supportive care with flonase, atrovent nasal spray, saline nasal spra, short course of oxymetazoline. Symptoms improving off antibiotics at the time of discharge and risk outweighs benefit of continuing antibiotics.     Lace+ Score: 82  59-90 High Risk  29-58 Medium Risk  0-28   Low  Risk       TCM Follow-Up Recommendation:  LACE > 58: High Risk of readmission after discharge from the hospital.      Procedures during hospitalization:   NA    Incidental or significant findings and recommendations (brief descriptions):  NA    Lab/Test results pending at Discharge:   NA    Consultants:  ID    Discharge Medication List:     Discharge Medications        START taking these medications        Instructions Prescription details   dicyclomine 20 MG Tabs  Commonly known as: Bentyl  Replaces: dicyclomine 10 MG Caps      Take 1 tablet (20 mg total) by mouth 4 (four) times daily as needed.   Stop taking on: December 3, 2024  Quantity: 30 tablet  Refills: 0     oxymetazoline 0.05 % Soln  Commonly known as: Nasal Decongestant      1 spray by Nasal route every 12 (twelve) hours as needed for congestion.   Quantity: 15 mL  Refills: 0     vancomycin 125 MG Caps  Commonly known as: Vancocin  Start taking on: November 3, 2024      Take 1 capsule (125 mg total) by mouth 4 (four) times daily for 14 days, THEN 1 capsule (125 mg total) in the morning, at noon, and at bedtime for 7 days, THEN 1 capsule (125 mg total) 2 (two) times a day for 7 days, THEN 1 capsule (125 mg total) daily for 7 days, THEN 1 capsule (125 mg total) every other day for 7 days.   Stop taking on: December 15, 2024  Quantity: 100 capsule  Refills: 0            CONTINUE taking these medications        Instructions Prescription details   alfuzosin ER 10 MG Tb24  Commonly known as: Uroxatral ER      Take 1 tablet (10 mg total) by mouth daily.   Refills: 0     Artificial Tears 0.1-0.3 % Soln  Generic drug: Dextran 70-Hypromellose      Apply 1 drop to eye.   Refills: 0     aspirin 81 MG Chew      Chew 1 tablet (81 mg total) by mouth daily.   Refills: 0     atorvastatin 80 MG Tabs  Commonly known as: Lipitor      Take 1 tablet (80 mg total) by mouth daily.   Refills: 0     clopidogrel 75 MG Tabs  Commonly known as: Plavix      Take 1 tablet (75 mg total)  by mouth daily.   Refills: 0     dutasteride 0.5 MG Caps  Commonly known as: Avodart      Take 1 capsule (0.5 mg total) by mouth daily.   Refills: 0     enalapril 10 MG Tabs  Commonly known as: Vasotec      Take 1 tablet (10 mg total) by mouth 2 (two) times daily.   Refills: 0     escitalopram 20 MG Tabs  Commonly known as: Lexapro      Take 1 tablet (20 mg total) by mouth at bedtime.   Refills: 0     ipratropium 0.06 % Soln  Commonly known as: Atrovent      1 spray by Nasal route in the morning and 1 spray before bedtime.   Refills: 0     mirtazapine 15 MG Tabs  Commonly known as: Remeron      Take 1 tablet (15 mg total) by mouth nightly.   Refills: 0     niacin 500 MG Tabs      Take 1 tablet (500 mg total) by mouth daily with breakfast.   Refills: 0     Polyethylene Glycol 3350 17 g Pack  Commonly known as: MIRALAX      Take 17 g by mouth daily.   Refills: 0     sennosides-docusate 8.6-50 MG Tabs  Commonly known as: Pericolace      Take 1 tablet by mouth daily.   Refills: 0     sucralfate 1 g Tabs  Commonly known as: Carafate      Take 1 tablet (1 g total) by mouth 3 (three) times daily before meals.   Refills: 0     tobramycin-dexamethasone 0.3-0.1 % Susp  Commonly known as: Tobradex      Place 1 drop into both eyes in the morning and 1 drop before bedtime.   Refills: 0     traZODone 50 MG Tabs  Commonly known as: Desyrel      Take 1 tablet (50 mg total) by mouth nightly.   Refills: 0     zinc sulfate 220 (50 Zn) MG Caps  Commonly known as: Zincate      Take 1 capsule (220 mg total) by mouth daily.   Refills: 0            STOP taking these medications      cefdinir 300 MG Caps  Commonly known as: Omnicef        ciprofloxacin 250 MG Tabs  Commonly known as: Cipro        dicyclomine 10 MG Caps  Commonly known as: Bentyl  Replaced by: dicyclomine 20 MG Tabs        trimethoprim 100 MG Tabs  Commonly known as: Trimpex                  Where to Get Your Medications        These medications were sent to [facility]  [address] [facility] [address] [phone], [phone]  [address], [facility] [address]      Phone: [phone]   dicyclomine 20 MG Tabs  oxymetazoline 0.05 % Soln  vancomycin 125 MG Caps         [place]    Follow-up appointment:   Paulo James MD  [address]  [address]  [facility] [address] [phone]    Schedule an appointment as soon as possible for a visit      [facility] Emergency Department  [address] [facility] [address]  [phone]  Follow up  As needed    Appointments for Next 30 Days 2024 - 2024      None            Vital signs:       Physical Exam:    General: No acute distress   Lungs: clear to auscultation  Cardiovascular: S1, S2  Abdomen: Soft    -----------------------------------------------------------------------------------------------  PATIENT DISCHARGE INSTRUCTIONS: See electronic chart    Fiona Lee MD    Total time spent on discharge plannin minutes     The  [employer] Cures Act makes medical notes like these available to patients in the interest of transparency. Please be advised this is a medical document. Medical documents are intended to carry relevant information, facts as evident, and the clinical opinion of the practitioner. The medical note is intended as peer to peer communication and may appear blunt or direct. It is written in medical language and may contain abbreviations or verbiage that are unfamiliar.

## 2024-11-12 ENCOUNTER — HOSPITAL ENCOUNTER (EMERGENCY)
Facility: HOSPITAL | Age: 89
Discharge: HOME | End: 2024-11-12
Attending: EMERGENCY MEDICINE
Payer: MEDICARE

## 2024-11-12 ENCOUNTER — APPOINTMENT (OUTPATIENT)
Dept: GENERAL RADIOLOGY | Facility: HOSPITAL | Age: 89
End: 2024-11-12
Attending: EMERGENCY MEDICINE
Payer: MEDICARE

## 2024-11-12 VITALS
DIASTOLIC BLOOD PRESSURE: 66 MMHG | OXYGEN SATURATION: 99 % | WEIGHT: 190.06 LBS | RESPIRATION RATE: 18 BRPM | HEART RATE: 57 BPM | SYSTOLIC BLOOD PRESSURE: 155 MMHG | TEMPERATURE: 99 F | BODY MASS INDEX: 27 KG/M2

## 2024-11-12 DIAGNOSIS — K59.00 CONSTIPATION, UNSPECIFIED CONSTIPATION TYPE: Primary | ICD-10-CM

## 2024-11-12 LAB
ALBUMIN SERPL-MCNC: 3.9 G/DL (ref 3.2–4.8)
ALBUMIN/GLOB SERPL: 1.4 {RATIO} (ref 1–2)
ALP LIVER SERPL-CCNC: 121 U/L
ALT SERPL-CCNC: 49 U/L
ANION GAP SERPL CALC-SCNC: 2 MMOL/L (ref 0–18)
AST SERPL-CCNC: 47 U/L (ref ?–34)
BASOPHILS # BLD AUTO: 0.05 X10(3) UL (ref 0–0.2)
BASOPHILS NFR BLD AUTO: 0.7 %
BILIRUB SERPL-MCNC: 0.3 MG/DL (ref 0.2–0.9)
BUN BLD-MCNC: 11 MG/DL (ref 9–23)
CALCIUM BLD-MCNC: 9.3 MG/DL (ref 8.7–10.4)
CHLORIDE SERPL-SCNC: 107 MMOL/L (ref 98–112)
CO2 SERPL-SCNC: 28 MMOL/L (ref 21–32)
CREAT BLD-MCNC: 0.94 MG/DL
EGFRCR SERPLBLD CKD-EPI 2021: 75 ML/MIN/1.73M2 (ref 60–?)
EOSINOPHIL # BLD AUTO: 0.04 X10(3) UL (ref 0–0.7)
EOSINOPHIL NFR BLD AUTO: 0.6 %
ERYTHROCYTE [DISTWIDTH] IN BLOOD BY AUTOMATED COUNT: 13.2 %
GLOBULIN PLAS-MCNC: 2.8 G/DL (ref 2–3.5)
GLUCOSE BLD-MCNC: 131 MG/DL (ref 70–99)
HCT VFR BLD AUTO: 35.9 %
HGB BLD-MCNC: 12.5 G/DL
IMM GRANULOCYTES # BLD AUTO: 0.01 X10(3) UL (ref 0–1)
IMM GRANULOCYTES NFR BLD: 0.1 %
LYMPHOCYTES # BLD AUTO: 2.6 X10(3) UL (ref 1–4)
LYMPHOCYTES NFR BLD AUTO: 37.1 %
MCH RBC QN AUTO: 28.9 PG (ref 26–34)
MCHC RBC AUTO-ENTMCNC: 34.8 G/DL (ref 31–37)
MCV RBC AUTO: 82.9 FL
MONOCYTES # BLD AUTO: 0.47 X10(3) UL (ref 0.1–1)
MONOCYTES NFR BLD AUTO: 6.7 %
NEUTROPHILS # BLD AUTO: 3.84 X10 (3) UL (ref 1.5–7.7)
NEUTROPHILS # BLD AUTO: 3.84 X10(3) UL (ref 1.5–7.7)
NEUTROPHILS NFR BLD AUTO: 54.8 %
OSMOLALITY SERPL CALC.SUM OF ELEC: 285 MOSM/KG (ref 275–295)
PLATELET # BLD AUTO: 224 10(3)UL (ref 150–450)
POTASSIUM SERPL-SCNC: 4.2 MMOL/L (ref 3.5–5.1)
PROT SERPL-MCNC: 6.7 G/DL (ref 5.7–8.2)
RBC # BLD AUTO: 4.33 X10(6)UL
SODIUM SERPL-SCNC: 137 MMOL/L (ref 136–145)
WBC # BLD AUTO: 7 X10(3) UL (ref 4–11)

## 2024-11-12 PROCEDURE — 99283 EMERGENCY DEPT VISIT LOW MDM: CPT

## 2024-11-12 PROCEDURE — 74018 RADEX ABDOMEN 1 VIEW: CPT | Performed by: EMERGENCY MEDICINE

## 2024-11-12 PROCEDURE — 85025 COMPLETE CBC W/AUTO DIFF WBC: CPT | Performed by: EMERGENCY MEDICINE

## 2024-11-12 PROCEDURE — 99284 EMERGENCY DEPT VISIT MOD MDM: CPT

## 2024-11-12 PROCEDURE — 80053 COMPREHEN METABOLIC PANEL: CPT | Performed by: EMERGENCY MEDICINE

## 2024-11-12 PROCEDURE — 36415 COLL VENOUS BLD VENIPUNCTURE: CPT

## 2024-11-12 RX ORDER — BISACODYL 10 MG
10 SUPPOSITORY, RECTAL RECTAL
Status: DISCONTINUED | OUTPATIENT
Start: 2024-11-12 | End: 2024-11-12

## 2024-11-12 RX ORDER — POLYETHYLENE GLYCOL 3350 17 G/17G
17 POWDER, FOR SOLUTION ORAL 2 TIMES DAILY
Qty: 476 G | Refills: 0 | Status: SHIPPED | OUTPATIENT
Start: 2024-11-12 | End: 2024-11-26

## 2024-11-12 NOTE — DISCHARGE INSTRUCTIONS
Increase daily fiber and drink lots of water restart MiraLAX twice daily.  Spoke with infectious diseases, they do think is a good idea to do the entire vancomycin taper to make sure that the C. difficile infection does not come back.  You can continue to use Colace suppositories to help soften the stool but would be careful using enemas- do not want to risk toxic megacolon

## 2024-11-12 NOTE — ED PROVIDER NOTES
Patient Seen in: [facility] Emergency Department      History     Chief Complaint   Patient presents with    Urinary Symptoms             Constipation     Stated Complaint: unable to urinate or have BM last two days    Subjective:   HPI      Admitted with c diff- d/c'd on vancomycin- has been on treatment for 9 days and now no BM for 3 days- no fevers or  chills      Objective:     Past Medical History:    Essential hypertension    Hyperlipidemia    Stroke (HCC)              Past Surgical History:   Procedure Laterality Date    Surgical stent      wife states Lft side of heart- [facility]                Social History     Socioeconomic History    Marital status:    Tobacco Use    Smoking status: Former     Types: Cigarettes    Smokeless tobacco: Never   Vaping Use    Vaping status: Never Used   Substance and Sexual Activity    Alcohol use: Not Currently    Drug use: Never     Social Drivers of Health     Financial Resource Strain: Low Risk  (7/12/2024)    Received from [facility]    Financial Resource Strain     In the past year, have you or any family members you live with been unable to get any of the following when it was really needed? Check all that apply.: None   Food Insecurity: No Food Insecurity (11/2/2024)    Food Insecurity     Food Insecurity: Never true   Transportation Needs: No Transportation Needs (11/2/2024)    Transportation Needs     Lack of Transportation: No   Physical Activity: Low Risk  (10/14/2024)    Received from [facility]    Exercise Vital Sign     On average, how many days per week do you engage in moderate to strenuous exercise (like a brisk walk)?: 5 days     On average, how many minutes do you engage in exercise at this level?: 30 min   Stress: Low Risk  (7/12/2024)    Received from [facility]    Stress     Stress is when someone feels tense, nervous, anxious, or can't sleep at night because their mind is troubled. How  stressed are you? : Not at all   Social Connections: Low Risk  (7/12/2024)    Received from [facility]    Social Connections     How often do you see or talk to people that you care about and feel close to? (For example: talking to friends on the phone, visiting friends or family, going to [employer] or club meetings): 3 to 5 times a week   Housing Stability: Low Risk  (11/2/2024)    Housing Stability     Housing Instability: No                  Physical Exam     ED Triage Vitals [11/12/24 0821]   BP (!) 170/60   Pulse 62   Resp 18   Temp 98.7 °F (37.1 °C)   Temp src Oral   SpO2 100 %   O2 Device None (Room air)       Current Vitals:   Vital Signs  BP: 145/58  Pulse: 55  Resp: 16  Temp: 98.7 °F (37.1 °C)  Temp src: Oral  MAP (mmHg): 84    Oxygen Therapy  SpO2: 98 %  O2 Device: None (Room air)        Physical Exam  ***      ED Course     Labs Reviewed   CBC WITH DIFFERENTIAL WITH PLATELET - Abnormal; Notable for the following components:       Result Value    HGB 12.5 (*)     HCT 35.9 (*)     All other components within normal limits   COMP METABOLIC PANEL (14) - Abnormal; Notable for the following components:    Glucose 131 (*)     AST 47 (*)     Alkaline Phosphatase 121 (*)     All other components within normal limits            ***       MDM      ***        Medical Decision Making      Disposition and Plan     Clinical Impression:  No diagnosis found.     Disposition:  There is no disposition on file for this visit.  There is no disposition time on file for this visit.    Follow-up:  No follow-up provider specified.        Medications Prescribed:  Current Discharge Medication List              Supplementary Documentation:                                                            palpation his KUB reveals constipation but no signs of obstruction there is no free air no signs of perforated viscus no significantly dilated bowel loops to suggest ileus.       MDM      Very pleasant 89-year-old presents to the emergency department he was having quite a lot of loose stools and diarrhea and he was seen and evaluated and then treated for C. difficile.  He feels improved he has no fevers no chills but now he has not had a bowel movement in 2 days.  He is concerned because he is constipated now.  His abdominal exam is incredibly benign I do not suspect mesenteric ischemia despite his advanced age.  While patients with C. difficile generally have colitis and could be at risk for toxic megacolon, his KUB was done and is very reassuring that he does not have a significant ileus or toxic megacolon.  Lab work including CBC and CMP is reassuring.  Discussed this with the patient and her daughter.  Collect suppository was given patient was able to have a bowel movement here in the emergency department and feels better.  Will be discharged home to continue gentle treatment of constipation at home.        Medical Decision Making      Disposition and Plan     Clinical Impression:  1. Constipation, unspecified constipation type         Disposition:  Discharge  11/12/2024 11:43 am    Follow-up:  No follow-up provider specified.        Medications Prescribed:  Discharge Medication List as of 11/12/2024 11:55 AM        START taking these medications    Details   !! polyethylene glycol, PEG 3350, 17 g Oral Powd Pack Take 17 g by mouth in the morning and 17 g before bedtime. Do all this for 14 days., Normal, Disp-476 g, R-0       !! - Potential duplicate medications found. Please discuss with provider.              Supplementary Documentation:

## 2024-11-29 ENCOUNTER — HOSPITAL ENCOUNTER (OUTPATIENT)
Age: 89
Discharge: HOME | End: 2024-11-29
Payer: MEDICARE

## 2024-11-29 VITALS
RESPIRATION RATE: 20 BRPM | DIASTOLIC BLOOD PRESSURE: 64 MMHG | SYSTOLIC BLOOD PRESSURE: 139 MMHG | HEART RATE: 68 BPM | TEMPERATURE: 97 F | OXYGEN SATURATION: 96 %

## 2024-11-29 DIAGNOSIS — B02.9 HERPES ZOSTER WITHOUT COMPLICATION: Primary | ICD-10-CM

## 2024-11-29 PROBLEM — N40.1 BENIGN PROSTATIC HYPERPLASIA WITH URINARY OBSTRUCTION: Chronic | Status: ACTIVE | Noted: 2018-06-25

## 2024-11-29 PROBLEM — I44.7 LBBB (LEFT BUNDLE BRANCH BLOCK): Status: ACTIVE | Noted: 2024-07-05

## 2024-11-29 PROBLEM — N13.8 BENIGN PROSTATIC HYPERPLASIA WITH URINARY OBSTRUCTION: Chronic | Status: ACTIVE | Noted: 2018-06-25

## 2024-11-29 PROBLEM — I50.32 CHRONIC HEART FAILURE WITH PRESERVED EJECTION FRACTION (HFPEF) (HCC): Chronic | Status: ACTIVE | Noted: 2022-10-13

## 2024-11-29 PROBLEM — F51.04 PSYCHOPHYSIOLOGICAL INSOMNIA: Status: ACTIVE | Noted: 2024-07-19

## 2024-11-29 PROBLEM — F01.B0 MODERATE VASCULAR DEMENTIA WITHOUT BEHAVIORAL DISTURBANCE, PSYCHOTIC DISTURBANCE, MOOD DISTURBANCE, OR ANXIETY (HCC): Status: ACTIVE | Noted: 2024-07-05

## 2024-11-29 PROBLEM — K58.1 IRRITABLE BOWEL SYNDROME WITH CONSTIPATION: Status: ACTIVE | Noted: 2024-08-01

## 2024-11-29 PROBLEM — K55.1 CHRONIC VASCULAR INSUFFICIENCY OF INTESTINE (HCC): Status: ACTIVE | Noted: 2022-09-19

## 2024-11-29 PROCEDURE — 99203 OFFICE O/P NEW LOW 30 MIN: CPT | Performed by: NURSE PRACTITIONER

## 2024-11-29 RX ORDER — VALACYCLOVIR HYDROCHLORIDE 1 G/1
1000 TABLET, FILM COATED ORAL 3 TIMES DAILY
Qty: 21 TABLET | Refills: 0 | Status: SHIPPED | OUTPATIENT
Start: 2024-11-29 | End: 2024-12-06

## 2024-11-29 NOTE — ED PROVIDER NOTES
History     Chief Complaint   Patient presents with    Lesion       Subjective:   HPI    Deven Atkinson, 89 year old male with notable medical history of CVA, dementia, HTN who presents with rash. Per wife, patient developed rash to Left flank approx 1-week ago with symptoms worsening. Patient has been scratching. Denies drainage, fever, chills, trauma.         Objective:   No pertinent past medical history.            No pertinent past surgical history.              No pertinent social history.            Medications Ordered Prior to Encounter[1]      Review of Systems   Skin:  Positive for rash.   All other systems reviewed and are negative.        Constitutional and vital signs reviewed.      All other systems reviewed and negative except as noted above.    I have reviewed the family history, social history, allergies, and outpatient medications.     History reviewed from EMR: Encounters, problem list, allergies, medications      Physical Exam     ED Triage Vitals [11/29/24 1042]   /64   Pulse 68   Resp 20   Temp 97.3 °F (36.3 °C)   Temp src Temporal   SpO2 96 %   O2 Device None (Room air)       Current:/64   Pulse 68   Temp 97.3 °F (36.3 °C) (Temporal)   Resp 20   SpO2 96%       Physical Exam  Vitals and nursing note reviewed.   Constitutional:       General: He is not in acute distress.     Appearance: Normal appearance. He is normal weight. He is not ill-appearing or toxic-appearing.   HENT:      Head: Normocephalic and atraumatic.      Right Ear: External ear normal.      Left Ear: External ear normal.      Nose: Nose normal. No congestion or rhinorrhea.      Mouth/Throat:      Mouth: Mucous membranes are moist.   Eyes:      Extraocular Movements: Extraocular movements intact.      Conjunctiva/sclera: Conjunctivae normal.      Pupils: Pupils are equal, round, and reactive to light.   Cardiovascular:      Rate and Rhythm: Normal rate.      Pulses: Normal pulses.   Pulmonary:      Effort:  Pulmonary effort is normal. No respiratory distress.   Musculoskeletal:         General: No swelling, tenderness or signs of injury. Normal range of motion.      Cervical back: Normal range of motion.   Skin:     General: Skin is warm and dry.      Capillary Refill: Capillary refill takes less than 2 seconds.      Findings: Rash present. Rash is vesicular.      Comments: Vesicular rash to Left flank to spine (not crossing), and across Left side / front abdomen   Neurological:      General: No focal deficit present.      Mental Status: He is alert and oriented to person, place, and time. Mental status is at baseline.   Psychiatric:         Mood and Affect: Mood normal.         Behavior: Behavior normal.         Thought Content: Thought content normal.         Judgment: Judgment normal.            ED Course     Labs Reviewed - No data to display  No orders to display       Vitals:    11/29/24 1042   BP: 139/64   Pulse: 68   Resp: 20   Temp: 97.3 °F (36.3 °C)   TempSrc: Temporal   SpO2: 96%            MDM        Deven Atkinson, 89 year old male with medical history as noted above who presents with skin lesions   - Patient in NAD, VSS   - shingles vs cellulitis vs eczema vs other   - Exam c/w shingles ~T10 dermatome   - Supportive care and infection control measures discussed   - Advised close f/u with primary care provider   - RTED precautions discussed       ** See ED course below for additional information on care provided / interventions / notable events throughout patient's encounter.         ** I have independently reviewed the radiology images, clinical lab results, and ECG tracings as described above (if applicable)    ** Concerning co-morbidities possibly affecting complaint / care: CVA, HTN, current c.diff infection    ** See below for home care instructions (if applicable)          Medical Decision Making  Amount and/or Complexity of Data Reviewed  Independent Historian: caregiver and spouse     Details:  wife    Risk  OTC drugs.  Prescription drug management.        Disposition and Plan     Clinical Impression:  1. Herpes zoster without complication         Disposition:  Discharge  11/29/2024 11:14 am    Follow-up:  Paulo James MD  [address] [place] [id number]  [phone]                Medications Prescribed:  Current Discharge Medication List        START taking these medications    Details   valACYclovir 1 G Oral Tab Take 1 tablet (1,000 mg total) by mouth 3 (three) times daily for 7 days.  Qty: 21 tablet, Refills: 0             The above patient (and/or guardian) was made aware that an appropriate evaluation has been performed, and that no additional testing is required at this time. In my medical judgment, there is currently no evidence of an immediate life-threatening or surgical condition, therefore discharge is indicated at this time. The patient (and/or guardian) was advised that a small risk still exists that a serious condition could develop. The patient was instructed to arrange close follow-up with their primary care provider (or the referral provider given today). The patient received written and verbal instructions regarding their condition / concerns, demonstrated understanding, and is agreement with the outpatient treatment plan.        Home care instructions:    Shingles Supportive Care Measures:   - Take Valacyclovir as directed and to completion   - Take Tylenol / Ibuprofen / Naproxen as needed for pain   - DO NOT scratch you rash as this can open the lesions and spread the infection   - DO NOT rub / touch your eyes as this can lead a severe eye infection   - Avoid wearing clothing that rubs against lesions   - If you accidentally touch / scratch your rash, immediately wash your hands and nails   - Change linens frequently until scabbed over / resolved   - Avoid direct sun exposure   - Drink plenty of water   - Keep lesions covered until all are scabbed over   -  Start taking multivitamins and vitamins D (2000IU) daily, year round   - Go to ER if you develop lesions around your eye      Zbigniew Garcia, DNP, APRN, AGACNP-BC, FNP-C, CNL  Adult-Gerontology Acute Care & Family Nurse Practitioner  [facility]                   [1]   Current Facility-Administered Medications on File Prior to Encounter   Medication Dose Route Frequency Provider Last Rate Last Admin    [] sodium chloride 0.9% infusion   Intravenous Continuous Fiona Lee MD   Stopped at 24 1528    [COMPLETED] sodium chloride 0.9 % IV bolus 1,000 mL  1,000 mL Intravenous Once Liv Ferreira MD   Stopped at 24 1341    [COMPLETED] levoFLOXacin (Levaquin) tab 500 mg  500 mg Oral Once Liv Ferreira MD   500 mg at 24 1301    [COMPLETED] ketorolac (Toradol) 15 MG/ML injection 15 mg  15 mg Intravenous Once Jero Wright MD   15 mg at 24 1149     Current Outpatient Medications on File Prior to Encounter   Medication Sig Dispense Refill    vancomycin (VANCOCIN) 125 MG Oral Cap Take 1 capsule (125 mg total) by mouth 4 (four) times daily for 14 days, THEN 1 capsule (125 mg total) in the morning, at noon, and at bedtime for 7 days, THEN 1 capsule (125 mg total) 2 (two) times a day for 7 days, THEN 1 capsule (125 mg total) daily for 7 days, THEN 1 capsule (125 mg total) every other day for 7 days. 100 capsule 0    dicyclomine 20 MG Oral Tab Take 1 tablet (20 mg total) by mouth 4 (four) times daily as needed. 30 tablet 0    enalapril 10 MG Oral Tab Take 1 tablet (10 mg total) by mouth 2 (two) times daily.      alfuzosin ER 10 MG Oral Tablet 24 Hr Take 1 tablet (10 mg total) by mouth daily.      Polyethylene Glycol 3350 17 g Oral Powd Pack Take 17 g by mouth daily.      traZODone 50 MG Oral Tab Take 1 tablet (50 mg total) by mouth nightly.      zinc sulfate 220 (50 Zn) MG Oral Cap Take 1 capsule (220 mg total) by mouth daily.      niacin 500 MG Oral Tab Take 1 tablet (500 mg  total) by mouth daily with breakfast.      sucralfate 1 g Oral Tab Take 1 tablet (1 g total) by mouth 3 (three) times daily before meals.      aspirin 81 MG Oral Chew Tab Chew 1 tablet (81 mg total) by mouth daily.      atorvastatin 80 MG Oral Tab Take 1 tablet (80 mg total) by mouth daily.      clopidogrel 75 MG Oral Tab Take 1 tablet (75 mg total) by mouth daily.      Dextran 70-Hypromellose (ARTIFICIAL TEARS) 0.1-0.3 % Ophthalmic Solution Apply 1 drop to eye.      dutasteride 0.5 MG Oral Cap Take 1 capsule (0.5 mg total) by mouth daily.      escitalopram 20 MG Oral Tab Take 1 tablet (20 mg total) by mouth at bedtime.      mirtazapine 15 MG Oral Tab Take 1 tablet (15 mg total) by mouth nightly.      sennosides-docusate 8.6-50 MG Oral Tab Take 1 tablet by mouth daily.      [] polyethylene glycol, PEG 3350, 17 g Oral Powd Pack Take 17 g by mouth in the morning and 17 g before bedtime. Do all this for 14 days. 476 g 0    oxymetazoline 0.05 % Nasal Solution 1 spray by Nasal route every 12 (twelve) hours as needed for congestion. (Patient not taking: Reported on 2024) 15 mL 0    ipratropium 0.06 % Nasal Solution 1 spray by Nasal route in the morning and 1 spray before bedtime. (Patient not taking: Reported on 2024)      tobramycin-dexamethasone 0.3-0.1 % Ophthalmic Suspension Place 1 drop into both eyes in the morning and 1 drop before bedtime.

## 2024-11-29 NOTE — DISCHARGE INSTRUCTIONS
Shingles Supportive Care Measures:   - Take Valacyclovir as directed and to completion   - Take Tylenol / Ibuprofen / Naproxen as needed for pain   - DO NOT scratch you rash as this can open the lesions and spread the infection   - DO NOT rub / touch your eyes as this can lead a severe eye infection   - Avoid wearing clothing that rubs against lesions   - If you accidentally touch / scratch your rash, immediately wash your hands and nails   - Change linens frequently until scabbed over / resolved   - Avoid direct sun exposure   - Drink plenty of water   - Keep lesions covered until all are scabbed over   - Start taking multivitamins and vitamins D (2000IU) daily, year round   - Go to ER if you develop lesions around your eye

## 2024-12-01 ENCOUNTER — APPOINTMENT (OUTPATIENT)
Dept: CT IMAGING | Facility: HOSPITAL | Age: 89
End: 2024-12-01
Attending: EMERGENCY MEDICINE
Payer: MEDICARE

## 2024-12-01 ENCOUNTER — HOSPITAL ENCOUNTER (EMERGENCY)
Facility: HOSPITAL | Age: 89
Discharge: HOME | End: 2024-12-02
Attending: EMERGENCY MEDICINE
Payer: MEDICARE

## 2024-12-01 DIAGNOSIS — K59.00 CONSTIPATION, UNSPECIFIED CONSTIPATION TYPE: Primary | ICD-10-CM

## 2024-12-01 LAB
ALBUMIN SERPL-MCNC: 3.4 G/DL (ref 3.2–4.8)
ALBUMIN/GLOB SERPL: 1 {RATIO} (ref 1–2)
ALP LIVER SERPL-CCNC: 113 U/L
ALT SERPL-CCNC: 52 U/L
ANION GAP SERPL CALC-SCNC: 9 MMOL/L (ref 0–18)
AST SERPL-CCNC: 36 U/L (ref ?–34)
BASOPHILS # BLD AUTO: 0.07 X10(3) UL (ref 0–0.2)
BASOPHILS NFR BLD AUTO: 0.4 %
BILIRUB SERPL-MCNC: 0.6 MG/DL (ref 0.2–0.9)
BILIRUB UR QL STRIP.AUTO: NEGATIVE
BUN BLD-MCNC: 12 MG/DL (ref 9–23)
CALCIUM BLD-MCNC: 8.9 MG/DL (ref 8.7–10.4)
CHLORIDE SERPL-SCNC: 100 MMOL/L (ref 98–112)
CLARITY UR REFRACT.AUTO: CLEAR
CO2 SERPL-SCNC: 22 MMOL/L (ref 21–32)
COLOR UR AUTO: YELLOW
CREAT BLD-MCNC: 0.78 MG/DL
EGFRCR SERPLBLD CKD-EPI 2021: 83 ML/MIN/1.73M2 (ref 60–?)
EOSINOPHIL # BLD AUTO: 0.04 X10(3) UL (ref 0–0.7)
EOSINOPHIL NFR BLD AUTO: 0.2 %
ERYTHROCYTE [DISTWIDTH] IN BLOOD BY AUTOMATED COUNT: 13.4 %
GLOBULIN PLAS-MCNC: 3.5 G/DL (ref 2–3.5)
GLUCOSE BLD-MCNC: 130 MG/DL (ref 70–99)
GLUCOSE UR STRIP.AUTO-MCNC: NORMAL MG/DL
HCT VFR BLD AUTO: 32.7 %
HGB BLD-MCNC: 11.4 G/DL
IMM GRANULOCYTES # BLD AUTO: 0.06 X10(3) UL (ref 0–1)
IMM GRANULOCYTES NFR BLD: 0.3 %
KETONES UR STRIP.AUTO-MCNC: NEGATIVE MG/DL
LEUKOCYTE ESTERASE UR QL STRIP.AUTO: NEGATIVE
LYMPHOCYTES # BLD AUTO: 4.51 X10(3) UL (ref 1–4)
LYMPHOCYTES NFR BLD AUTO: 26.2 %
MCH RBC QN AUTO: 28.8 PG (ref 26–34)
MCHC RBC AUTO-ENTMCNC: 34.9 G/DL (ref 31–37)
MCV RBC AUTO: 82.6 FL
MONOCYTES # BLD AUTO: 0.91 X10(3) UL (ref 0.1–1)
MONOCYTES NFR BLD AUTO: 5.3 %
NEUTROPHILS # BLD AUTO: 11.61 X10 (3) UL (ref 1.5–7.7)
NEUTROPHILS # BLD AUTO: 11.61 X10(3) UL (ref 1.5–7.7)
NEUTROPHILS NFR BLD AUTO: 67.6 %
NITRITE UR QL STRIP.AUTO: NEGATIVE
OSMOLALITY SERPL CALC.SUM OF ELEC: 274 MOSM/KG (ref 275–295)
PH UR STRIP.AUTO: 6 [PH] (ref 5–8)
PLATELET # BLD AUTO: 200 10(3)UL (ref 150–450)
POTASSIUM SERPL-SCNC: 4 MMOL/L (ref 3.5–5.1)
PROT SERPL-MCNC: 6.9 G/DL (ref 5.7–8.2)
PROT UR STRIP.AUTO-MCNC: NEGATIVE MG/DL
RBC # BLD AUTO: 3.96 X10(6)UL
RBC UR QL AUTO: NEGATIVE
SODIUM SERPL-SCNC: 131 MMOL/L (ref 136–145)
SP GR UR STRIP.AUTO: 1.02 (ref 1–1.03)
UROBILINOGEN UR STRIP.AUTO-MCNC: NORMAL MG/DL
WBC # BLD AUTO: 17.2 X10(3) UL (ref 4–11)

## 2024-12-01 PROCEDURE — 81003 URINALYSIS AUTO W/O SCOPE: CPT | Performed by: EMERGENCY MEDICINE

## 2024-12-01 PROCEDURE — 99284 EMERGENCY DEPT VISIT MOD MDM: CPT

## 2024-12-01 PROCEDURE — 36415 COLL VENOUS BLD VENIPUNCTURE: CPT

## 2024-12-01 PROCEDURE — 80053 COMPREHEN METABOLIC PANEL: CPT | Performed by: EMERGENCY MEDICINE

## 2024-12-01 PROCEDURE — 74176 CT ABD & PELVIS W/O CONTRAST: CPT | Performed by: EMERGENCY MEDICINE

## 2024-12-01 PROCEDURE — 85025 COMPLETE CBC W/AUTO DIFF WBC: CPT | Performed by: EMERGENCY MEDICINE

## 2024-12-01 PROCEDURE — 99285 EMERGENCY DEPT VISIT HI MDM: CPT

## 2024-12-01 PROCEDURE — 51798 US URINE CAPACITY MEASURE: CPT

## 2024-12-02 ENCOUNTER — HOSPITAL ENCOUNTER (EMERGENCY)
Dept: CT IMAGING | Facility: HOSPITAL | Age: 89
Discharge: HOME | End: 2024-12-02
Attending: INTERNAL MEDICINE
Payer: MEDICARE

## 2024-12-02 VITALS
SYSTOLIC BLOOD PRESSURE: 138 MMHG | OXYGEN SATURATION: 95 % | HEART RATE: 56 BPM | DIASTOLIC BLOOD PRESSURE: 60 MMHG | TEMPERATURE: 98 F | RESPIRATION RATE: 18 BRPM

## 2024-12-02 DIAGNOSIS — S09.90XA INJURY OF HEAD: ICD-10-CM

## 2024-12-02 PROCEDURE — 70450 CT HEAD/BRAIN W/O DYE: CPT | Performed by: INTERNAL MEDICINE

## 2024-12-02 RX ORDER — DOCUSATE SODIUM 100 MG/1
100 CAPSULE, LIQUID FILLED ORAL 2 TIMES DAILY
Qty: 60 CAPSULE | Refills: 0 | Status: SHIPPED | OUTPATIENT
Start: 2024-12-02 | End: 2025-01-01

## 2024-12-02 RX ORDER — POLYETHYLENE GLYCOL 3350 17 G/17G
17 POWDER, FOR SOLUTION ORAL DAILY PRN
Qty: 12 EACH | Refills: 0 | Status: SHIPPED | OUTPATIENT
Start: 2024-12-02 | End: 2025-01-01

## 2024-12-02 NOTE — DISCHARGE INSTRUCTIONS
Please have Mr. Atkinson increase his water intake.  Please have him take docusate as prescribed as needed for constipation.   If h his abdomen is getting enlarged or distended and he is still complaining of having difficulty urinating return to the ER as he may need to be evaluated for catheter placement. If his symptoms return or progress or if he is having worsening abdominal pain return to the ER for further evaluation.    Please follow-up with his primary care physician within 1 week for close reevaluation. Please discuss with his primary care physician about his symptoms and his shingles.

## 2024-12-02 NOTE — ED INITIAL ASSESSMENT (HPI)
Pt here with c/o constipation for past 3 days. Dx with shingles 3 days ago. Denies any fevers/n/v/pain.

## 2024-12-02 NOTE — ED PROVIDER NOTES
Patient Seen in: [facility] Emergency Department      History     Chief Complaint   Patient presents with    Constipation    Urinary Symptoms     Stated Complaint: constipation and urinary retention for a couple days per wife    Subjective:   HPI      89-year-old male presents ED with complaints of constipation and urinary retention.  Patient's wife reports patient's been complaining of the symptoms however patient does have a history of hypertension hyperlipidemia stroke as well as dementia.  Generally confused to timing and generally forgetful.  She does report that he is very self-sufficient otherwise generally Does his own ADLs.  Reports that he has been complaining of difficulty urinating and constipation.  Currently on vancomycin for C. difficile.  Also on acyclovir for recent diagnosis of shingles overlying the left flank region.  Currently complaining of 4 out of 10 abdominal pain and left flank pain    Objective:     Past Medical History:    Essential hypertension    Hyperlipidemia    Stroke (HCC)              Past Surgical History:   Procedure Laterality Date    Surgical stent      wife states Lft side of heart- [facility]                No pertinent social history.                Physical Exam     ED Triage Vitals   BP 12/01/24 2232 147/60   Pulse 12/01/24 2232 74   Resp 12/01/24 2232 18   Temp 12/01/24 2244 97.6 °F (36.4 °C)   Temp src 12/01/24 2244 Oral   SpO2 12/01/24 2232 96 %   O2 Device 12/01/24 2232 None (Room air)       Current Vitals:   Vital Signs  BP: 147/60  Pulse: 59  Resp: 18  Temp: 97.6 °F (36.4 °C)  Temp src: Oral    Oxygen Therapy  SpO2: 95 %  O2 Device: None (Room air)        Physical Exam  Vitals and nursing note reviewed.   Constitutional:       Appearance: He is well-developed.   HENT:      Head: Normocephalic and atraumatic.   Eyes:      Pupils: Pupils are equal, round, and reactive to light.   Cardiovascular:      Rate and Rhythm: Normal rate and regular rhythm.    Pulmonary:      Effort: Pulmonary effort is normal.      Breath sounds: Normal breath sounds.   Abdominal:      General: Bowel sounds are normal.      Palpations: Abdomen is soft.   Musculoskeletal:         General: No deformity.      Cervical back: Normal range of motion and neck supple.   Skin:     General: Skin is warm and dry.      Capillary Refill: Capillary refill takes less than 2 seconds.   Neurological:      Mental Status: He is alert and oriented to person, place, and time.             ED Course     Labs Reviewed   COMP METABOLIC PANEL (14) - Abnormal; Notable for the following components:       Result Value    Glucose 130 (*)     Sodium 131 (*)     Calculated Osmolality 274 (*)     AST 36 (*)     ALT 52 (*)     All other components within normal limits   CBC WITH DIFFERENTIAL WITH PLATELET - Abnormal; Notable for the following components:    WBC 17.2 (*)     HGB 11.4 (*)     HCT 32.7 (*)     Neutrophil Absolute Prelim 11.61 (*)     Neutrophil Absolute 11.61 (*)     Lymphocyte Absolute 4.51 (*)     All other components within normal limits   URINALYSIS WITH CULTURE REFLEX   SCAN SLIDE        CT ABDOMEN AND PELVIS WITHOUT CONTRAST    COMPARISON: 11/2/2024.    IMPRESSION:  Essentially stable exam.  No new acute abnormality in the abdomen or pelvis.  No appendicitis.         MDM      89-year-old male presents ED with complaints of abdominal pain and urinary retention.  Vitals are stable arrival patient appears nontoxic examination normal extension of the abdomen no focal tenderness palpation.  Will obtain basic lab work rule out acute appendicitis versus diverticulitis versus colitis versus ileus.  Labs with mild leukocytosis 17,200 patient is afebrile however and normotensive.  CT abdomen pelvis per [facility] essentially stable examination.  Patient initially had a postvoid residual of 250 but was able to urinate over 100 cc of urine immediately after we obtained a postvoid residual.  He does not  have a significant monitor urinary retention at this time.  CT scan does show mild amounts of stool from independent evaluation of his CT scan.  Discussed supportive care with starting stool softeners like docusate increase oral hydration.  Close follow-up with primary care physician recommended strict return precaution instructed.  Patient's wife shows understanding the plan and is in agreement plan discharged home stable condition.        Medical Decision Making    Disposition and Plan     Clinical Impression:  1. Constipation, unspecified constipation type         Disposition:  Discharge  12/2/2024  1:38 am    Follow-up:  Paulo James MD  [address] [place] [id number]  [phone]    Call in 1 day(s)            Medications Prescribed:  Current Discharge Medication List        START taking these medications    Details   docusate sodium 100 MG Oral Cap Take 1 capsule (100 mg total) by mouth 2 (two) times daily.  Qty: 60 capsule, Refills: 0                 Supplementary Documentation:

## 2024-12-08 ENCOUNTER — HOSPITAL ENCOUNTER (EMERGENCY)
Facility: HOSPITAL | Age: 89
Discharge: HOME | End: 2024-12-08
Attending: EMERGENCY MEDICINE
Payer: MEDICARE

## 2024-12-08 ENCOUNTER — APPOINTMENT (OUTPATIENT)
Dept: CT IMAGING | Facility: HOSPITAL | Age: 89
End: 2024-12-08
Attending: EMERGENCY MEDICINE
Payer: MEDICARE

## 2024-12-08 VITALS
OXYGEN SATURATION: 99 % | TEMPERATURE: 98 F | DIASTOLIC BLOOD PRESSURE: 63 MMHG | WEIGHT: 189.63 LBS | SYSTOLIC BLOOD PRESSURE: 160 MMHG | BODY MASS INDEX: 26 KG/M2 | HEART RATE: 64 BPM | RESPIRATION RATE: 16 BRPM

## 2024-12-08 DIAGNOSIS — R91.1 PULMONARY NODULE: ICD-10-CM

## 2024-12-08 DIAGNOSIS — R33.9 URINARY RETENTION: Primary | ICD-10-CM

## 2024-12-08 LAB
ALBUMIN SERPL-MCNC: 3.9 G/DL (ref 3.2–4.8)
ALBUMIN/GLOB SERPL: 1.2 {RATIO} (ref 1–2)
ALP LIVER SERPL-CCNC: 123 U/L
ALT SERPL-CCNC: 47 U/L
ANION GAP SERPL CALC-SCNC: 9 MMOL/L (ref 0–18)
AST SERPL-CCNC: 35 U/L (ref ?–34)
BASOPHILS # BLD AUTO: 0.06 X10(3) UL (ref 0–0.2)
BASOPHILS NFR BLD AUTO: 0.7 %
BILIRUB SERPL-MCNC: 0.8 MG/DL (ref 0.2–0.9)
BILIRUB UR QL STRIP.AUTO: NEGATIVE
BUN BLD-MCNC: 9 MG/DL (ref 9–23)
CALCIUM BLD-MCNC: 9.3 MG/DL (ref 8.7–10.4)
CHLORIDE SERPL-SCNC: 102 MMOL/L (ref 98–112)
CLARITY UR REFRACT.AUTO: CLEAR
CO2 SERPL-SCNC: 22 MMOL/L (ref 21–32)
CREAT BLD-MCNC: 0.9 MG/DL
EGFRCR SERPLBLD CKD-EPI 2021: 79 ML/MIN/1.73M2 (ref 60–?)
EOSINOPHIL # BLD AUTO: 0.06 X10(3) UL (ref 0–0.7)
EOSINOPHIL NFR BLD AUTO: 0.7 %
ERYTHROCYTE [DISTWIDTH] IN BLOOD BY AUTOMATED COUNT: 13.5 %
GLOBULIN PLAS-MCNC: 3.3 G/DL (ref 2–3.5)
GLUCOSE BLD-MCNC: 107 MG/DL (ref 70–99)
GLUCOSE UR STRIP.AUTO-MCNC: NORMAL MG/DL
HCT VFR BLD AUTO: 36.9 %
HGB BLD-MCNC: 13 G/DL
IMM GRANULOCYTES # BLD AUTO: 0.05 X10(3) UL (ref 0–1)
IMM GRANULOCYTES NFR BLD: 0.6 %
KETONES UR STRIP.AUTO-MCNC: NEGATIVE MG/DL
LEUKOCYTE ESTERASE UR QL STRIP.AUTO: NEGATIVE
LYMPHOCYTES # BLD AUTO: 3.29 X10(3) UL (ref 1–4)
LYMPHOCYTES NFR BLD AUTO: 36.3 %
MCH RBC QN AUTO: 28.2 PG (ref 26–34)
MCHC RBC AUTO-ENTMCNC: 35.2 G/DL (ref 31–37)
MCV RBC AUTO: 80 FL
MONOCYTES # BLD AUTO: 0.52 X10(3) UL (ref 0.1–1)
MONOCYTES NFR BLD AUTO: 5.7 %
NEUTROPHILS # BLD AUTO: 5.09 X10 (3) UL (ref 1.5–7.7)
NEUTROPHILS # BLD AUTO: 5.09 X10(3) UL (ref 1.5–7.7)
NEUTROPHILS NFR BLD AUTO: 56 %
NITRITE UR QL STRIP.AUTO: NEGATIVE
OSMOLALITY SERPL CALC.SUM OF ELEC: 275 MOSM/KG (ref 275–295)
PH UR STRIP.AUTO: 7 [PH] (ref 5–8)
PLATELET # BLD AUTO: 305 10(3)UL (ref 150–450)
POTASSIUM SERPL-SCNC: 4.6 MMOL/L (ref 3.5–5.1)
PROT SERPL-MCNC: 7.2 G/DL (ref 5.7–8.2)
PROT UR STRIP.AUTO-MCNC: NEGATIVE MG/DL
RBC # BLD AUTO: 4.61 X10(6)UL
RBC UR QL AUTO: NEGATIVE
SODIUM SERPL-SCNC: 133 MMOL/L (ref 136–145)
SP GR UR STRIP.AUTO: 1.01 (ref 1–1.03)
UROBILINOGEN UR STRIP.AUTO-MCNC: NORMAL MG/DL
WBC # BLD AUTO: 9.1 X10(3) UL (ref 4–11)

## 2024-12-08 PROCEDURE — 99285 EMERGENCY DEPT VISIT HI MDM: CPT

## 2024-12-08 PROCEDURE — 81003 URINALYSIS AUTO W/O SCOPE: CPT | Performed by: EMERGENCY MEDICINE

## 2024-12-08 PROCEDURE — 74176 CT ABD & PELVIS W/O CONTRAST: CPT | Performed by: EMERGENCY MEDICINE

## 2024-12-08 PROCEDURE — 36415 COLL VENOUS BLD VENIPUNCTURE: CPT

## 2024-12-08 PROCEDURE — 51702 INSERT TEMP BLADDER CATH: CPT

## 2024-12-08 PROCEDURE — 80053 COMPREHEN METABOLIC PANEL: CPT | Performed by: EMERGENCY MEDICINE

## 2024-12-08 PROCEDURE — 85025 COMPLETE CBC W/AUTO DIFF WBC: CPT | Performed by: EMERGENCY MEDICINE

## 2024-12-08 RX ORDER — DIPHENHYDRAMINE HCL 25 MG
25 CAPSULE ORAL ONCE
Status: COMPLETED | OUTPATIENT
Start: 2024-12-08 | End: 2024-12-08

## 2024-12-08 NOTE — ED PROVIDER NOTES
Patient Seen in: [facility] Emergency Department      History     Chief Complaint   Patient presents with    Urinary Symptoms     Stated Complaint: Blood in urine    Subjective:   HPI      Patient comes to the emergency department with symptoms of urinary urgency and dysuria over the past day.  The patient has no abdominal pain.  History was obtained primarily from the patient's wife.  The patient has a history of dementia.  Patient has had no fever or chills.  He has had no nausea or vomiting.  Patient also does incidentally have itching and irritation from shingles in his left flank.     Objective:     Past Medical History:    Essential hypertension    Hyperlipidemia    Stroke (HCC)              Past Surgical History:   Procedure Laterality Date    Surgical stent      wife states Lft side of heart- [facility]                Social History     Socioeconomic History    Marital status:    Tobacco Use    Smoking status: Former     Types: Cigarettes    Smokeless tobacco: Never   Vaping Use    Vaping status: Never Used   Substance and Sexual Activity    Alcohol use: Not Currently    Drug use: Never     Social Drivers of Health     Financial Resource Strain: Low Risk  (7/12/2024)    Received from [facility]    Financial Resource Strain     In the past year, have you or any family members you live with been unable to get any of the following when it was really needed? Check all that apply.: None   Food Insecurity: No Food Insecurity (11/2/2024)    Food Insecurity     Food Insecurity: Never true   Transportation Needs: No Transportation Needs (11/2/2024)    Transportation Needs     Lack of Transportation: No   Physical Activity: Medium Risk (12/2/2024)    Received from [facility]    Exercise Vital Sign     On average, how many days per week do you engage in moderate to strenuous exercise (like a brisk walk)?: 5 days     On average, how many minutes do you engage in exercise  at this level?: 20 min   Stress: Low Risk  (7/12/2024)    Received from [facility]    Stress     Stress is when someone feels tense, nervous, anxious, or can't sleep at night because their mind is troubled. How stressed are you? : Not at all   Social Connections: Low Risk  (7/12/2024)    Received from [facility]    Social Connections     How often do you see or talk to people that you care about and feel close to? (For example: talking to friends on the phone, visiting friends or family, going to [employer] or club meetings): 3 to 5 times a week   Housing Stability: Low Risk  (11/2/2024)    Housing Stability     Housing Instability: No                  Physical Exam     ED Triage Vitals [12/08/24 0433]   /74   Pulse 74   Resp 18   Temp 98.2 °F (36.8 °C)   Temp src Temporal   SpO2 96 %   O2 Device None (Room air)       Current Vitals:   Vital Signs  BP: 160/63  Pulse: 64  Resp: 16  Temp: 98.2 °F (36.8 °C)  Temp src: Temporal  MAP (mmHg): 93    Oxygen Therapy  SpO2: 99 %  O2 Device: None (Room air)        Physical Exam  Vitals and nursing note reviewed.   Constitutional:       Appearance: He is well-developed.   HENT:      Head: Normocephalic.   Cardiovascular:      Rate and Rhythm: Normal rate and regular rhythm.      Heart sounds: Normal heart sounds. No murmur heard.  Pulmonary:      Effort: Pulmonary effort is normal.      Breath sounds: Normal breath sounds.   Abdominal:      General: Bowel sounds are normal.      Palpations: Abdomen is soft.      Tenderness: There is no abdominal tenderness. There is no guarding.      Comments: No abdominal discomfort is noted.   Musculoskeletal:         General: No tenderness. Normal range of motion.      Cervical back: Normal range of motion and neck supple.   Lymphadenopathy:      Cervical: No cervical adenopathy.   Skin:     General: Skin is warm and dry.      Findings: Rash present.      Comments: Rash consisting of eschars consistent with healing  shingles is noted in the left flank.  No surrounding cellulitis or evidence of acute infection or necrotizing infection noted.   Neurological:      Mental Status: He is alert.      Sensory: No sensory deficit.      Comments: Patient is awake and conversive, but is disoriented to time and place, consistent with his baseline.  Patient does have a history of dementia.            ED Course     Labs Reviewed   COMP METABOLIC PANEL (14) - Abnormal; Notable for the following components:       Result Value    Glucose 107 (*)     Sodium 133 (*)     AST 35 (*)     Alkaline Phosphatase 123 (*)     All other components within normal limits   CBC WITH DIFFERENTIAL WITH PLATELET - Abnormal; Notable for the following components:    HCT 36.9 (*)     All other components within normal limits   URINALYSIS WITH CULTURE REFLEX       ED Course as of 12/08/24 0951  ------------------------------------------------------------  Time: 12/08 0624  Value: Urinalysis with Culture Reflex  Comment: Unremarkable    ------------------------------------------------------------  Time: 12/08 0624  Comment: Urinalysis was unremarkable.  Given the patient's age and other comorbidities, my index of suspicion for some other more occult pathology is relatively high.  Because of this, blood work and a CT scan were ordered.  ------------------------------------------------------------  Time: 12/08 0804  Value: CT ABDOMEN+PELVIS KIDNEYSTONE 2D RNDR(NO IV,NO ORAL)(CPT=74176)  Comment: Images were independently viewed by me and patient has an enlarged prostate and distended bladder.  ------------------------------------------------------------  Time: 12/08 0804  Value: CBC With Differential With Platelet(!)  Comment: Unremarkable    ------------------------------------------------------------  Time: 12/08 0804  Value: Comp Metabolic Panel (14)(!)  Comment: Unremarkable    ------------------------------------------------------------  Time: 12/08 0845  Comment:  Jay catheter was inserted because of appearance of bladder on CAT scan.  Patient had a substantial amount of urine.              MDM      Patient comes to the emergency department with urinary symptoms.  Differential diagnosis includes acute urinary infection, pyelonephritis, prostatitis.  Patient's initial urinalysis was unremarkable, prompting further workup.  Patient's lab work was otherwise unremarkable, but CT scan did reveal a substantially enlarged prostate along with distended bladder.  Subsequently, Jay catheter was inserted with the substantial retained urine output.  Patient was discharged home with instructions to follow-up with primary care and urology.  Patient and wife were given catheter and leg bag instructions.        Medical Decision Making      Disposition and Plan     Clinical Impression:  1. Urinary retention    2. Pulmonary nodule         Disposition:  Discharge  12/8/2024  8:45 am    Follow-up:  Paulo James MD  [address] [place] [id number]  [phone]    Follow up      [facility]   [address] [place] [id number] [phone]  Follow up            Medications Prescribed:  Current Discharge Medication List              Supplementary Documentation:

## 2024-12-08 NOTE — ED QUICK NOTES
PT is repeatedly attempting to get up out of bed despite writers numerous attempts to redirect, and involve family.  PT repeatedly using call light and when staff responds, the patient has no specific need. Again writer advising patient and family member to remain in the bed and call for assistance

## 2024-12-08 NOTE — ED QUICK NOTES
PT on call light and is again up out of bed and is yelling, complaining that he cannot urinate despite a urinary catheter in place.  Attempted to explain how the catheter works. PT is agitated.  Writer asked to use the  and what their preferred language is, spouse responded \"it doesn't matter it doesn't work\".

## 2024-12-10 ENCOUNTER — LAB ENCOUNTER (OUTPATIENT)
Dept: LAB | Age: 89
End: 2024-12-10
Attending: INTERNAL MEDICINE
Payer: MEDICARE

## 2024-12-10 DIAGNOSIS — A04.72 C. DIFFICILE COLITIS: ICD-10-CM

## 2024-12-10 PROCEDURE — 87493 C DIFF AMPLIFIED PROBE: CPT

## 2024-12-11 LAB — C DIFF TOX B STL QL: POSITIVE

## 2024-12-29 ENCOUNTER — APPOINTMENT (OUTPATIENT)
Dept: GENERAL RADIOLOGY | Facility: HOSPITAL | Age: 89
End: 2024-12-29
Attending: EMERGENCY MEDICINE
Payer: MEDICARE

## 2024-12-29 ENCOUNTER — APPOINTMENT (OUTPATIENT)
Dept: CT IMAGING | Facility: HOSPITAL | Age: 89
End: 2024-12-29
Attending: EMERGENCY MEDICINE
Payer: MEDICARE

## 2024-12-29 ENCOUNTER — HOSPITAL ENCOUNTER (EMERGENCY)
Facility: HOSPITAL | Age: 89
Discharge: HOME | End: 2024-12-29
Attending: EMERGENCY MEDICINE
Payer: MEDICARE

## 2024-12-29 VITALS
WEIGHT: 150 LBS | HEIGHT: 67 IN | OXYGEN SATURATION: 100 % | TEMPERATURE: 99 F | HEART RATE: 56 BPM | DIASTOLIC BLOOD PRESSURE: 56 MMHG | BODY MASS INDEX: 23.54 KG/M2 | SYSTOLIC BLOOD PRESSURE: 171 MMHG | RESPIRATION RATE: 23 BRPM

## 2024-12-29 DIAGNOSIS — G89.29 CHRONIC ABDOMINAL PAIN: Primary | ICD-10-CM

## 2024-12-29 DIAGNOSIS — R10.9 CHRONIC ABDOMINAL PAIN: Primary | ICD-10-CM

## 2024-12-29 LAB
ALBUMIN SERPL-MCNC: 3.7 G/DL (ref 3.2–4.8)
ALBUMIN/GLOB SERPL: 1.2 {RATIO} (ref 1–2)
ALP LIVER SERPL-CCNC: 108 U/L
ALT SERPL-CCNC: 62 U/L
ANION GAP SERPL CALC-SCNC: 8 MMOL/L (ref 0–18)
AST SERPL-CCNC: 51 U/L (ref ?–34)
BASOPHILS # BLD AUTO: 0.06 X10(3) UL (ref 0–0.2)
BASOPHILS NFR BLD AUTO: 0.7 %
BILIRUB SERPL-MCNC: 0.5 MG/DL (ref 0.2–0.9)
BILIRUB UR QL STRIP.AUTO: NEGATIVE
BUN BLD-MCNC: 17 MG/DL (ref 9–23)
CALCIUM BLD-MCNC: 8.9 MG/DL (ref 8.7–10.4)
CHLORIDE SERPL-SCNC: 106 MMOL/L (ref 98–112)
CLARITY UR REFRACT.AUTO: CLEAR
CO2 SERPL-SCNC: 22 MMOL/L (ref 21–32)
COLOR UR AUTO: COLORLESS
CREAT BLD-MCNC: 0.93 MG/DL
EGFRCR SERPLBLD CKD-EPI 2021: 76 ML/MIN/1.73M2 (ref 60–?)
EOSINOPHIL # BLD AUTO: 0.03 X10(3) UL (ref 0–0.7)
EOSINOPHIL NFR BLD AUTO: 0.3 %
ERYTHROCYTE [DISTWIDTH] IN BLOOD BY AUTOMATED COUNT: 14.2 %
FLUAV + FLUBV RNA SPEC NAA+PROBE: NEGATIVE
FLUAV + FLUBV RNA SPEC NAA+PROBE: NEGATIVE
GLOBULIN PLAS-MCNC: 3 G/DL (ref 2–3.5)
GLUCOSE BLD-MCNC: 217 MG/DL (ref 70–99)
GLUCOSE UR STRIP.AUTO-MCNC: NORMAL MG/DL
HCT VFR BLD AUTO: 37.5 %
HGB BLD-MCNC: 12.8 G/DL
IMM GRANULOCYTES # BLD AUTO: 0.03 X10(3) UL (ref 0–1)
IMM GRANULOCYTES NFR BLD: 0.3 %
KETONES UR STRIP.AUTO-MCNC: NEGATIVE MG/DL
LEUKOCYTE ESTERASE UR QL STRIP.AUTO: 250
LIPASE SERPL-CCNC: 29 U/L (ref 12–53)
LYMPHOCYTES # BLD AUTO: 3.36 X10(3) UL (ref 1–4)
LYMPHOCYTES NFR BLD AUTO: 38.4 %
MCH RBC QN AUTO: 29 PG (ref 26–34)
MCHC RBC AUTO-ENTMCNC: 34.1 G/DL (ref 31–37)
MCV RBC AUTO: 84.8 FL
MONOCYTES # BLD AUTO: 0.36 X10(3) UL (ref 0.1–1)
MONOCYTES NFR BLD AUTO: 4.1 %
NEUTROPHILS # BLD AUTO: 4.9 X10 (3) UL (ref 1.5–7.7)
NEUTROPHILS # BLD AUTO: 4.9 X10(3) UL (ref 1.5–7.7)
NEUTROPHILS NFR BLD AUTO: 56.2 %
OSMOLALITY SERPL CALC.SUM OF ELEC: 290 MOSM/KG (ref 275–295)
PH UR STRIP.AUTO: 8.5 [PH] (ref 5–8)
PLATELET # BLD AUTO: 197 10(3)UL (ref 150–450)
POTASSIUM SERPL-SCNC: 4 MMOL/L (ref 3.5–5.1)
PROT SERPL-MCNC: 6.7 G/DL (ref 5.7–8.2)
PROT UR STRIP.AUTO-MCNC: NEGATIVE MG/DL
RBC # BLD AUTO: 4.42 X10(6)UL
RBC UR QL AUTO: NEGATIVE
RSV RNA SPEC NAA+PROBE: NEGATIVE
SARS-COV-2 RNA RESP QL NAA+PROBE: NOT DETECTED
SODIUM SERPL-SCNC: 136 MMOL/L (ref 136–145)
SP GR UR STRIP.AUTO: >1.03 (ref 1–1.03)
UROBILINOGEN UR STRIP.AUTO-MCNC: NORMAL MG/DL
WBC # BLD AUTO: 8.7 X10(3) UL (ref 4–11)

## 2024-12-29 PROCEDURE — 83690 ASSAY OF LIPASE: CPT | Performed by: EMERGENCY MEDICINE

## 2024-12-29 PROCEDURE — 81001 URINALYSIS AUTO W/SCOPE: CPT | Performed by: EMERGENCY MEDICINE

## 2024-12-29 PROCEDURE — 71045 X-RAY EXAM CHEST 1 VIEW: CPT | Performed by: EMERGENCY MEDICINE

## 2024-12-29 PROCEDURE — 99284 EMERGENCY DEPT VISIT MOD MDM: CPT

## 2024-12-29 PROCEDURE — 87086 URINE CULTURE/COLONY COUNT: CPT | Performed by: EMERGENCY MEDICINE

## 2024-12-29 PROCEDURE — 0241U SARS-COV-2/FLU A AND B/RSV BY PCR (GENEXPERT): CPT | Performed by: EMERGENCY MEDICINE

## 2024-12-29 PROCEDURE — 36415 COLL VENOUS BLD VENIPUNCTURE: CPT

## 2024-12-29 PROCEDURE — 74177 CT ABD & PELVIS W/CONTRAST: CPT | Performed by: EMERGENCY MEDICINE

## 2024-12-29 PROCEDURE — 85025 COMPLETE CBC W/AUTO DIFF WBC: CPT | Performed by: EMERGENCY MEDICINE

## 2024-12-29 PROCEDURE — 87077 CULTURE AEROBIC IDENTIFY: CPT | Performed by: EMERGENCY MEDICINE

## 2024-12-29 PROCEDURE — 80053 COMPREHEN METABOLIC PANEL: CPT | Performed by: EMERGENCY MEDICINE

## 2024-12-29 NOTE — ED PROVIDER NOTES
a  Patient Seen in: [facility] Emergency Department      History     Chief Complaint   Patient presents with    Abdomen/Flank Pain    Mental Status Changes     Stated Complaint: Not feeling well, seen in ED for similair 18 times this year.    Subjective:   HPI      Patient is a 89-year-old female presents emergency room with a history of feeling hot and cold.  The patient has a history of recent C. difficile and history of shingles.  The patient has had no history of any acute vomiting or diarrhea at this time.  The patient did have C. difficile and is finishing up a second course of antibiotics tomorrow.  The patient's wife was giving history at the bedside states the diarrhea has markedly improved.  The patient has had some abdominal cramping associated with symptoms.  Patient has had no history of any fall or trauma.  The patient denies chest pain.  The patient denies history of any other somatic complaints or discomfort at this time.  Patient states he occasionally feels hot and cold all over per patient's wife.    Objective:     Past Medical History:    Essential hypertension    Hyperlipidemia    Stroke (HCC)              Past Surgical History:   Procedure Laterality Date    Surgical stent      wife states Lft side of heart- [facility]                Social History     Socioeconomic History    Marital status:    Tobacco Use    Smoking status: Former     Types: Cigarettes    Smokeless tobacco: Never   Vaping Use    Vaping status: Never Used   Substance and Sexual Activity    Alcohol use: Not Currently    Drug use: Never     Social Drivers of Health     Financial Resource Strain: Low Risk  (7/12/2024)    Received from [facility]    Financial Resource Strain     In the past year, have you or any family members you live with been unable to get any of the following when it was really needed? Check all that apply.: None   Food Insecurity: No Food Insecurity (11/2/2024)    Food  Insecurity     Food Insecurity: Never true   Transportation Needs: No Transportation Needs (11/2/2024)    Transportation Needs     Lack of Transportation: No   Physical Activity: Medium Risk (12/2/2024)    Received from [facility]    Exercise Vital Sign     On average, how many days per week do you engage in moderate to strenuous exercise (like a brisk walk)?: 5 days     On average, how many minutes do you engage in exercise at this level?: 20 min   Stress: Low Risk  (7/12/2024)    Received from [facility]    Stress     Stress is when someone feels tense, nervous, anxious, or can't sleep at night because their mind is troubled. How stressed are you? : Not at all   Social Connections: Low Risk  (7/12/2024)    Received from [facility]    Social Connections     How often do you see or talk to people that you care about and feel close to? (For example: talking to friends on the phone, visiting friends or family, going to [employer] or club meetings): 3 to 5 times a week   Housing Stability: Low Risk  (11/2/2024)    Housing Stability     Housing Instability: No                  Physical Exam     ED Triage Vitals [12/29/24 0931]   /58   Pulse 77   Resp 20   Temp 99 °F (37.2 °C)   Temp src    SpO2 100 %   O2 Device None (Room air)       Current Vitals:   Vital Signs  BP: 154/55  Pulse: 51  Resp: 15  Temp: 99 °F (37.2 °C)  MAP (mmHg): 84    Oxygen Therapy  SpO2: 100 %  O2 Device: None (Room air)        Physical Exam     GENERAL: Well-developed, well-nourished male sitting up breathing easily in no apparent distress.  Patient is nontoxic appearance.  HEENT: Head is normocephalic, atraumatic. Pupils are 4 mm equally round and reactive to light. Oropharynx is clear. Mucous membranes are moist.  NECK: There is no focal tenderness to palpation appreciated. No stridor.  LUNGS: Clear to auscultation bilaterally with no wheeze. There is good equal air entry bilaterally.  HEART: Regular rate and  rhythm. Normal S1, S2 no S3, or S4. No murmur.  ABDOMEN: There is mild focal tenderness to palpation appreciated to the periumbilical area only with no other focal tenderness to palpation appreciated. There is no guarding, no rebound, no mass, and no organomegaly appreciated. There is normoactive bowel sounds.   EXTREMITIES: There is no cyanosis, clubbing, or edema appreciated. Pulses are 2+ and equal in all 4 extremities.  NEURO: Patient is awake, alert and answering questions appropriately as per family at the bedside. Motor strength is 5 over 5 in all 4 extremities. There are no gross motor or sensory deficits appreciated.  No evidence of any facial droop or asymmetry appreciated.      ED Course     Labs Reviewed   CBC WITH DIFFERENTIAL WITH PLATELET - Abnormal; Notable for the following components:       Result Value    HGB 12.8 (*)     HCT 37.5 (*)     All other components within normal limits   COMP METABOLIC PANEL (14) - Abnormal; Notable for the following components:    Glucose 217 (*)     AST 51 (*)     ALT 62 (*)     All other components within normal limits   URINALYSIS WITH CULTURE REFLEX - Abnormal; Notable for the following components:    Urine Color Colorless (*)     Spec Gravity >1.030 (*)     pH Urine 8.5 (*)     Nitrite Urine 2+ (*)     Leukocyte Esterase Urine 250 (*)     WBC Urine 11-20 (*)     RBC Urine 6-10 (*)     Bacteria Urine Rare (*)     All other components within normal limits   LIPASE - Normal   SARS-COV-2/FLU A AND B/RSV BY PCR (GENEXPERT) - Normal    Narrative:     This test is intended for the qualitative detection and differentiation of SARS-CoV-2, influenza A, influenza B, and respiratory syncytial virus (RSV) viral RNA in nasopharyngeal or nares swabs from individuals suspected of respiratory viral infection consistent with COVID-19 by their healthcare provider. Signs and symptoms of respiratory viral infection due to SARS-CoV-2, influenza, and RSV can be similar.    Test  performed using the Xpert Xpress SARS-CoV-2/FLU/RSV (real time RT-PCR)  assay on the GeneXpert instrument, [place], [place] [address].   This test is being used under the Food and Drug Administration's Emergency Use Authorization.    The authorized Fact Sheet for Healthcare Providers for this assay is available upon request from the laboratory.   URINE CULTURE, ROUTINE            CT ABDOMEN+PELVIS(CONTRAST ONLY)(CPT=74177)    Result Date: 12/29/2024  CONCLUSION:   Jay catheterization of the urinary bladder with associated bladder decompression.  Moderate concentric bladder wall thickening is likely related in part to decompression, though cystitis may be contributory.  Recommend correlation with urinalysis.   LOCATION:  [facility]   Dictated by (CST): Niki Jiménze MD on 12/29/2024 at 11:46 AM     Finalized by (CST): Niki Jiménez MD on 12/29/2024 at 11:52 AM       XR CHEST AP PORTABLE  (CPT=71045)    Result Date: 12/29/2024  CONCLUSION:  Stable cardiac and mediastinal contours.  Loop recorder device of the left chest wall noted.  Subsegmental bibasilar atelectasis or scar without pulmonary edema or focal airspace consolidation.  The pleural spaces are clear.   LOCATION:  [facility]      Dictated by (CST): Niki Jiménez MD on 12/29/2024 at 11:19 AM     Finalized by (CST): Niki Jiménez MD on 12/29/2024 at 11:19 AM                MDM          13:09 patient sitting back and breathing easily in no apparent distress this time.  The patient we discharged to home at this time.  Patient is comfortable going home and is happy to go home at this time.  Patient's family feels comfortable taking the patient home at this time        Medical Decision Making    Patient had IV line established blood work drawn including a CBC, chemistries, BUN/creatinine, and blood sugar shows evidence of some mild hyperglycemia no other acute abnormalities noted.  Liver function test show mild elevation of liver transaminases.  Lipase found to be negative.   COVID, flu, and RSV are all negative.  Urinalysis shows evidence of some mild bacteria however that the patient has a chronic Jay catheter in place.  Patient with no other new complaints at this time.  The patient will be discharged to home with the family's care at this time.  Patient very comfortable going home at this time.  Patient wishes to follow-up with his primary care physician.  Patient instructed to return to the ER immediately if symptoms worsen or if any other problems arise.  Patient discharged home into family's care at this time  Disposition and Plan     Clinical Impression:  1. Chronic abdominal pain         Disposition:  Discharge  12/29/2024  1:10 pm    Follow-up:  Paulo James MD  [address] [place] [id number]  [phone]    Follow up in 2 day(s)            Medications Prescribed:  Current Discharge Medication List              Supplementary Documentation:

## 2024-12-29 NOTE — ED QUICK NOTES
Family at bedside.  Urine sample encouraged  Call light within reach  Bed locked, lowest position

## 2024-12-29 NOTE — DISCHARGE INSTRUCTIONS
Rest at home  Continue antibiotics until finished  Return to the ER if symptoms worsen or if any other problems arise

## 2025-01-10 ENCOUNTER — HOSPITAL ENCOUNTER (EMERGENCY)
Facility: HOSPITAL | Age: OVER 89
Discharge: HOME | End: 2025-01-10
Attending: EMERGENCY MEDICINE
Payer: COMMERCIAL

## 2025-01-10 VITALS
OXYGEN SATURATION: 97 % | HEART RATE: 57 BPM | RESPIRATION RATE: 16 BRPM | SYSTOLIC BLOOD PRESSURE: 171 MMHG | TEMPERATURE: 99 F | DIASTOLIC BLOOD PRESSURE: 66 MMHG

## 2025-01-10 DIAGNOSIS — N39.0 URINARY TRACT INFECTION ASSOCIATED WITH INDWELLING URETHRAL CATHETER, INITIAL ENCOUNTER (HCC): Primary | ICD-10-CM

## 2025-01-10 DIAGNOSIS — T83.511A URINARY TRACT INFECTION ASSOCIATED WITH INDWELLING URETHRAL CATHETER, INITIAL ENCOUNTER (HCC): Primary | ICD-10-CM

## 2025-01-10 LAB
BILIRUB UR QL STRIP.AUTO: NEGATIVE
COLOR UR AUTO: YELLOW
GLUCOSE UR STRIP.AUTO-MCNC: 70 MG/DL
KETONES UR STRIP.AUTO-MCNC: NEGATIVE MG/DL
LEUKOCYTE ESTERASE UR QL STRIP.AUTO: 500
PH UR STRIP.AUTO: 8.5 [PH] (ref 5–8)
PROT UR STRIP.AUTO-MCNC: 100 MG/DL
RBC #/AREA URNS AUTO: >10 /HPF
SP GR UR STRIP.AUTO: 1.02 (ref 1–1.03)
UROBILINOGEN UR STRIP.AUTO-MCNC: NORMAL MG/DL

## 2025-01-10 PROCEDURE — 87077 CULTURE AEROBIC IDENTIFY: CPT | Performed by: EMERGENCY MEDICINE

## 2025-01-10 PROCEDURE — 81001 URINALYSIS AUTO W/SCOPE: CPT | Performed by: EMERGENCY MEDICINE

## 2025-01-10 PROCEDURE — 87086 URINE CULTURE/COLONY COUNT: CPT | Performed by: EMERGENCY MEDICINE

## 2025-01-10 PROCEDURE — 87186 SC STD MICRODIL/AGAR DIL: CPT | Performed by: EMERGENCY MEDICINE

## 2025-01-10 PROCEDURE — 99284 EMERGENCY DEPT VISIT MOD MDM: CPT

## 2025-01-10 PROCEDURE — 87088 URINE BACTERIA CULTURE: CPT | Performed by: EMERGENCY MEDICINE

## 2025-01-10 PROCEDURE — 99283 EMERGENCY DEPT VISIT LOW MDM: CPT

## 2025-01-10 RX ORDER — CIPROFLOXACIN 250 MG/1
250 TABLET, FILM COATED ORAL 2 TIMES DAILY
Qty: 14 TABLET | Refills: 0 | Status: SHIPPED | OUTPATIENT
Start: 2025-01-10 | End: 2025-01-13

## 2025-01-10 RX ORDER — CIPROFLOXACIN 500 MG/1
250 TABLET, FILM COATED ORAL ONCE
Status: COMPLETED | OUTPATIENT
Start: 2025-01-10 | End: 2025-01-10

## 2025-01-10 RX ORDER — VANCOMYCIN HYDROCHLORIDE 125 MG/1
125 CAPSULE ORAL DAILY
Qty: 12 CAPSULE | Refills: 0 | Status: SHIPPED | OUTPATIENT
Start: 2025-01-10

## 2025-01-10 NOTE — ED PROVIDER NOTES
Patient Seen in: [facility] Emergency Department      History     Chief Complaint   Patient presents with    Cath Tube Problem     Stated Complaint: Jay cath problem    Subjective:   HPI      Patient presents with Jay catheter leaking.  The patient had the Jay placed in December according to his wife.  He was at his urologist yesterday for a follow-up.  He has elected to proceed with chronic Jay care as opposed to going through a surgical procedure at his age.  The urology office change the catheter yesterday.  His wife states it does not seem to be functioning well.  He is feeling a constant urge to urinate and there is urine leaking around the catheter.  There is still some urine going into the bag.  He has not had any fever or vomiting.    Objective:     Past Medical History:    Essential hypertension    Hyperlipidemia    Stroke (HCC)              Past Surgical History:   Procedure Laterality Date    Surgical stent      wife states Lft side of heart- [facility]                Social History     Socioeconomic History    Marital status:    Tobacco Use    Smoking status: Former     Types: Cigarettes    Smokeless tobacco: Never   Vaping Use    Vaping status: Never Used   Substance and Sexual Activity    Alcohol use: Not Currently    Drug use: Never     Social Drivers of Health     Financial Resource Strain: Low Risk  (7/12/2024)    Received from [facility]    Financial Resource Strain     In the past year, have you or any family members you live with been unable to get any of the following when it was really needed? Check all that apply.: None   Food Insecurity: No Food Insecurity (11/2/2024)    Food Insecurity     Food Insecurity: Never true   Transportation Needs: No Transportation Needs (11/2/2024)    Transportation Needs     Lack of Transportation: No   Physical Activity: Medium Risk (12/2/2024)    Received from [facility]    Exercise Vital Sign     On  average, how many days per week do you engage in moderate to strenuous exercise (like a brisk walk)?: 5 days     On average, how many minutes do you engage in exercise at this level?: 20 min   Stress: Low Risk  (7/12/2024)    Received from [facility]    Stress     Stress is when someone feels tense, nervous, anxious, or can't sleep at night because their mind is troubled. How stressed are you? : Not at all   Social Connections: Low Risk  (7/12/2024)    Received from [facility]    Social Connections     How often do you see or talk to people that you care about and feel close to? (For example: talking to friends on the phone, visiting friends or family, going to [employer] or club meetings): 3 to 5 times a week   Housing Stability: Low Risk  (11/2/2024)    Housing Stability     Housing Instability: No                  Physical Exam     ED Triage Vitals [01/10/25 1006]   BP (!) 165/77   Pulse 71   Resp 18   Temp 98.6 °F (37 °C)   Temp src Temporal   SpO2 96 %   O2 Device None (Room air)       Current Vitals:   Vital Signs  BP: (!) 171/66  Pulse: 57  Resp: 16  Temp: 98.6 °F (37 °C)  Temp src: Temporal    Oxygen Therapy  SpO2: 97 %  O2 Device: None (Room air)        Physical Exam  General: Awake and alert, in no acute distress.  Abdomen: Soft, minimal suprapubic tenderness, nondistended.  : Jay catheter in place with clear yellow urine in the Jay bag, urine leaking around the Jay at the meatus.  Extremities: No CCE.  Skin: Warm and dry.    ED Course     Labs Reviewed   URINALYSIS, ROUTINE - Abnormal; Notable for the following components:       Result Value    Clarity Urine Ex.Turbid (*)     Glucose Urine 70 (*)     Blood Urine 2+ (*)     pH Urine 8.5 (*)     Protein Urine 100 (*)     Nitrite Urine 2+ (*)     Leukocyte Esterase Urine 500 (*)     WBC Urine 21-50 (*)     RBC Urine >10 (*)     Triple PO4 Crystals Many (*)     All other components within normal limits   URINE CULTURE, ROUTINE             A UA was sent and the patient's urine appeared very cloudy and had a foul odor.  Nursing staff irrigated the catheter and had good return of the irrigation fluid.  After the catheter was irrigated it was placed to gravity and did not appear to be leaking.  There was urine coming through into the Jay bag.       MDM      Patient presents with dysuria and leaking Jay catheter.  Differential diagnosis includes but is not limited to catheter malfunction and urinary tract infection.  The patient does appear to have a urinary tract infection.  He will be started on antibiotics.  He was given C. difficile prophylaxis additionally as he recently had a C. difficile infection.  The catheter was irrigated and repositioned.  It appears to be working and I think his symptoms are largely due to the infection.  He was counseled regarding symptoms to watch for and when to return to the emergency department.  He should schedule follow-up with his urologist.        Medical Decision Making      Disposition and Plan     Clinical Impression:  1. Urinary tract infection associated with indwelling urethral catheter, initial encounter ([facility])         Disposition:  Discharge  1/10/2025  1:30 pm    Follow-up:  Ryan Yeung MD  [id number]   [place] [address]  [phone]    Call today            Medications Prescribed:  Discharge Medication List as of 1/10/2025  1:56 PM        START taking these medications    Details   ciprofloxacin 250 MG Oral Tab Take 1 tablet (250 mg total) by mouth 2 (two) times daily for 7 days., Normal, Disp-14 tablet, R-0                 Supplementary Documentation:

## 2025-01-10 NOTE — ED INITIAL ASSESSMENT (HPI)
Patient arrives after having catheter placed yesterday and states that it is not draining. Per report patient feels like he has to pee but can't. Patient's wife states she is afraid it was placed incorrectly and wants it to be assessed.

## 2025-01-10 NOTE — ED QUICK NOTES
Catheter irrigated with 50ml of NS and over 50ml returned in aaron bag. Pt is c/o pain. UA was sent from urine in bag prior to irrigation. Will bladder scan pt and discuss with MD.

## 2025-01-12 ENCOUNTER — HOSPITAL ENCOUNTER (OUTPATIENT)
Facility: HOSPITAL | Age: OVER 89
Setting detail: OBSERVATION
Discharge: HOME | End: 2025-01-13
Attending: EMERGENCY MEDICINE | Admitting: HOSPITALIST
Payer: COMMERCIAL

## 2025-01-12 DIAGNOSIS — A49.8 PSEUDOMONAS AERUGINOSA INFECTION: ICD-10-CM

## 2025-01-12 DIAGNOSIS — T83.511A URINARY TRACT INFECTION ASSOCIATED WITH INDWELLING URETHRAL CATHETER, INITIAL ENCOUNTER (HCC): Primary | ICD-10-CM

## 2025-01-12 DIAGNOSIS — N39.0 URINARY TRACT INFECTION ASSOCIATED WITH INDWELLING URETHRAL CATHETER, INITIAL ENCOUNTER (HCC): Primary | ICD-10-CM

## 2025-01-12 PROBLEM — I10 BENIGN ESSENTIAL HTN: Status: ACTIVE | Noted: 2024-08-12

## 2025-01-12 PROBLEM — T83.9XXA FOLEY CATHETER PROBLEM: Status: ACTIVE | Noted: 2025-01-12

## 2025-01-12 PROBLEM — T83.9XXA FOLEY CATHETER PROBLEM (HCC): Status: ACTIVE | Noted: 2025-01-12

## 2025-01-12 LAB
ALBUMIN SERPL-MCNC: 3.6 G/DL (ref 3.2–4.8)
ALBUMIN/GLOB SERPL: 1.1 {RATIO} (ref 1–2)
ALP LIVER SERPL-CCNC: 123 U/L
ALT SERPL-CCNC: 24 U/L
ANION GAP SERPL CALC-SCNC: 9 MMOL/L (ref 0–18)
AST SERPL-CCNC: 23 U/L (ref ?–34)
BASOPHILS # BLD AUTO: 0.05 X10(3) UL (ref 0–0.2)
BASOPHILS NFR BLD AUTO: 0.6 %
BILIRUB SERPL-MCNC: 0.5 MG/DL (ref 0.2–0.9)
BILIRUB UR QL STRIP.AUTO: NEGATIVE
BUN BLD-MCNC: 15 MG/DL (ref 9–23)
CALCIUM BLD-MCNC: 9.1 MG/DL (ref 8.7–10.4)
CHLORIDE SERPL-SCNC: 112 MMOL/L (ref 98–112)
CLARITY UR REFRACT.AUTO: CLEAR
CO2 SERPL-SCNC: 21 MMOL/L (ref 21–32)
COLOR UR AUTO: YELLOW
CREAT BLD-MCNC: 0.89 MG/DL
EGFRCR SERPLBLD CKD-EPI 2021: 79 ML/MIN/1.73M2 (ref 60–?)
EOSINOPHIL # BLD AUTO: 0.06 X10(3) UL (ref 0–0.7)
EOSINOPHIL NFR BLD AUTO: 0.7 %
ERYTHROCYTE [DISTWIDTH] IN BLOOD BY AUTOMATED COUNT: 14.4 %
EST. AVERAGE GLUCOSE BLD GHB EST-MCNC: 108 MG/DL (ref 68–126)
GLOBULIN PLAS-MCNC: 3.3 G/DL (ref 2–3.5)
GLUCOSE BLD-MCNC: 181 MG/DL (ref 70–99)
GLUCOSE UR STRIP.AUTO-MCNC: NEGATIVE MG/DL
HBA1C MFR BLD: 5.4 % (ref ?–5.7)
HCT VFR BLD AUTO: 36.7 %
HGB BLD-MCNC: 12.6 G/DL
IMM GRANULOCYTES # BLD AUTO: 0.02 X10(3) UL (ref 0–1)
IMM GRANULOCYTES NFR BLD: 0.2 %
KETONES UR STRIP.AUTO-MCNC: NEGATIVE MG/DL
LACTATE SERPL-SCNC: 2 MMOL/L (ref 0.5–2)
LYMPHOCYTES # BLD AUTO: 2.61 X10(3) UL (ref 1–4)
LYMPHOCYTES NFR BLD AUTO: 31.6 %
MCH RBC QN AUTO: 29.4 PG (ref 26–34)
MCHC RBC AUTO-ENTMCNC: 34.3 G/DL (ref 31–37)
MCV RBC AUTO: 85.7 FL
MONOCYTES # BLD AUTO: 0.45 X10(3) UL (ref 0.1–1)
MONOCYTES NFR BLD AUTO: 5.4 %
NEUTROPHILS # BLD AUTO: 5.07 X10 (3) UL (ref 1.5–7.7)
NEUTROPHILS # BLD AUTO: 5.07 X10(3) UL (ref 1.5–7.7)
NEUTROPHILS NFR BLD AUTO: 61.5 %
NITRITE UR QL STRIP.AUTO: NEGATIVE
OSMOLALITY SERPL CALC.SUM OF ELEC: 299 MOSM/KG (ref 275–295)
PH UR STRIP.AUTO: 6.5 [PH] (ref 5–8)
PLATELET # BLD AUTO: 233 10(3)UL (ref 150–450)
POTASSIUM SERPL-SCNC: 4 MMOL/L (ref 3.5–5.1)
PROT SERPL-MCNC: 6.9 G/DL (ref 5.7–8.2)
RBC # BLD AUTO: 4.28 X10(6)UL
RBC #/AREA URNS AUTO: >10 /HPF
SODIUM SERPL-SCNC: 142 MMOL/L (ref 136–145)
SP GR UR STRIP.AUTO: 1.02 (ref 1–1.03)
UROBILINOGEN UR STRIP.AUTO-MCNC: 0.2 MG/DL
WBC # BLD AUTO: 8.3 X10(3) UL (ref 4–11)

## 2025-01-12 PROCEDURE — 99223 1ST HOSP IP/OBS HIGH 75: CPT | Performed by: HOSPITALIST

## 2025-01-12 RX ORDER — SUCRALFATE 1 G/1
1 TABLET ORAL
Status: DISCONTINUED | OUTPATIENT
Start: 2025-01-12 | End: 2025-01-13

## 2025-01-12 RX ORDER — AMLODIPINE BESYLATE 2.5 MG/1
2.5 TABLET ORAL EVERY 6 HOURS PRN
Status: DISCONTINUED | OUTPATIENT
Start: 2025-01-12 | End: 2025-01-13

## 2025-01-12 RX ORDER — BISACODYL 10 MG
10 SUPPOSITORY, RECTAL RECTAL
Status: DISCONTINUED | OUTPATIENT
Start: 2025-01-12 | End: 2025-01-13

## 2025-01-12 RX ORDER — ONDANSETRON 2 MG/ML
4 INJECTION INTRAMUSCULAR; INTRAVENOUS EVERY 6 HOURS PRN
Status: DISCONTINUED | OUTPATIENT
Start: 2025-01-12 | End: 2025-01-13

## 2025-01-12 RX ORDER — CIPROFLOXACIN 2 MG/ML
400 INJECTION, SOLUTION INTRAVENOUS EVERY 12 HOURS
Status: DISCONTINUED | OUTPATIENT
Start: 2025-01-12 | End: 2025-01-12

## 2025-01-12 RX ORDER — ASPIRIN 81 MG/1
81 TABLET, CHEWABLE ORAL DAILY
Status: DISCONTINUED | OUTPATIENT
Start: 2025-01-12 | End: 2025-01-13

## 2025-01-12 RX ORDER — ESCITALOPRAM OXALATE 20 MG/1
20 TABLET ORAL NIGHTLY
Status: DISCONTINUED | OUTPATIENT
Start: 2025-01-12 | End: 2025-01-13

## 2025-01-12 RX ORDER — GABAPENTIN 100 MG/1
100 CAPSULE ORAL 3 TIMES DAILY
COMMUNITY

## 2025-01-12 RX ORDER — SENNOSIDES 8.6 MG
17.2 TABLET ORAL NIGHTLY PRN
Status: DISCONTINUED | OUTPATIENT
Start: 2025-01-12 | End: 2025-01-13

## 2025-01-12 RX ORDER — ONDANSETRON 2 MG/ML
4 INJECTION INTRAMUSCULAR; INTRAVENOUS EVERY 4 HOURS PRN
Status: DISCONTINUED | OUTPATIENT
Start: 2025-01-12 | End: 2025-01-12

## 2025-01-12 RX ORDER — CIPROFLOXACIN 500 MG/1
500 TABLET, FILM COATED ORAL 2 TIMES DAILY
Qty: 10 TABLET | Refills: 0 | Status: SHIPPED | OUTPATIENT
Start: 2025-01-12 | End: 2025-01-12

## 2025-01-12 RX ORDER — CIPROFLOXACIN 500 MG/1
500 TABLET, FILM COATED ORAL 2 TIMES DAILY
Qty: 14 TABLET | Refills: 0 | Status: SHIPPED | OUTPATIENT
Start: 2025-01-12 | End: 2025-01-12

## 2025-01-12 RX ORDER — NIACIN 100 MG
500 TABLET ORAL
Status: DISCONTINUED | OUTPATIENT
Start: 2025-01-13 | End: 2025-01-13

## 2025-01-12 RX ORDER — VANCOMYCIN HYDROCHLORIDE 125 MG/1
125 CAPSULE ORAL EVERY EVENING
Status: DISCONTINUED | OUTPATIENT
Start: 2025-01-12 | End: 2025-01-13

## 2025-01-12 RX ORDER — CIPROFLOXACIN 2 MG/ML
400 INJECTION, SOLUTION INTRAVENOUS EVERY 8 HOURS
Status: DISCONTINUED | OUTPATIENT
Start: 2025-01-12 | End: 2025-01-12

## 2025-01-12 RX ORDER — TRAZODONE HYDROCHLORIDE 50 MG/1
50 TABLET, FILM COATED ORAL NIGHTLY
Status: DISCONTINUED | OUTPATIENT
Start: 2025-01-12 | End: 2025-01-12

## 2025-01-12 RX ORDER — CLOPIDOGREL BISULFATE 75 MG/1
75 TABLET ORAL DAILY
Status: DISCONTINUED | OUTPATIENT
Start: 2025-01-12 | End: 2025-01-13

## 2025-01-12 RX ORDER — ZINC SULFATE 50(220)MG
220 CAPSULE ORAL DAILY
Status: DISCONTINUED | OUTPATIENT
Start: 2025-01-12 | End: 2025-01-13

## 2025-01-12 RX ORDER — ECHINACEA PURPUREA EXTRACT 125 MG
1 TABLET ORAL
Status: DISCONTINUED | OUTPATIENT
Start: 2025-01-12 | End: 2025-01-13

## 2025-01-12 RX ORDER — SODIUM PHOSPHATE, DIBASIC AND SODIUM PHOSPHATE, MONOBASIC 7; 19 G/230ML; G/230ML
1 ENEMA RECTAL ONCE AS NEEDED
Status: DISCONTINUED | OUTPATIENT
Start: 2025-01-12 | End: 2025-01-13

## 2025-01-12 RX ORDER — ACETAMINOPHEN 500 MG
1000 TABLET ORAL EVERY 4 HOURS PRN
Status: DISCONTINUED | OUTPATIENT
Start: 2025-01-12 | End: 2025-01-13

## 2025-01-12 RX ORDER — GABAPENTIN 100 MG/1
100 CAPSULE ORAL 3 TIMES DAILY
Status: DISCONTINUED | OUTPATIENT
Start: 2025-01-12 | End: 2025-01-13

## 2025-01-12 RX ORDER — MIRTAZAPINE 15 MG/1
15 TABLET, FILM COATED ORAL NIGHTLY
Status: DISCONTINUED | OUTPATIENT
Start: 2025-01-12 | End: 2025-01-13

## 2025-01-12 RX ORDER — ATORVASTATIN CALCIUM 80 MG/1
80 TABLET, FILM COATED ORAL DAILY
Status: DISCONTINUED | OUTPATIENT
Start: 2025-01-12 | End: 2025-01-13

## 2025-01-12 RX ORDER — SODIUM CHLORIDE 9 MG/ML
INJECTION, SOLUTION INTRAVENOUS CONTINUOUS
Status: ACTIVE | OUTPATIENT
Start: 2025-01-12 | End: 2025-01-13

## 2025-01-12 RX ORDER — HYDROMORPHONE HYDROCHLORIDE 1 MG/ML
0.5 INJECTION, SOLUTION INTRAMUSCULAR; INTRAVENOUS; SUBCUTANEOUS EVERY 30 MIN PRN
Status: DISCONTINUED | OUTPATIENT
Start: 2025-01-12 | End: 2025-01-13

## 2025-01-12 RX ORDER — METOCLOPRAMIDE HYDROCHLORIDE 5 MG/ML
5 INJECTION INTRAMUSCULAR; INTRAVENOUS EVERY 8 HOURS PRN
Status: DISCONTINUED | OUTPATIENT
Start: 2025-01-12 | End: 2025-01-13

## 2025-01-12 RX ORDER — ENOXAPARIN SODIUM 100 MG/ML
40 INJECTION SUBCUTANEOUS DAILY
Status: DISCONTINUED | OUTPATIENT
Start: 2025-01-12 | End: 2025-01-13

## 2025-01-12 RX ORDER — POLYETHYLENE GLYCOL 3350 17 G/17G
17 POWDER, FOR SOLUTION ORAL DAILY PRN
Status: DISCONTINUED | OUTPATIENT
Start: 2025-01-12 | End: 2025-01-13

## 2025-01-12 RX ORDER — LISINOPRIL 10 MG/1
20 TABLET ORAL DAILY
Status: DISCONTINUED | OUTPATIENT
Start: 2025-01-12 | End: 2025-01-13

## 2025-01-12 NOTE — PROGRESS NOTES
NURSING ADMISSION NOTE      Patient admitted via Cart  Oriented to room.  Safety precautions initiated.  Bed in low position.  Call light in reach.    Assumed care at 1155  A/O x 2. Oriented to person and place. Disoriented to time and situation. Mostly mandarin speaking. Wife translating at bedside.   Pt irritable, demanding and yelling. Wife states this is his behavior at home as well.  RA  BP elevated - Dr. Vásquez aware  Admission navigator complete by this RN  Jay with leg bag draining yellow urine. Continent of stool.  Up SBA - PT on consult for reported frequent falls at home. Bed alarm and chair alarm in place.  Regular diet, patient prefers soft & warm foods.  [place] - no hearing aids  Missing teeth  Noted to have scattered bruising to BUE  Lovenox for VTE  Non-cardiac EP  On PO vanco for c. Diff prophylaxis  0.9 @ 75 ml/hr  Takes pills whole with water  All needs met. Pt and pts wife at bedside updated. Will continue with plan of care.

## 2025-01-12 NOTE — ED INITIAL ASSESSMENT (HPI)
Had aaron placed a couple days ago and now uncomfortable, \"can't get use to it\" per wife. Aaron is draining

## 2025-01-12 NOTE — H&P
[facility]     Deven Atkinson Patient Status:  Observation    1930 MRN [id number]   Location [facility] 3NE-A Attending Tigre Vásquez MD   Hosp Day # 0 PCP Paulo James MD     Chief Complaint:   Chief Complaint   Patient presents with    Cath Tube Problem       Subjective:    History of Present Illness:     Deven Atkinson is a 89 year old male with a past medical history of BPH, chronic Jay, hypertension, CVA, hyperlipidemia.  He recently had C. difficile colitis and completed an extended regimen of tapering oral vancomycin.  He saw his urologist and had his Jay catheter exchanged on .  He came to the emergency room on 1/10 as his Jay catheter seems to be nonfunctioning well.  The patient had urge sensation and there was urine leaking around the catheter.  His catheter was irrigated and repositioned with improvement.  Urinalysis was checked and abnormal.  Cultures returned positive for Pseudomonas and he was discharged on ciprofloxacin.   On previous ER visit his Jay was changed from a leg bag to a gravity bag.  He continues to have some discomfort and subsequent brought to the emergency room.  In the emergency room here the patient was fatigued and unable to ambulate.  Urinalysis remained abnormal and he was started on IV antibiotics.    History/Other:    Past Medical History:  Past Medical History:    Essential hypertension    Hearing impaired person, bilateral    Hyperlipidemia    Stroke (HCC)     Past Surgical History:   Past Surgical History:   Procedure Laterality Date    Surgical stent      wife states Lft side of heart- [facility]      Family History:   History reviewed. No pertinent family history.  Social History:    reports that he has quit smoking. His smoking use included cigarettes. He has never used smokeless tobacco. He reports that he does not currently use alcohol. He reports that he does not use drugs.     Allergies:  Allergies[1]    Medications:  Medications Ordered Prior to Encounter[2]    Review of Systems:   A comprehensive review of systems was completed.    Pertinent positives and negatives noted in the HPI.    Objective:   Physical Exam:    BP (!) 179/69 (BP Location: Left arm)   Pulse 56   Temp 99.2 °F (37.3 °C) (Oral)   Resp 18   Wt 168 lb 12.8 oz (76.6 kg)   SpO2 100%   BMI 26.44 kg/m²   General: No acute distress, Alert  Respiratory: No rhonchi, no wheezes  Cardiovascular: S1, S2.   Abdomen: Soft, Non-tender, Non-distended, Positive bowel sounds  Neuro: No new focal deficits  Extremities: No edema      Results:    Labs:      Labs Last 24 Hours:  Recent Labs   Lab 01/12/25  0802   WBC 8.3   HGB 12.6*   MCV 85.7   .0       Recent Labs   Lab 01/12/25  0802   *   BUN 15   CREATSERUM 0.89   CA 9.1   ALB 3.6      K 4.0      CO2 21.0   ALKPHO 123*   AST 23   ALT 24   BILT 0.5   TP 6.9       Estimated Glomerular Filtration Rate: 79.4 mL/min/1.73m2 (by CKD-EPI based on SCr of 0.89 mg/dL).    No results for input(s): \"TROP\", \"TROPHS\", \"CK\" in the last 168 hours.    No results for input(s): \"PTP\", \"INR\" in the last 168 hours.    No results for input(s): \"TROP\", \"CK\" in the last 168 hours.      Imaging: Imaging data reviewed in Epic.    Assessment & Plan:      #Abnl Urine  Recent UCX: PSAR and providencia rettgeri  Suspect colonization w/o true infection  U/A x 2 without bacteria seen  Monitor off ABX    #Recent C. Diff  Received ABX recently  Will cont. PPX PO vanco x 3 days    #Hyperglycemia  Check A1c    #HTN  Enalapril -> lisinopril    # BPH with chronic aaron  Sees  as OP    #Cerebrovascular disease w/ hx of CVA  DAPT/statin      All diagnosis' and recommendations discussed with patient and/or family in detail.      Plan of care discussed with ED physician      Tigre Vásquez MD    Supplementary Documentation:     The 21st Century Cures Act makes medical notes like these available to patients  in the interest of transparency. Please be advised this is a medical document. Medical documents are intended to carry relevant information, facts as evident, and the clinical opinion of the practitioner. The medical note is intended as peer to peer communication and may appear blunt or direct. It is written in medical language and may contain abbreviations or verbiage that are unfamiliar.                                         [1]   Allergies  Allergen Reactions    Losartan NAUSEA AND VOMITING and OTHER (SEE COMMENTS)     Adverse Reaction           (historical)    Adverse Reaction         Adverse Reaction        Adverse Reaction           (historical)    Adverse Reaction      Adverse Reaction       (historical)  Adverse Reaction       Adverse Reaction      Adverse Reaction       (historical)    Adverse Reaction       (historical)  Adverse Reaction       Adverse Reaction      Adverse Reaction       (historical)      Adverse Reaction       (historical)      Adverse Reaction      Adverse Reaction       (historical)      Adverse Reaction  Adverse Reaction       (historical)  Adverse Reaction       Adverse Reaction      Adverse Reaction       (historical)   [2]   No current facility-administered medications on file prior to encounter.     Current Outpatient Medications on File Prior to Encounter   Medication Sig Dispense Refill    gabapentin 100 MG Oral Cap Take 1 capsule (100 mg total) by mouth 3 (three) times daily.      ciprofloxacin 250 MG Oral Tab Take 1 tablet (250 mg total) by mouth 2 (two) times daily for 7 days. 14 tablet 0    vancomycin 125 MG Oral Cap Take 1 capsule (125 mg total) by mouth daily. 12 capsule 0    enalapril 10 MG Oral Tab Take 1 tablet (10 mg total) by mouth 2 (two) times daily.      Polyethylene Glycol 3350 17 g Oral Powd Pack Take 17 g by mouth daily.      traZODone 50 MG Oral Tab Take 1 tablet (50 mg total) by mouth nightly.      zinc sulfate 220 (50 Zn) MG Oral Cap Take 1 capsule (220 mg  total) by mouth daily.      niacin 500 MG Oral Tab Take 1 tablet (500 mg total) by mouth daily with breakfast.      sucralfate 1 g Oral Tab Take 1 tablet (1 g total) by mouth 3 (three) times daily before meals.      aspirin 81 MG Oral Chew Tab Chew 1 tablet (81 mg total) by mouth daily.      atorvastatin 80 MG Oral Tab Take 1 tablet (80 mg total) by mouth daily.      clopidogrel 75 MG Oral Tab Take 1 tablet (75 mg total) by mouth daily.      escitalopram 20 MG Oral Tab Take 1 tablet (20 mg total) by mouth at bedtime.      mirtazapine 15 MG Oral Tab Take 1 tablet (15 mg total) by mouth nightly.      oxymetazoline 0.05 % Nasal Solution 1 spray by Nasal route every 12 (twelve) hours as needed for congestion. (Patient not taking: Reported on 11/29/2024) 15 mL 0    alfuzosin ER 10 MG Oral Tablet 24 Hr Take 1 tablet (10 mg total) by mouth daily. (Patient not taking: Reported on 1/12/2025)      ipratropium 0.06 % Nasal Solution 1 spray by Nasal route in the morning and 1 spray before bedtime. (Patient not taking: Reported on 11/29/2024)      tobramycin-dexamethasone 0.3-0.1 % Ophthalmic Suspension Place 1 drop into both eyes in the morning and 1 drop before bedtime. (Patient not taking: Reported on 1/12/2025)      Dextran 70-Hypromellose (ARTIFICIAL TEARS) 0.1-0.3 % Ophthalmic Solution Apply 1 drop to eye. (Patient taking differently: Apply 1 drop to eye as needed.)      dutasteride 0.5 MG Oral Cap Take 1 capsule (0.5 mg total) by mouth daily. (Patient not taking: Reported on 1/12/2025)      sennosides-docusate 8.6-50 MG Oral Tab Take 1 tablet by mouth daily. (Patient not taking: Reported on 1/12/2025)

## 2025-01-12 NOTE — DISCHARGE INSTRUCTIONS
Continue the vancomycin.  Instead of ciprofloxacin 250 mg twice a day, switch to ciprofloxacin 500 mg twice a day.  Recheck with your primary in the next 1 to 2 days  Please keep a close eye out for any weakness, fever, vomiting, or any other problems and return to the emergency department immediately.

## 2025-01-12 NOTE — ED PROVIDER NOTES
Patient Seen in: [facility] Emergency Department      History     Chief Complaint   Patient presents with    Cath Tube Problem     Stated Complaint: pain from aaron    Subjective:   HPI      89-year-old with history of BPH, history of retention, difficulty voiding, indwelling Aaron, recent UTI and Aaron issues.  Note from urology visit on January 9 mentions postvoid residual in October was 100 cc, Aaron placed December 8, decision for chronic Aaron, possible TURP.  Patient seen in ED 2 days ago with Aaron issues, Aaron was irrigated, placed to gravity bag, seem to be draining well.  Patient and wife return today.  Wife reports they have been taking the Cipro and vancomycin (C. difficile prophylaxis) regularly.  She feels he is reporting pain whenever urine drains.  She is not sure if this is psychological because he is used to leg bag and is now seeing the urine come out of the bag or if he is truly in pain.  Objective:     Past Medical History:    Essential hypertension    Hearing impaired person, bilateral    Hyperlipidemia    Stroke (HCC)              Past Surgical History:   Procedure Laterality Date    Surgical stent      wife states Lft side of heart- [facility]                Social History     Socioeconomic History    Marital status:    Tobacco Use    Smoking status: Former     Types: Cigarettes    Smokeless tobacco: Never   Vaping Use    Vaping status: Never Used   Substance and Sexual Activity    Alcohol use: Not Currently    Drug use: Never     Social Drivers of Health     Financial Resource Strain: Low Risk  (7/12/2024)    Received from [facility]    Financial Resource Strain     In the past year, have you or any family members you live with been unable to get any of the following when it was really needed? Check all that apply.: None   Food Insecurity: No Food Insecurity (1/12/2025)    Food Insecurity     Food Insecurity: Never true   Transportation Needs: No  Transportation Needs (1/12/2025)    Transportation Needs     Lack of Transportation: No   Physical Activity: Medium Risk (12/2/2024)    Received from [facility]    Exercise Vital Sign     On average, how many days per week do you engage in moderate to strenuous exercise (like a brisk walk)?: 5 days     On average, how many minutes do you engage in exercise at this level?: 20 min   Stress: Low Risk  (7/12/2024)    Received from [facility]    Stress     Stress is when someone feels tense, nervous, anxious, or can't sleep at night because their mind is troubled. How stressed are you? : Not at all   Social Connections: Low Risk  (7/12/2024)    Received from [facility]    Social Connections     How often do you see or talk to people that you care about and feel close to? (For example: talking to friends on the phone, visiting friends or family, going to Christian or club meetings): 3 to 5 times a week   Housing Stability: Low Risk  (1/12/2025)    Housing Stability     Housing Instability: No                  Physical Exam     ED Triage Vitals [01/12/25 0631]   /70   Pulse 70   Resp 18   Temp 98.1 °F (36.7 °C)   Temp src Temporal   SpO2 98 %   O2 Device None (Room air)       Current Vitals:   Vital Signs  BP: (!) 179/69  Pulse: 56  Resp: 18  Temp: 99.2 °F (37.3 °C)  Temp src: Oral  MAP (mmHg): 98    Oxygen Therapy  SpO2: 100 %  O2 Device: None (Room air)        Physical Exam  General: Well-developed, well-nourished, alert, talkative with wife who is his   Head and neck: Normocephalic, atraumatic  Cardiovascular: Regular rate and rhythm, normal S1 and S2, no obvious murmurs, rubs, or gallops   Lungs: Clear to auscultation bilaterally with equal breath sounds, no wheezing, no rhonchi   Abdomen: Positive bowel sounds,  soft, no tenderness, no distension, no rebound, no guarding   Extremities: No cyanosis, no clubbing, no edema   Musculoskeletal: No tenderness, swelling,  deformity   Skin: No significant lesions   Neuro: Grossly intact to patient's baseline   : External genitalia within normal limits.    ED Course     Labs Reviewed   URINALYSIS WITH CULTURE REFLEX - Abnormal; Notable for the following components:       Result Value    Blood Urine Moderate (*)     Protein Urine 30 mg/dL (*)     Leukocyte Esterase Urine Large (*)     All other components within normal limits   CBC WITH DIFFERENTIAL WITH PLATELET - Abnormal; Notable for the following components:    HGB 12.6 (*)     HCT 36.7 (*)     All other components within normal limits   COMP METABOLIC PANEL (14) - Abnormal; Notable for the following components:    Glucose 181 (*)     Calculated Osmolality 299 (*)     Alkaline Phosphatase 123 (*)     All other components within normal limits   UA MICROSCOPIC ONLY, URINE - Abnormal; Notable for the following components:    WBC Urine 21-50 (*)     RBC Urine >10 (*)     Ca Oxalate Crystals Few (*)     All other components within normal limits   LACTIC ACID, PLASMA - Normal   BLOOD CULTURE   BLOOD CULTURE   URINE CULTURE, ROUTINE   Differential diagnosis includes UTI, sepsis, bladder spasm, among other processes                MDM      89-year-old with history of BPH, indwelling Jay, hypertension, stroke, hyperlipidemia, previous C. difficile, seen in ED 2 days ago, had a Jay immigrated, urinalysis showed UTI, patient placed on Cipro and vancomycin for C. difficile prophylaxis.  Patient's wife feels that he is very aware of the urine as it exits since he has a gravity bag instead of leg bag.  He had been sent home with a gravity bag since there was a question of the leg bag draining properly or not the last time.  She would like to go back to the leg bag now.  Patient produced urine here, still unclear if he is in pain or not.  At times he yells out in pain, other times says he wants to go home.  Admission disposition: 1/12/2025 10:22 AM       Workup here shows unchanged kidney  function.  No elevated white count.  Lactic acid 2.0.  Urinalysis still shows 21-50 white blood cells.  Urine culture from 2 days ago shows Pseudomonas UTI.  IV Cipro 400 mg given.  Patient was on 250 mg twice daily at home.  Patient is a very poor historian.  We attempted ambulation and he seems very weak, is unable to ambulate.  His wife also seems to be exhausted.  I do not have confidence that they will be able to manage at home.  Patient will be admitted for further IV antibiotics, supportive care.  Case discussed with Dr. Vásquez who will be admitting the patient.    Medical Decision Making      Disposition and Plan     Clinical Impression:  1. Urinary tract infection associated with indwelling urethral catheter, initial encounter ([facility])    2. Pseudomonas aeruginosa infection         Disposition:  Admit  1/12/2025 10:22 am    Follow-up:  Paulo James MD  [address] [place] [address]  [phone]    Follow up            Medications Prescribed:  Current Discharge Medication List              Supplementary Documentation:         Hospital Problems       Present on Admission  Date Reviewed: 11/29/2024            ICD-10-CM Noted POA    * (Principal) Urinary tract infection associated with indwelling urethral catheter, initial encounter ([facility]) T83.511A, N39.0 1/12/2025 Unknown    Pseudomonas aeruginosa infection A49.8 1/12/2025 Unknown

## 2025-01-13 ENCOUNTER — APPOINTMENT (OUTPATIENT)
Dept: GENERAL RADIOLOGY | Facility: HOSPITAL | Age: OVER 89
End: 2025-01-13
Attending: HOSPITALIST
Payer: COMMERCIAL

## 2025-01-13 VITALS
TEMPERATURE: 98 F | WEIGHT: 168.81 LBS | HEART RATE: 53 BPM | OXYGEN SATURATION: 96 % | RESPIRATION RATE: 16 BRPM | DIASTOLIC BLOOD PRESSURE: 65 MMHG | BODY MASS INDEX: 26 KG/M2 | SYSTOLIC BLOOD PRESSURE: 157 MMHG

## 2025-01-13 PROCEDURE — 73590 X-RAY EXAM OF LOWER LEG: CPT | Performed by: HOSPITALIST

## 2025-01-13 PROCEDURE — 99239 HOSP IP/OBS DSCHRG MGMT >30: CPT | Performed by: HOSPITALIST

## 2025-01-13 PROCEDURE — 73552 X-RAY EXAM OF FEMUR 2/>: CPT | Performed by: HOSPITALIST

## 2025-01-13 RX ORDER — DIPHENHYDRAMINE HCL 25 MG
1 CAPSULE ORAL AS NEEDED
Status: SHIPPED | COMMUNITY
Start: 2025-01-13

## 2025-01-13 NOTE — PLAN OF CARE
Assumed patient care @ 07:30.  Alert and oriented x1-2, confused  Room air.  NSR on telemetry.  Safety fall precautions maintained.  Regular diet.  Denies pain.  Cardiac electrolyte protocol.  Hard of hearing, no hearing aid.  Needs attended, call light within his reach.  Spouse at bedside.  Problem: Patient/Family Goals  Goal: Patient/Family Long Term Goal  Description: Patient's Long Term Goal: Discharge to home.    Interventions:  -   - See additional Care Plan goals for specific interventions  Outcome: Progressing  Goal: Patient/Family Short Term Goal  Description: Patient's Short Term Goal: Discharge to home, stable    Interventions:   - monitor vital signs, mental status.  -administer medications as ordered.  - See additional Care Plan goals for specific interventions  Outcome: Progressing

## 2025-01-13 NOTE — PLAN OF CARE
Assumed care at 1930. Primarily Mandarin speaking. A&OX1/2. [place]. RA. NSR/SB on tele. Regular diet, pills whole. Non-cardiac electrolyte protocol. PIV R AC SL. Lovenox/Plavix for VTE. Jay in place. Pt c/o mild HA, declining PRN pain medication. Safety precautions in place, call light within reach. Will continue with plan of care.      Problem: Patient/Family Goals  Goal: Patient/Family Long Term Goal  Description: Patient's Long Term Goal: discharge    Interventions:  - follow plan of care  - See additional Care Plan goals for specific interventions  Outcome: Progressing  Goal: Patient/Family Short Term Goal  Description: Patient's Short Term Goal: medication compliance    Interventions:   - take scheduled medications  - See additional Care Plan goals for specific interventions  Outcome: Progressing

## 2025-01-13 NOTE — PROGRESS NOTES
Significant Event - Fall Note    Date/Time of Fall: January 13, 2025 at 0205    Fall huddle completed: Yes    Description of patient fall:     Patient fell from: Bed     Activity when fall occurred: Ambulating without assistance or assistive devices     Where did fall occur: Patient room     Was the fall assisted: Found on floor/unassisted to floor    Who witnessed the fall: Unwitnessed    Patient narrative of fall: Pt stated, \"I need to use to the bathroom.\"    Staff narrative of fall: Staff PCT answered call light, found pt sitting beside the bed. Bed alarm not going off.     Name of Provider notified of fall: Eddie    Family notification: Family present    Factors contributing to fall:     Physical: Balance impairment, Deconditioned, Hearing impairment, Impaired mobility/transfer, Unsteady gait, and Weakness/fatigue     Psychological: Agitation, Confused, Disoriented, and Impulsive     Environmental: Equipment and Footwear     Medications received in the past 8 hours:   Medication(s) Administered in past 8 Hours from 01/12/2025 2042 to 01/13/2025 0442       None            Was patient identified as high fall risk prior to fall:                                What interventions were in place prior to fall: Assistive devices (wheelchair, commode, walker, gait belt), Bed alarm, Bed in lowest position, Call light within reach, Fall alert wristband, [place] footwear, Patient education tool, Patient/family involved in fall prevention plan, Patient situated close to nursing station, Personal items within reach, Reality orientation, Rounding, and Toileting regimen    Interventions post fall: Assistive devices (wheelchair, commode, walker, gait belt), Bed alarm, Bed in lowest position, Call light within reach, Fall alert wristband, [place] footwear, Patient education tool, Patient/family involved in fall prevention plan, Patient situated close to nursing station, Personal items within reach, Reality orientation,  Rounding, and Toileting regimen    Additional comments: n/a

## 2025-01-15 NOTE — DISCHARGE SUMMARY
[facility]     Deven Atkinson Patient Status:  Observation    1930 MRN [id number]   Location [facility] 3NE-A Attending No att. providers found   Hosp Day # 0 PCP Paulo James MD     Date of Admission:  2025  Date of Discharge:   2025    Discharge Disposition: Home or Self Care    Discharge Diagnosis:    #Abnl Urine  #Dehdyration  #Recent C. Diff  #stress hyperglycemia  #HTN  # BPH with chronic aaron       #Cerebrovasc    History of Present Illness:    Deven Atkinson is a 89 year old male with a past medical history of BPH, chronic Aaron, hypertension, CVA, hyperlipidemia.  He recently had C. difficile colitis and completed an extended regimen of tapering oral vancomycin.  He saw his urologist and had his Aaron catheter exchanged on .  He came to the emergency room on 1/10 as his Aaron catheter seems to be nonfunctioning well.  The patient had urge sensation and there was urine leaking around the catheter.  His catheter was irrigated and repositioned with improvement.  Urinalysis was checked and abnormal.  Cultures returned positive for Pseudomonas and he was discharged on ciprofloxacin.   On previous ER visit his Aaron was changed from a leg bag to a gravity bag.  He continues to have some discomfort and subsequent brought to the emergency room.  In the emergency room here the patient was fatigued and unable to ambulate.  Urinalysis remained abnormal and he was started on IV antibiotics.          Brief Synopsis:    The patient was admitted due to abnormal urinalysis.  He recent urine culture positive.  This is felt to be colonization.  The patient remained medically stable off antibiotics.  He was eventually stable for discharge with plans to follow-up with his primary care doctor and his primary urologist.  He had mild dehydration which resolved with fluids.     All diagnosis' and recommendations discussed with patient and/or family in detail.      Lace+  Score: 82  59-90 High Risk  29-58 Medium Risk  0-28   Low Risk       TCM Follow-Up Recommendation:  LACE > 58: High Risk of readmission after discharge from the hospital.      Discharge Medication List:     Discharge Medications        CHANGE how you take these medications        Instructions Prescription details   Polyethylene Glycol 3350 17 g Pack  Commonly known as: MIRALAX  What changed: Another medication with the same name was removed. Continue taking this medication, and follow the directions you see here.      Take 17 g by mouth daily.   Refills: 0     vancomycin 125 MG Caps  Commonly known as: Vancocin  What changed: Another medication with the same name was removed. Continue taking this medication, and follow the directions you see here.      Take 1 capsule (125 mg total) by mouth daily.   Quantity: 12 capsule  Refills: 0            CONTINUE taking these medications        Instructions Prescription details   Artificial Tears 0.1-0.3 % Soln  Generic drug: Dextran 70-Hypromellose      Apply 1 drop to eye as needed.   Refills: 0     aspirin 81 MG Chew      Chew 1 tablet (81 mg total) by mouth daily.   Refills: 0     atorvastatin 80 MG Tabs  Commonly known as: Lipitor      Take 1 tablet (80 mg total) by mouth daily.   Refills: 0     clopidogrel 75 MG Tabs  Commonly known as: Plavix      Take 1 tablet (75 mg total) by mouth daily.   Refills: 0     enalapril 10 MG Tabs  Commonly known as: Vasotec      Take 1 tablet (10 mg total) by mouth 2 (two) times daily.   Refills: 0     escitalopram 20 MG Tabs  Commonly known as: Lexapro      Take 1 tablet (20 mg total) by mouth at bedtime.   Refills: 0     gabapentin 100 MG Caps  Commonly known as: Neurontin      Take 1 capsule (100 mg total) by mouth 3 (three) times daily.   Refills: 0     mirtazapine 15 MG Tabs  Commonly known as: Remeron      Take 1 tablet (15 mg total) by mouth nightly.   Refills: 0     niacin 500 MG Tabs      Take 1 tablet (500 mg total) by mouth  daily with breakfast.   Refills: 0     sucralfate 1 g Tabs  Commonly known as: Carafate      Take 1 tablet (1 g total) by mouth 3 (three) times daily before meals.   Refills: 0     traZODone 50 MG Tabs  Commonly known as: Desyrel      Take 1 tablet (50 mg total) by mouth nightly.   Refills: 0     zinc sulfate 220 (50 Zn) MG Caps  Commonly known as: Zincate      Take 1 capsule (220 mg total) by mouth daily.   Refills: 0            STOP taking these medications      ciprofloxacin 250 MG Tabs  Commonly known as: Cipro        dicyclomine 20 MG Tabs  Commonly known as: Bentyl        docusate sodium 100 MG Caps  Commonly known as: Colace        dutasteride 0.5 MG Caps  Commonly known as: Avodart        ipratropium 0.06 % Soln  Commonly known as: Atrovent        oxymetazoline 0.05 % Soln  Commonly known as: Nasal Decongestant        sennosides-docusate 8.6-50 MG Tabs  Commonly known as: Pericolace        tobramycin-dexamethasone 0.3-0.1 % Susp  Commonly known as: Tobradex        valACYclovir 1 G Tabs  Commonly known as: Valtrex               ASK your doctor about these medications        Instructions Prescription details   alfuzosin ER 10 MG Tb24  Commonly known as: Uroxatral ER      Take 1 tablet (10 mg total) by mouth daily.   Refills: 0              [place]    Follow-up appointment:   Paulo James MD  [address] [place] [id number]  [phone]    Follow up        Vital signs:       Physical Exam:    General: No acute distress   Lungs: clear to auscultation  Cardiovascular: S1, S2  Abdomen: Soft      -----------------------------------------------------------------------------------------------  PATIENT DISCHARGE INSTRUCTIONS: See electronic chart    Tigre Vásquez MD    Total minutes spent on discharge plannin      The  [employer] Cures Act makes medical notes like these available to patients in the interest of transparency. Please be advised this is a medical document.  Medical documents are intended to carry relevant information, facts as evident, and the clinical opinion of the practitioner. The medical note is intended as peer to peer communication and may appear blunt or direct. It is written in medical language and may contain abbreviations or verbiage that are unfamiliar.

## 2025-01-16 ENCOUNTER — HOSPITAL ENCOUNTER (OUTPATIENT)
Age: OVER 89
Discharge: HOME | End: 2025-01-16
Payer: COMMERCIAL

## 2025-01-16 VITALS
DIASTOLIC BLOOD PRESSURE: 65 MMHG | RESPIRATION RATE: 20 BRPM | TEMPERATURE: 98 F | SYSTOLIC BLOOD PRESSURE: 174 MMHG | OXYGEN SATURATION: 98 % | HEART RATE: 72 BPM

## 2025-01-16 DIAGNOSIS — Z97.8 COMPLAINT ABOUT URINARY CATHETER: Primary | ICD-10-CM

## 2025-01-16 DIAGNOSIS — R39.9 COMPLAINT ABOUT URINARY CATHETER: Primary | ICD-10-CM

## 2025-01-16 NOTE — ED PROVIDER NOTES
Patient Seen in: [facility]      History     Chief Complaint   Patient presents with    Other     Stated Complaint: catheter dripping    Subjective:   89-year-old male presents to immediate care for catheter complaints.  Wife at bedside translating.  Leg bag has been leaking from the exit ports.  No difficulty with the catheter insertion      Objective:     Past Medical History:    Essential hypertension    Hearing impaired person, bilateral    Hyperlipidemia    Stroke (HCC)              Past Surgical History:   Procedure Laterality Date    Surgical stent      wife states Lft side of heart- [facility]                Social History     Socioeconomic History    Marital status:    Tobacco Use    Smoking status: Former     Types: Cigarettes     Passive exposure: Never    Smokeless tobacco: Never   Vaping Use    Vaping status: Never Used   Substance and Sexual Activity    Alcohol use: Not Currently    Drug use: Never     Social Drivers of Health     Financial Resource Strain: Low Risk  (1/15/2025)    Received from [facility]    Financial Resource Strain     In the past year, have you or any family members you live with been unable to get any of the following when it was really needed? Check all that apply.: None   Food Insecurity: Low Risk  (1/15/2025)    Received from [facility]    Food Insecurity     Within the past 12 months, you worried that your food would run out before you got money to buy more.  : Never true     Within the past 12 months, the food you bought just didn't last and you didn't have money to get more. : Never true   Transportation Needs: Not At Risk (1/15/2025)    Received from [facility]    Transportation Needs     In the past 12 months, has lack of reliable transportation kept you from medical appointments, meetings, work or from getting things needed for daily living? : No   Physical Activity: Medium Risk (1/15/2025)     Received from [facility]    Exercise Vital Sign     On average, how many days per week do you engage in moderate to strenuous exercise (like a brisk walk)?: 7 days     On average, how many minutes do you engage in exercise at this level?: 20 min   Stress: Low Risk  (1/15/2025)    Received from [facility]    Stress     Stress is when someone feels tense, nervous, anxious, or can't sleep at night because their mind is troubled. How stressed are you? : Not at all   Social Connections: High Risk (1/15/2025)    Received from [facility]    Social Connections     How often do you see or talk to people that you care about and feel close to? (For example: talking to friends on the phone, visiting friends or family, going to Congregation or club meetings): Less than once a week   Housing Stability: Low Risk  (1/12/2025)    Housing Stability     Housing Instability: No              Review of Systems   Constitutional: Negative.    Respiratory: Negative.     Cardiovascular: Negative.    Gastrointestinal: Negative.    Skin: Negative.    Neurological: Negative.        Positive for stated complaint: catheter dripping  Other systems are as noted in HPI.  Constitutional and vital signs reviewed.      All other systems reviewed and negative except as noted above.    Physical Exam     ED Triage Vitals [01/16/25 1104]   BP (!) 174/65   Pulse 72   Resp 20   Temp 98.3 °F (36.8 °C)   Temp src Oral   SpO2 98 %   O2 Device None (Room air)       Current Vitals:   Vital Signs  BP: (!) 174/65  Pulse: 72  Resp: 20  Temp: 98.3 °F (36.8 °C)  Temp src: Oral    Oxygen Therapy  SpO2: 98 %  O2 Device: None (Room air)        Physical Exam  Vitals and nursing note reviewed.   Constitutional:       General: He is not in acute distress.  HENT:      Head: Normocephalic.   Cardiovascular:      Rate and Rhythm: Normal rate.   Pulmonary:      Effort: Pulmonary effort is normal.   Musculoskeletal:         General: Normal range of  motion.   Skin:     General: Skin is warm and dry.   Neurological:      General: No focal deficit present.      Mental Status: He is alert and oriented to person, place, and time.          ED Course   Labs Reviewed - No data to display         MDM              Medical Decision Making  Pertinent Labs & Imaging studies reviewed. (See chart for details).  Patient coming in with leaking catheter bag.   Differential diagnosis includes catheter insertion disruption, catheter bag dysfunction  Leg bag changed, no additional complaints  Patient is comfortable with plan of care.     Overall Pt looks good. Non-toxic, well-hydrated and in no respiratory distress. Vital signs are reassuring. Exam is reassuring. I do not believe pt requires and additional diagnostic studies or intervention. I believe pt can be discharged home to continue evaluation as an outpatient. Follow-up provider given. Discharge instructions given and reviewed. Return for any problems. All understand and agrees with the plan.        Problems Addressed:  Complaint about urinary catheter: acute illness or injury        Disposition and Plan     Clinical Impression:  1. Complaint about urinary catheter         Disposition:  Discharge  1/16/2025 11:21 am    Follow-up:  Paulo James MD  [address] [place] [id number]  [phone]                Medications Prescribed:  Discharge Medication List as of 1/16/2025 11:28 AM              Supplementary Documentation:

## 2025-01-23 ENCOUNTER — HOSPITAL ENCOUNTER (EMERGENCY)
Facility: HOSPITAL | Age: OVER 89
Discharge: HOME | End: 2025-01-23
Attending: EMERGENCY MEDICINE
Payer: COMMERCIAL

## 2025-01-23 VITALS
BODY MASS INDEX: 26.51 KG/M2 | WEIGHT: 168.88 LBS | RESPIRATION RATE: 16 BRPM | HEIGHT: 67 IN | DIASTOLIC BLOOD PRESSURE: 67 MMHG | TEMPERATURE: 98 F | SYSTOLIC BLOOD PRESSURE: 171 MMHG | OXYGEN SATURATION: 98 % | HEART RATE: 61 BPM

## 2025-01-23 DIAGNOSIS — T83.9XXA PROBLEM WITH FOLEY CATHETER, INITIAL ENCOUNTER: Primary | ICD-10-CM

## 2025-01-23 PROCEDURE — 99282 EMERGENCY DEPT VISIT SF MDM: CPT

## 2025-01-23 PROCEDURE — 99283 EMERGENCY DEPT VISIT LOW MDM: CPT

## 2025-01-23 NOTE — ED PROVIDER NOTES
Patient Seen in: [facility] Emergency Department      History     Chief Complaint   Patient presents with    Cath Tube Problem     Stated Complaint: leaking around aaron    Subjective:   HPI      Patient is a 89-year-old male with history indwelling Aaron catheter for history of BPH.  Aaron catheter is chronic.  Wife is primary historian, she notes that the catheter is leaking this morning no other associate symptoms or complaints..    Objective:     Past Medical History:    Essential hypertension    Hearing impaired person, bilateral    Hyperlipidemia    Stroke (HCC)              Past Surgical History:   Procedure Laterality Date    Surgical stent      wife states Lft side of heart- [facility]                Social History     Socioeconomic History    Marital status:    Tobacco Use    Smoking status: Former     Types: Cigarettes     Passive exposure: Never    Smokeless tobacco: Never   Vaping Use    Vaping status: Never Used   Substance and Sexual Activity    Alcohol use: Not Currently    Drug use: Never     Social Drivers of Health     Financial Resource Strain: Low Risk  (1/15/2025)    Received from [facility]    Financial Resource Strain     In the past year, have you or any family members you live with been unable to get any of the following when it was really needed? Check all that apply.: None   Food Insecurity: Low Risk  (1/15/2025)    Received from [facility]    Food Insecurity     Within the past 12 months, you worried that your food would run out before you got money to buy more.  : Never true     Within the past 12 months, the food you bought just didn't last and you didn't have money to get more. : Never true   Transportation Needs: Not At Risk (1/15/2025)    Received from [facility]    Transportation Needs     In the past 12 months, has lack of reliable transportation kept you from medical appointments, meetings, work or from getting things  needed for daily living? : No   Physical Activity: Medium Risk (1/15/2025)    Received from [facility]    Exercise Vital Sign     On average, how many days per week do you engage in moderate to strenuous exercise (like a brisk walk)?: 7 days     On average, how many minutes do you engage in exercise at this level?: 20 min   Stress: Low Risk  (1/15/2025)    Received from [facility]    Stress     Stress is when someone feels tense, nervous, anxious, or can't sleep at night because their mind is troubled. How stressed are you? : Not at all   Social Connections: High Risk (1/15/2025)    Received from [facility]    Social Connections     How often do you see or talk to people that you care about and feel close to? (For example: talking to friends on the phone, visiting friends or family, going to Hinduism or club meetings): Less than once a week   Housing Stability: Low Risk  (1/12/2025)    Housing Stability     Housing Instability: No                  Physical Exam     ED Triage Vitals [01/23/25 0439]   BP (!) 180/76   Pulse 67   Resp 16   Temp 98 °F (36.7 °C)   Temp src Oral   SpO2 98 %   O2 Device None (Room air)       Current Vitals:   Vital Signs  BP: (!) 180/76  Pulse: 67  Resp: 16  Temp: 98 °F (36.7 °C)  Temp src: Oral    Oxygen Therapy  SpO2: 98 %  O2 Device: None (Room air)        Physical Exam  Vitals and nursing note reviewed.   Constitutional:       General: He is not in acute distress.     Appearance: He is well-developed. He is not toxic-appearing.   HENT:      Head: Normocephalic and atraumatic.   Eyes:      General: No scleral icterus.     Conjunctiva/sclera: Conjunctivae normal.   Cardiovascular:      Rate and Rhythm: Normal rate.   Pulmonary:      Effort: Pulmonary effort is normal. No respiratory distress.   Abdominal:      General: There is no distension.   Musculoskeletal:         General: No tenderness. Normal range of motion.      Cervical back: Normal range of  motion and neck supple.   Skin:     General: Skin is warm and dry.      Findings: No rash.   Neurological:      Mental Status: He is alert.      Motor: No abnormal muscle tone.   Psychiatric:         Behavior: Behavior normal.            ED Course   Labs Reviewed - No data to display                 MDM              -History source other than patient -wife        -Comorbidities did add complexity to the management are mentioned in the HPI above        -I personally reviewed the prior external notes and the medical record to obtain additional history- I reviewed patient discharge summary from December 12, 2025, patient admitted to abnormal urinalysis culture positive UTI.  [place] to be colonization and remained stable after antibiotics were discontinued-        -DDX: Includes but not limited to -Jay catheter obstruction, malfunction, occlusion, which is a medical condition that can pose a threat to life/function        The Jay catheter was reattached.  And flushed.  Patient tolerated well.  Jay catheter was draining clear radha-colored urine.  Abdomen soft benign.  Bladder scan less than 10 mL of urine.  Patient did not wife comfortable at home at this time discharge home stable condition           Medical Decision Making      Disposition and Plan     Clinical Impression:  1. Problem with Jay catheter, initial encounter ([facility] ED)         Disposition:  Discharge  1/23/2025  5:59 am    Follow-up:  Drew Cole MD  [address]  [address] [place] [id number]  [phone]    Schedule an appointment as soon as possible for a visit            Medications Prescribed:  Current Discharge Medication List              Supplementary Documentation:

## 2025-01-30 ENCOUNTER — HOSPITAL ENCOUNTER (EMERGENCY)
Facility: HOSPITAL | Age: OVER 89
Discharge: HOME | End: 2025-01-30
Attending: EMERGENCY MEDICINE
Payer: COMMERCIAL

## 2025-01-30 VITALS
OXYGEN SATURATION: 93 % | SYSTOLIC BLOOD PRESSURE: 186 MMHG | HEIGHT: 67 IN | WEIGHT: 150 LBS | TEMPERATURE: 98 F | DIASTOLIC BLOOD PRESSURE: 81 MMHG | HEART RATE: 68 BPM | BODY MASS INDEX: 23.54 KG/M2 | RESPIRATION RATE: 18 BRPM

## 2025-01-30 DIAGNOSIS — T83.011A MALFUNCTION OF FOLEY CATHETER, INITIAL ENCOUNTER: Primary | ICD-10-CM

## 2025-01-30 PROCEDURE — 99282 EMERGENCY DEPT VISIT SF MDM: CPT

## 2025-01-30 NOTE — ED INITIAL ASSESSMENT (HPI)
Pt presents to ED for urinary retention. Family states aaron bag was emptied at 2330 last night. Pt woke up this morning with fullness and urge to pee, but bag is empty per family. Reports clear yellow urine. Denies fevers or abd pain

## 2025-01-30 NOTE — ED PROVIDER NOTES
Patient Seen in: [facility] Emergency Department      History     Chief Complaint   Patient presents with    Cath Tube Problem     Stated Complaint: C/O cath tube problem last out/put was 11:30 PM yesterday    Subjective:   HPI      89-year-old male past medical history of hypertension hyperlipidemia CVA urinary retention status post cath tube placement presents ED with complaints of Jay malfunction.  Patient's wife reports that the Jay was emptied at 2330 yesterday and this morning when she checked it was completely empty.  On arrival the leg bag is now full.  Patient denies any symptoms or complaints at this time no back pain no abdominal pain no fever no chills.    Objective:     Past Medical History:    Essential hypertension    Hearing impaired person, bilateral    Hyperlipidemia    Stroke (HCC)              Past Surgical History:   Procedure Laterality Date    Surgical stent      wife states Lft side of heart- [facility] [place] [facility]                Social History     Socioeconomic History    Marital status:    Tobacco Use    Smoking status: Former     Types: Cigarettes     Passive exposure: Never    Smokeless tobacco: Never   Vaping Use    Vaping status: Never Used   Substance and Sexual Activity    Alcohol use: Not Currently    Drug use: Never     Social Drivers of Health     Financial Resource Strain: Low Risk  (1/15/2025)    Received from [facility]    Financial Resource Strain     In the past year, have you or any family members you live with been unable to get any of the following when it was really needed? Check all that apply.: None   Food Insecurity: Low Risk  (1/15/2025)    Received from [facility]    Food Insecurity     Within the past 12 months, you worried that your food would run out before you got money to buy more.  : Never true     Within the past 12 months, the food you bought just didn't last and you didn't have money to get more. : Never true    Transportation Needs: Not At Risk (1/15/2025)    Received from [facility]    Transportation Needs     In the past 12 months, has lack of reliable transportation kept you from medical appointments, meetings, work or from getting things needed for daily living? : No   Physical Activity: Medium Risk (1/15/2025)    Received from [facility]    Exercise Vital Sign     On average, how many days per week do you engage in moderate to strenuous exercise (like a brisk walk)?: 7 days     On average, how many minutes do you engage in exercise at this level?: 20 min   Stress: Low Risk  (1/15/2025)    Received from [facility]    Stress     Stress is when someone feels tense, nervous, anxious, or can't sleep at night because their mind is troubled. How stressed are you? : Not at all   Social Connections: High Risk (1/15/2025)    Received from [facility]    Social Connections     How often do you see or talk to people that you care about and feel close to? (For example: talking to friends on the phone, visiting friends or family, going to Yazidism or club meetings): Less than once a week   Housing Stability: Low Risk  (1/12/2025)    Housing Stability     Housing Instability: No                  Physical Exam     ED Triage Vitals [01/30/25 0416]   BP (!) 186/81   Pulse 68   Resp 18   Temp 98.3 °F (36.8 °C)   Temp src Oral   SpO2 93 %   O2 Device None (Room air)       Current Vitals:   Vital Signs  BP: (!) 186/81  Pulse: 68  Resp: 18  Temp: 98.3 °F (36.8 °C)  Temp src: Oral    Oxygen Therapy  SpO2: 93 %  O2 Device: None (Room air)        Physical Exam  Vitals and nursing note reviewed.   Constitutional:       Appearance: He is well-developed.   HENT:      Head: Normocephalic and atraumatic.   Eyes:      Pupils: Pupils are equal, round, and reactive to light.   Cardiovascular:      Rate and Rhythm: Normal rate and regular rhythm.   Pulmonary:      Effort: Pulmonary effort is normal.       Breath sounds: Normal breath sounds.   Abdominal:      General: Bowel sounds are normal.      Palpations: Abdomen is soft.   Musculoskeletal:         General: No deformity.      Cervical back: Normal range of motion and neck supple.   Skin:     General: Skin is warm and dry.      Capillary Refill: Capillary refill takes less than 2 seconds.   Neurological:      Mental Status: He is alert and oriented to person, place, and time.             ED Course   Labs Reviewed - No data to display                MDM      84-year-old male presents He with Jay catheter malfunction.  Vital signs with mild hypertension arrival patient appears nontoxic examination no complaints on exam.  Jay catheter leg bag with clear urine.  Patient's wife is educated on the Jay irrigation.  No further complaints at this time.  Discussed supportive care close follow-up strict return precautions.        Medical Decision Making      Disposition and Plan     Clinical Impression:  1. Malfunction of Jay catheter, initial encounter         Disposition:  Discharge  1/30/2025  4:51 am    Follow-up:  Ryan Yeung MD  [address]   [address]  [facility] [address]  [phone]    Call  As needed, If symptoms worsen          Medications Prescribed:  Current Discharge Medication List              Supplementary Documentation:

## 2025-01-30 NOTE — DISCHARGE INSTRUCTIONS
Please follow-up with your primary care physician 1-2 days return to the ER if your symptoms worsen progress or if you have any further concerns.  Please have your blood pressure rechecked by your primary care physician within 1 week it was elevated here i 186/81 n the ER on arrival.

## 2025-01-30 NOTE — ED QUICK NOTES
Education provided by this RN on aaron irrigation. Piston syringe kit and urinal sent home with patient for home use. Verbalized understanding by patient and wife

## 2025-02-02 ENCOUNTER — HOSPITAL ENCOUNTER (EMERGENCY)
Facility: HOSPITAL | Age: OVER 89
Discharge: HOME | End: 2025-02-02
Attending: EMERGENCY MEDICINE
Payer: COMMERCIAL

## 2025-02-02 ENCOUNTER — APPOINTMENT (OUTPATIENT)
Dept: GENERAL RADIOLOGY | Facility: HOSPITAL | Age: OVER 89
End: 2025-02-02
Attending: EMERGENCY MEDICINE
Payer: COMMERCIAL

## 2025-02-02 VITALS
HEART RATE: 65 BPM | RESPIRATION RATE: 28 BRPM | WEIGHT: 149.94 LBS | TEMPERATURE: 98 F | SYSTOLIC BLOOD PRESSURE: 170 MMHG | BODY MASS INDEX: 23 KG/M2 | OXYGEN SATURATION: 100 % | DIASTOLIC BLOOD PRESSURE: 69 MMHG

## 2025-02-02 DIAGNOSIS — J32.4 CHRONIC PANSINUSITIS: Primary | ICD-10-CM

## 2025-02-02 DIAGNOSIS — E87.79 OTHER HYPERVOLEMIA: ICD-10-CM

## 2025-02-02 LAB
ALBUMIN SERPL-MCNC: 3.6 G/DL (ref 3.2–4.8)
ALBUMIN/GLOB SERPL: 1.2 {RATIO} (ref 1–2)
ALP LIVER SERPL-CCNC: 102 U/L
ALT SERPL-CCNC: 26 U/L
ANION GAP SERPL CALC-SCNC: 10 MMOL/L (ref 0–18)
AST SERPL-CCNC: 43 U/L (ref ?–34)
BASOPHILS # BLD AUTO: 0.07 X10(3) UL (ref 0–0.2)
BASOPHILS NFR BLD AUTO: 0.8 %
BILIRUB SERPL-MCNC: 0.4 MG/DL (ref 0.2–0.9)
BUN BLD-MCNC: 16 MG/DL (ref 9–23)
CALCIUM BLD-MCNC: 8.6 MG/DL (ref 8.7–10.6)
CHLORIDE SERPL-SCNC: 108 MMOL/L (ref 98–112)
CO2 SERPL-SCNC: 24 MMOL/L (ref 21–32)
CREAT BLD-MCNC: 0.88 MG/DL
EGFRCR SERPLBLD CKD-EPI 2021: 80 ML/MIN/1.73M2 (ref 60–?)
EOSINOPHIL # BLD AUTO: 0.09 X10(3) UL (ref 0–0.7)
EOSINOPHIL NFR BLD AUTO: 1 %
ERYTHROCYTE [DISTWIDTH] IN BLOOD BY AUTOMATED COUNT: 13.4 %
FLUAV + FLUBV RNA SPEC NAA+PROBE: NEGATIVE
FLUAV + FLUBV RNA SPEC NAA+PROBE: NEGATIVE
GLOBULIN PLAS-MCNC: 2.9 G/DL (ref 2–3.5)
GLUCOSE BLD-MCNC: 112 MG/DL (ref 70–99)
HCT VFR BLD AUTO: 36.4 %
HGB BLD-MCNC: 12.4 G/DL
IMM GRANULOCYTES # BLD AUTO: 0.03 X10(3) UL (ref 0–1)
IMM GRANULOCYTES NFR BLD: 0.3 %
LYMPHOCYTES # BLD AUTO: 4.14 X10(3) UL (ref 1–4)
LYMPHOCYTES NFR BLD AUTO: 45.6 %
MCH RBC QN AUTO: 29 PG (ref 26–34)
MCHC RBC AUTO-ENTMCNC: 34.1 G/DL (ref 31–37)
MCV RBC AUTO: 85 FL
MONOCYTES # BLD AUTO: 0.43 X10(3) UL (ref 0.1–1)
MONOCYTES NFR BLD AUTO: 4.7 %
NEUTROPHILS # BLD AUTO: 4.32 X10 (3) UL (ref 1.5–7.7)
NEUTROPHILS # BLD AUTO: 4.32 X10(3) UL (ref 1.5–7.7)
NEUTROPHILS NFR BLD AUTO: 47.6 %
NT-PROBNP SERPL-MCNC: 3473 PG/ML (ref ?–450)
OSMOLALITY SERPL CALC.SUM OF ELEC: 296 MOSM/KG (ref 275–295)
PLATELET # BLD AUTO: 205 10(3)UL (ref 150–450)
PLATELETS.RETICULATED NFR BLD AUTO: 1.1 % (ref 0–7)
POTASSIUM SERPL-SCNC: 4.4 MMOL/L (ref 3.5–5.1)
PROT SERPL-MCNC: 6.5 G/DL (ref 5.7–8.2)
RBC # BLD AUTO: 4.28 X10(6)UL
RSV RNA SPEC NAA+PROBE: NEGATIVE
SARS-COV-2 RNA RESP QL NAA+PROBE: NOT DETECTED
SODIUM SERPL-SCNC: 142 MMOL/L (ref 136–145)
TROPONIN I SERPL HS-MCNC: 25 NG/L
WBC # BLD AUTO: 9.1 X10(3) UL (ref 4–11)

## 2025-02-02 PROCEDURE — 36415 COLL VENOUS BLD VENIPUNCTURE: CPT

## 2025-02-02 PROCEDURE — 99284 EMERGENCY DEPT VISIT MOD MDM: CPT

## 2025-02-02 PROCEDURE — 93010 ELECTROCARDIOGRAM REPORT: CPT

## 2025-02-02 PROCEDURE — 85025 COMPLETE CBC W/AUTO DIFF WBC: CPT | Performed by: EMERGENCY MEDICINE

## 2025-02-02 PROCEDURE — 83880 ASSAY OF NATRIURETIC PEPTIDE: CPT | Performed by: EMERGENCY MEDICINE

## 2025-02-02 PROCEDURE — 0241U SARS-COV-2/FLU A AND B/RSV BY PCR (GENEXPERT): CPT | Performed by: EMERGENCY MEDICINE

## 2025-02-02 PROCEDURE — 84484 ASSAY OF TROPONIN QUANT: CPT | Performed by: EMERGENCY MEDICINE

## 2025-02-02 PROCEDURE — 71045 X-RAY EXAM CHEST 1 VIEW: CPT | Performed by: EMERGENCY MEDICINE

## 2025-02-02 PROCEDURE — 93005 ELECTROCARDIOGRAM TRACING: CPT

## 2025-02-02 PROCEDURE — 80053 COMPREHEN METABOLIC PANEL: CPT | Performed by: EMERGENCY MEDICINE

## 2025-02-02 RX ORDER — FUROSEMIDE 10 MG/ML
20 INJECTION INTRAMUSCULAR; INTRAVENOUS ONCE
Status: DISCONTINUED | OUTPATIENT
Start: 2025-02-02 | End: 2025-02-02

## 2025-02-02 RX ORDER — DOXYCYCLINE 100 MG/1
100 CAPSULE ORAL 2 TIMES DAILY
Qty: 14 CAPSULE | Refills: 0 | Status: SHIPPED | OUTPATIENT
Start: 2025-02-02 | End: 2025-02-13

## 2025-02-02 RX ORDER — FUROSEMIDE 40 MG/1
40 TABLET ORAL ONCE
Status: COMPLETED | OUTPATIENT
Start: 2025-02-02 | End: 2025-02-02

## 2025-02-02 NOTE — ED INITIAL ASSESSMENT (HPI)
Pt presents to ED with C/O difficulty breathing. Family member states he cannot breathe through his nose. Pt is Mandrin speaking only use of phone . Pt c/o of shortness of breath, gasping for air and fever. PT took an unknown medication for fever at home. Pt denies chest pain. Pt states he also has a cough non-productive.

## 2025-02-02 NOTE — ED PROVIDER NOTES
Patient Seen in: [facility] Emergency Department      History     Chief Complaint   Patient presents with    Difficulty Breathing     ANTONIO d/t \"sinus infection for 2 months\" ENT apt in 2/6  97% RA     Stated Complaint:     Subjective:   HPI      Difficulty breathing  Sees ENT - difficuylty breathing generally actually through his nose feels like he has had a sinus infection for many months he has not scheduled for an ENT appointment until the sixth is on 97% on room air his daughter notes that he has been more short of breath when walking recently no fevers no chills past medical history includes but is not limited to history with congestive heart failure with preserved ejection fraction vascular dementia chronic kidney disease chronic pansinusitis for which she follows up with Dr. Mcgarry.        Objective:     Past Medical History:    Essential hypertension    Hearing impaired person, bilateral    Hyperlipidemia    Stroke (HCC)              Past Surgical History:   Procedure Laterality Date    Surgical stent      [facility]                Social History     Socioeconomic History    Marital status:    Tobacco Use    Smoking status: Former     Types: Cigarettes     Passive exposure: Never    Smokeless tobacco: Never   Vaping Use    Vaping status: Never Used   Substance and Sexual Activity    Alcohol use: Not Currently    Drug use: Never     Social Drivers of Health     Financial Resource Strain: Low Risk  (1/15/2025)    Received from [facility]    Financial Resource Strain     In the past year, have you or any family members you live with been unable to get any of the following when it was really needed? Check all that apply.: None   Food Insecurity: Low Risk  (1/15/2025)    Received from [facility]    Food Insecurity     Within the past 12 months, you worried that your food would run out before you got money to buy more.  : Never true      Within the past 12 months, the food you bought just didn't last and you didn't have money to get more. : Never true   Transportation Needs: Not At Risk (1/15/2025)    Received from [facility]    Transportation Needs     In the past 12 months, has lack of reliable transportation kept you from medical appointments, meetings, work or from getting things needed for daily living? : No   Physical Activity: Medium Risk (1/15/2025)    Received from [facility]    Exercise Vital Sign     On average, how many days per week do you engage in moderate to strenuous exercise (like a brisk walk)?: 7 days     On average, how many minutes do you engage in exercise at this level?: 20 min   Stress: Low Risk  (1/15/2025)    Received from [facility]    Stress     Stress is when someone feels tense, nervous, anxious, or can't sleep at night because their mind is troubled. How stressed are you? : Not at all   Social Connections: High Risk (1/15/2025)    Received from [facility]    Social Connections     How often do you see or talk to people that you care about and feel close to? (For example: talking to friends on the phone, visiting friends or family, going to [employer] or club meetings): Less than once a week   Housing Stability: Low Risk  (1/12/2025)    Housing Stability     Housing Instability: No                  Physical Exam     ED Triage Vitals [02/02/25 0944]   BP (!) 183/74   Pulse 75   Resp 16   Temp 98.1 °F (36.7 °C)   Temp src Oral   SpO2 96 %   O2 Device None (Room air)       Current Vitals:   Vital Signs  BP: (!) 170/69  Pulse: 65  Resp: (!) 28  Temp: 98.1 °F (36.7 °C)  Temp src: Oral  MAP (mmHg): 94    Oxygen Therapy  SpO2: 100 %  O2 Device: None (Room air)        Physical Exam  Vitals and nursing note reviewed.       Pleasant elderly Chinese gentleman lying on emergency department bed with his daughter at bedside.  He has injected and swollen nasal Koza as well as posterior  oropharynx that is mildly erythematous with postnasal drip noted.  His lungs are clear to auscultation bilaterally without wheezes rales or rhonchi his abdomen is soft nontender nondistended he is pleasant interactive and speaking full sentences  ED Course     Labs Reviewed   CBC WITH DIFFERENTIAL WITH PLATELET - Abnormal; Notable for the following components:       Result Value    HGB 12.4 (*)     HCT 36.4 (*)     Lymphocyte Absolute 4.14 (*)     All other components within normal limits   COMP METABOLIC PANEL (14) - Abnormal; Notable for the following components:    Glucose 112 (*)     Calcium, Total 8.6 (*)     Calculated Osmolality 296 (*)     AST 43 (*)     All other components within normal limits   PRO BETA NATRIURETIC PEPTIDE - Abnormal; Notable for the following components:    Pro-Beta Natriuretic Peptide 3,473 (*)     All other components within normal limits   TROPONIN I HIGH SENSITIVITY - Normal   SARS-COV-2/FLU A AND B/RSV BY PCR (GENEXPERT) - Normal    Narrative:     This test is intended for the qualitative detection and differentiation of SARS-CoV-2, influenza A, influenza B, and respiratory syncytial virus (RSV) viral RNA in nasopharyngeal or nares swabs from individuals suspected of respiratory viral infection consistent with COVID-19 by their healthcare provider. Signs and symptoms of respiratory viral infection due to SARS-CoV-2, influenza, and RSV can be similar.    Test performed using the Xpert Xpress SARS-CoV-2/FLU/RSV (real time RT-PCR)  assay on the GeneXpert instrument, [employer], [place] [address].   This test is being used under the Food and Drug Administration's Emergency Use Authorization.    The authorized Fact Sheet for Healthcare Providers for this assay is available upon request from the laboratory.   SCAN SLIDE   RAINBOW DRAW LAVENDER   RAINBOW DRAW LIGHT GREEN   RAINBOW DRAW BLUE     EKG    Rate, intervals and axes as noted on EKG Report.  Rate: 66  Rhythm: Sinus Rhythm  Reading:  66 sinus rhythm with LBBB no acute change from previous- reassuring               ED Course as of 02/02/25 2253  ------------------------------------------------------------  Time: 02/02 1332  Value: pro-BETA NATRIURETIC PEPTIDE(!): 3,473  Comment: (Reviewed)     Chest x-ray is independently interpreted by myself show signs of vascular congestion without significant lobar interstitial consolidations to suggest pneumonia pleural effusions or even pneumonitis.     MDM      Patient with a history of chronic difficulty breathing through his nose but also some increased difficulty breathing while walking.  While on the lung sounds are reassuring chest x-ray shows some possibly mild congestion may be some early infiltrates.  This coupled with the patient's chronic pansinusitis I do think represents the need for some doxycycline.  I do not see any signs of pending respiratory failure the patient's oxygen saturations are quite good he is able to ambulate with the help of his daughter.  No signs of acute severe disease at this time do suspect that the combination of some vascular overload with the patient sinusitis making it more short of breath.  He was given 1 dose of 40 mg of Lasix here in the emergency artery and which caused some diuresis and he will have some doxycycline as he is stable for discharge      Medical Decision Making      Disposition and Plan     Clinical Impression:  1. Chronic pansinusitis    2. Other hypervolemia         Disposition:  Discharge  2/2/2025  1:50 pm    Follow-up:  Sarabjit Betancur MD  [address]   [facility] [id number] [phone]    Follow up      Maksim Betancur MD  [address]  [address]  [facility] [id number] [phone]    Follow up            Medications Prescribed:  Discharge Medication List as of 2/2/2025  1:58 PM        START taking these medications    Details   doxycycline 100 MG Oral Cap Take 1 capsule (100 mg total) by mouth 2 (two) times daily for 7 days., Normal, Disp-14  capsule, R-0                 Supplementary Documentation:

## 2025-02-03 LAB
ATRIAL RATE: 66 BPM
P AXIS: 46 DEGREES
P-R INTERVAL: 218 MS
Q-T INTERVAL: 456 MS
QRS DURATION: 132 MS
QTC CALCULATION (BEZET): 478 MS
R AXIS: -42 DEGREES
T AXIS: 129 DEGREES
VENTRICULAR RATE: 66 BPM

## 2025-02-10 ENCOUNTER — HOSPITAL ENCOUNTER (OUTPATIENT)
Dept: GENERAL RADIOLOGY | Facility: HOSPITAL | Age: OVER 89
Discharge: HOME | End: 2025-02-10
Attending: INTERNAL MEDICINE
Payer: COMMERCIAL

## 2025-02-10 ENCOUNTER — LAB ENCOUNTER (OUTPATIENT)
Dept: LAB | Facility: HOSPITAL | Age: OVER 89
End: 2025-02-10
Attending: INTERNAL MEDICINE
Payer: COMMERCIAL

## 2025-02-10 DIAGNOSIS — Z01.818 PRE-OP EVALUATION: ICD-10-CM

## 2025-02-10 DIAGNOSIS — I50.32 CHRONIC DIASTOLIC HEART FAILURE (HCC): ICD-10-CM

## 2025-02-10 DIAGNOSIS — T83.511A URINARY TRACT INFECTION ASSOCIATED WITH INDWELLING URETHRAL CATHETER, INITIAL ENCOUNTER: ICD-10-CM

## 2025-02-10 DIAGNOSIS — N13.8 HYPERTROPHY OF PROSTATE WITH URINARY OBSTRUCTION: ICD-10-CM

## 2025-02-10 DIAGNOSIS — N39.0 URINARY TRACT INFECTION ASSOCIATED WITH INDWELLING URETHRAL CATHETER, INITIAL ENCOUNTER: ICD-10-CM

## 2025-02-10 DIAGNOSIS — Z01.818 PREOP EXAMINATION: Primary | ICD-10-CM

## 2025-02-10 DIAGNOSIS — N40.3 NODULAR PROSTATE WITH URINARY RETENTION: ICD-10-CM

## 2025-02-10 DIAGNOSIS — I10 ESSENTIAL HYPERTENSION, MALIGNANT: ICD-10-CM

## 2025-02-10 DIAGNOSIS — I50.9 HEART FAILURE, UNSPECIFIED HF CHRONICITY, UNSPECIFIED HEART FAILURE TYPE (HCC): ICD-10-CM

## 2025-02-10 DIAGNOSIS — N40.1 HYPERTROPHY OF PROSTATE WITH URINARY OBSTRUCTION: ICD-10-CM

## 2025-02-10 DIAGNOSIS — R33.8 NODULAR PROSTATE WITH URINARY RETENTION: ICD-10-CM

## 2025-02-10 DIAGNOSIS — N18.31 CHRONIC KIDNEY DISEASE (CKD) STAGE G3A/A1, MODERATELY DECREASED GLOMERULAR FILTRATION RATE (GFR) BETWEEN 45-59 ML/MIN/1.73 SQUARE METER AND ALBUMINURIA CREATININE RATIO LESS THAN 30 MG/G (HCC): ICD-10-CM

## 2025-02-10 LAB
ALBUMIN SERPL-MCNC: 3.9 G/DL (ref 3.2–4.8)
ALBUMIN/GLOB SERPL: 1.3 {RATIO} (ref 1–2)
ALP LIVER SERPL-CCNC: 119 U/L
ALT SERPL-CCNC: 18 U/L
ANION GAP SERPL CALC-SCNC: 11 MMOL/L (ref 0–18)
APTT PPP: 28.9 SECONDS (ref 23–36)
AST SERPL-CCNC: 24 U/L (ref ?–34)
ATRIAL RATE: 71 BPM
BASOPHILS # BLD AUTO: 0.07 X10(3) UL (ref 0–0.2)
BASOPHILS NFR BLD AUTO: 0.7 %
BILIRUB SERPL-MCNC: 0.6 MG/DL (ref 0.2–0.9)
BUN BLD-MCNC: 17 MG/DL (ref 9–23)
CALCIUM BLD-MCNC: 9.1 MG/DL (ref 8.7–10.6)
CHLORIDE SERPL-SCNC: 106 MMOL/L (ref 98–112)
CO2 SERPL-SCNC: 22 MMOL/L (ref 21–32)
CREAT BLD-MCNC: 0.92 MG/DL
EGFRCR SERPLBLD CKD-EPI 2021: 77 ML/MIN/1.73M2 (ref 60–?)
EOSINOPHIL # BLD AUTO: 0.07 X10(3) UL (ref 0–0.7)
EOSINOPHIL NFR BLD AUTO: 0.7 %
ERYTHROCYTE [DISTWIDTH] IN BLOOD BY AUTOMATED COUNT: 13.3 %
FASTING STATUS PATIENT QL REPORTED: NO
GLOBULIN PLAS-MCNC: 3 G/DL (ref 2–3.5)
GLUCOSE BLD-MCNC: 171 MG/DL (ref 70–99)
HCT VFR BLD AUTO: 39 %
HGB BLD-MCNC: 13.5 G/DL
IMM GRANULOCYTES # BLD AUTO: 0.02 X10(3) UL (ref 0–1)
IMM GRANULOCYTES NFR BLD: 0.2 %
INR BLD: 1.06 (ref 0.8–1.2)
LYMPHOCYTES # BLD AUTO: 4.09 X10(3) UL (ref 1–4)
LYMPHOCYTES NFR BLD AUTO: 40.8 %
MCH RBC QN AUTO: 29.3 PG (ref 26–34)
MCHC RBC AUTO-ENTMCNC: 34.6 G/DL (ref 31–37)
MCV RBC AUTO: 84.8 FL
MONOCYTES # BLD AUTO: 0.49 X10(3) UL (ref 0.1–1)
MONOCYTES NFR BLD AUTO: 4.9 %
NEUTROPHILS # BLD AUTO: 5.29 X10 (3) UL (ref 1.5–7.7)
NEUTROPHILS # BLD AUTO: 5.29 X10(3) UL (ref 1.5–7.7)
NEUTROPHILS NFR BLD AUTO: 52.7 %
OSMOLALITY SERPL CALC.SUM OF ELEC: 294 MOSM/KG (ref 275–295)
P AXIS: 51 DEGREES
P-R INTERVAL: 194 MS
PLATELET # BLD AUTO: 203 10(3)UL (ref 150–450)
POTASSIUM SERPL-SCNC: 3.8 MMOL/L (ref 3.5–5.1)
PROT SERPL-MCNC: 6.9 G/DL (ref 5.7–8.2)
PROTHROMBIN TIME: 14 SECONDS (ref 11.6–14.8)
Q-T INTERVAL: 446 MS
QRS DURATION: 144 MS
QTC CALCULATION (BEZET): 484 MS
R AXIS: -43 DEGREES
RBC # BLD AUTO: 4.6 X10(6)UL
SODIUM SERPL-SCNC: 139 MMOL/L (ref 136–145)
T AXIS: 127 DEGREES
VENTRICULAR RATE: 71 BPM
WBC # BLD AUTO: 10 X10(3) UL (ref 4–11)

## 2025-02-10 PROCEDURE — 80053 COMPREHEN METABOLIC PANEL: CPT

## 2025-02-10 PROCEDURE — 85025 COMPLETE CBC W/AUTO DIFF WBC: CPT

## 2025-02-10 PROCEDURE — 71045 X-RAY EXAM CHEST 1 VIEW: CPT | Performed by: INTERNAL MEDICINE

## 2025-02-10 PROCEDURE — 93005 ELECTROCARDIOGRAM TRACING: CPT

## 2025-02-10 PROCEDURE — 85610 PROTHROMBIN TIME: CPT

## 2025-02-10 PROCEDURE — 85730 THROMBOPLASTIN TIME PARTIAL: CPT

## 2025-02-10 PROCEDURE — 93010 ELECTROCARDIOGRAM REPORT: CPT | Performed by: INTERNAL MEDICINE

## 2025-02-10 PROCEDURE — 36415 COLL VENOUS BLD VENIPUNCTURE: CPT

## 2025-02-19 ENCOUNTER — HOSPITAL ENCOUNTER (OUTPATIENT)
Facility: HOSPITAL | Age: OVER 89
Discharge: HOME | End: 2025-02-20
Attending: UROLOGY | Admitting: UROLOGY
Payer: COMMERCIAL

## 2025-02-19 ENCOUNTER — ANESTHESIA EVENT (OUTPATIENT)
Dept: SURGERY | Facility: HOSPITAL | Age: OVER 89
End: 2025-02-19
Payer: COMMERCIAL

## 2025-02-19 ENCOUNTER — ANESTHESIA (OUTPATIENT)
Dept: SURGERY | Facility: HOSPITAL | Age: OVER 89
End: 2025-02-19
Payer: COMMERCIAL

## 2025-02-19 PROCEDURE — 0VT08ZZ RESECTION OF PROSTATE, VIA NATURAL OR ARTIFICIAL OPENING ENDOSCOPIC: ICD-10-PCS | Performed by: UROLOGY

## 2025-02-19 PROCEDURE — 0W3R8ZZ CONTROL BLEEDING IN GENITOURINARY TRACT, VIA NATURAL OR ARTIFICIAL OPENING ENDOSCOPIC: ICD-10-PCS | Performed by: UROLOGY

## 2025-02-19 PROCEDURE — 88305 TISSUE EXAM BY PATHOLOGIST: CPT | Performed by: UROLOGY

## 2025-02-19 RX ORDER — LIDOCAINE HYDROCHLORIDE 10 MG/ML
INJECTION, SOLUTION EPIDURAL; INFILTRATION; INTRACAUDAL; PERINEURAL AS NEEDED
Status: DISCONTINUED | OUTPATIENT
Start: 2025-02-19 | End: 2025-02-19 | Stop reason: SURG

## 2025-02-19 RX ORDER — ONDANSETRON 2 MG/ML
INJECTION INTRAMUSCULAR; INTRAVENOUS AS NEEDED
Status: DISCONTINUED | OUTPATIENT
Start: 2025-02-19 | End: 2025-02-19 | Stop reason: SURG

## 2025-02-19 RX ORDER — HYDROMORPHONE HYDROCHLORIDE 1 MG/ML
0.4 INJECTION, SOLUTION INTRAMUSCULAR; INTRAVENOUS; SUBCUTANEOUS EVERY 5 MIN PRN
Status: DISCONTINUED | OUTPATIENT
Start: 2025-02-19 | End: 2025-02-19 | Stop reason: HOSPADM

## 2025-02-19 RX ORDER — HYDROMORPHONE HYDROCHLORIDE 1 MG/ML
0.4 INJECTION, SOLUTION INTRAMUSCULAR; INTRAVENOUS; SUBCUTANEOUS EVERY 2 HOUR PRN
Status: DISCONTINUED | OUTPATIENT
Start: 2025-02-19 | End: 2025-02-20

## 2025-02-19 RX ORDER — LABETALOL HYDROCHLORIDE 5 MG/ML
5 INJECTION, SOLUTION INTRAVENOUS EVERY 5 MIN PRN
Status: DISCONTINUED | OUTPATIENT
Start: 2025-02-19 | End: 2025-02-19 | Stop reason: HOSPADM

## 2025-02-19 RX ORDER — SUCRALFATE 1 G/1
1 TABLET ORAL
Status: DISCONTINUED | OUTPATIENT
Start: 2025-02-20 | End: 2025-02-20

## 2025-02-19 RX ORDER — METOCLOPRAMIDE HYDROCHLORIDE 5 MG/ML
10 INJECTION INTRAMUSCULAR; INTRAVENOUS EVERY 8 HOURS PRN
Status: DISCONTINUED | OUTPATIENT
Start: 2025-02-19 | End: 2025-02-19 | Stop reason: HOSPADM

## 2025-02-19 RX ORDER — HYDRALAZINE HYDROCHLORIDE 20 MG/ML
5 INJECTION INTRAMUSCULAR; INTRAVENOUS ONCE
Status: COMPLETED | OUTPATIENT
Start: 2025-02-19 | End: 2025-02-19

## 2025-02-19 RX ORDER — ENALAPRIL MALEATE 10 MG/1
10 TABLET ORAL 2 TIMES DAILY
Status: DISCONTINUED | OUTPATIENT
Start: 2025-02-19 | End: 2025-02-20

## 2025-02-19 RX ORDER — ATORVASTATIN CALCIUM 80 MG/1
80 TABLET, FILM COATED ORAL NIGHTLY
Status: DISCONTINUED | OUTPATIENT
Start: 2025-02-19 | End: 2025-02-20

## 2025-02-19 RX ORDER — ONDANSETRON 4 MG/1
4 TABLET, ORALLY DISINTEGRATING ORAL EVERY 6 HOURS PRN
Status: DISCONTINUED | OUTPATIENT
Start: 2025-02-19 | End: 2025-02-20

## 2025-02-19 RX ORDER — MORPHINE SULFATE 4 MG/ML
4 INJECTION, SOLUTION INTRAMUSCULAR; INTRAVENOUS EVERY 10 MIN PRN
Status: DISCONTINUED | OUTPATIENT
Start: 2025-02-19 | End: 2025-02-19 | Stop reason: HOSPADM

## 2025-02-19 RX ORDER — NALOXONE HYDROCHLORIDE 0.4 MG/ML
80 INJECTION, SOLUTION INTRAMUSCULAR; INTRAVENOUS; SUBCUTANEOUS AS NEEDED
Status: DISCONTINUED | OUTPATIENT
Start: 2025-02-19 | End: 2025-02-19 | Stop reason: HOSPADM

## 2025-02-19 RX ORDER — EPHEDRINE SULFATE 50 MG/ML
INJECTION INTRAVENOUS AS NEEDED
Status: DISCONTINUED | OUTPATIENT
Start: 2025-02-19 | End: 2025-02-19 | Stop reason: SURG

## 2025-02-19 RX ORDER — SODIUM CHLORIDE, SODIUM LACTATE, POTASSIUM CHLORIDE, CALCIUM CHLORIDE 600; 310; 30; 20 MG/100ML; MG/100ML; MG/100ML; MG/100ML
INJECTION, SOLUTION INTRAVENOUS CONTINUOUS
Status: DISCONTINUED | OUTPATIENT
Start: 2025-02-19 | End: 2025-02-19 | Stop reason: HOSPADM

## 2025-02-19 RX ORDER — ONDANSETRON 2 MG/ML
4 INJECTION INTRAMUSCULAR; INTRAVENOUS EVERY 6 HOURS PRN
Status: DISCONTINUED | OUTPATIENT
Start: 2025-02-19 | End: 2025-02-19 | Stop reason: HOSPADM

## 2025-02-19 RX ORDER — ONDANSETRON 2 MG/ML
4 INJECTION INTRAMUSCULAR; INTRAVENOUS EVERY 6 HOURS PRN
Status: DISCONTINUED | OUTPATIENT
Start: 2025-02-19 | End: 2025-02-20

## 2025-02-19 RX ORDER — SODIUM CHLORIDE, SODIUM LACTATE, POTASSIUM CHLORIDE, CALCIUM CHLORIDE 600; 310; 30; 20 MG/100ML; MG/100ML; MG/100ML; MG/100ML
INJECTION, SOLUTION INTRAVENOUS CONTINUOUS PRN
Status: DISCONTINUED | OUTPATIENT
Start: 2025-02-19 | End: 2025-02-19 | Stop reason: SURG

## 2025-02-19 RX ORDER — MORPHINE SULFATE 10 MG/ML
6 INJECTION, SOLUTION INTRAMUSCULAR; INTRAVENOUS EVERY 10 MIN PRN
Status: DISCONTINUED | OUTPATIENT
Start: 2025-02-19 | End: 2025-02-19 | Stop reason: HOSPADM

## 2025-02-19 RX ORDER — OXYCODONE HYDROCHLORIDE 5 MG/1
2.5 TABLET ORAL EVERY 4 HOURS PRN
Status: DISCONTINUED | OUTPATIENT
Start: 2025-02-19 | End: 2025-02-20

## 2025-02-19 RX ORDER — MIRTAZAPINE 7.5 MG/1
15 TABLET, FILM COATED ORAL NIGHTLY
Status: DISCONTINUED | OUTPATIENT
Start: 2025-02-19 | End: 2025-02-20

## 2025-02-19 RX ORDER — SODIUM CHLORIDE 9 MG/ML
INJECTION, SOLUTION INTRAVENOUS CONTINUOUS
Status: DISCONTINUED | OUTPATIENT
Start: 2025-02-19 | End: 2025-02-20

## 2025-02-19 RX ORDER — ACETAMINOPHEN 325 MG/1
650 TABLET ORAL EVERY 6 HOURS PRN
Status: DISCONTINUED | OUTPATIENT
Start: 2025-02-19 | End: 2025-02-20

## 2025-02-19 RX ORDER — HYDROMORPHONE HYDROCHLORIDE 1 MG/ML
0.6 INJECTION, SOLUTION INTRAMUSCULAR; INTRAVENOUS; SUBCUTANEOUS EVERY 5 MIN PRN
Status: DISCONTINUED | OUTPATIENT
Start: 2025-02-19 | End: 2025-02-19 | Stop reason: HOSPADM

## 2025-02-19 RX ORDER — PHENAZOPYRIDINE HYDROCHLORIDE 100 MG/1
200 TABLET, FILM COATED ORAL 3 TIMES DAILY PRN
Status: DISCONTINUED | OUTPATIENT
Start: 2025-02-19 | End: 2025-02-20

## 2025-02-19 RX ORDER — DEXAMETHASONE SODIUM PHOSPHATE 4 MG/ML
VIAL (ML) INJECTION AS NEEDED
Status: DISCONTINUED | OUTPATIENT
Start: 2025-02-19 | End: 2025-02-19 | Stop reason: SURG

## 2025-02-19 RX ORDER — OXYBUTYNIN CHLORIDE 5 MG/1
5 TABLET ORAL EVERY 6 HOURS PRN
Status: DISCONTINUED | OUTPATIENT
Start: 2025-02-19 | End: 2025-02-20

## 2025-02-19 RX ORDER — OXYCODONE HYDROCHLORIDE 5 MG/1
5 TABLET ORAL EVERY 4 HOURS PRN
Status: DISCONTINUED | OUTPATIENT
Start: 2025-02-19 | End: 2025-02-20

## 2025-02-19 RX ORDER — NALOXONE HYDROCHLORIDE 0.4 MG/ML
0.08 INJECTION, SOLUTION INTRAMUSCULAR; INTRAVENOUS; SUBCUTANEOUS AS NEEDED
Status: DISCONTINUED | OUTPATIENT
Start: 2025-02-19 | End: 2025-02-19 | Stop reason: HOSPADM

## 2025-02-19 RX ORDER — HYDROMORPHONE HYDROCHLORIDE 1 MG/ML
0.2 INJECTION, SOLUTION INTRAMUSCULAR; INTRAVENOUS; SUBCUTANEOUS EVERY 2 HOUR PRN
Status: DISCONTINUED | OUTPATIENT
Start: 2025-02-19 | End: 2025-02-20

## 2025-02-19 RX ORDER — ESCITALOPRAM OXALATE 20 MG/1
20 TABLET ORAL NIGHTLY
Status: DISCONTINUED | OUTPATIENT
Start: 2025-02-19 | End: 2025-02-20

## 2025-02-19 RX ORDER — MORPHINE SULFATE 4 MG/ML
2 INJECTION, SOLUTION INTRAMUSCULAR; INTRAVENOUS EVERY 10 MIN PRN
Status: DISCONTINUED | OUTPATIENT
Start: 2025-02-19 | End: 2025-02-19 | Stop reason: HOSPADM

## 2025-02-19 RX ORDER — HYDROMORPHONE HYDROCHLORIDE 1 MG/ML
0.2 INJECTION, SOLUTION INTRAMUSCULAR; INTRAVENOUS; SUBCUTANEOUS EVERY 5 MIN PRN
Status: DISCONTINUED | OUTPATIENT
Start: 2025-02-19 | End: 2025-02-19 | Stop reason: HOSPADM

## 2025-02-19 RX ORDER — GLYCOPYRROLATE 0.2 MG/ML
INJECTION, SOLUTION INTRAMUSCULAR; INTRAVENOUS AS NEEDED
Status: DISCONTINUED | OUTPATIENT
Start: 2025-02-19 | End: 2025-02-19 | Stop reason: SURG

## 2025-02-19 RX ORDER — GABAPENTIN 100 MG/1
100 CAPSULE ORAL 3 TIMES DAILY
Status: DISCONTINUED | OUTPATIENT
Start: 2025-02-19 | End: 2025-02-20

## 2025-02-19 RX ORDER — ACETAMINOPHEN 500 MG
1000 TABLET ORAL ONCE
Status: COMPLETED | OUTPATIENT
Start: 2025-02-19 | End: 2025-02-19

## 2025-02-19 RX ORDER — SODIUM CHLORIDE, SODIUM LACTATE, POTASSIUM CHLORIDE, CALCIUM CHLORIDE 600; 310; 30; 20 MG/100ML; MG/100ML; MG/100ML; MG/100ML
INJECTION, SOLUTION INTRAVENOUS CONTINUOUS
Status: DISCONTINUED | OUTPATIENT
Start: 2025-02-19 | End: 2025-02-19

## 2025-02-19 RX ADMIN — GLYCOPYRROLATE 0.2 MG: 0.2 INJECTION, SOLUTION INTRAMUSCULAR; INTRAVENOUS at 14:35:00

## 2025-02-19 RX ADMIN — SODIUM CHLORIDE, SODIUM LACTATE, POTASSIUM CHLORIDE, CALCIUM CHLORIDE: 600; 310; 30; 20 INJECTION, SOLUTION INTRAVENOUS at 17:57:00

## 2025-02-19 RX ADMIN — DEXAMETHASONE SODIUM PHOSPHATE 4 MG: 4 MG/ML VIAL (ML) INJECTION at 17:15:00

## 2025-02-19 RX ADMIN — LIDOCAINE HYDROCHLORIDE 50 MG: 10 INJECTION, SOLUTION EPIDURAL; INFILTRATION; INTRACAUDAL; PERINEURAL at 17:12:00

## 2025-02-19 RX ADMIN — SODIUM CHLORIDE, SODIUM LACTATE, POTASSIUM CHLORIDE, CALCIUM CHLORIDE: 600; 310; 30; 20 INJECTION, SOLUTION INTRAVENOUS at 14:04:00

## 2025-02-19 RX ADMIN — EPHEDRINE SULFATE 10 MG: 50 INJECTION INTRAVENOUS at 17:26:00

## 2025-02-19 RX ADMIN — ONDANSETRON 4 MG: 2 INJECTION INTRAMUSCULAR; INTRAVENOUS at 17:15:00

## 2025-02-19 RX ADMIN — SODIUM CHLORIDE, SODIUM LACTATE, POTASSIUM CHLORIDE, CALCIUM CHLORIDE: 600; 310; 30; 20 INJECTION, SOLUTION INTRAVENOUS at 17:06:00

## 2025-02-19 RX ADMIN — LIDOCAINE HYDROCHLORIDE 50 MG: 10 INJECTION, SOLUTION EPIDURAL; INFILTRATION; INTRACAUDAL; PERINEURAL at 14:13:00

## 2025-02-19 RX ADMIN — EPHEDRINE SULFATE 10 MG: 50 INJECTION INTRAVENOUS at 15:13:00

## 2025-02-19 NOTE — ANESTHESIA PROCEDURE NOTES
Airway  Date/Time: 2/19/2025 2:22 PM  Airway not difficult    General Information and Staff    Patient location during procedure: OR  Anesthesiologist: Ester Virgen MD  Resident/CRNA: Dea Swanson CRNA  Performed: CRNA   Performed by: Dea Swanson CRNA  Authorized by: Ester Virgen MD      Indications and Patient Condition  Indications for airway management: airway protection and anesthesia  Spontaneous ventilation: present  Sedation level: deep  Preoxygenated: yes  Patient position: ramp  Mask difficulty assessment: 0 - not attempted    Final Airway Details  Final airway type: supraglottic airway      Successful airway: Size 5       Number of attempts at approach: 1  Number of other approaches attempted: 0

## 2025-02-19 NOTE — ANESTHESIA PREPROCEDURE EVALUATION
Anesthesia PreOp Note    HPI:     Deven Atkinson is a 89 year old male who presents for preoperative consultation requested by: Ryan Yeung MD    Date of Surgery: 2/19/2025    Procedure(s):  Cystoscopy, transurethral resection of prostate  Indication: Urinary retention, BPH with obstruction, lower urinary tract symptoms    Relevant Problems   No relevant active problems       NPO:  Last Liquid Consumption Date: 02/19/25  Last Liquid Consumption Time: 0800 (water, apple juice)  Last Solid Consumption Date: 02/18/25  Last Solid Consumption Time: 2330  Last Liquid Consumption Date: 02/19/25          History Review:  Patient Active Problem List    Diagnosis Date Noted    Urinary tract infection associated with indwelling urethral catheter, initial encounter 01/12/2025    Pseudomonas aeruginosa infection 01/12/2025    Jay catheter problem 01/12/2025    Diarrhea of presumed infectious origin 11/02/2024    Acute appendicitis, unspecified acute appendicitis type 11/02/2024    Diarrhea, unspecified type 08/19/2024    Weakness 08/19/2024    Dementia, unspecified dementia severity, unspecified dementia type, unspecified whether behavioral, psychotic, or mood disturbance or anxiety ([facility]) 08/19/2024    Clostridium difficile colitis 08/19/2024    Falls 08/19/2024    Metabolic encephalopathy 08/13/2024    Hyperglycemia 08/12/2024    Azotemia 08/12/2024    Anemia 08/12/2024    Metabolic acidosis 08/12/2024    Acute cystitis with hematuria 08/12/2024    Depression 08/12/2024    Cerebrovascular accident (CVA) ([facility]) 08/12/2024    Benign essential HTN 08/12/2024    Hyperlipidemia 08/12/2024    Irritable bowel syndrome with constipation 08/01/2024    Psychophysiological insomnia 07/19/2024    LBBB (left bundle branch block) 07/05/2024    Moderate vascular dementia without behavioral disturbance, psychotic disturbance, mood disturbance, or anxiety ([facility]) 07/05/2024    Chronic heart failure with preserved ejection fraction (HFpEF) ([facility])  10/13/2022    Chronic vascular insufficiency of intestine (HCC) 09/19/2022    Benign prostatic hyperplasia with urinary obstruction 06/25/2018       Past Medical History:    BPH (benign prostatic hyperplasia)    Coronary atherosclerosis    Depression    Essential hypertension    Hearing impaired person, bilateral    High blood pressure    High cholesterol    History of stomach ulcers    Hyperlipidemia    Stroke (HCC)       Past Surgical History:   Procedure Laterality Date    Cath bare metal stent (bms)      Colonoscopy      Surgical stent      wife [facility]       Prescriptions Prior to Admission[1]  Current Medications and Prescriptions Ordered in Epic[2]    Allergies[3]    Family History   Adopted: Yes   Family history unknown: Yes     Social History     Socioeconomic History    Marital status:    Tobacco Use    Smoking status: Former     Types: Cigarettes     Passive exposure: Never    Smokeless tobacco: Never    Tobacco comments:     QUIT SMOKING IN 2000   Vaping Use    Vaping status: Never Used   Substance and Sexual Activity    Alcohol use: Not Currently    Drug use: Never       Available pre-op labs reviewed.  Lab Results   Component Value Date    WBC 10.0 02/10/2025    RBC 4.60 02/10/2025    HGB 13.5 02/10/2025    HCT 39.0 02/10/2025    MCV 84.8 02/10/2025    MCH 29.3 02/10/2025    MCHC 34.6 02/10/2025    RDW 13.3 02/10/2025    .0 02/10/2025     Lab Results   Component Value Date     02/10/2025    K 3.8 02/10/2025     02/10/2025    CO2 22.0 02/10/2025    BUN 17 02/10/2025    CREATSERUM 0.92 02/10/2025     (H) 02/10/2025    CA 9.1 02/10/2025     Lab Results   Component Value Date    INR 1.06 02/10/2025       Vital Signs:  Body mass index is 27.1 kg/m².   height is 1.702 m (5' 7\") and weight is 78.5 kg (173 lb). His oral temperature is 98.1 °F (36.7 °C). His blood pressure is 157/66 and his pulse is 64. His respiration is 16 and oxygen  saturation is 97%.   Vitals:    02/13/25 1226 02/19/25 1211   BP:  157/66   Pulse:  64   Resp:  16   Temp:  98.1 °F (36.7 °C)   TempSrc:  Oral   SpO2:  97%   Weight: 67.6 kg (149 lb) 78.5 kg (173 lb)   Height: 1.702 m (5' 7\") 1.702 m (5' 7\")        Anesthesia Evaluation     Patient summary reviewed and Nursing notes reviewed    Airway   Mallampati: II  TM distance: >3 FB  Neck ROM: full  Dental          Pulmonary - normal exam   (-) sleep apnea  Cardiovascular   Exercise tolerance: good  (+) hypertension well controlled, CAD    NYHA Classification: II  ECG reviewed  ROS comment: EKG performed and read in office demonstrated sinus bradycardia left bundle branch block borderline first-degree    Assessment/Plan:  1. Essential hypertension   2. Pure hypercholesterolemia   3. Chronic heart failure with preserved ejection fraction (HFpEF) ([facility])   4. LBBB (left bundle branch block)   5. Atherosclerosis of both carotid arteries     Recommendations: Obtain records from Frank Su with regards to previous cardiac testing and some follow-up on what the arrhythmia monitor/Linq device is and what has been showing.    I did offer them to also follow-up with one of our EP doctors but first they are to have a physician taking care of her and put the Linq device in.    Will be obtaining records and see them back in 8 weeks. We will also decrease his lisinopril from 20 mg tablets twice a day to 10 mg twice a day blood pressure little bit on the low side and he is bradycardic    The patient's previous laboratory and cardiac diagnostic testing listed above has been reviewed and was utilized to establish my current plan of care.  Previous outside notes were reviewed and taken into consideration when deciding further treatment.    I personally reviewed: Personally reviewed what records I could find    Discussed with: Patient discussed what I had with the patient but there is more cardiac records apparently that I do not have    Return  to Clinic: Return in 2 months (on 12/14/2024). Patient instructed to have all ordered testing prior to follow-up visit.     Orders Placed During This Encounter:  No orders of the defined types were placed in this encounter.      Thank you for allowing me to see this patient in our office. Please call our office with any questions. Correspondence will be sent to your office.  Narrative  Performed by: [facility]  Normal sinus rhythm  Possible Left atrial enlargement  Left axis deviation  Left bundle branch block  Abnormal ECG  When compared with ECG of 02-FEB-2025 10:26,  T wave inversion more evident in Lateral leads  Confirmed by MARIE Flores, Shipedro pablo ([id number]) on 2/10/2025 3:39:31 PM  Specimen Collected: 02/10/25 12:58 Last Resulted: 02/10/25 15:39             Neuro/Psych    (+)  CVA,  depression      Comments: History of Present Illness:  Deven Atkinson is a 89 year old man with PMHx of HTN who underwent a [employer] LINQ ILR back in December 2023 by Dr. Su. He presents for first time follow-up with me. He also sees Dr. Erwin. The patient speaks Mandarin but his wife is here who speaks English. She states that he had a mild stroke around December of last year which is the reason the loop monitor was inserted. She denies any history of syncope. He has occasional dizziness.    Linq was interrogated today. There is questionable 12 minutes of A-fib but no EGM's associated with it. He has had no recent arrhythmias.    EKG performed recently shows sinus rhythm. He does have underlying left bundle branch block with a QRS duration of 140 ms.  PMHx:    Past Medical History:   Diagnosis Date   Abnormal colonoscopy 09/29/2021   0ne 6 mm polyp in the sigmoid colon. internal hemorrhoid. divericulosis. Dr. Pabon [facility]   Abnormal findings on esophagogastroduodenoscopy (EGD) 02/11/2022   H.P. gastritis noted, Dr. Long.   Abnormal findings on esophagogastroduodenoscopy (EGD) 06/27/2023   erythematous antrum due to atrophic gastritis.   Mercedes. H.P.neg.   Abrasion of left ankle   Acid reflux   Acute bacterial conjunctivitis of both eyes   Acute cystitis with hematuria   Allergic rhinitis   Anemia   Anxiety disorder   Arthritis, rheumatoid ([facility])   Azotemia   BPH (benign prostatic hyperplasia)   Bradycardia   Cerebrovascular accident ([facility])   Chronic heart failure with preserved ejection fraction ([facility])   Chronic idiopathic constipation   Chronic vascular insufficiency of intestine ([facility])   Clostridium difficile colitis   Cognitive communication deficit   Contusion of coccyx   Dementia ([facility])   Difficulty walking   Dizziness and giddiness   Dry eye syndrome   Dysphagia   Elevated PSA   Epigastric pain   Episodic confusion   History of falling   HTN (hypertension)   Hyperglycemia   Hyperlipidemia   Irritable bowel syndrome with constipation   Ischemic brain damage   Laceration of left upper extremity   Leg cramps   Lipoma of neck   Major depression   Maxillary sinus mass   Metabolic acidosis   Metabolic encephalopathy   Moderate dehydration   Noninfectious ileitis   Obstructive uropathy   Occlusion and stenosis of basilar artery   Occlusion and stenosis of bilateral carotid arteries   Optic neuropathy   Other specified cardiac arrhythmias   Photophobia   Primary osteoarthritis   Prostate disease   Rectal hemorrhage   Recurrent UTI   Retention of urine   Sensorineural hearing loss   Shortness of breath   Slowness and poor responsiveness   Solitary pulmonary nodule   Spontaneous ecchymoses   Suicidal ideations   Superficial phlebitis of left leg   Syncope   Trochanteric bursitis of left hip   Unsteady gait     PSHx:    Past Surgical History:   Procedure Laterality Date   Cataract extraction, bilateral   Colonoscopy w/ polypectomy 09/29/2021   Esophagogastroduodenoscopy transoral flex w/bx single or mult 02/11/2022     Family Hx:    Family History   Problem Relation Age of Onset   Patient is unaware of any medical problems Mother     Social Hx:    Social  History     Tobacco Use   Smoking status: Never   Smokeless tobacco: Never   Vaping Use   Vaping status: never used   Substance Use Topics   Alcohol use: No   Drug use: No     Allergies:     ALLERGIES:   Allergen Reactions   Losartan GI UPSET and Nausea & Vomiting   Adverse Reaction    Adverse Reaction (historical)    Adverse Reaction     Adverse Reaction Adverse Reaction (historical)     Medications:    Current Outpatient Medications le    GI/Hepatic/Renal    (-) hepatitis, liver disease    Endo/Other    (-) diabetes mellitus, hypothyroidism  Abdominal  - normal exam     Other findings: [place]  Poor teeth             Anesthesia Plan:   ASA:  3  Plan Comments: LINQ device  Informed Consent Plan and Risks Discussed With:  Patient and spouse  Discussed plan with:  CRNA, attending and surgeon  Provider Attestation (if preop done by other):  Wife and pt are bad historians  Info is from notes   GA/LMA/confusion  PONV,denetal damages etc      I have informed Deven Atkinson and/or legal guardian or family member of the nature of the anesthetic plan, benefits, risks including possible dental damage if relevant, major complications, and any alternative forms of anesthetic management.   All of the patient's questions were answered to the best of my ability. The patient desires the anesthetic management as planned.  SUSU GONSALVES MD  2025 1:51 PM  Present on Admission:  **None**           [1]   Medications Prior to Admission   Medication Sig Dispense Refill Last Dose/Taking    Sodium Chloride-Xylitol (XLEAR SINUS CARE SPRAY NA) by Nasal route 3 (three) times daily.   2025    [] doxycycline 100 MG Oral Cap Take 1 capsule (100 mg total) by mouth 2 (two) times daily for 7 days. 14 capsule 0 2025    gabapentin 100 MG Oral Cap Take 1 capsule (100 mg total) by mouth 3 (three) times daily.   2025 at  8:00 AM    enalapril 10 MG Oral Tab Take 1 tablet (10 mg total) by mouth 2 (two) times daily.   2025 at  8:00  AM    alfuzosin ER 10 MG Oral Tablet 24 Hr Take 1 tablet (10 mg total) by mouth daily.   Past Month    Polyethylene Glycol 3350 17 g Oral Powd Pack Take 17 g by mouth daily.   2/17/2025    traZODone 50 MG Oral Tab Take 1 tablet (50 mg total) by mouth nightly.   2/18/2025 at  5:00 PM    zinc sulfate 220 (50 Zn) MG Oral Cap Take 1 capsule (220 mg total) by mouth daily.   2/12/2025    niacin 500 MG Oral Tab Take 1 tablet (500 mg total) by mouth daily with breakfast.   2/11/2025    sucralfate 1 g Oral Tab Take 1 tablet (1 g total) by mouth 3 (three) times daily before meals.   2/19/2025 at  8:00 AM    aspirin 81 MG Oral Chew Tab Chew 1 tablet (81 mg total) by mouth daily.   2/11/2025    atorvastatin 80 MG Oral Tab Take 1 tablet (80 mg total) by mouth daily.   2/18/2025 at  8:00 AM    clopidogrel 75 MG Oral Tab Take 1 tablet (75 mg total) by mouth daily.   2/11/2025    escitalopram 20 MG Oral Tab Take 1 tablet (20 mg total) by mouth at bedtime.   2/18/2025 at  5:00 PM    mirtazapine 15 MG Oral Tab Take 1 tablet (15 mg total) by mouth nightly.   2/18/2025 at  5:00 PM   [2]   Current Facility-Administered Medications Ordered in Epic   Medication Dose Route Frequency Provider Last Rate Last Admin    lactated ringers infusion   Intravenous Continuous Ryan Yeung MD 20 mL/hr at 02/19/25 1243 New Bag at 02/19/25 1243    ceFEPIme (Maxipime) 1 g in sodium chloride 0.9% 100 mL IVPB-MBP  1 g Intravenous Q8H Ryan Yeung MD         No current [facility]-ordered outpatient medications on file.   [3]   Allergies  Allergen Reactions    Losartan UNKNOWN     Unable to remember reaction

## 2025-02-19 NOTE — ANESTHESIA PROCEDURE NOTES
Airway  Date/Time: 2/19/2025 5:13 PM  Urgency: Elective    Airway not difficult    General Information and Staff    Patient location during procedure: OR  Anesthesiologist: Rory Oliva MD  Performed: anesthesiologist   Performed by: Rory Oliva MD  Authorized by: Rory Oliva MD      Indications and Patient Condition  Indications for airway management: anesthesia  Spontaneous ventilation: present  Sedation level: deep  Preoxygenated: yes  Patient position: sniffing  MILS maintained throughout  Mask difficulty assessment: 1 - vent by mask    Final Airway Details  Final airway type: supraglottic airway      Successful airway: classic  Size 4  Airway Seal Pressure (cm H2O): 20       Number of attempts at approach: 1  Number of other approaches attempted: 0

## 2025-02-19 NOTE — ANESTHESIA PREPROCEDURE EVALUATION
Anesthesia PreOp Note    HPI:     Deven Atkinson is a 89 year old male who presents for preoperative consultation requested by: Ryan Yeung MD    Date of Surgery: 2/19/2025    Procedure(s):  CYSTOSCOPY FULGURATION  Indication: Bleeding [R58]    Relevant Problems   No relevant active problems       NPO:  Last Liquid Consumption Date: 02/19/25  Last Liquid Consumption Time: 0800 (water, apple juice)  Last Solid Consumption Date: 02/18/25  Last Solid Consumption Time: 2330  Last Liquid Consumption Date: 02/19/25          History Review:  Patient Active Problem List    Diagnosis Date Noted    Urinary tract infection associated with indwelling urethral catheter, initial encounter 01/12/2025    Pseudomonas aeruginosa infection 01/12/2025    Jay catheter problem 01/12/2025    Diarrhea of presumed infectious origin 11/02/2024    Acute appendicitis, unspecified acute appendicitis type 11/02/2024    Diarrhea, unspecified type 08/19/2024    Weakness 08/19/2024    Dementia, unspecified dementia severity, unspecified dementia type, unspecified whether behavioral, psychotic, or mood disturbance or anxiety ([facility]) 08/19/2024    Clostridium difficile colitis 08/19/2024    Falls 08/19/2024    Metabolic encephalopathy 08/13/2024    Hyperglycemia 08/12/2024    Azotemia 08/12/2024    Anemia 08/12/2024    Metabolic acidosis 08/12/2024    Acute cystitis with hematuria 08/12/2024    Depression 08/12/2024    Cerebrovascular accident (CVA) ([facility]) 08/12/2024    Benign essential HTN 08/12/2024    Hyperlipidemia 08/12/2024    Irritable bowel syndrome with constipation 08/01/2024    Psychophysiological insomnia 07/19/2024    LBBB (left bundle branch block) 07/05/2024    Moderate vascular dementia without behavioral disturbance, psychotic disturbance, mood disturbance, or anxiety ([facility]) 07/05/2024    Chronic heart failure with preserved ejection fraction (HFpEF) ([facility]) 10/13/2022    Chronic vascular insufficiency of intestine ([facility]) 09/19/2022     Benign prostatic hyperplasia with urinary obstruction 06/25/2018       Past Medical History:    BPH (benign prostatic hyperplasia)    Coronary atherosclerosis    Depression    Essential hypertension    Hearing impaired person, bilateral    High blood pressure    High cholesterol    History of stomach ulcers    Hyperlipidemia    Stroke (HCC)       Past Surgical History:   Procedure Laterality Date    Cath bare metal stent (bms)      Colonoscopy      Surgical stent      wife states Lft side of heart- Ahsan Anaya       Prescriptions Prior to Admission[1]  Current Medications and Prescriptions Ordered in Epic[2]    Allergies[3]    Family History   Adopted: Yes   Family history unknown: Yes     Social History     Socioeconomic History    Marital status:    Tobacco Use    Smoking status: Former     Types: Cigarettes     Passive exposure: Never    Smokeless tobacco: Never    Tobacco comments:     QUIT SMOKING IN 2000   Vaping Use    Vaping status: Never Used   Substance and Sexual Activity    Alcohol use: Not Currently    Drug use: Never       Available pre-op labs reviewed.  Lab Results   Component Value Date    WBC 10.0 02/10/2025    RBC 4.60 02/10/2025    HGB 13.5 02/10/2025    HCT 39.0 02/10/2025    MCV 84.8 02/10/2025    MCH 29.3 02/10/2025    MCHC 34.6 02/10/2025    RDW 13.3 02/10/2025    .0 02/10/2025     Lab Results   Component Value Date     02/10/2025    K 3.8 02/10/2025     02/10/2025    CO2 22.0 02/10/2025    BUN 17 02/10/2025    CREATSERUM 0.92 02/10/2025     (H) 02/10/2025    CA 9.1 02/10/2025     Lab Results   Component Value Date    INR 1.06 02/10/2025       Vital Signs:  Body mass index is 27.1 kg/m².   height is 1.702 m (5' 7\") and weight is 78.5 kg (173 lb). His temporal temperature is 97.8 °F (36.6 °C). His blood pressure is 160/73 and his pulse is 69. His respiration is 14 and oxygen saturation is 100%.   Vitals:    02/19/25 1626 02/19/25 1636 02/19/25 1646  25 1656   BP: (!) 170/71 (!) 177/74 (!) 162/75 160/73   Pulse: 66 63 66 69   Resp: 15 12 14 14   Temp:    97.8 °F (36.6 °C)   TempSrc:    Temporal   SpO2: 100% 100% 100% 100%   Weight:       Height:            Anesthesia Evaluation     Patient summary reviewed and Nursing notes reviewed    No history of anesthetic complications   Airway   Mallampati: II  TM distance: >3 FB  Neck ROM: full  Dental          Pulmonary - negative ROS and normal exam   Cardiovascular - normal exam  (+) hypertension well controlled, CAD    ROS comment: hyperlipidemia    Neuro/Psych    (+)  CVA,  depression      GI/Hepatic/Renal    (+) PUD    Endo/Other - negative ROS   Abdominal  - normal exam                 Anesthesia Plan:   ASA:  3  Plan:   General  Airway:  LMA  Post-op Pain Management: IV analgesics and Oral pain medication  Informed Consent Plan and Risks Discussed With:  Patient      I have informed Deven Atkinson of the nature of the anesthetic plan, benefits, risks including possible dental damage if relevant, major complications, and any alternative forms of anesthetic management.   All of the patient's questions were answered to the best of my ability. The patient desires the anesthetic management as planned.  RENITA TODD MD  2025 5:07 PM  Present on Admission:  **None**           [1]   Medications Prior to Admission   Medication Sig Dispense Refill Last Dose/Taking    Sodium Chloride-Xylitol (XLEAR SINUS CARE SPRAY NA) by Nasal route 3 (three) times daily.   2025    [] doxycycline 100 MG Oral Cap Take 1 capsule (100 mg total) by mouth 2 (two) times daily for 7 days. 14 capsule 0 2025    gabapentin 100 MG Oral Cap Take 1 capsule (100 mg total) by mouth 3 (three) times daily.   2025 at  8:00 AM    enalapril 10 MG Oral Tab Take 1 tablet (10 mg total) by mouth 2 (two) times daily.   2025 at  8:00 AM    alfuzosin ER 10 MG Oral Tablet 24 Hr Take 1 tablet (10 mg total) by mouth daily.    Past Month    Polyethylene Glycol 3350 17 g Oral Powd Pack Take 17 g by mouth daily.   2/17/2025    traZODone 50 MG Oral Tab Take 1 tablet (50 mg total) by mouth nightly.   2/18/2025 at  5:00 PM    zinc sulfate 220 (50 Zn) MG Oral Cap Take 1 capsule (220 mg total) by mouth daily.   2/12/2025    niacin 500 MG Oral Tab Take 1 tablet (500 mg total) by mouth daily with breakfast.   2/11/2025    sucralfate 1 g Oral Tab Take 1 tablet (1 g total) by mouth 3 (three) times daily before meals.   2/19/2025 at  8:00 AM    aspirin 81 MG Oral Chew Tab Chew 1 tablet (81 mg total) by mouth daily.   2/11/2025    atorvastatin 80 MG Oral Tab Take 1 tablet (80 mg total) by mouth daily.   2/18/2025 at  8:00 AM    clopidogrel 75 MG Oral Tab Take 1 tablet (75 mg total) by mouth daily.   2/11/2025    escitalopram 20 MG Oral Tab Take 1 tablet (20 mg total) by mouth at bedtime.   2/18/2025 at  5:00 PM    mirtazapine 15 MG Oral Tab Take 1 tablet (15 mg total) by mouth nightly.   2/18/2025 at  5:00 PM   [2]   Current Facility-Administered Medications Ordered in Epic   Medication Dose Route Frequency Provider Last Rate Last Admin    lactated ringers infusion   Intravenous Continuous Ryan Yeung MD   Stopped at 02/19/25 1530    lactated ringers infusion   Intravenous Continuous Ester Virgen MD        lactated ringers IV bolus 500 mL  500 mL Intravenous Once PRN Ester Virgen MD        atropine 0.1 MG/ML injection 0.5 mg  0.5 mg Intravenous PRN Ester Virgen MD        naloxone (Narcan) 0.4 MG/ML injection 80 mcg  80 mcg Intravenous PRN Ester Virgen MD        fentaNYL (Sublimaze) 50 mcg/mL injection 25 mcg  25 mcg Intravenous Q5 Min PRN Ester Virgen MD   25 mcg at 02/19/25 1618    fentaNYL (Sublimaze) 50 mcg/mL injection 50 mcg  50 mcg Intravenous Q5 Min PRN Ester Virgen MD        HYDROmorphone (Dilaudid) 1 MG/ML injection 0.2 mg  0.2 mg Intravenous Q5 Min PRN Ester Virgen MD        HYDROmorphone (Dilaudid) 1 MG/ML injection 0.4 mg  0.4 mg  Intravenous Q5 Min PRN Ester Virgen MD        HYDROmorphone (Dilaudid) 1 MG/ML injection 0.6 mg  0.6 mg Intravenous Q5 Min PRN Ester Virgen MD        morphINE PF 4 MG/ML injection 2 mg  2 mg Intravenous Q10 Min PRN Ester Virgen MD        morphINE PF 4 MG/ML injection 4 mg  4 mg Intravenous Q10 Min PRN Ester Virgen MD        morphINE PF 10 MG/ML injection 6 mg  6 mg Intravenous Q10 Min PRN Ester Virgen MD        lactated ringers infusion   Intravenous Continuous Rory Oliva MD        lactated ringers IV bolus 500 mL  500 mL Intravenous Once PRN Rory Oliva MD        atropine 0.1 MG/ML injection 0.5 mg  0.5 mg Intravenous PRN Rory Oliva MD        naloxone (Narcan) 0.4 MG/ML injection 0.08 mg  0.08 mg Intravenous PRN Rory Oliva MD        fentaNYL (Sublimaze) 50 mcg/mL injection 25 mcg  25 mcg Intravenous Q5 Min PRN Rory Oliva MD        fentaNYL (Sublimaze) 50 mcg/mL injection 50 mcg  50 mcg Intravenous Q5 Min PRN Rory Oliva MD        HYDROmorphone (Dilaudid) 1 MG/ML injection 0.2 mg  0.2 mg Intravenous Q5 Min PRN Rory Oliva MD        HYDROmorphone (Dilaudid) 1 MG/ML injection 0.4 mg  0.4 mg Intravenous Q5 Min PRN Rory Oliva MD        HYDROmorphone (Dilaudid) 1 MG/ML injection 0.6 mg  0.6 mg Intravenous Q5 Min PRN Rory Oliva MD        morphINE PF 4 MG/ML injection 2 mg  2 mg Intravenous Q10 Min PRN Rory Oliva MD        morphINE PF 4 MG/ML injection 4 mg  4 mg Intravenous Q10 Min PRN Rory Oliva MD        morphINE PF 10 MG/ML injection 6 mg  6 mg Intravenous Q10 Min PRN Rory Oliva MD        labetalol (Trandate) 5 mg/mL injection 5 mg  5 mg Intravenous Q5 Min PRN Rory Oliva MD        ondansetron (Zofran) 4 MG/2ML injection 4 mg  4 mg Intravenous Q6H PRN Rory Oliva MD        metoclopramide (Reglan) 5 mg/mL injection 10 mg  10 mg Intravenous Q8H PRN Rory Oliva MD         No  current Epic-ordered outpatient medications on file.   [3]   Allergies  Allergen Reactions    Losartan UNKNOWN     Unable to remember reaction

## 2025-02-19 NOTE — H&P
HPI:   The patient is a 89 year old male with urinary retention and BPH. He is being managed with a chronic aaron. He states he cannot continue with a chronic aaron and wishes to have a TURP. He has lateral lobe obstruction on cystoscopy and a prostate volume of 67 mL by TRUS.             Current Outpatient Medications   Medication Sig Dispense Refill    dicyclomine 10 MG Oral Cap TAKE 1 CAPSULE BY MOUTH 3 TIMES A DAY AS NEEDED (BLOATING)        gabapentin 100 MG Oral Cap Take 1 capsule by mouth 3 (three) times daily.        mupirocin 2 % External Ointment Apply to both nostrils daily for 14 days 22 g 0    trimethoprim 100 MG Oral Tab Take 1 tablet (100 mg total) by mouth daily. 90 tablet 0    aspirin 81 MG Oral Chew Tab CHEW AND SWALLOW ONE TABLET EVERY DAY FOR PROPHYLAXIS        clopidogrel 75 MG Oral Tab Take 75 mg by mouth daily.        enalapril 20 MG Oral Tab Take 20 mg by mouth 2 (two) times daily.        escitalopram 20 MG Oral Tab Take 20 mg by mouth daily.        Lutein 40 MG Oral Cap Take 40 mg by mouth As Directed.        mirtazapine 15 MG Oral Tab Take 15 mg by mouth every other day.        pantoprazole 40 MG Oral Tab EC Take 40 mg by mouth every morning.        traZODone 50 MG Oral Tab Take 50 mg by mouth nightly.        Valproate Sodium (VALPROIC ACID) 250 MG/5ML Oral Solution Take 125 mg by mouth daily.             Losartan                NAUSEA AND VOMITING, OTHER (SEE                            COMMENTS), ANAPHYLAXIS    Comment:Adverse Reaction       (historical)              Adverse Reaction       Adverse Reaction                 Adverse Reaction       (historical)             Adverse Reaction       (historical)             Adverse Reaction      Adverse Reaction                   (historical)             Adverse Reaction  Adverse Reaction                   (historical)  Adverse Reaction                 Adverse Reaction      Adverse Reaction                   (historical)             Adverse  Reaction       (historical)              Adverse Reaction       Adverse Reaction                 Adverse Reaction       (historical)              Adverse Reaction  Adverse Reaction                   (historical)  Adverse Reaction                   Adverse Reaction      Adverse Reaction                   (historical)  Adverse Reaction                   (historical)  Adverse Reaction                   Adverse Reaction      Adverse Reaction                   (historical)  Adverse Reaction       (historical)             Adverse Reaction      Adverse Reaction                   (historical)  Adverse Reaction  Adverse Reaction                   (historical)  Adverse Reaction                   Adverse Reaction      Adverse Reaction                   (historical)   No past medical history on file.        Past Surgical History:   Procedure Laterality Date    OTHER SURGICAL HISTORY   08/29/2024     Dr. Yeung - Cystoscopy      Social History:  Social History    Tobacco Use      Smoking status: Unknown      Smokeless tobacco: Not on file    Alcohol use: Not on file    Drug use: Not on file         REVIEW OF SYSTEMS:   GENERAL HEALTH: Feels well.  RESPIRATORY: Denies shortness of breath with exertion.  CARDIOVASCULAR: Denies chest pain on exertion.  GI: Denies abdominal pain or heartburn.  : --- See HPI.  NEURO: No sensory or motor complaint.     EXAM:   There were no vitals taken for this visit.  GENERAL: Well developed, well nourished male in no acute distress.  HEENT: Atraumatic, normocephalic.  CHEST: Respirations non-labored.   : Jay in place.  NEURO: Alert and oriented.        ASSESSMENT AND PLAN:      Urinary retention  BPH      We will proceed with TURP. We discussed the risks, benefits, complications, and alternatives, including but not limited to bleeding, hematuria, infection, ED, retrograde ejaculation, and incontinence at length as well as the risks of perioperative antibiotics.  He expressed understanding and  wished to proceed.

## 2025-02-19 NOTE — H&P
[facility] Hospitalist Consult Note   PCP: Paulo James MD  Attending: Dr. Yeung  Reason for Consult: Post operative medical management  Date of surgery: 2/19/25  Surgery: Cystoscopy and transurethral resection of prostate     History of Present Illness: Patient is a 89-year-old male with a past medical history significant for HTN, HDL, CVA, MDD, PUD, BPH, who presents for prostate resection.  Seen postoperatively and doing well.  Cantonese speaking.  Looks comfortable and moving all 4 extremities.    Review of Systems  Comprehensive ROS reviewed and negative except for what's stated above.     PMH  Past Medical History:    BPH (benign prostatic hyperplasia)    Coronary atherosclerosis    Depression    Essential hypertension    Hearing impaired person, bilateral    High blood pressure    High cholesterol    History of stomach ulcers    Hyperlipidemia    Stroke (HCC)        PSH  Past Surgical History:   Procedure Laterality Date    Cath bare metal stent (bms)      Colonoscopy      Surgical stent      wife states Lft side of heart- [facility]        ALL:  Allergies[1]     Home Medications:  Medications Taking[2]      Soc Hx  Social History     Tobacco Use    Smoking status: Former     Types: Cigarettes     Passive exposure: Never    Smokeless tobacco: Never    Tobacco comments:     QUIT SMOKING IN 2000   Substance Use Topics    Alcohol use: Not Currently        Fam Hx  Family History   Adopted: Yes   Family history unknown: Yes         OBJECTIVE:  /66 (BP Location: Right arm)   Pulse 64   Temp 98.1 °F (36.7 °C) (Oral)   Resp 16   Ht 5' 7\" (1.702 m)   Wt 173 lb (78.5 kg)   SpO2 97%   BMI 27.10 kg/m²   General: Alert, no acute distress  HEENT: oral mucosa normal   Neck: non tender, no adenopathy   Lungs: clear to ausculation bilaterally  Heart: Regular rate and rhythm  Abdomen: soft, non tender, non distended   : Jay in place, CBI  Extremities: No edema  Skin: no new rash, normal  color  Neuro: 5/5 strength in bilateral extremities, normal sensations  Psych: appropriate affect     Diagnostic Data:    CBC/Chem  No results for input(s): \"WBC\", \"HGB\", \"MCV\", \"PLT\", \"BAND\", \"INR\" in the last 168 hours.    Invalid input(s): \"LYM#\", \"MONO#\", \"BASOS#\", \"EOSIN#\"    No results for input(s): \"NA\", \"K\", \"CL\", \"CO2\", \"BUN\", \"CREATSERUM\", \"GLU\", \"CA\", \"CAION\", \"MG\", \"PHOS\" in the last 168 hours.    No results for input(s): \"ALT\", \"AST\", \"ALB\", \"AMYLASE\", \"LIPASE\", \"LDH\" in the last 168 hours.    Invalid input(s): \"ALPHOS\", \"TBIL\", \"DBIL\", \"TPROT\"    No results for input(s): \"TROP\" in the last 168 hours.      Radiology: No results found.      ASSESSMENT / PLAN:   Patient is a 89-year-old male with a past medical history significant for HTN, HDL, CVA, MDD, PUD, BPH, who presents for prostate resection.    POD#0 s/p Cystoscopy and transurethral resection of prostate   Urinary retention and benign prostatic hyperplasia   - pain and nausea control  - IVF, advance diet as tolerated   - Jay care, CBI  - am labs    HTN - enalapril   HDL - statin, niacin  CVA - asa, plavix when okay with   MDD - mirtazapine, trazodone, escitalopram   PUD - sucralfate     Fluids: post op   Diet: ADAT  DVT prophylaxis: SCDs  Code status: Full    Disposition: post op care     Los Stone MD  [facility] Hospitalist   Answering service [phone]           [1]   Allergies  Allergen Reactions    Losartan UNKNOWN     Unable to remember reaction       [2]   Outpatient Medications Marked as Taking for the 25 encounter (Hospital Encounter)   Medication Sig Dispense Refill    Sodium Chloride-Xylitol (XLEAR SINUS CARE SPRAY NA) by Nasal route 3 (three) times daily.      [] doxycycline 100 MG Oral Cap Take 1 capsule (100 mg total) by mouth 2 (two) times daily for 7 days. 14 capsule 0    gabapentin 100 MG Oral Cap Take 1 capsule (100 mg total) by mouth 3 (three) times daily.      enalapril 10 MG Oral Tab Take 1  tablet (10 mg total) by mouth 2 (two) times daily.      alfuzosin ER 10 MG Oral Tablet 24 Hr Take 1 tablet (10 mg total) by mouth daily.      Polyethylene Glycol 3350 17 g Oral Powd Pack Take 17 g by mouth daily.      traZODone 50 MG Oral Tab Take 1 tablet (50 mg total) by mouth nightly.      zinc sulfate 220 (50 Zn) MG Oral Cap Take 1 capsule (220 mg total) by mouth daily.      niacin 500 MG Oral Tab Take 1 tablet (500 mg total) by mouth daily with breakfast.      sucralfate 1 g Oral Tab Take 1 tablet (1 g total) by mouth 3 (three) times daily before meals.      aspirin 81 MG Oral Chew Tab Chew 1 tablet (81 mg total) by mouth daily.      atorvastatin 80 MG Oral Tab Take 1 tablet (80 mg total) by mouth daily.      clopidogrel 75 MG Oral Tab Take 1 tablet (75 mg total) by mouth daily.      escitalopram 20 MG Oral Tab Take 1 tablet (20 mg total) by mouth at bedtime.      mirtazapine 15 MG Oral Tab Take 1 tablet (15 mg total) by mouth nightly.

## 2025-02-19 NOTE — OPERATIVE REPORT
[facility]  Urology Procedure Note  Deven Atkinson Patient Status:  Outpatient in a Bed    1930 MRN [id number]   Location [facility] OPERATING ROOM Attending Ryan Yeung MD   Hosp Day # 0 PCP Paulo James MD     Procedure: Cystoscopy and transurethral resection of prostate    Pre-Op Diagnosis:  Urinary retention and benign prostatic hyperplasia    Post-Op Diagnosis: Same    Procedure:  The patient was brought to the operating room where a timeout was performed per protocol and satisfactory general anesthesia was obtained. The patient was placed in lithotomy position and was prepped and draped in the usual fashion. The 26 Swiss resectoscope sheath was placed under direct vision. The anterior urethra was normal in appearance. The prostatic urethra was visually obstructed by primarily lateral lobe hyperplasia.  There were no tumors seen within the bladder.  The ureteral orifices were normal in position and configuration. The bipolar electrocautery loop was used to wilver the distal margin of resection at the veru as well as the ureteral orifices. Resection was initiated at the bladder neck and the middle lobe was removed back to the veru. This was followed by resection of the right lobe, left lobe, and anterior lobe of the prostate. The view from the veru was wide open after resection. Hemostasis was obtained throughout the case using the cautery loop as well as the bar electrode at the end of the case. All visible prostate fragments were removed from the bladder at the conclusion of the procedure. The resectoscope was removed and a 22 Swiss 3 way aaron was placed using a catheter guide. The balloon was inflated with 40 mL of sterile water and the catheter was placed to light traction. Continuous bladder irrigation was initiated with normal saline. The patient tolerated the procedure well.      Surgeon: Ryan Yeung    Anesthesia:  General    Findings:  The prostatic urethra was visually  obstructed by primarily lateral lobe hyperplasia.     Specimens: Prostate chips    Blood Loss:  50 mL    Complications:  None    Drains:  22 frency 3 way aaron    Secondary Diagnosis:  None    Ryan Yeung MD  2/19/2025

## 2025-02-19 NOTE — ANESTHESIA POSTPROCEDURE EVALUATION
Patient: Deven Atkinson    Procedure Summary       Date: 02/19/25 Room / Location: [facility] MAIN OR  / [facility] MAIN OR    Anesthesia Start: 1402 Anesthesia Stop: 1541    Procedure: Cystoscopy, transurethral resection of prostate (Bladder) Diagnosis: (Urinary retention, BPH with obstruction, lower urinary tract symptoms)    Surgeons: Ryan Yeung MD Anesthesiologist: Ciaran Underwood MD    Anesthesia Type: Not recorded ASA Status: 3            Anesthesia Type: No value filed.    Vitals Value Taken Time   /66 02/19/25 1540   Temp 98.1 °F (36.7 °C) 02/19/25 1540   Pulse 79 02/19/25 1540   Resp 17 02/19/25 1540   SpO2 100 % 02/19/25 1540   Vitals shown include unfiled device data.    [facility] AN Post Evaluation:   Patient Evaluated in PACU  Patient Participation: complete - patient cannot participate  Level of Consciousness: awake and alert  Pain Score: 0  Pain Management: adequate  Airway Patency:patent  Yes    Nausea/Vomiting: none  Cardiovascular Status: blood pressure returned to baseline  Respiratory Status: acceptable and nasal cannula  Postoperative Hydration acceptable      Dea Swanson CRNA  2/19/2025 3:41 PM

## 2025-02-20 VITALS
HEART RATE: 62 BPM | OXYGEN SATURATION: 99 % | HEIGHT: 67 IN | WEIGHT: 173 LBS | SYSTOLIC BLOOD PRESSURE: 135 MMHG | BODY MASS INDEX: 27.15 KG/M2 | TEMPERATURE: 98 F | RESPIRATION RATE: 18 BRPM | DIASTOLIC BLOOD PRESSURE: 84 MMHG

## 2025-02-20 LAB
ANION GAP SERPL CALC-SCNC: 10 MMOL/L (ref 0–18)
BUN BLD-MCNC: 14 MG/DL (ref 9–23)
BUN/CREAT SERPL: 16.5 (ref 10–20)
CALCIUM BLD-MCNC: 8.1 MG/DL (ref 8.7–10.4)
CHLORIDE SERPL-SCNC: 108 MMOL/L (ref 98–112)
CO2 SERPL-SCNC: 23 MMOL/L (ref 21–32)
CREAT BLD-MCNC: 0.85 MG/DL
DEPRECATED RDW RBC AUTO: 40.5 FL (ref 35.1–46.3)
EGFRCR SERPLBLD CKD-EPI 2021: 81 ML/MIN/1.73M2 (ref 60–?)
ERYTHROCYTE [DISTWIDTH] IN BLOOD BY AUTOMATED COUNT: 12.9 % (ref 11–15)
GLUCOSE BLD-MCNC: 135 MG/DL (ref 70–99)
HCT VFR BLD AUTO: 33.2 %
HGB BLD-MCNC: 11.4 G/DL
MCH RBC QN AUTO: 29.8 PG (ref 26–34)
MCHC RBC AUTO-ENTMCNC: 34.3 G/DL (ref 31–37)
MCV RBC AUTO: 86.7 FL
OSMOLALITY SERPL CALC.SUM OF ELEC: 295 MOSM/KG (ref 275–295)
PLATELET # BLD AUTO: 181 10(3)UL (ref 150–450)
POTASSIUM SERPL-SCNC: 4.1 MMOL/L (ref 3.5–5.1)
RBC # BLD AUTO: 3.83 X10(6)UL
SODIUM SERPL-SCNC: 141 MMOL/L (ref 136–145)
WBC # BLD AUTO: 10.4 X10(3) UL (ref 4–11)

## 2025-02-20 PROCEDURE — 85027 COMPLETE CBC AUTOMATED: CPT | Performed by: UROLOGY

## 2025-02-20 PROCEDURE — 80048 BASIC METABOLIC PNL TOTAL CA: CPT | Performed by: UROLOGY

## 2025-02-20 NOTE — PLAN OF CARE
Patient is alert, forgetful. CBI infusing, IVF infusing. Tolerating diet, denies any pain or nausea. Spouse at bedside. Call light within reach. Fall precautions maintained.    Problem: Patient Centered Care  Goal: Patient preferences are identified and integrated in the patient's plan of care  Description: Interventions:  - What would you like us to know as we care for you?   - Provide timely, complete, and accurate information to patient/family  - Incorporate patient and family knowledge, values, beliefs, and cultural backgrounds into the planning and delivery of care  - Encourage patient/family to participate in care and decision-making at the level they choose  - Honor patient and family perspectives and choices  Outcome: Progressing     Problem: PAIN - ADULT  Goal: Verbalizes/displays adequate comfort level or patient's stated pain goal  Description: INTERVENTIONS:  - Encourage pt to monitor pain and request assistance  - Assess pain using appropriate pain scale  - Administer analgesics based on type and severity of pain and evaluate response  - Implement non-pharmacological measures as appropriate and evaluate response  - Consider cultural and social influences on pain and pain management  - Manage/alleviate anxiety  - Utilize distraction and/or relaxation techniques  - Monitor for opioid side effects  - Notify MD/LIP if interventions unsuccessful or patient reports new pain  - Anticipate increased pain with activity and pre-medicate as appropriate  Outcome: Progressing     Problem: SAFETY ADULT - FALL  Goal: Free from fall injury  Description: INTERVENTIONS:  - Assess pt frequently for physical needs  - Identify cognitive and physical deficits and behaviors that affect risk of falls.  - [place] fall precautions as indicated by assessment.  - Educate pt/family on patient safety including physical limitations  - Instruct pt to call for assistance with activity based on assessment  - Modify environment to  reduce risk of injury  - Provide assistive devices as appropriate  - Consider OT/PT consult to assist with strengthening/mobility  - Encourage toileting schedule  Outcome: Progressing     Problem: GENITOURINARY - ADULT  Goal: Absence of urinary retention  Description: INTERVENTIONS:  - Assess patient’s ability to void and empty bladder  - Monitor intake/output and perform bladder scan as needed  - Follow urinary retention protocol/standard of care  - Consider collaborating with pharmacy to review patient's medication profile  - Implement strategies to promote bladder emptying  Outcome: Progressing     Problem: Impaired Cognition  Goal: Patient will exhibit improved attention, thought processing and/or memory  Description: Interventions:    Outcome: Progressing

## 2025-02-20 NOTE — DISCHARGE SUMMARY
General Medicine Discharge Summary     Patient ID:  Deven Atkinson  89 year old  7/9/1930    Admit date: 2/19/2025    Discharge date and time: 2/20/2025    Attending Physician: Ryan Yeung MD     Consults: IP CONSULT TO HOSPITALIST    Primary Care Physician: Paulo James MD     Reason for admission: Observation after elective prostatectomy    Risk For Readmission: Low    Discharge Diagnoses: Urinary retention, BPH with obstruction, lower urinary tract symptoms  See Additional Discharge Diagnoses in Hospital Course    Discharged Condition: good    Follow-up with labs/images appointments:       Exam  Gen: No acute distress  Pulm: Lungs clear, normal respiratory effort  CV: Heart with regular rate and rhythm  Abd: Abdomen soft,       HPI: Per Dr. Stone    Patient is a 89-year-old male with a past medical history significant for HTN, HDL, CVA, MDD, PUD, BPH, who presents for prostate resection.  Seen postoperatively and doing well.  Cantonese speaking.  Looks comfortable and moving all 4 extremities.     Hospital Course:   Patient was admitted for observation after elective prostatectomy treated overnight with continuous bladder irrigation urine cleared CBI was clamped discharged home with Jay close follow-up with urology.     Operative Procedures: Procedure(s) (LRB):  CYSTOSCOPY FULGURATION OF PROSTATE (N/A)     Imaging: No results found.    Disposition: home    Activity: activity as tolerated  Diet: regular diet  Wound Care: none needed  Code Status: DNAR/Selective Treatment  O2: none    Home Medication Changes: See list below    Med list     Medication List        CONTINUE taking these medications      alfuzosin ER 10 MG Tb24  Commonly known as: Uroxatral ER     aspirin 81 MG Chew     atorvastatin 80 MG Tabs  Commonly known as: Lipitor     clopidogrel 75 MG Tabs  Commonly known as: Plavix     enalapril 10 MG Tabs  Commonly known as: Vasotec     escitalopram 20 MG Tabs  Commonly known as: Lexapro      gabapentin 100 MG Caps  Commonly known as: Neurontin     mirtazapine 15 MG Tabs  Commonly known as: Remeron     niacin 500 MG Tabs     Polyethylene Glycol 3350 17 g Pack  Commonly known as: MIRALAX     sucralfate 1 g Tabs  Commonly known as: Carafate     traZODone 50 MG Tabs  Commonly known as: Desyrel     XLEAR SINUS CARE SPRAY NA     zinc sulfate 220 (50 Zn) MG Caps  Commonly known as: Zincate            STOP taking these medications      doxycycline 100 MG Caps  Commonly known as: Vibramycin              FU   Follow-up Information       Paulo James MD Follow up in 2 week(s).    Specialties: Internal Medicine, IP Consult to Primary Care  Contact information:  [address]  [facility] [address]  [facility] [address] [phone]               Ryan Yeung MD Follow up in 2 week(s).    Specialty: UROLOGY  Contact information:  [address]   [address]  [facility] [id number] [phone]                             DC instructions:      I reconciled current and discharge medications on day of discharge, discussed changes with patient and noted changes above.       Total Time Coordinating Care: 35 minutes    Patient had opportunity to ask questions and state understand and agree with therapeutic plan as outlined    Thank You,    Saravanan Langston,    Hospitalist with [facility]

## 2025-02-20 NOTE — ANESTHESIA POSTPROCEDURE EVALUATION
Patient: Deven Atkinson    Procedure Summary       Date: 02/19/25 Room / Location: [facility] MAIN OR  / [facility] MAIN OR    Anesthesia Start: 1706 Anesthesia Stop: 1818    Procedure: CYSTOSCOPY FULGURATION OF PROSTATE Diagnosis:       Bleeding      (Bleeding [R58])    Surgeons: Ryan Yeung MD Anesthesiologist: Renita Oliva MD    Anesthesia Type: general ASA Status: 3            Anesthesia Type: general    Vitals Value Taken Time   /67 02/19/25 1818   Temp 98.2 °F (36.8 °C) 02/19/25 1818   Pulse 64 02/19/25 1818   Resp 11 02/19/25 1818   SpO2 100 % 02/19/25 1818   Vitals shown include unfiled device data.    EM AN Post Evaluation:   Patient Evaluated in PACU  Patient Participation: complete - patient participated  Level of Consciousness: awake and alert  Pain Score: 0  Pain Management: adequate  Airway Patency:patent  Yes    Nausea/Vomiting: none  Cardiovascular Status: acceptable  Respiratory Status: acceptable  Postoperative Hydration acceptable      RENITA OLIVA MD  2/19/2025 6:19 PM

## 2025-02-20 NOTE — OPERATIVE REPORT
[facility]  Urology Procedure Note  Deven Atkinson Patient Status:  Outpatient in a Bed    1930 MRN [id number]   Location [facility] POST ANESTHESIA CARE UNIT Attending Ryan Yeung MD   Hosp Day # 0 PCP Paulo James MD     Procedure: Cystoscopy and fulguration of prostate    Pre-Op Diagnosis:  Hematuria    Post-Op Diagnosis: Same    Procedure:  The patient was brought to the operating room where a timeout was performed per protocol and satisfactory general anesthesia was obtained. The airway was controlled with the laryngeal mask. The patient was placed in lithotomy position and was prepped and draped in the usual fashion. The 26 Hungarian resectoscope sheath was placed under direct vision. The anterior urethra was normal in appearance. The prostatic urethra was visually unobstructed but lined with clot in the fossa.  The bipolar electrocautery loop and button were used to fulgurate the entire prostate bed and remove adherent clot. No arterial bleeding was found. There was oozing from the right anterior prostate. Excellent hemostasis was obtained. The resectoscope was removed and a 22 [place] 3 way aaron was placed. The balloon was inflated with 40 mL of sterile water and the catheter was placed to light traction. Continuous bladder irrigation was initiated with normal saline. The patient tolerated the procedure well.     Indications: 89 year old male with urinary retention and BPH who underwent a TURP. In recovery he was noted to develop gross hematuria which progressively increased. The decision was made to bring the patient back to the OR for cystoscopy and fulguration of the prostate.    Surgeon:  Dr. Ryan Yeung    Anesthesia:  General    Findings:  The prostatic urethra was visually unobstructed but lined with clot in the fossa. No arterial bleeding was found. There was oozing from the right anterior prostate.     Specimens: None    Blood Loss:  50 mL    Complications:   None    Drains:  22 Georgian 3 way aaron    Secondary Diagnosis:  None    Ryan Yeung MD  2/19/2025

## 2025-02-20 NOTE — DISCHARGE INSTRUCTIONS
Follow up with physicians as instructed, call their office to schedule an appointment.    Seek medical attention if you develop any chest pain or shortness of breath; severe nausea, vomiting, diarrhea or constipation; urine that is foul smelling or purulent; blood clots; no urine output; temperature greater than 100.5.    No lifting anything greater than 10-15 lbs until cleared by your surgeon.     RESUME PLAVIX ON MONDAY per Dr Mijares

## 2025-02-20 NOTE — PROGRESS NOTES
[facility]  [facility] Progress Note    Deven Atkinson Patient Status:  Outpatient in a Bed    1930 MRN [id number]   Location [facility] 4W/SW/SE Attending Ryan Yeung MD   Hosp Day # 0 PCP Paulo James MD     Deven Atkinson is a(n) 89 year old male admitted with:  No chief complaint on file.      SUBJECTIVE:   Reason for Urology Consult: BPH, Urinary Retention    OR yesterday, required take back due to prostatic bleeding.  No issue with catheter or CBI today.  Patient doing well and is without complaints .    Past Medical History:    BPH (benign prostatic hyperplasia)    Coronary atherosclerosis    Depression    Essential hypertension    Hearing impaired person, bilateral    High blood pressure    High cholesterol    History of stomach ulcers    Hyperlipidemia    Stroke (HCC)       Past Surgical History:   Procedure Laterality Date    Cath bare metal stent (bms)      Colonoscopy      Surgical stent      wife states Lft side of heart- [facility]      Family History   Adopted: Yes   Family history unknown: Yes     Allergies[1]   Social History:  Social History     Socioeconomic History    Marital status:    Tobacco Use    Smoking status: Former     Types: Cigarettes     Passive exposure: Never    Smokeless tobacco: Never    Tobacco comments:     QUIT SMOKING IN    Vaping Use    Vaping status: Never Used   Substance and Sexual Activity    Alcohol use: Not Currently    Drug use: Never     Social Drivers of Health     Food Insecurity: No Food Insecurity (2025)    NCSS - Food Insecurity     Worried About Running Out of Food in the Last Year: No     Ran Out of Food in the Last Year: No   Transportation Needs: No Transportation Needs (2025)    NCSS - Transportation     Lack of Transportation: No   Housing Stability: Not At Risk (2025)    NCSS - Housing/Utilities     Has Housing: Yes     Worried About Losing Housing: No     Unable to Get  Utilities: No        MEDICATIONS:     No current outpatient medications on file.       I/Os:   I/O last 3 completed shifts:  In: 3501 [P.O.:530; I.V.:2671; IV PIGGYBACK:300]  Out: 6125 [Urine:6075; Blood:50]     EXAM:     Vital 24 Hour Range   Temperature Temp  Min: 97.4 °F (36.3 °C)  Max: 98.2 °F (36.8 °C)   Pulse Pulse  Min: 53  Max: 79   Respiratory Resp  Min: 12  Max: 20   Non-Invasive  Blood Pressure BP  Min: 132/54  Max: 177/74   Pulse Oximetry SpO2  Min: 95 %  Max: 100 %     /84 (BP Location: Right arm)   Pulse 62   Temp 98 °F (36.7 °C) (Oral)   Resp 18   Ht 5' 7\" (1.702 m)   Wt 173 lb (78.5 kg)   SpO2 99%   BMI 27.10 kg/m²   Body mass index is 27.1 kg/m².  Physical Exam  Constitutional:       Appearance: Normal appearance.   HENT:      Head: Normocephalic and atraumatic.   Cardiovascular:      Rate and Rhythm: Normal rate.      Pulses: Normal pulses.   Pulmonary:      Effort: Pulmonary effort is normal. No respiratory distress.   Abdominal:      General: Abdomen is flat.      Palpations: Abdomen is soft.   Genitourinary:     Comments: 3 way catheter in place draining clear output on trickle CBI  Skin:     General: Skin is warm and dry.   Neurological:      Mental Status: He is alert and oriented to person, place, and time. Mental status is at baseline.   Psychiatric:         Mood and Affect: Mood normal.         Behavior: Behavior normal.         IMAGING:     No orders to display       LABS:   CBC  Recent Labs   Lab 02/20/25  0615   RBC 3.83   HGB 11.4*   HCT 33.2*   MCV 86.7   MCH 29.8   MCHC 34.3   RDW 12.9   WBC 10.4   .0        BMP  Recent Labs   Lab 02/20/25  0615   *   BUN 14   CREATSERUM 0.85   EGFRCR 81   CA 8.1*      K 4.1      CO2 23.0        Urinalysis/Cultures  No results for input(s): \"COLORUR\", \"CLARITY\", \"SPECGRAVITY\", \"GLUUR\", \"BILUR\", \"KETUR\", \"BLOODURINE\", \"PHURINE\", \"PROUR\", \"UROBILINOGEN\", \"NITRITE\", \"LEUUR\", \"WBCUR\", \"RBCUR\", \"BACUR\", \"EPIUR\" in the  last 168 hours.     Susceptibility data from last 90 days.  Collected Specimen Info Organism Ampicillin Ampicillin/Sulbactam Cefazolin Cefepime Ceftazidime Ceftriaxone Ciprofloxacin Gentamicin Levofloxacin Meropenem Nitrofurantoin Piperacillin/Tazobactam Tobramycin   01/10/25 Urine, clean catch Providencia rettgeri R  S  R  S   S  S  S  S  S  R  S      Pseudomonas aeruginosa     S  S   S   S  S   S  S     Collected Specimen Info Organism Trimethoprim/Sulfamethoxazole   01/10/25 Urine, clean catch Providencia rettgeri  S     Pseudomonas aeruginosa        ASSESSMENT AND PLAN:   Labs independently interpreted which are significant for stable renal function and hemoglobin.    Impression:  Patient Active Problem List   Diagnosis    Hyperglycemia    Azotemia    Anemia    Metabolic acidosis    Acute cystitis with hematuria    Depression    Cerebrovascular accident (CVA) ([facility])    Benign essential HTN    Hyperlipidemia    Metabolic encephalopathy    Diarrhea, unspecified type    Weakness    Dementia, unspecified dementia severity, unspecified dementia type, unspecified whether behavioral, psychotic, or mood disturbance or anxiety ([facility])    Clostridium difficile colitis    Falls    Diarrhea of presumed infectious origin    Acute appendicitis, unspecified acute appendicitis type    Benign prostatic hyperplasia with urinary obstruction    Chronic heart failure with preserved ejection fraction (HFpEF) ([facility])    Chronic vascular insufficiency of intestine ([facility])    LBBB (left bundle branch block)    Irritable bowel syndrome with constipation    Moderate vascular dementia without behavioral disturbance, psychotic disturbance, mood disturbance, or anxiety ([facility])    Psychophysiological insomnia    Urinary tract infection associated with indwelling urethral catheter, initial encounter    Pseudomonas aeruginosa infection    Jay catheter problem       Deven Atkinson is a(n) 89 year old male with:    BPH  Urinary  retention    Interventions:  2/20/25:  TURP  2/20/25:  Cystoscopy with prostate fulguration    - Wean CBI as able  - Appreciate medical management  - Plan to clamp CBI when hospitalist service believes patient is approaching discharge    DO [facility]  O:  [phone]  2/20/2025  1:05 PM         [1]   Allergies  Allergen Reactions    Losartan UNKNOWN     Unable to remember reaction

## 2025-02-21 ENCOUNTER — PATIENT OUTREACH (OUTPATIENT)
Dept: CASE MANAGEMENT | Age: OVER 89
End: 2025-02-21

## 2025-02-21 NOTE — PLAN OF CARE
Patient alert and oriented X4, forgetful at times; requires frequent reorientation. Continued with IVFs and IV Abx. Tolerated diet. Aaron with CBI this morning, clamped this afternoon. Notified Dr Mijares that urine a few hours after clamping is cherry in color, clear, no clots. Patient cleared for discharge with instructions per Dr Mijares to hold Plavix til Monday. Discharge instructions reviewed with patient and wife at [facility] including need for follow up; continued and held medicaions; and when to seek medical attention; aaron catheter with leg bag care provided. (patient and wife refused large overnight bag stating he wont use it). PIV removed. Patient escorted to lobby via wheelchair.    Problem: Patient Centered Care  Goal: Patient preferences are identified and integrated in the patient's plan of care  Description: Interventions:  - What would you like us to know as we care for you?   - Provide timely, complete, and accurate information to patient/family  - Incorporate patient and family knowledge, values, beliefs, and cultural backgrounds into the planning and delivery of care  - Encourage patient/family to participate in care and decision-making at the level they choose  - Honor patient and family perspectives and choices  Outcome: Adequate for Discharge     Problem: PAIN - ADULT  Goal: Verbalizes/displays adequate comfort level or patient's stated pain goal  Description: INTERVENTIONS:  - Encourage pt to monitor pain and request assistance  - Assess pain using appropriate pain scale  - Administer analgesics based on type and severity of pain and evaluate response  - Implement non-pharmacological measures as appropriate and evaluate response  - Consider cultural and social influences on pain and pain management  - Manage/alleviate anxiety  - Utilize distraction and/or relaxation techniques  - Monitor for opioid side effects  - Notify MD/LIP if interventions unsuccessful or patient reports new pain  -  Anticipate increased pain with activity and pre-medicate as appropriate  Outcome: Adequate for Discharge     Problem: SAFETY ADULT - FALL  Goal: Free from fall injury  Description: INTERVENTIONS:  - Assess pt frequently for physical needs  - Identify cognitive and physical deficits and behaviors that affect risk of falls.  - [place] fall precautions as indicated by assessment.  - Educate pt/family on patient safety including physical limitations  - Instruct pt to call for assistance with activity based on assessment  - Modify environment to reduce risk of injury  - Provide assistive devices as appropriate  - Consider OT/PT consult to assist with strengthening/mobility  - Encourage toileting schedule  Outcome: Adequate for Discharge     Problem: GENITOURINARY - ADULT  Goal: Absence of urinary retention  Description: INTERVENTIONS:  - Assess patient’s ability to void and empty bladder  - Monitor intake/output and perform bladder scan as needed  - Follow urinary retention protocol/standard of care  - Consider collaborating with pharmacy to review patient's medication profile  - Implement strategies to promote bladder emptying  Outcome: Adequate for Discharge     Problem: Impaired Cognition  Goal: Patient will exhibit improved attention, thought processing and/or memory  Description: Interventions:    Outcome: Adequate for Discharge

## 2025-02-21 NOTE — PROGRESS NOTES
PCP / Specialist appointment request (discharged 02/20)    Dr Paulo James  Internal Medicine, IP Consult to Primary Care  [facility]  [address]  [facility] [address]  [place] [address]  [phone]  Apt made:  Tue 02/25 @9:30am    Dr Ryan Yeung  Urology  [address]  [place] [address]  [phone]  Patient has existing appointment Fri 02/28 @11:40am  Confirmed w/pt wife, Radha  Closing encounter

## 2025-02-22 ENCOUNTER — HOSPITAL ENCOUNTER (OUTPATIENT)
Age: OVER 89
Discharge: HOME | End: 2025-02-22
Payer: COMMERCIAL

## 2025-02-22 ENCOUNTER — APPOINTMENT (OUTPATIENT)
Dept: GENERAL RADIOLOGY | Facility: HOSPITAL | Age: OVER 89
End: 2025-02-22
Attending: EMERGENCY MEDICINE
Payer: COMMERCIAL

## 2025-02-22 ENCOUNTER — APPOINTMENT (OUTPATIENT)
Dept: CT IMAGING | Facility: HOSPITAL | Age: OVER 89
End: 2025-02-22
Attending: EMERGENCY MEDICINE
Payer: COMMERCIAL

## 2025-02-22 VITALS
SYSTOLIC BLOOD PRESSURE: 156 MMHG | TEMPERATURE: 98 F | DIASTOLIC BLOOD PRESSURE: 67 MMHG | HEART RATE: 90 BPM | OXYGEN SATURATION: 98 % | RESPIRATION RATE: 19 BRPM

## 2025-02-22 DIAGNOSIS — R31.0 GROSS HEMATURIA: Primary | ICD-10-CM

## 2025-02-22 LAB
GLUCOSE UR STRIP-MCNC: NEGATIVE MG/DL
NITRITE UR QL STRIP: NEGATIVE
PH UR STRIP: 5.5 [PH]
PROT UR STRIP-MCNC: >=300 MG/DL
SP GR UR STRIP: 1.02
UROBILINOGEN UR STRIP-ACNC: <2 MG/DL

## 2025-02-22 PROCEDURE — 81002 URINALYSIS NONAUTO W/O SCOPE: CPT | Performed by: NURSE PRACTITIONER

## 2025-02-22 PROCEDURE — 70450 CT HEAD/BRAIN W/O DYE: CPT | Performed by: EMERGENCY MEDICINE

## 2025-02-22 PROCEDURE — 71045 X-RAY EXAM CHEST 1 VIEW: CPT | Performed by: EMERGENCY MEDICINE

## 2025-02-22 PROCEDURE — 99213 OFFICE O/P EST LOW 20 MIN: CPT | Performed by: NURSE PRACTITIONER

## 2025-02-22 NOTE — DISCHARGE INSTRUCTIONS
Contact Urology office Monday morning to confirm appointment to be seen Monday for evaluation:  [phone]     Go to ER if urine stops draining

## 2025-02-22 NOTE — ED PROVIDER NOTES
History     Chief Complaint   Patient presents with    Postop/Procedure Problem    Cath Tube Problem       Subjective:   HPI    Deven Atkinson, 89 year old male with notable medical history of BPH, HFpEF, HTN, CVA, dementia who presents with hematuria. Patient had TURP and cystoscopy with fulguration of prostate 2/20/25 and has had persistent hematuria. Denies dysuria or abdominal distension. Wife concerned given still having red urine. She has not spoken to the urologist.      Patient Active Problem List   Diagnosis    Hyperglycemia    Azotemia    Anemia    Metabolic acidosis    Acute cystitis with hematuria    Depression    Cerebrovascular accident (CVA) ([facility])    Benign essential HTN    Hyperlipidemia    Metabolic encephalopathy    Diarrhea, unspecified type    Weakness    Dementia, unspecified dementia severity, unspecified dementia type, unspecified whether behavioral, psychotic, or mood disturbance or anxiety ([facility])    Clostridium difficile colitis    Falls    Diarrhea of presumed infectious origin    Acute appendicitis, unspecified acute appendicitis type    Benign prostatic hyperplasia with urinary obstruction    Chronic heart failure with preserved ejection fraction (HFpEF) ([facility])    Chronic vascular insufficiency of intestine ([facility])    LBBB (left bundle branch block)    Irritable bowel syndrome with constipation    Moderate vascular dementia without behavioral disturbance, psychotic disturbance, mood disturbance, or anxiety ([facility])    Psychophysiological insomnia    Urinary tract infection associated with indwelling urethral catheter, initial encounter    Pseudomonas aeruginosa infection    Jay catheter problem      Objective:   Past Medical History:    BPH (benign prostatic hyperplasia)    Coronary atherosclerosis    Depression    Essential hypertension    Hearing impaired person, bilateral    High blood pressure    High cholesterol    History of stomach ulcers    Hyperlipidemia    Stroke ([facility])               Past Surgical History:   Procedure Laterality Date    Cath bare metal stent (bms)      Colonoscopy      Surgical stent      wife states [facility]                Social History     Socioeconomic History    Marital status:    Tobacco Use    Smoking status: Former     Types: Cigarettes     Passive exposure: Never    Smokeless tobacco: Never    Tobacco comments:     QUIT SMOKING IN 2000   Vaping Use    Vaping status: Never Used   Substance and Sexual Activity    Alcohol use: Not Currently    Drug use: Never     Social Drivers of Health     Food Insecurity: No Food Insecurity (2/19/2025)    NCSS - Food Insecurity     Worried About Running Out of Food in the Last Year: No     Ran Out of Food in the Last Year: No   Transportation Needs: No Transportation Needs (2/19/2025)    NCSS - Transportation     Lack of Transportation: No   Housing Stability: Not At Risk (2/19/2025)    NCSS - Housing/Utilities     Has Housing: Yes     Worried About Losing Housing: No     Unable to Get Utilities: No              Medications Ordered Prior to Encounter[1]      Constitutional and vital signs reviewed.      All other systems reviewed and negative except as noted above.    I have reviewed the family history, social history, allergies, and outpatient medications.     History reviewed from EMR: Encounters, problem list, allergies, medications      Physical Exam     ED Triage Vitals   BP 02/22/25 1325 156/67   Pulse 02/22/25 1325 90   Resp 02/22/25 1325 19   Temp 02/22/25 1333 98.2 °F (36.8 °C)   Temp src 02/22/25 1325 Oral   SpO2 02/22/25 1325 98 %   O2 Device 02/22/25 1325 None (Room air)       Current:/67   Pulse 90   Temp 98.2 °F (36.8 °C) (Temporal)   Resp 19   SpO2 98%       Physical Exam  Vitals and nursing note reviewed.   Constitutional:       General: He is not in acute distress.     Appearance: Normal appearance. He is normal weight. He is not ill-appearing or toxic-appearing.    HENT:      Head: Normocephalic and atraumatic.      Right Ear: External ear normal.      Left Ear: External ear normal.      Nose: Nose normal. No congestion or rhinorrhea.      Mouth/Throat:      Mouth: Mucous membranes are moist.   Eyes:      Extraocular Movements: Extraocular movements intact.      Conjunctiva/sclera: Conjunctivae normal.      Pupils: Pupils are equal, round, and reactive to light.   Cardiovascular:      Rate and Rhythm: Normal rate.      Pulses: Normal pulses.   Pulmonary:      Effort: Pulmonary effort is normal. No respiratory distress.   Abdominal:      Tenderness: There is no abdominal tenderness.   Genitourinary:     Comments: Indwelling catheter affixed to Right thigh w/ good slack. Notable bright red blood in catheter tubing and collection bag.  Musculoskeletal:         General: No swelling, tenderness or signs of injury. Normal range of motion.      Cervical back: Normal range of motion.   Skin:     General: Skin is warm and dry.      Capillary Refill: Capillary refill takes less than 2 seconds.   Neurological:      General: No focal deficit present.      Mental Status: He is alert and oriented to person, place, and time. Mental status is at baseline.   Psychiatric:         Mood and Affect: Mood normal.         Behavior: Behavior normal.         Thought Content: Thought content normal.         Judgment: Judgment normal.            ED Course     Labs Reviewed   URINE CULTURE, ROUTINE     No orders to display       Vitals:    02/22/25 1325 02/22/25 1333   BP: 156/67    Pulse: 90    Resp: 19    Temp:  98.2 °F (36.8 °C)   TempSrc: Oral Temporal   SpO2: 98%             [facility]        Deven Mata Demetrio, 89 year old male with medical history as noted above who presents with hematuria s/p TURP and cystoscopy fulguration of prostate   - Patient in NAD, VSS   - expected findings s/t procedure vs infection vs excessive bleeding vs other   - Perfect serve message sent to Urologists covering for Dr Yeung   -  Will check urine       ** See ED course below for additional information on care provided / interventions / notable events throughout patient's encounter.    ** See Home Care Instructions below for care measures to trial as applicable.    ED Course as of 02/22/25 1354  ------------------------------------------------------------  Time: 02/22 1351  Comment: Spoke to Dr Carlo Edmondson via [employer] (covering for Dr Yeung) regarding concerns. Advised that as long as patient's urine is draining he can go home and continue to hydrate, with patient contacting clinic Monday morning for evaluation on Monday.  Patient and spouse aware of plan  RTED precautions discussed       ** I have independently reviewed the radiology images, clinical lab results, and ECG tracings as described above (if applicable)    ** Concerning co-morbidities possibly affecting complaint / care: TURP, BPH, dementia    ** See disposition & plan section below for home care instructions - if applicable        Medical Decision Making  Amount and/or Complexity of Data Reviewed  Labs: ordered. Decision-making details documented in ED Course.        Disposition and Plan     Disposition:  Discharge  2/22/2025  1:50 pm    Clinical Impression:  1. Gross hematuria            Home care instructions:    Contact Urology office Monday morning to confirm appointment to be seen Monday for evaluation:  [phone]     Go to ER if urine stops draining      Follow-up:  No follow-up provider specified.        Medications Prescribed:  Current Discharge Medication List            Zbigniew Garcia, DNP, APRN, AGACNP-BC, FNP-C, CNL  Adult-Gerontology Acute Care & Family Nurse Practitioner  [facility]      The above patient (and/or guardian) was made aware that an appropriate evaluation has been performed, and that no additional testing is required at this time. In my medical judgment, there is currently no evidence of an immediate life-threatening or surgical  condition, therefore discharge is indicated at this time. The patient (and/or guardian) was advised that a small risk still exists that a serious condition could develop. The patient was instructed to arrange close follow-up with their primary care provider (or the referral provider given today). The patient received written and verbal instructions regarding their condition / concerns, demonstrated understanding, and is agreement with the outpatient treatment plan.            [1]   Current Facility-Administered Medications on File Prior to Encounter   Medication Dose Route Frequency Provider Last Rate Last Admin    [COMPLETED] acetaminophen (Tylenol Extra Strength) tab 1,000 mg  1,000 mg Oral Once Ryan Yeung MD   1,000 mg at 25 1232    [COMPLETED] ceFEPIme (Maxipime) 1 g in sodium chloride 0.9% 100 mL IVPB-MBP  1 g Intravenous Q8H Ryan Yeung MD   Stopped at 25 0900    [COMPLETED] ceFEPIme (Maxipime) 1 g in sodium chloride 0.9% 100 mL IVPB-MBP  1 g Intravenous Q8H Ryan Yeung MD   Stopped at 25 1011    [COMPLETED] hydrALAzine (Apresoline) 20 mg/mL injection 5 mg  5 mg Intravenous Once Ester Virgen MD   5 mg at 25 1644    [COMPLETED] furosemide (Lasix) tab 40 mg  40 mg Oral Once Naila Guidry MD   40 mg at 25 1258    [] sodium chloride 0.9% infusion   Intravenous Continuous iTgre Vásquez MD   Stopped at 25 0100    [COMPLETED] ciprofloxacin (Cipro) tab 250 mg  250 mg Oral Once Carmella Nguyen MD   250 mg at 01/10/25 1343    [COMPLETED] iopamidol 76% (ISOVUE-370) injection for power injector  80 mL Intravenous ONCE PRN Maximus Urban MD   80 mL at 24 1120    [COMPLETED] diphenhydrAMINE (Benadryl) cap/tab 25 mg  25 mg Oral Once Sean Davidson MD   25 mg at 24 0819     Current Outpatient Medications on File Prior to Encounter   Medication Sig Dispense Refill    Sodium Chloride-Xylitol (XLEAR SINUS CARE SPRAY NA) by Nasal route 3 (three) times daily.       gabapentin 100 MG Oral Cap Take 1 capsule (100 mg total) by mouth 3 (three) times daily.      enalapril 10 MG Oral Tab Take 1 tablet (10 mg total) by mouth 2 (two) times daily.      alfuzosin ER 10 MG Oral Tablet 24 Hr Take 1 tablet (10 mg total) by mouth daily.      Polyethylene Glycol 3350 17 g Oral Powd Pack Take 17 g by mouth daily.      traZODone 50 MG Oral Tab Take 1 tablet (50 mg total) by mouth nightly.      zinc sulfate 220 (50 Zn) MG Oral Cap Take 1 capsule (220 mg total) by mouth daily.      niacin 500 MG Oral Tab Take 1 tablet (500 mg total) by mouth daily with breakfast.      sucralfate 1 g Oral Tab Take 1 tablet (1 g total) by mouth 3 (three) times daily before meals.      aspirin 81 MG Oral Chew Tab Chew 1 tablet (81 mg total) by mouth daily.      atorvastatin 80 MG Oral Tab Take 1 tablet (80 mg total) by mouth daily.      escitalopram 20 MG Oral Tab Take 1 tablet (20 mg total) by mouth at bedtime.      mirtazapine 15 MG Oral Tab Take 1 tablet (15 mg total) by mouth nightly.

## 2025-02-26 ENCOUNTER — HOSPITAL ENCOUNTER (OUTPATIENT)
Age: OVER 89
Discharge: HOME | End: 2025-02-26
Payer: COMMERCIAL

## 2025-02-26 VITALS
RESPIRATION RATE: 18 BRPM | HEART RATE: 83 BPM | DIASTOLIC BLOOD PRESSURE: 71 MMHG | SYSTOLIC BLOOD PRESSURE: 140 MMHG | TEMPERATURE: 97 F | OXYGEN SATURATION: 98 %

## 2025-02-26 DIAGNOSIS — R33.9 URINARY RETENTION: Primary | ICD-10-CM

## 2025-02-26 DIAGNOSIS — N39.0 ACUTE UTI: ICD-10-CM

## 2025-02-26 LAB
BILIRUB UR QL STRIP: NEGATIVE
COLOR UR: YELLOW
GLUCOSE UR STRIP-MCNC: NEGATIVE MG/DL
KETONES UR STRIP-MCNC: NEGATIVE MG/DL
NITRITE UR QL STRIP: NEGATIVE
PH UR STRIP: 5.5 [PH]
PROT UR STRIP-MCNC: 100 MG/DL
SP GR UR STRIP: 1.01
UROBILINOGEN UR STRIP-ACNC: <2 MG/DL

## 2025-02-26 PROCEDURE — 99214 OFFICE O/P EST MOD 30 MIN: CPT | Performed by: NURSE PRACTITIONER

## 2025-02-26 PROCEDURE — 51702 INSERT TEMP BLADDER CATH: CPT | Performed by: NURSE PRACTITIONER

## 2025-02-26 PROCEDURE — 81002 URINALYSIS NONAUTO W/O SCOPE: CPT | Performed by: NURSE PRACTITIONER

## 2025-02-26 RX ORDER — LEVOFLOXACIN 750 MG/1
750 TABLET, FILM COATED ORAL DAILY
Qty: 7 TABLET | Refills: 0 | Status: SHIPPED | OUTPATIENT
Start: 2025-02-26 | End: 2025-03-05

## 2025-02-26 NOTE — ED INITIAL ASSESSMENT (HPI)
Pt here w/ c/o pain to lower abd. Feels like retaining urine. Has had to have a catheter several times.

## 2025-02-26 NOTE — DISCHARGE INSTRUCTIONS
A urine culture is pending. Drink fluids. Take the antibiotics as prescribed. Call tomorrow and make an appointment for follow up with Urology. For any worsening or concerning symptoms including nausea, vomiting, worsening pain, fevers, or any other worsening symptoms, go to the nearest emergency department for further evaluation.

## 2025-02-26 NOTE — ED PROVIDER NOTES
He    Patient Seen in: [facility]      History     Chief Complaint   Patient presents with    Urinary Symptoms     Stated Complaint: PAIN STOMACH  Subjective:   89-year-old male who is status post a TURP he had 8 days ago presents for urinary retention.  The patient's wife states that he had his catheter removed a few days ago.  He woke up yesterday with some pressure in his pelvis.  He is now able to urinate, but only small amounts.  He did notice some bright red blood in his urine.  He states he feels distended and bloated in the abdomen.  No nausea or vomiting.  No fevers or chills.  He is drinking and eating normally.  No flank pain.  No testicular complaints.  He appears nontoxic.      Objective:   Past Medical History:    BPH (benign prostatic hyperplasia)    Coronary atherosclerosis    Depression    Essential hypertension    Hearing impaired person, bilateral    High blood pressure    High cholesterol    History of stomach ulcers    Hyperlipidemia    Stroke ([facility])            Past Surgical History:   Procedure Laterality Date    Cath bare metal stent (bms)      Colonoscopy      Surgical stent      wife states Lft side of heart- [facility]              Social History     Socioeconomic History    Marital status:    Tobacco Use    Smoking status: Former     Types: Cigarettes     Passive exposure: Never    Smokeless tobacco: Never    Tobacco comments:     QUIT SMOKING IN 2000   Vaping Use    Vaping status: Never Used   Substance and Sexual Activity    Alcohol use: Not Currently    Drug use: Never     Social Drivers of Health     Food Insecurity: No Food Insecurity (2/19/2025)    NCSS - Food Insecurity     Worried About Running Out of Food in the Last Year: No     Ran Out of Food in the Last Year: No   Transportation Needs: No Transportation Needs (2/19/2025)    NCSS - Transportation     Lack of Transportation: No   Housing Stability: Not At Risk (2/19/2025)    NCSS -  Housing/Utilities     Has Housing: Yes     Worried About Losing Housing: No     Unable to Get Utilities: No            Review of Systems    Positive for stated complaint: Urinary Symptoms     Other systems are as noted in HPI.  Constitutional and vital signs reviewed.      All other systems reviewed and negative except as noted above.    Physical Exam     ED Triage Vitals [02/26/25 1511]   /71   Pulse 83   Resp 18   Temp 97.3 °F (36.3 °C)   Temp src Oral   SpO2 98 %   O2 Device None (Room air)     Current:/71   Pulse 83   Temp 97.3 °F (36.3 °C) (Oral)   Resp 18   SpO2 98%     Physical Exam  Vitals and nursing note reviewed.   Constitutional:       General: He is not in acute distress.     Appearance: Normal appearance. He is not toxic-appearing.   HENT:      Mouth/Throat:      Mouth: Mucous membranes are moist.   Cardiovascular:      Rate and Rhythm: Normal rate and regular rhythm.   Pulmonary:      Effort: Pulmonary effort is normal.      Breath sounds: Normal breath sounds.   Abdominal:      General: Bowel sounds are normal. There is distension.      Tenderness: There is no right CVA tenderness or left CVA tenderness.      Comments: Mild distention in the groin.  There is some feelings that he has to urinate when I palpate.  No CVA tenderness.   Musculoskeletal:         General: Normal range of motion.   Skin:     General: Skin is warm and dry.      Capillary Refill: Capillary refill takes less than 2 seconds.   Neurological:      General: No focal deficit present.      Mental Status: He is alert and oriented to person, place, and time.   Psychiatric:         Mood and Affect: Mood normal.         Behavior: Behavior normal.         ED Course   No results found.  Labs Reviewed   [facility] POCT URINALYSIS DIPSTICK - Abnormal; Notable for the following components:       Result Value    Urine Clarity Slightly cloudy (*)     Protein urine 100 (*)     Blood, Urine Large (*)     Leukocyte esterase urine Small  (*)     All other components within normal limits   URINE CULTURE, ROUTINE       MDM     Medical Decision Making  I spoke with Christin, the triage nurse for Dr. Yeung, the patient's urologist.  She states to place a catheter  with a leg blade, check for infection, and the patient's wife can call tomorrow to arrange close follow-up in the next couple days.  The nurse placed a Jay catheter, she obtained approximately 200 cc of urine.  His abdomen is now soft and nontender.  The urine was dipped, the results show small leukocytes and blood.  We will send this for culture.  I will treat the patient with Levaquin based on his complicated history.  We discussed making sure he is drinking plenty of fluids and his wife states she will call tomorrow morning to arrange close follow-up with the urologist.  He and his wife are aware if he develops any worsening symptoms including nausea, vomiting, worsening pain, fevers, or any other worsening or concerning symptoms, to take him to the nearest emergency department for further evaluation.    Amount and/or Complexity of Data Reviewed  Independent Historian: spouse  Labs: ordered.     Details: The urine dip shows large blood, small leukocytes, and 100 of protein.  Discussion of management or test interpretation with external provider(s): I discussed this patient with Dr. Harley. He agrees with the plan of care.     Risk  OTC drugs.  Prescription drug management.  Risk Details: Urinary retention versus UTI versus pyelonephritis        Disposition and Plan     Clinical Impression:  1. Urinary retention    2. Acute UTI         Disposition:  Discharge  2/26/2025  4:27 pm    Follow-up:  Paulo James MD  [address] [place] [id number]  [phone]    Call in 1 day  to make a follow up appointment          Medications Prescribed:  Discharge Medication List as of 2/26/2025  4:27 PM        START taking these medications    Details   levoFLOXacin 750 MG Oral Tab  Take 1 tablet (750 mg total) by mouth daily for 7 days., Normal, Disp-7 tablet, R-0

## 2025-02-27 ENCOUNTER — HOSPITAL ENCOUNTER (EMERGENCY)
Facility: HOSPITAL | Age: OVER 89
Discharge: HOME | End: 2025-02-27
Attending: EMERGENCY MEDICINE
Payer: COMMERCIAL

## 2025-02-27 ENCOUNTER — HOSPITAL ENCOUNTER (OUTPATIENT)
Age: OVER 89
Discharge: HOME | End: 2025-02-27
Payer: COMMERCIAL

## 2025-02-27 VITALS
OXYGEN SATURATION: 100 % | HEART RATE: 73 BPM | BODY MASS INDEX: 27 KG/M2 | DIASTOLIC BLOOD PRESSURE: 70 MMHG | SYSTOLIC BLOOD PRESSURE: 158 MMHG | TEMPERATURE: 99 F | RESPIRATION RATE: 16 BRPM | WEIGHT: 173.06 LBS

## 2025-02-27 VITALS
HEART RATE: 85 BPM | TEMPERATURE: 99 F | OXYGEN SATURATION: 99 % | RESPIRATION RATE: 22 BRPM | SYSTOLIC BLOOD PRESSURE: 146 MMHG | DIASTOLIC BLOOD PRESSURE: 67 MMHG

## 2025-02-27 DIAGNOSIS — T83.9XXA PROBLEM WITH FOLEY CATHETER, INITIAL ENCOUNTER: Primary | ICD-10-CM

## 2025-02-27 DIAGNOSIS — Z97.8 FOLEY CATHETER PRESENT: Primary | ICD-10-CM

## 2025-02-27 PROCEDURE — 99212 OFFICE O/P EST SF 10 MIN: CPT | Performed by: PHYSICIAN ASSISTANT

## 2025-02-27 PROCEDURE — 99282 EMERGENCY DEPT VISIT SF MDM: CPT

## 2025-02-27 NOTE — ED PROVIDER NOTES
Patient Seen in: [facility]mediate [facility]      History     Chief Complaint   Patient presents with    Cath Tube Problem     Stated Complaint: leaking Bag    Subjective:   HPI    89 YO male presents to immediate care with his wife for leaking Aaron.  Patient had Aaron catheter placed yesterday.  Patient was evaluated at emergency department this morning for leaking aaron, stopper was placed on the catheter and he was discharged home. Wife is requesting a larger bag.   Wife states next Urology appointment is March 27 but she is calling today to try to get an earlier appointment.        Objective:     No pertinent past medical history.            No pertinent past surgical history.              No pertinent social history.            Review of Systems    Positive for stated complaint: leaking Bag  Other systems are as noted in HPI.  Constitutional and vital signs reviewed.      All other systems reviewed and negative except as noted above.    Physical Exam     ED Triage Vitals [02/27/25 1144]   /67   Pulse 85   Resp 22   Temp 98.7 °F (37.1 °C)   Temp src Oral   SpO2 99 %   O2 Device None (Room air)       Current Vitals:   Vital Signs  BP: 146/67  Pulse: 85  Resp: 22  Temp: 98.7 °F (37.1 °C)  Temp src: Oral    Oxygen Therapy  SpO2: 99 %  O2 Device: None (Room air)        Physical Exam  Vitals and nursing note reviewed.   Constitutional:       General: He is not in acute distress.     Appearance: Normal appearance. He is not ill-appearing, toxic-appearing or diaphoretic.   Cardiovascular:      Rate and Rhythm: Normal rate and regular rhythm.   Pulmonary:      Effort: Pulmonary effort is normal. No respiratory distress.   Abdominal:      Palpations: Abdomen is soft.      Tenderness: There is no abdominal tenderness.   Neurological:      Mental Status: He is alert and oriented to person, place, and time.   Psychiatric:         Behavior: Behavior normal.         ED Course   Labs Reviewed - No data to  display       MDM      89 YO male presenting for aaron catheter leaking. Patient evaluated in ER this morning for same complaint. Stopper was placed on the catheter at the ER and was draining without difficulty, discharged home.  At time of my evaluation, aaron catheter is in place and is not leaking.  Abdomen is soft and nontender.  Wife is requesting a larger aaron catheter bag, which this clinic does not have.  She states she will call patient's urology office to try to get an earlier appointment.  Return instructions discussed with understanding.       Medical Decision Making      Disposition and Plan     Clinical Impression:  1. Aaron catheter present         Disposition:  Discharge  2/27/2025 12:16 pm    Follow-up:  No follow-up provider specified.        Medications Prescribed:  Discharge Medication List as of 2/27/2025 12:19 PM              Supplementary Documentation:

## 2025-02-27 NOTE — ED INITIAL ASSESSMENT (HPI)
Seen here yesterday, then went to ER for aaron bag leaking but that they \"didn't fix it\" and it continues to leak.

## 2025-02-27 NOTE — ED PROVIDER NOTES
Patient Seen in: [facility] Emergency Department      History     Chief Complaint   Patient presents with    Cath Tube Problem     Stated Complaint: Leaking aaron    Subjective:   HPI      89-year-old male presenting emerged part for leaking Aaron.  Patient has had a Aaron K-pad catheter placed yesterday was leaking in the bag prompting visit here.  No complaints of pain abdominal distention or any other complaints    Objective:     Past Medical History:    BPH (benign prostatic hyperplasia)    Coronary atherosclerosis    Depression    Essential hypertension    Hearing impaired person, bilateral    High blood pressure    High cholesterol    History of stomach ulcers    Hyperlipidemia    Stroke (HCC)              Past Surgical History:   Procedure Laterality Date    Cath bare metal stent (bms)      Colonoscopy      Surgical stent      wife states Lft side of heart- [facility]                Social History     Socioeconomic History    Marital status:    Tobacco Use    Smoking status: Former     Types: Cigarettes     Passive exposure: Never    Smokeless tobacco: Never    Tobacco comments:     QUIT SMOKING IN 2000   Vaping Use    Vaping status: Never Used   Substance and Sexual Activity    Alcohol use: Not Currently    Drug use: Never     Social Drivers of Health     Food Insecurity: No Food Insecurity (2/19/2025)    NCSS - Food Insecurity     Worried About Running Out of Food in the Last Year: No     Ran Out of Food in the Last Year: No   Transportation Needs: No Transportation Needs (2/19/2025)    NCSS - Transportation     Lack of Transportation: No   Housing Stability: Not At Risk (2/19/2025)    NCSS - Housing/Utilities     Has Housing: Yes     Worried About Losing Housing: No     Unable to Get Utilities: No                  Physical Exam     ED Triage Vitals [02/27/25 0345]   /70   Pulse 73   Resp 16   Temp 98.9 °F (37.2 °C)   Temp src Oral   SpO2 100 %   O2 Device None (Room air)        Current Vitals:   Vital Signs  BP: 158/70  Pulse: 73  Resp: 16  Temp: 98.9 °F (37.2 °C)  Temp src: Oral    Oxygen Therapy  SpO2: 100 %  O2 Device: None (Room air)        Physical Exam  Awake alert patient appears no distress HEENT exam is normal lungs are clear cardiovascular exam regular rhythm abdomen soft nontender extremities no COVID cyanosis or edema Jay catheter in place    ED Course   Labs Reviewed - No data to display         Differential diagnosis includes obstruction, renal failure       MDM              Medical Decision Making  89-year-old male presenting emerged part for Jay catheter leaking.  Turns out that the patient has a Jay catheter without a stopper at this time.  Stopper was placed on the catheter and is draining without difficulty.  Patient will be discharged home  The patient was screened and evaluated during this visit.  As a treating physician attending to the patient, I determined, within reasonable clinical confidence and prior to discharge, that an emergency medical condition was not or was no longer present.  There was no indication for further evaluation, treatment or admission on an emergency basis.    The usual and customary discharge instructions were discussed given the patient's ER course.  We discussed signs and symptoms that should prompt the patient's immediate return to the emergency department.  Reasonable over-the-counter and prescription treatment options and physician follow-up plan was discussed.  Patient was discharged home in good condition  This note was prepared using Dragon Medical voice recognition dictation software.  As a result errors may occur.  When identified to these areas have been corrected.  While every attempt is made to correct errors during dictation discrepancies may still exist.  Please contact if there are any errors    Problems Addressed:  Problem with Jay catheter, initial encounter: acute illness or injury        Disposition and Plan      Clinical Impression:  1. Problem with Jay catheter, initial encounter         Disposition:  There is no disposition on file for this visit.  There is no disposition time on file for this visit.    Follow-up:  No follow-up provider specified.        Medications Prescribed:  Current Discharge Medication List              Supplementary Documentation:

## 2025-02-27 NOTE — ED INITIAL ASSESSMENT (HPI)
Patient here with c/o aaron catheter leaking.  Patient had it placed in ED yesterday.  Wife reports it has been leaking in bed.

## 2025-03-01 ENCOUNTER — HOSPITAL ENCOUNTER (EMERGENCY)
Facility: HOSPITAL | Age: OVER 89
Discharge: HOME | End: 2025-03-01
Attending: EMERGENCY MEDICINE
Payer: COMMERCIAL

## 2025-03-01 VITALS
OXYGEN SATURATION: 100 % | RESPIRATION RATE: 16 BRPM | HEART RATE: 65 BPM | TEMPERATURE: 98 F | SYSTOLIC BLOOD PRESSURE: 120 MMHG | DIASTOLIC BLOOD PRESSURE: 72 MMHG | BODY MASS INDEX: 27 KG/M2 | WEIGHT: 173.06 LBS

## 2025-03-01 DIAGNOSIS — T83.9XXA PROBLEM WITH FOLEY CATHETER, INITIAL ENCOUNTER: ICD-10-CM

## 2025-03-01 DIAGNOSIS — R31.0 GROSS HEMATURIA: Primary | ICD-10-CM

## 2025-03-01 LAB
ALBUMIN SERPL-MCNC: 3.8 G/DL (ref 3.2–4.8)
ALBUMIN/GLOB SERPL: 1.2 {RATIO} (ref 1–2)
ALP LIVER SERPL-CCNC: 122 U/L
ALT SERPL-CCNC: 16 U/L
ANION GAP SERPL CALC-SCNC: 8 MMOL/L (ref 0–18)
AST SERPL-CCNC: 21 U/L (ref ?–34)
BASOPHILS # BLD AUTO: 0.07 X10(3) UL (ref 0–0.2)
BASOPHILS NFR BLD AUTO: 0.8 %
BILIRUB SERPL-MCNC: 0.7 MG/DL (ref 0.2–0.9)
BILIRUB UR QL STRIP.AUTO: NEGATIVE
BUN BLD-MCNC: 12 MG/DL (ref 9–23)
CALCIUM BLD-MCNC: 8.5 MG/DL (ref 8.7–10.6)
CHLORIDE SERPL-SCNC: 108 MMOL/L (ref 98–112)
CO2 SERPL-SCNC: 23 MMOL/L (ref 21–32)
CREAT BLD-MCNC: 1.04 MG/DL
EGFRCR SERPLBLD CKD-EPI 2021: 67 ML/MIN/1.73M2 (ref 60–?)
EOSINOPHIL # BLD AUTO: 0.1 X10(3) UL (ref 0–0.7)
EOSINOPHIL NFR BLD AUTO: 1.1 %
ERYTHROCYTE [DISTWIDTH] IN BLOOD BY AUTOMATED COUNT: 13 %
GLOBULIN PLAS-MCNC: 3.1 G/DL (ref 2–3.5)
GLUCOSE BLD-MCNC: 111 MG/DL (ref 70–99)
GLUCOSE UR STRIP.AUTO-MCNC: NEGATIVE MG/DL
HCT VFR BLD AUTO: 34 %
HGB BLD-MCNC: 11.6 G/DL
IMM GRANULOCYTES # BLD AUTO: 0.03 X10(3) UL (ref 0–1)
IMM GRANULOCYTES NFR BLD: 0.3 %
KETONES UR STRIP.AUTO-MCNC: NEGATIVE MG/DL
LYMPHOCYTES # BLD AUTO: 3.47 X10(3) UL (ref 1–4)
LYMPHOCYTES NFR BLD AUTO: 38 %
MCH RBC QN AUTO: 28.7 PG (ref 26–34)
MCHC RBC AUTO-ENTMCNC: 34.1 G/DL (ref 31–37)
MCV RBC AUTO: 84.2 FL
MONOCYTES # BLD AUTO: 0.63 X10(3) UL (ref 0.1–1)
MONOCYTES NFR BLD AUTO: 6.9 %
NEUTROPHILS # BLD AUTO: 4.82 X10 (3) UL (ref 1.5–7.7)
NEUTROPHILS # BLD AUTO: 4.82 X10(3) UL (ref 1.5–7.7)
NEUTROPHILS NFR BLD AUTO: 52.9 %
NITRITE UR QL STRIP.AUTO: NEGATIVE
OSMOLALITY SERPL CALC.SUM OF ELEC: 288 MOSM/KG (ref 275–295)
PH UR STRIP.AUTO: 6 [PH] (ref 5–8)
PLATELET # BLD AUTO: 273 10(3)UL (ref 150–450)
POTASSIUM SERPL-SCNC: 4.1 MMOL/L (ref 3.5–5.1)
PROT SERPL-MCNC: 6.9 G/DL (ref 5.7–8.2)
PROT UR STRIP.AUTO-MCNC: >=300 MG/DL
RBC # BLD AUTO: 4.04 X10(6)UL
RBC #/AREA URNS AUTO: >10 /HPF
SODIUM SERPL-SCNC: 139 MMOL/L (ref 136–145)
SP GR UR STRIP.AUTO: 1.02 (ref 1–1.03)
UROBILINOGEN UR STRIP.AUTO-MCNC: 0.2 MG/DL
WBC # BLD AUTO: 9.1 X10(3) UL (ref 4–11)

## 2025-03-01 PROCEDURE — 85025 COMPLETE CBC W/AUTO DIFF WBC: CPT | Performed by: EMERGENCY MEDICINE

## 2025-03-01 PROCEDURE — 99284 EMERGENCY DEPT VISIT MOD MDM: CPT

## 2025-03-01 PROCEDURE — 81015 MICROSCOPIC EXAM OF URINE: CPT | Performed by: EMERGENCY MEDICINE

## 2025-03-01 PROCEDURE — 81001 URINALYSIS AUTO W/SCOPE: CPT | Performed by: EMERGENCY MEDICINE

## 2025-03-01 PROCEDURE — 80053 COMPREHEN METABOLIC PANEL: CPT | Performed by: EMERGENCY MEDICINE

## 2025-03-01 PROCEDURE — 87086 URINE CULTURE/COLONY COUNT: CPT | Performed by: EMERGENCY MEDICINE

## 2025-03-01 PROCEDURE — 96374 THER/PROPH/DIAG INJ IV PUSH: CPT

## 2025-03-01 PROCEDURE — 51798 US URINE CAPACITY MEASURE: CPT

## 2025-03-01 RX ORDER — MORPHINE SULFATE 2 MG/ML
2 INJECTION, SOLUTION INTRAMUSCULAR; INTRAVENOUS ONCE
Status: COMPLETED | OUTPATIENT
Start: 2025-03-01 | End: 2025-03-01

## 2025-03-01 RX ORDER — MORPHINE SULFATE 2 MG/ML
INJECTION, SOLUTION INTRAMUSCULAR; INTRAVENOUS
Status: COMPLETED
Start: 2025-03-01 | End: 2025-03-01

## 2025-03-01 NOTE — ED INITIAL ASSESSMENT (HPI)
Pt in wheelchair, with wife  Reports blood in aaron- noticed it this morning. Reports no pain- just feels abdominal bloating.

## 2025-03-01 NOTE — ED PROVIDER NOTES
Patient Seen in: [facility] Emergency Department      History     Chief Complaint   Patient presents with    Cath Tube Problem     Stated Complaint: pt wife states blood in cath    Subjective:   HPI      89-year-old male began experiencing bleeding tonight. Prior to this, there was no visible blood. He has a history of urinary obstruction, the patient had a TURP procedure on February 19, 2025. His urologist is Dr. Ryan Rodarte.  Patient has had numerous ER visits for urinary obstruction and bleeding from Jay.    Objective:     Past Medical History:    BPH (benign prostatic hyperplasia)    Coronary atherosclerosis    Depression    Essential hypertension    Hearing impaired person, bilateral    High blood pressure    High cholesterol    History of stomach ulcers    Hyperlipidemia    Stroke (HCC)              Past Surgical History:   Procedure Laterality Date    Cath bare metal stent (bms)      Colonoscopy      Surgical stent      wife states Lft side of heart- [facility]                Social History     Socioeconomic History    Marital status:    Tobacco Use    Smoking status: Former     Types: Cigarettes     Passive exposure: Never    Smokeless tobacco: Never    Tobacco comments:     QUIT SMOKING IN 2000   Vaping Use    Vaping status: Never Used   Substance and Sexual Activity    Alcohol use: Not Currently    Drug use: Never     Social Drivers of Health     Food Insecurity: No Food Insecurity (2/19/2025)    NCSS - Food Insecurity     Worried About Running Out of Food in the Last Year: No     Ran Out of Food in the Last Year: No   Transportation Needs: No Transportation Needs (2/19/2025)    NCSS - Transportation     Lack of Transportation: No   Housing Stability: Not At Risk (2/19/2025)    NCSS - Housing/Utilities     Has Housing: Yes     Worried About Losing Housing: No     Unable to Get Utilities: No                  Physical Exam     ED Triage Vitals [03/01/25 0138]   /72   Pulse 94    Resp 18   Temp 97.6 °F (36.4 °C)   Temp src Temporal   SpO2 96 %   O2 Device None (Room air)       Current Vitals:   Vital Signs  BP: 120/72  Pulse: 94  Resp: 18  Temp: 97.6 °F (36.4 °C)  Temp src: Temporal    Oxygen Therapy  SpO2: 96 %  O2 Device: None (Room air)        Physical Exam    Vital signs reviewed  General appearance: Patient is alert and in no acute distress  HEENT: Pupils equal react to light extraocular muscles intact no scleral icterus, mucous membranes are moist, there is no erythema or exudate in the posterior pharynx  Neck: Supple no JVD no lymphadenopathy no meningismus no carotid bruit  CV: Regular rate and rhythm no murmur rub  Respiratory: Clear to auscultation bilaterally no crackles no wheezes no accessory muscle use  Abdomen: Soft nontender nondistended, no rebound no guarding  no hepatosplenomegaly bowel sounds are present , no pulsatile mass there does appear to be clots in the Jay  Extremities: No clubbing cyanosis or edema 2+ distal pulses.  Neuro: Cranial nerves II through XII intact with no gross focal sensory or motor abnormality.      ED Course     Labs Reviewed   COMP METABOLIC PANEL (14) - Abnormal; Notable for the following components:       Result Value    Glucose 111 (*)     Calcium, Total 8.5 (*)     Alkaline Phosphatase 122 (*)     All other components within normal limits   CBC WITH DIFFERENTIAL WITH PLATELET - Abnormal; Notable for the following components:    HGB 11.6 (*)     HCT 34.0 (*)     All other components within normal limits   URINALYSIS WITH CULTURE REFLEX - Abnormal; Notable for the following components:    Urine Color Red (*)     Clarity Urine Turbid (*)     Blood Urine Moderate (*)     Protein Urine >=300 (*)     Leukocyte Esterase Urine Small (*)     All other components within normal limits   UA MICROSCOPIC ONLY, URINE - Abnormal; Notable for the following components:    RBC Urine >10 (*)     Bacteria Urine 1+ (*)     Squamous Epi. Cells Few (*)     All  other components within normal limits   RAINBOW DRAW BLUE   URINE CULTURE, ROUTINE            Patient had Jay removed and put an irrigating Jay and then will irrigate his bladder.  Will check a CBC chemistry and a urinalysis.  Will reassess after irrigation.     Irrigating Jay was done and had complete resolution of blood and had clear urine.  Will discharge him home and he should absolutely follow-up with urology has had numerous ER visits for Jay issues.  Wife agrees with plan.  Do not feel there is any obvious infection.  There was some small leukoesterase and little bacteria but squamous epithelial cells  MDM      Differential diagnosis reflecting the complexity of care include: Post TURP procedure, hematuria, Jay catheter blockage    Comorbidities that add complexity to management include: Recent TURP    External chart review was done and was noted: Numerous ER visits for Jay issues were reviewed    Diagnostic tests and medications considered but not ordered were: Antibiotics were considered but do not feel necessary    Social determinants of health that affect care: Patient always seems to come in at night for his Jay issues    Shared decision making was done by myself patient and his wife.  Told him he needs to follow-up with urology to get a game plan not to have to come to the ER.            Medical Decision Making      Disposition and Plan     Clinical Impression:  1. Gross hematuria    2. Problem with Jay catheter, initial encounter         Disposition:  Discharge  3/1/2025  3:34 am    Follow-up:  Ryan Yeung MD  [address]  [address] [place] [id number] [phone]    Follow up            Medications Prescribed:  Current Discharge Medication List              Supplementary Documentation: Patient was screened and evaluated during this visit.  As the treating physician attending to the patient, I determined within reasonable clinical confidence and prior to discharge, that an  emergency medical condition was not or was no longer present.  There was no indication for further evaluation, treatment, or admission on an emergency basis.  Comprehensive verbal and written discharge and follow-up instructions were provided to help prevent relapse or worsening.  Patient was instructed to follow-up with primary care provider for further evaluation treatment, return immediately to ER for worsening, concerning, new, or changing/persisting symptoms.  I discussed the case with the patient and they had no questions, complaints, or concerns.  Patient was comfortable going home.      Dictation Disclaimer Note:   To increase efficiency this document may have been prepared using voice recognition technology. Every effort has been made to correct any errors made during preparation of this note. However, if a word or phrase is confusing, or does not make sense, this is likely due to a recognition error within the program which was not discovered during editing. Please do not hesitate to contact to address any significant errors.    Note to Patient:   The 21st Century Cures Act makes medical notes like these available to patients in the interest of transparency. Please be advised this is a medical document. Medical documents are intended to carry relevant information, facts as evident, and the clinical opinion of the practitioner. The medical note is intended as peer to peer communication and may appear blunt or direct. It is written in medical language and may contain abbreviations or verbiage that are unfamiliar.

## 2025-03-02 ENCOUNTER — HOSPITAL ENCOUNTER (OUTPATIENT)
Age: OVER 89
Discharge: HOME | End: 2025-03-02
Payer: COMMERCIAL

## 2025-03-02 VITALS
DIASTOLIC BLOOD PRESSURE: 60 MMHG | HEART RATE: 86 BPM | OXYGEN SATURATION: 97 % | HEIGHT: 67 IN | WEIGHT: 160 LBS | RESPIRATION RATE: 18 BRPM | TEMPERATURE: 97 F | SYSTOLIC BLOOD PRESSURE: 136 MMHG | BODY MASS INDEX: 25.11 KG/M2

## 2025-03-02 DIAGNOSIS — K59.00 CONSTIPATION, UNSPECIFIED CONSTIPATION TYPE: Primary | ICD-10-CM

## 2025-03-02 RX ORDER — SODIUM PHOSPHATE, DIBASIC AND SODIUM PHOSPHATE, MONOBASIC 7; 19 G/230ML; G/230ML
1 ENEMA RECTAL ONCE AS NEEDED
Qty: 2 EACH | Refills: 0 | Status: SHIPPED | OUTPATIENT
Start: 2025-03-02 | End: 2025-03-02

## 2025-03-02 NOTE — ED INITIAL ASSESSMENT (HPI)
BM today but c/o of constipation   Jay cath in place   Took suppository today x 3 years  Taking miralax  Bloated

## 2025-03-02 NOTE — ED PROVIDER NOTES
Patient Seen in: [facility]      History     Chief Complaint   Patient presents with    Constipation     Stated Complaint: constipated, bloated    Subjective:   HPI    89-year-old male with extensive past medical history as indicated below, presents with acute onset of reported constipation.  Patient presents with wife today who reports passage of small amount of stool with increased straining this morning.  Wife reports 3-year history of unspecified constipation.  No medications administered prior to arrival.  Patient's wife reports that their PCP recommended half bottle of magnesium citrate as needed for constipation.  Patient otherwise denies fevers, nausea, vomiting, diarrhea, abdominal pain.    Objective:     Past Medical History:    BPH (benign prostatic hyperplasia)    Coronary atherosclerosis    Depression    Essential hypertension    Hearing impaired person, bilateral    High blood pressure    High cholesterol    History of stomach ulcers    Hyperlipidemia    Stroke (HCC)              Past Surgical History:   Procedure Laterality Date    Cath bare metal stent (bms)      Colonoscopy      Surgical stent      wife states Lft side of heart- [facility]                Social History     Socioeconomic History    Marital status:    Tobacco Use    Smoking status: Former     Types: Cigarettes     Passive exposure: Never    Smokeless tobacco: Never    Tobacco comments:     QUIT SMOKING IN 2000   Vaping Use    Vaping status: Never Used   Substance and Sexual Activity    Alcohol use: Not Currently    Drug use: Never     Social Drivers of Health     Food Insecurity: No Food Insecurity (2/19/2025)    NCSS - Food Insecurity     Worried About Running Out of Food in the Last Year: No     Ran Out of Food in the Last Year: No   Transportation Needs: No Transportation Needs (2/19/2025)    NCSS - Transportation     Lack of Transportation: No   Housing Stability: Not At Risk (2/19/2025)     NCSS - Housing/Utilities     Has Housing: Yes     Worried About Losing Housing: No     Unable to Get Utilities: No              Review of Systems   All other systems reviewed and are negative.      Positive for stated complaint: constipated, bloated  Other systems are as noted in HPI.  Constitutional and vital signs reviewed.      All other systems reviewed and negative except as noted above.    Physical Exam     ED Triage Vitals [03/02/25 1030]   /60   Pulse 86   Resp 18   Temp 97.4 °F (36.3 °C)   Temp src Oral   SpO2 97 %   O2 Device None (Room air)       Current Vitals:   Vital Signs  BP: 136/60  Pulse: 86  Resp: 18  Temp: 97.4 °F (36.3 °C)  Temp src: Oral    Oxygen Therapy  SpO2: 97 %  O2 Device: None (Room air)        Physical Exam  Vitals and nursing note reviewed.   Constitutional:       General: He is not in acute distress.     Appearance: Normal appearance. He is normal weight. He is not ill-appearing, toxic-appearing or diaphoretic.   HENT:      Head: Normocephalic and atraumatic.      Nose: Nose normal.   Eyes:      Extraocular Movements: Extraocular movements intact.      Pupils: Pupils are equal, round, and reactive to light.   Cardiovascular:      Rate and Rhythm: Normal rate.      Pulses: Normal pulses.   Pulmonary:      Effort: Pulmonary effort is normal.      Breath sounds: Normal breath sounds.   Abdominal:      General: Abdomen is flat. Bowel sounds are normal. There is no distension.      Palpations: Abdomen is soft. There is no mass.      Tenderness: There is no abdominal tenderness. There is no right CVA tenderness, left CVA tenderness, guarding or rebound.      Hernia: No hernia is present.   Genitourinary:     Comments: Jay catheter in place.  No skin irritation or breakdown at the meatus.  Urine in Jay bag appears yellow and clear  Musculoskeletal:         General: No swelling, tenderness, deformity or signs of injury. Normal range of motion.      Cervical back: Normal range of  motion and neck supple.      Right lower leg: No edema.      Left lower leg: No edema.   Skin:     General: Skin is warm and dry.      Capillary Refill: Capillary refill takes less than 2 seconds.      Coloration: Skin is not jaundiced or pale.      Findings: No bruising, erythema, lesion or rash.   Neurological:      General: No focal deficit present.      Mental Status: He is alert and oriented to person, place, and time. Mental status is at baseline.   Psychiatric:         Mood and Affect: Mood normal.         Behavior: Behavior normal.         Thought Content: Thought content normal.         Judgment: Judgment normal.             ED Course   Labs Reviewed - No data to display                MDM             Medical Decision Making  89-year-old male with extensive past medical history presents for subjective constipation, according to the wife.  Patient is overall well-appearing without abdominal tenderness or CVA tenderness.  Plan  - rx: Magnesium citrate half bottle and repeat remaining half as needed.  fleet enema once as prescribed repeat prn  - encourage oral fluid rehydration and increase in fiber intake  - refer to pcp  - return to ED if symptoms worsens            Disposition and Plan     Clinical Impression:  1. Constipation, unspecified constipation type         Disposition:  Discharge  3/2/2025 10:49 am    Follow-up:  Paulo James MD  [address]  [facility] [address]  [facility] [address]  [phone]          [address] [address]  [phone]              Medications Prescribed:  Discharge Medication List as of 3/2/2025 10:52 AM        START taking these medications    Details   fleet enema Rectal Enema Place 133 mL rectally once as needed., Normal, Disp-2 each, R-0      Magnesium Citrate Oral Solution Take 296 mL by mouth once for 1 dose. Use 1/2 bottle.  Repeat as needed for constipation, Normal, Disp-296 mL, R-0                  Supplementary Documentation:

## 2025-03-03 ENCOUNTER — HOSPITAL ENCOUNTER (EMERGENCY)
Facility: HOSPITAL | Age: OVER 89
Discharge: HOME | End: 2025-03-03
Attending: EMERGENCY MEDICINE
Payer: COMMERCIAL

## 2025-03-03 ENCOUNTER — APPOINTMENT (OUTPATIENT)
Dept: CT IMAGING | Facility: HOSPITAL | Age: OVER 89
End: 2025-03-03
Attending: EMERGENCY MEDICINE
Payer: COMMERCIAL

## 2025-03-03 VITALS
TEMPERATURE: 98 F | DIASTOLIC BLOOD PRESSURE: 64 MMHG | WEIGHT: 160.06 LBS | SYSTOLIC BLOOD PRESSURE: 134 MMHG | RESPIRATION RATE: 20 BRPM | HEART RATE: 56 BPM | OXYGEN SATURATION: 100 % | BODY MASS INDEX: 25 KG/M2

## 2025-03-03 DIAGNOSIS — E86.0 DEHYDRATION: Primary | ICD-10-CM

## 2025-03-03 LAB
ALBUMIN SERPL-MCNC: 3.9 G/DL (ref 3.2–4.8)
ALBUMIN/GLOB SERPL: 1.3 {RATIO} (ref 1–2)
ALP LIVER SERPL-CCNC: 116 U/L
ALT SERPL-CCNC: 12 U/L
ANION GAP SERPL CALC-SCNC: 12 MMOL/L (ref 0–18)
AST SERPL-CCNC: 19 U/L (ref ?–34)
BASOPHILS # BLD AUTO: 0.04 X10(3) UL (ref 0–0.2)
BASOPHILS NFR BLD AUTO: 0.6 %
BILIRUB SERPL-MCNC: 0.4 MG/DL (ref 0.2–0.9)
BILIRUB UR QL STRIP.AUTO: NEGATIVE
BUN BLD-MCNC: 18 MG/DL (ref 9–23)
CALCIUM BLD-MCNC: 8.8 MG/DL (ref 8.7–10.6)
CHLORIDE SERPL-SCNC: 108 MMOL/L (ref 98–112)
CO2 SERPL-SCNC: 20 MMOL/L (ref 21–32)
COLOR UR AUTO: YELLOW
CREAT BLD-MCNC: 1.06 MG/DL
EGFRCR SERPLBLD CKD-EPI 2021: 65 ML/MIN/1.73M2 (ref 60–?)
EOSINOPHIL # BLD AUTO: 0.07 X10(3) UL (ref 0–0.7)
EOSINOPHIL NFR BLD AUTO: 1 %
ERYTHROCYTE [DISTWIDTH] IN BLOOD BY AUTOMATED COUNT: 12.8 %
GLOBULIN PLAS-MCNC: 2.9 G/DL (ref 2–3.5)
GLUCOSE BLD-MCNC: 134 MG/DL (ref 70–99)
GLUCOSE UR STRIP.AUTO-MCNC: NORMAL MG/DL
HCT VFR BLD AUTO: 34.6 %
HGB BLD-MCNC: 11.6 G/DL
IMM GRANULOCYTES # BLD AUTO: 0.04 X10(3) UL (ref 0–1)
IMM GRANULOCYTES NFR BLD: 0.6 %
KETONES UR STRIP.AUTO-MCNC: NEGATIVE MG/DL
LEUKOCYTE ESTERASE UR QL STRIP.AUTO: 500
LYMPHOCYTES # BLD AUTO: 2.08 X10(3) UL (ref 1–4)
LYMPHOCYTES NFR BLD AUTO: 29.1 %
MCH RBC QN AUTO: 28.6 PG (ref 26–34)
MCHC RBC AUTO-ENTMCNC: 33.5 G/DL (ref 31–37)
MCV RBC AUTO: 85.4 FL
MONOCYTES # BLD AUTO: 0.4 X10(3) UL (ref 0.1–1)
MONOCYTES NFR BLD AUTO: 5.6 %
NEUTROPHILS # BLD AUTO: 4.53 X10 (3) UL (ref 1.5–7.7)
NEUTROPHILS # BLD AUTO: 4.53 X10(3) UL (ref 1.5–7.7)
NEUTROPHILS NFR BLD AUTO: 63.1 %
NITRITE UR QL STRIP.AUTO: NEGATIVE
OSMOLALITY SERPL CALC.SUM OF ELEC: 294 MOSM/KG (ref 275–295)
PH UR STRIP.AUTO: 5.5 [PH] (ref 5–8)
PLATELET # BLD AUTO: 284 10(3)UL (ref 150–450)
POTASSIUM SERPL-SCNC: 4.1 MMOL/L (ref 3.5–5.1)
PROT SERPL-MCNC: 6.8 G/DL (ref 5.7–8.2)
PROT UR STRIP.AUTO-MCNC: 70 MG/DL
RBC # BLD AUTO: 4.05 X10(6)UL
RBC #/AREA URNS AUTO: >10 /HPF
SODIUM SERPL-SCNC: 140 MMOL/L (ref 136–145)
SP GR UR STRIP.AUTO: 1.02 (ref 1–1.03)
UROBILINOGEN UR STRIP.AUTO-MCNC: NORMAL MG/DL
WBC # BLD AUTO: 7.2 X10(3) UL (ref 4–11)
WBC #/AREA URNS AUTO: >50 /HPF

## 2025-03-03 PROCEDURE — 81001 URINALYSIS AUTO W/SCOPE: CPT | Performed by: EMERGENCY MEDICINE

## 2025-03-03 PROCEDURE — 74177 CT ABD & PELVIS W/CONTRAST: CPT | Performed by: EMERGENCY MEDICINE

## 2025-03-03 PROCEDURE — 99284 EMERGENCY DEPT VISIT MOD MDM: CPT

## 2025-03-03 PROCEDURE — 51798 US URINE CAPACITY MEASURE: CPT

## 2025-03-03 PROCEDURE — 80053 COMPREHEN METABOLIC PANEL: CPT | Performed by: EMERGENCY MEDICINE

## 2025-03-03 PROCEDURE — 96360 HYDRATION IV INFUSION INIT: CPT

## 2025-03-03 PROCEDURE — 96361 HYDRATE IV INFUSION ADD-ON: CPT

## 2025-03-03 PROCEDURE — 85025 COMPLETE CBC W/AUTO DIFF WBC: CPT | Performed by: EMERGENCY MEDICINE

## 2025-03-03 NOTE — ED INITIAL ASSESSMENT (HPI)
Pt presents to ED from home for clogged catheter, last time it was emptied was last night. Per pt, it was half way full when he last emptied. Catheter was not emptied this morning. Pain started this morning. Last BM was yesterday.

## 2025-03-03 NOTE — ED PROVIDER NOTES
Patient Seen in: [facility] Emergency Department      History     Chief Complaint   Patient presents with    Cath Tube Problem     Per wife patients urinary catheter is not draining and/or clogged     Stated Complaint: cath tube problem    Subjective:   HPI      Patient presents with Jay catheter not draining.  The patient has had multiple visits over the past couple of months with issues with his Jay catheter.  He had a TURP procedure done on February 19.  His course was complicated by bleeding.  Wife gives the history.  She states that the catheter was changed within the past 2 or 3 days.  He is on antibiotics for urinary tract infection and has a couple of days remaining.  He reports pain and feeling the urge to urinate and the Jay bag was empty this morning.  He has not had recent blood in the catheter.  He has had recent issues with constipation and is taking magnesium citrate ended 2 enemas.  He has had a bowel movement subsequently.  He has not had any fevers.  She states that he is not eating as much as usual but is worried about the constipation.  It appears that he was found to have cancer at the time of his surgery.  Wife states that they have not discussed a treatment plan and the patient is not aware at this time.  He is complaining now of feeling thirsty and having bilateral feet pain which he has had for some time.    Objective:     Past Medical History:    BPH (benign prostatic hyperplasia)    Coronary atherosclerosis    Depression    Essential hypertension    Hearing impaired person, bilateral    High blood pressure    High cholesterol    History of stomach ulcers    Hyperlipidemia    Stroke (HCC)              Past Surgical History:   Procedure Laterality Date    Cath bare metal stent (bms)      Colonoscopy      Surgical stent      wife states Lft side of heart- [facility]                Social History     Socioeconomic History    Marital status:    Tobacco Use     Smoking status: Former     Types: Cigarettes     Passive exposure: Never    Smokeless tobacco: Never    Tobacco comments:     QUIT SMOKING IN 2000   Vaping Use    Vaping status: Never Used   Substance and Sexual Activity    Alcohol use: Not Currently    Drug use: Never     Social Drivers of Health     Food Insecurity: No Food Insecurity (2/19/2025)    NCSS - Food Insecurity     Worried About Running Out of Food in the Last Year: No     Ran Out of Food in the Last Year: No   Transportation Needs: No Transportation Needs (2/19/2025)    NCSS - Transportation     Lack of Transportation: No   Housing Stability: Not At Risk (2/19/2025)    NCSS - Housing/Utilities     Has Housing: Yes     Worried About Losing Housing: No     Unable to Get Utilities: No                  Physical Exam     ED Triage Vitals   BP 03/03/25 0818 111/55   Pulse 03/03/25 0818 81   Resp 03/03/25 0818 20   Temp 03/03/25 0829 97.6 °F (36.4 °C)   Temp src 03/03/25 0829 Temporal   SpO2 03/03/25 0818 100 %   O2 Device 03/03/25 0818 None (Room air)       Current Vitals:   Vital Signs  BP: 134/64  Pulse: 56  Resp: 20  Temp: 97.6 °F (36.4 °C)  Temp src: Temporal  MAP (mmHg): 83    Oxygen Therapy  SpO2: 100 %  O2 Device: None (Room air)        Physical Exam  General: Awake and alert, appears uncomfortable.  HEENT: Normocephalic, atraumatic, pupils equal round and reactive to light, mucous membranes slightly dry.  Neck: Supple.  Cardiovascular: Regular rate and rhythm, no murmurs.  Respiratory: Lungs clear to auscultation.  Abdomen: Soft, diffusely tender to palpation but more across the lower abdomen, no rebound or guarding, normal active bowel sounds, no CVA tenderness.  Extremities: No CCE.  Skin: Warm and dry.    ED Course     Labs Reviewed   CBC WITH DIFFERENTIAL WITH PLATELET - Abnormal; Notable for the following components:       Result Value    HGB 11.6 (*)     HCT 34.6 (*)     All other components within normal limits   COMP METABOLIC PANEL (14) -  Abnormal; Notable for the following components:    Glucose 134 (*)     CO2 20.0 (*)     All other components within normal limits   URINALYSIS, ROUTINE - Abnormal; Notable for the following components:    Clarity Urine Turbid (*)     Blood Urine 3+ (*)     Protein Urine 70 (*)     Leukocyte Esterase Urine 500 (*)     WBC Urine >50 (*)     RBC Urine >10 (*)     Bacteria Urine 1+ (*)     All other components within normal limits       CT ABDOMEN+PELVIS(CONTRAST ONLY)(CPT=74177)    Result Date: 3/3/2025  PROCEDURE:  CT ABDOMEN+PELVIS (CONTRAST ONLY) (CPT=74177)  COMPARISON:  [facility] , CT, CT ABDOMEN+PELVIS(CONTRAST ONLY)(CPT=74177), 12/29/2024, 11:14 AM.  INDICATIONS:  cath tube problem, abd pain  TECHNIQUE:  CT scanning was performed from the dome of the diaphragm to the pubic symphysis with non-ionic intravenous contrast material. Post contrast coronal MPR imaging was performed.  Dose reduction techniques were used. Dose information is transmitted to the ACR (American College of Radiology) NRDR (National Radiology Data Registry) which includes the Dose Index Registry.  PATIENT STATED HISTORY:(As transcribed by Technologist)  Patietn is here with Lower abdominal pain   CONTRAST USED:  85cc of Isovue 370  FINDINGS:  Motion degraded exam. LIVER:  No enlargement, atrophy, abnormal density, or significant focal lesion.  BILIARY:  Cholelithiasis.  No significant biliary ductal dilation. PANCREAS:  No lesion, fluid collection, ductal dilatation, or atrophy.  SPLEEN:  No enlargement or focal lesion.  KIDNEYS:  No mass, obstruction, or calcification.  ADRENALS:  No mass or enlargement.  AORTA/VASCULAR:  Atherosclerotic disease without aneurysm. RETROPERITONEUM:  No mass or adenopathy.  BOWEL/MESENTERY:  No dilated bowel or wall thickening.  Colonic diverticulosis, without diverticulitis.  Normal appendix. ABDOMINAL WALL:  Small fat containing left inguinal hernia. URINARY BLADDER:  Urinary bladder is decompressed with Jay  catheter in place. PELVIC NODES:  No adenopathy.  PELVIC ORGANS:  No visible mass.  Pelvic organs appropriate for patient age.  BONES:  Degenerative changes of the spine. LUNG BASES:  Motion artifact. OTHER:  Negative.             CONCLUSION:  1. Motion degraded assessment. 2. Cholelithiasis. 3. Colonic diverticulosis. 4. Decompressed urinary bladder with Jay catheter in place. 5. Please see above for further details.   LOCATION:  [facility]   Dictated by (CST): Delvis Laguerre MD on 3/03/2025 at 10:19 AM     Finalized by (CST): Delvis Laguerre MD on 3/03/2025 at 10:22 AM          Medications   sodium chloride 0.9 % IV bolus 1,000 mL (0 mL Intravenous Stopped 3/3/25 1106)   iopamidol 76% (ISOVUE-370) injection for power injector (85 mL Intravenous Given 3/3/25 1020)       Nursing staff performed an initial bladder scan that showed no urine in the bladder.  The patient was hydrated with normal saline and is producing urine now in the Jay bag.       MDM      Patient presents with Jay catheter not draining and abdominal pain.  Differential diagnosis includes but is not limited to catheter obstruction, acute renal failure, constipation and diverticulitis.  The patient's Jay catheter does appear to be draining.  His UA is abnormal but he is currently still taking antibiotics to treat a urinary tract infection.  He does not have a fever or leukocytosis.  His renal function is normal.  His CT does not report diverticulitis or significant stool burden.  The patient was again strongly encouraged to follow-up with his urologist.  He should continue the antibiotics and push oral fluids.  If he has new or worsening symptoms, he can return for repeat evaluation.        Medical Decision Making      Disposition and Plan     Clinical Impression:  1. Dehydration         Disposition:  Discharge  3/3/2025 11:05 am    Follow-up:  Ryan Yeung MD  [address] [place] [address]  [phone]    Follow  up            Medications Prescribed:  Discharge Medication List as of 3/3/2025 11:06 AM              Supplementary Documentation:

## 2025-03-04 ENCOUNTER — APPOINTMENT (OUTPATIENT)
Dept: GENERAL RADIOLOGY | Age: OVER 89
End: 2025-03-04
Attending: NURSE PRACTITIONER
Payer: COMMERCIAL

## 2025-03-04 ENCOUNTER — HOSPITAL ENCOUNTER (OUTPATIENT)
Age: OVER 89
Discharge: HOME | End: 2025-03-04
Payer: COMMERCIAL

## 2025-03-04 ENCOUNTER — HOSPITAL ENCOUNTER (EMERGENCY)
Facility: HOSPITAL | Age: OVER 89
Discharge: HOME | End: 2025-03-04
Attending: EMERGENCY MEDICINE
Payer: COMMERCIAL

## 2025-03-04 VITALS
SYSTOLIC BLOOD PRESSURE: 140 MMHG | TEMPERATURE: 98 F | DIASTOLIC BLOOD PRESSURE: 60 MMHG | BODY MASS INDEX: 28.32 KG/M2 | RESPIRATION RATE: 22 BRPM | OXYGEN SATURATION: 97 % | WEIGHT: 170 LBS | HEIGHT: 65 IN | HEART RATE: 92 BPM

## 2025-03-04 VITALS
TEMPERATURE: 98 F | DIASTOLIC BLOOD PRESSURE: 69 MMHG | SYSTOLIC BLOOD PRESSURE: 143 MMHG | OXYGEN SATURATION: 97 % | HEART RATE: 85 BPM | RESPIRATION RATE: 18 BRPM

## 2025-03-04 DIAGNOSIS — K59.00 CONSTIPATION, UNSPECIFIED CONSTIPATION TYPE: Primary | ICD-10-CM

## 2025-03-04 DIAGNOSIS — R14.0 BLOATING: Primary | ICD-10-CM

## 2025-03-04 PROCEDURE — 99213 OFFICE O/P EST LOW 20 MIN: CPT | Performed by: NURSE PRACTITIONER

## 2025-03-04 PROCEDURE — 99282 EMERGENCY DEPT VISIT SF MDM: CPT

## 2025-03-04 PROCEDURE — 99283 EMERGENCY DEPT VISIT LOW MDM: CPT

## 2025-03-04 PROCEDURE — 74018 RADEX ABDOMEN 1 VIEW: CPT | Performed by: NURSE PRACTITIONER

## 2025-03-04 NOTE — ED PROVIDER NOTES
He    Patient Seen in: Immediate Care [facility]      History     Chief Complaint   Patient presents with    Constipation     Stated Complaint: constipation  Subjective:   89-year-old male with a history of dementia presents for possible constipation.  He has been seen in the emergency department in the immediate care multiple times in the last week.  He is status post a TURP and currently has a leg bag.  He is also on Levaquin for urinary tract infection.  He states he woke up this morning and felt constipated and bloated.  He has no acute abdominal pain.  He had a bowel movement yesterday.  He was seen in the immediate care this past weekend for constipation and was prescribed a stool softener and an enema.  No blood in his stool.  No fevers.  No nausea or vomiting.  He is eating and drinking well.  He has an appointment scheduled with his primary care provider tomorrow.  He is here with his wife.      Objective:   Past Medical History:    BPH (benign prostatic hyperplasia)    Coronary atherosclerosis    Depression    Essential hypertension    Hearing impaired person, bilateral    High blood pressure    High cholesterol    History of stomach ulcers    Hyperlipidemia    Stroke (HCC)            Past Surgical History:   Procedure Laterality Date    Cath bare metal stent (bms)      Colonoscopy      Surgical stent      wife states Lft side of heart- [facility]              Social History     Socioeconomic History    Marital status:    Tobacco Use    Smoking status: Former     Types: Cigarettes     Passive exposure: Never    Smokeless tobacco: Never    Tobacco comments:     QUIT SMOKING IN 2000   Vaping Use    Vaping status: Never Used   Substance and Sexual Activity    Alcohol use: Not Currently    Drug use: Never     Social Drivers of Health     Food Insecurity: No Food Insecurity (2/19/2025)    NCSS - Food Insecurity     Worried About Running Out of Food in the Last Year: No     Ran Out of  Food in the Last Year: No   Transportation Needs: No Transportation Needs (2/19/2025)    NCSS - Transportation     Lack of Transportation: No   Housing Stability: Not At Risk (2/19/2025)    NCSS - Housing/Utilities     Has Housing: Yes     Worried About Losing Housing: No     Unable to Get Utilities: No            Review of Systems    Positive for stated complaint: Constipation     Other systems are as noted in HPI.  Constitutional and vital signs reviewed.      All other systems reviewed and negative except as noted above.    Physical Exam     ED Triage Vitals [03/04/25 0841]   /69   Pulse 85   Resp 18   Temp 97.8 °F (36.6 °C)   Temp src Oral   SpO2 97 %   O2 Device None (Room air)     Current:/69   Pulse 85   Temp 97.8 °F (36.6 °C) (Oral)   Resp 18   SpO2 97%     Physical Exam  Vitals and nursing note reviewed.   Constitutional:       General: He is not in acute distress.     Appearance: Normal appearance. He is not toxic-appearing.   HENT:      Mouth/Throat:      Mouth: Mucous membranes are moist.   Cardiovascular:      Rate and Rhythm: Normal rate and regular rhythm.   Pulmonary:      Effort: Pulmonary effort is normal.      Breath sounds: Normal breath sounds.   Abdominal:      General: There is no distension.      Palpations: Abdomen is soft.      Tenderness: There is no abdominal tenderness. There is no right CVA tenderness or left CVA tenderness.   Musculoskeletal:         General: Normal range of motion.   Skin:     General: Skin is warm and dry.      Capillary Refill: Capillary refill takes less than 2 seconds.   Neurological:      General: No focal deficit present.      Mental Status: He is alert. Mental status is at baseline.      Cranial Nerves: No cranial nerve deficit.      Sensory: No sensory deficit.      Motor: No weakness.   Psychiatric:         Mood and Affect: Mood normal.         Behavior: Behavior normal.         ED Course   XR ABDOMEN (1 VIEW) (CPT=74018)    Result Date:  3/4/2025  CONCLUSION:  There is a nonspecific bowel gas pattern without evidence of obstruction.  No mass or abnormal calcification.  Left lower lobe consolidation/pneumonia noted.  Marked degenerative changes of the spine are seen.  There is moderate vascular calcification.    LOCATION:  [place] [address]   Dictated by (CST): Khari Cohn MD on 3/04/2025 at 10:16 AM     Finalized by (CST): Khari Cohn MD on 3/04/2025 at 10:17 AM      Labs Reviewed - No data to display    MDM     Medical Decision Making  The patient and his wife wanted to leave prior to the results of the x-ray returning.  His wife states she will get the results on [facility].  The results are as below.  The patient has no URI symptoms consistent with a pneumonia, this may be more atelectasis than an actual infection.  However, he is currently taking Levaquin for a urinary tract infection, so this should cover pneumonia as well.  The patient's catheter was draining clear yellow urine without any sp blood.  I did leave a message for his primary care provider notifying him of all of these results and his recent visits, he is scheduled to see him tomorrow.  His wife is aware for any worsening or concerning symptoms, to go to the nearest emergency department for further evaluation.  She will also continue giving him a high-fiber diet and stool softeners as needed.    Amount and/or Complexity of Data Reviewed  Independent Historian: spouse     Details: Wife  Radiology: ordered.     Details: I personally visualized the KUB which shows a nonspecific gas pattern without evidence of obstruction.  He does have a left lower lobe consolidation which is an incidental finding.  Discussion of management or test interpretation with external provider(s): I discussed this patient with Dr. Arreola.  He agrees to the plan of care.    Risk  OTC drugs.  Risk Details: Constipation versus urinary retention        Disposition and Plan     Clinical Impression:  1.  Constipation, unspecified constipation type         Disposition:  Discharge  3/4/2025 10:52 am    Follow-up:  Paulo James MD  [address] [address]  [phone]      appointment tomorrow          Medications Prescribed:  Discharge Medication List as of 3/4/2025 11:04 AM

## 2025-03-04 NOTE — ED INITIAL ASSESSMENT (HPI)
Pt was here 2 days ago with constipation,  was given instructions and had a bowel movement and felt better.  He did have a small bowel movement yesterday But he wants to go every day

## 2025-03-04 NOTE — DISCHARGE INSTRUCTIONS
Continue a diet high in fiber, drink plenty of water, follow-up with your primary care provider tomorrow.  Continue your Levaquin.  For any worsening or concerning symptoms, go to the nearest emergency department for further evaluation.

## 2025-03-04 NOTE — ED INITIAL ASSESSMENT (HPI)
Pt here for abdominal pain that radiates in the middle that is \"new\"for him. New pain first appeared,a month ago  Pt is able to go tiny amounts but still feels constipated. Pt just finished a 7 day course of antibiotics started today.

## 2025-03-05 ENCOUNTER — HOSPITAL ENCOUNTER (EMERGENCY)
Facility: HOSPITAL | Age: OVER 89
Discharge: HOME | End: 2025-03-05
Attending: EMERGENCY MEDICINE
Payer: COMMERCIAL

## 2025-03-05 VITALS
SYSTOLIC BLOOD PRESSURE: 107 MMHG | OXYGEN SATURATION: 97 % | BODY MASS INDEX: 25.14 KG/M2 | HEART RATE: 84 BPM | DIASTOLIC BLOOD PRESSURE: 55 MMHG | TEMPERATURE: 99 F | HEIGHT: 68.9 IN | RESPIRATION RATE: 18 BRPM | WEIGHT: 169.75 LBS

## 2025-03-05 DIAGNOSIS — R19.7 DIARRHEA, UNSPECIFIED TYPE: ICD-10-CM

## 2025-03-05 DIAGNOSIS — Z76.89 ENCOUNTER FOR ASSESSMENT OF FOLEY CATHETER: Primary | ICD-10-CM

## 2025-03-05 LAB
ANION GAP SERPL CALC-SCNC: 7 MMOL/L (ref 0–18)
BUN BLD-MCNC: 14 MG/DL (ref 9–23)
CALCIUM BLD-MCNC: 8.8 MG/DL (ref 8.7–10.6)
CHLORIDE SERPL-SCNC: 106 MMOL/L (ref 98–112)
CO2 SERPL-SCNC: 26 MMOL/L (ref 21–32)
CREAT BLD-MCNC: 1.06 MG/DL
EGFRCR SERPLBLD CKD-EPI 2021: 65 ML/MIN/1.73M2 (ref 60–?)
GLUCOSE BLD-MCNC: 134 MG/DL (ref 70–99)
OSMOLALITY SERPL CALC.SUM OF ELEC: 290 MOSM/KG (ref 275–295)
POTASSIUM SERPL-SCNC: 4.2 MMOL/L (ref 3.5–5.1)
SODIUM SERPL-SCNC: 139 MMOL/L (ref 136–145)

## 2025-03-05 PROCEDURE — 51798 US URINE CAPACITY MEASURE: CPT

## 2025-03-05 PROCEDURE — 80048 BASIC METABOLIC PNL TOTAL CA: CPT | Performed by: EMERGENCY MEDICINE

## 2025-03-05 PROCEDURE — 99283 EMERGENCY DEPT VISIT LOW MDM: CPT

## 2025-03-05 PROCEDURE — 36415 COLL VENOUS BLD VENIPUNCTURE: CPT

## 2025-03-05 NOTE — ED INITIAL ASSESSMENT (HPI)
Chief Complaint  9 yr [facility]      History of Present Illness  HM, 9-12 years, Female [facility]: The patient comes in today for routine health maintenance with her father  The last health maintenance visit was 1 years ago  General health since the last visit is described as good  Dental care includes good dental hygiene and regular dental visits  Immunizations are up to date  No sensory or development concerns are expressed  The patient's menstrual status is premenarcheal  Current diet includes a normal healthy diet  Dietary supplements:  no daily multivitamins  No nutritional concerns are expressed  No elimination concerns are expressed  She sleeps for 9 hours at night  The child's temperament is described as happy  Household risk factors:  exposure to pets, but no passive smoking exposure  Safety elements used:  seat belt, safety helmet, sun safety, smoke detectors and carbon monoxide detectors  She is in grade 3  School performance has been excellent  Review of Systems    Constitutional: no fever  Eyes: no purulent discharge from the eyes and no itching of the eyes  ENT: no earache and no sore throat  Respiratory: no cough  Gastrointestinal: no vomiting and no diarrhea  Active Problems    1  Abnormal hearing test (389 9) (Z01 118,R94 128)   2  Acute bronchitis, unspecified organism (466 0) (J20 9)   3  Acute conjunctivitis (372 00) (H10 30)   4  Acute suppurative otitis media of left ear with spontaneous rupture of tympanic   membrane, recurrence not specified (382 01) (H66 012)   5  Acute viral conjunctivitis of left eye (077 99) (B30 9)   6  Allergic rhinitis, unspecified allergic rhinitis type   7  Irritable bowel syndrome with diarrhea (564 1) (K58 0)   8  Left ear pain (388 70) (H92 02)   9  Mild persistent asthma with acute exacerbation (493 92) (J45 31)   10  Need for influenza vaccination (V04 81) (Z23)   11   Need for prophylactic antibiotic (V58 62) (Z79 2)   12  Reactive airway disease Patient said he has an urge to pass urine but his urinary catheter is not draining well .  Diarrhea last night . Patient was here yesterday with same problem . Urinary catheter in place with little urine in the bag   (493 90) (J45 909)    Past Medical History    · History of Acute upper respiratory infection (465 9) (J06 9)   · History of Ear ache (388 70) (H92 09)   · History of Finger injury, left, initial encounter (959 5) (I49 52KC)   · History of Growing pains (781 99) (R29 898)   · History of acute pharyngitis (V12 69) (Z87 09)   · History of acute sinusitis (V12 69) (Z87 09)   · History of bacterial pneumonia (V12 61) (Z87 01)   · History of fatigue (V13 89) (M24 358)   · History of gastroenteritis (V12 79) (Z87 19)   · History of Loss of hearing (389 9) (H91 90)   · History of Stye (373 11) (H00 019)    Surgical History    · History of Myringotomy - With Ventilating Tube Insertion    Family History    · Family history of Hypertension (V17 49)    · Family history of Breast Cancer (V16 3)    · Family history of Hypothyroidism   · Family history of Hypothyroidism    Social History    · Currently in 3rd grade   · Currently In School   · Pets in caregiver's home   · History of Sports Activities Cheerleading   · Sports Activities [employer]   · Track and field cross country    Current Meds   1  Albuterol Sulfate (2 5 MG/3ML) 0 083% Inhalation Nebulization Solution; USE 1 UNIT   DOSE EVERY 4-6 HOURS AS NEEDED FOR WHEEZING ; Therapy: 62SFL9753 to (Last Rx:25Jan2016)  Requested for: 25Jan2016 Ordered   2  Amoxicillin 400 MG/5ML Oral Suspension Reconstituted; 9 ML Twice daily x 10 days; Therapy: 18PFK9025 to (Last Rx:07Dec2016)  Requested for: 10MTC2701 Ordered   3  Asmanex  MCG/ACT Inhalation Aerosol; 1-2 puffs once/day; Therapy: 80WSU8549 to (Last Rx:99Gnj3705) Ordered   4  Ciprodex 0 3-0 1 % Otic Suspension; INSTILL 4 DROPS IN THE AFFECTED EAR(S)   TWICE DAILY; Therapy: 55SHK2586 to (Evaluate:44Mdv8216)  Requested for: 23PAI0320; Last   Rx:07Dec2016 Ordered   5  Moxeza 0 5 % Ophthalmic Solution; 1 drop both eyes twice a day x 7 days;    Therapy: 71SBW8030 to (Last Rx:01Fai7487)  Requested for: 95LCM6127 Ordered   6  OptiChamber Advantage Miscellaneous; Use with the inhaler as instructed; Therapy: 43IMD1551 to (Last Rx:22Pom3110)  Requested for: 30IUI4140 Ordered   7  Tamiflu 6 MG/ML Oral Suspension Reconstituted; TAKE 2 TSP Daily x 10 days; Therapy: 10Emr2247 to (Evaluate:39Lbk3951)  Requested for: 66Cjl6203; Last   Rx:01Alk7435 Ordered    Allergies    1  No Known Drug Allergies    Vitals   Recorded: 31QPN0682 11:01AM   Temperature 98 F   Heart Rate 80   Respiration 16   Systolic 94   Diastolic 60   Height 4 ft 6 75 in   Weight 64 lb    BMI Calculated 15 01   BSA Calculated 1 08   BMI Percentile 22 %   2-20 Stature Percentile 75 %   2-20 Weight Percentile 41 %     Physical Exam    Constitutional - General Appearance: well appearing with no visible distress; no dysmorphic features  Head and Face - Head and face: Normocephalic atraumatic  Eyes - Conjunctiva and lids: Conjunctiva noninjected, no eye discharge and no swelling  Pupils and irises: Equal, round, reactive to light and accommodation bilaterally; Extraocular muscles intact; Sclera anicteric  Ophthalmoscopic examination normal    Ears, Nose, Mouth, and Throat - Otoscopic examination:  The left tympanic membrane was not red, had no loss of landmarks and had an intact light reflex  The right external canal had a cerumen impaction  External inspection of ears and nose: Normal without deformities or discharge; No pinna or tragal tenderness  Passed bilaterally once the cerumen was removed  Nasal mucosa, septum, and turbinates: Normal, no edema, no nasal discharge, nares not pale or boggy  Lips, teeth, and gums: Normal, good dentition  Oropharynx: Oropharynx without ulcer, exudate or erythema, moist mucous membranes  Neck - Neck: Supple  Thyroid: No thyromegaly  Pulmonary - Respiratory effort: Normal respiratory rate and rhythm, no stridor, no tachypnea, grunting, flaring or retractions   Auscultation of lungs: Clear to auscultation bilaterally without wheeze, rales, or rhonchi  Cardiovascular - Auscultation of heart: Regular rate and rhythm, no murmur  Femoral pulses: Normal, 2+ bilaterally  Chest - Breasts: Normal  Ritesh 1  Abdomen - Abdomen: Normal bowel sounds, soft, nondistended, nontender, no organomegaly  Genitourinary - External genitalia: Normal external female genitalia  Ritesh 1  Lymphatic - Palpation of lymph nodes in neck: No anterior or posterior cervical lymphadenopathy  Palpation of lymph nodes in groin: No lymphadenopathy  Musculoskeletal - Gait and station: Normal gait  Evaluation for scoliosis: No scoliosis on exam  Full range of motion in all extremities  Stability: No joint instability  Muscle strength/tone: No hypertonia or hypotonia  Skin - Skin and subcutaneous tissue: No rash , no bruising, no pallor, cyanosis, or icterus  Neurologic - Reflexes:  Deep tendon reflexes: 2+ right brachioradialis, 2+ left brachioradialis, 2+ right patella and 2+ left patella  Cranial nerves: Cranial nerves II-XII intact  Results/Data  Evoked Otoacoustic Emissions 03CLS7891 11:54AM Issa Raddle   repeated after wax removal, right ear     Test Name Result Flag Reference   EVOKED OTOACOUSTIC EMISSION pass bi       Evoked Otoacoustic Emissions 69BOV7747 11:07AM Issa Raddle     Test Name Result Flag Reference   EVOKED OTOACOUSTIC EMISSION refer right       SNELLEN VISION- POC 40EOB3389 11:06AM Issa Raddle     Test Name Result Flag Reference   Right Eye 20/20     Left Eye 20/20     Bilateral Eyes 20/20         Procedure    Procedure: cerumen removal    Indication: tympanic membrane(s) could not be visualized and cerumen impaction in the right ear  Procedure Note: The procedure was performed by the Provider  A otoscope was placed in the ear canal(s) to visualize the ear canal debris  The ear was cleaned by using a curette  The procedure was successful  Post-Procedure:   Patient Status: the patient tolerated the procedure well  Complications: there were no complications  Procedure: Visual Acuity Test    Indication: routine screening  Inforrmation supplied by a Snellen chart  Results: 20/20 in both eyes without corrective device, 20/20 in the right eye without corrective device, 20/20 in the left eye without corrective device normal in both eyes  The patient tolerated the procedure well  There were no complications  Procedure: Hearing Acuity Test    Indication: Routine screeing  Audiometry:  refer right  The patient Tolerated the procedure well  There were no complications  Assessment    1  Well child visit (V20 2) (Z00 129)   2  Impacted cerumen of right ear (380 4) (H61 21)    Plan  Acute suppurative otitis media of left ear with spontaneous rupture of tympanic  membrane, recurrence not specified    · Amoxicillin 400 MG/5ML Oral Suspension Reconstituted   Rx By: Gladys Truong; Dispense: 0 Days ; #:180 ML; Refill: 0; For: Acute suppurative otitis media of left ear with spontaneous rupture of tympanic membrane, recurrence not specified; JAIDEN = N; Sent To: [facility]; Msg to Pharmacy: SIG calculated with weight: 29 94 kg and a target dose of 50 mg/kg/day; Last Updated By: Becca Severino; 1/12/2017 11:08:20 AM   · Ciprodex 0 3-0 1 % Otic Suspension   Rx By: Gladys Truong; Dispense: 7 Days ; #:1 X 7 5 ML Bottle; Refill: 0; For: Acute suppurative otitis media of left ear with spontaneous rupture of tympanic membrane, recurrence not specified; JAIDEN = N; Sent To: [facility]; Last Updated By: Becca Severino; 1/12/2017 11:08:20 AM  Acute viral conjunctivitis of left eye    · Moxeza 0 5 % Ophthalmic Solution   Rx By: Gladys Truong; Dispense: 0 Days ; #:1 X 3 ML Bottle;  Refill: 0; For: Acute viral conjunctivitis of left eye; JAIDEN = N; Sent To: [facility]; Last Updated By: Becca Severino; 1/12/2017 11:08:20 AM  Health Maintenance    · Evoked Otoacoustic Emissions; Status:Complete - Retrospective Authorization;   Done:  98TXK0891 11:07AM   Performed: In Office; COK:[id number]; Last Updated By:Yasmin Cruz; 1/12/2017 11:08:20 AM;Ordered;  For:Health Maintenance; Ordered By:Shraddha Carmen;   · Evoked Otoacoustic Emissions; Status:Complete - Retrospective Authorization;   Done:  53YRM8220 11:54AM   Performed: In Office; PVJ:[id number]; Last Updated By:Yasmin Cruz; 1/12/2017 11:55:19 AM;Ordered;  For:Health Maintenance; Ordered By:Shraddha Carmen;   · SNELLEN VISION- POC; Status:Complete - Retrospective Authorization;   Done:  77JDU7730 11:06AM   Performed: In Office; VANESSA:[id number]; Last Updated By:Yasmin Cruz; 1/12/2017 11:08:20 AM;Ordered; Today;  For:Health Maintenance; Ordered By:Grey Carmen; Need for prophylactic antibiotic    · Tamiflu 6 MG/ML Oral Suspension Reconstituted   Rx By: Gavi Whitten; Dispense: 10 Days ; #:100 ML; Refill: 0; For: Need for prophylactic antibiotic; JAIDEN = N; Sent To: [facility]    Discussion/Summary    Passed the OAE once the cerumen was removed  Follow up yearly  Needs more vegetables in the diet  Better diet  None  The treatment plan was reviewed with the patient/guardian   The patient/guardian understands and agrees with the treatment plan      Signatures   Electronically signed by : BRIAN Squires ; Jan 12 2017 11:56AM EST                       (Author)

## 2025-03-05 NOTE — ED PROVIDER NOTES
Patient Seen in: [facility] Emergency Department      History     Chief Complaint   Patient presents with    Urinary Symptoms     Stated Complaint: jerome orta, has aaron    Subjective:   HPI      Patient presents again for concerns related to Aaron catheter.  I saw him 2 days ago when he was here yesterday as well.  He feels like the catheter is not draining because he has an urge to urinate.  He started having diarrhea the past couple of days as well.  He just finished his last antibiotic dose last night.  He has not had a fever.  He is drinking.    Objective:     Past Medical History:    BPH (benign prostatic hyperplasia)    Coronary atherosclerosis    Depression    Essential hypertension    Hearing impaired person, bilateral    High blood pressure    High cholesterol    History of stomach ulcers    Hyperlipidemia    Stroke (HCC)              Past Surgical History:   Procedure Laterality Date    Cath bare metal stent (bms)      Colonoscopy      Surgical stent      [facility]                Social History     Socioeconomic History    Marital status:    Tobacco Use    Smoking status: Former     Types: Cigarettes     Passive exposure: Never    Smokeless tobacco: Never    Tobacco comments:     QUIT SMOKING IN 2000   Vaping Use    Vaping status: Never Used   Substance and Sexual Activity    Alcohol use: Not Currently    Drug use: Never     Social Drivers of Health     Food Insecurity: No Food Insecurity (2/19/2025)    NCSS - Food Insecurity     Worried About Running Out of Food in the Last Year: No     Ran Out of Food in the Last Year: No   Transportation Needs: No Transportation Needs (2/19/2025)    NCSS - Transportation     Lack of Transportation: No   Housing Stability: Not At Risk (2/19/2025)    NCSS - Housing/Utilities     Has Housing: Yes     Worried About Losing Housing: No     Unable to Get Utilities: No                  Physical Exam     ED Triage Vitals  [03/05/25 1346]   /62   Pulse 90   Resp 18   Temp 98.7 °F (37.1 °C)   Temp src Temporal   SpO2 97 %   O2 Device None (Room air)       Current Vitals:   Vital Signs  BP: 107/55  Pulse: 84  Resp: 18  Temp: 98.5 °F (36.9 °C)  Temp src: Temporal    Oxygen Therapy  SpO2: 97 %  O2 Device: None (Room air)        Physical Exam  General: Awake and alert, in no acute distress.  Cardiovascular: Regular rate and rhythm.  Respiratory: Lungs clear to auscultation.  Abdomen: Soft, no focal tenderness, no rebound or guarding, normal active bowel sounds, no CVA tenderness.  Extremities: No CCE.  Skin: Warm and dry.    ED Course     Labs Reviewed   BASIC METABOLIC PANEL (8) - Abnormal; Notable for the following components:       Result Value    Glucose 134 (*)     All other components within normal limits   RAINBOW DRAW LAVENDER   RAINBOW DRAW LIGHT GREEN   RAINBOW DRAW BLUE   RAINBOW DRAW GOLD      Patient had a bladder scan that did not show any residual urine.  There is urine in the Jay catheter.       MDM      Patient presents with concern for Jay catheter not draining.  Differential diagnosis includes but is not limited to catheter obstruction and acute renal failure.  The patient's catheter appears to be functional.  It was recently changed and he just finished a course of antibiotics.  His chemistry panel reveals normal renal function.  He is now having diarrhea so we will have him bring back a stool sample for culture and C. difficile testing.  He was encouraged to push oral fluids.  He should follow-up with his urologist and primary care doctor as well.        MDM    Disposition and Plan     Clinical Impression:  1. Encounter for assessment of Jay catheter    2. Diarrhea, unspecified type         Disposition:  Discharge  3/5/2025  5:17 pm    Follow-up:  Paulo James MD  [address] [place] [id number]  [phone]    Call in 1 day(s)            Medications Prescribed:  Current Discharge  Medication List              Supplementary Documentation:

## 2025-03-05 NOTE — ED PROVIDER NOTES
Patient Seen in: [facility] Emergency Department      History     Chief Complaint   Patient presents with    Constipation    Urinary Symptoms     Stated Complaint: constipation, urinary symptoms x 1 year    Subjective:   HPI      89-year-old male comes to the hospital stating he feels bloated.  The patient has been to the emergency room and urgent care twice in the last 2 days.  He is had a workup for constipation including a CT, and x-ray as well as blood work and urine which was unrevealing.  He did have a bowel movement this morning.  He is denying any headache symptoms.  No chest pain or shortness of breath.  Denies any nausea, vomiting or loose stool.  He has an indwelling Jay status post TURP in February.  I reviewed the patient's last urine culture from the other day that has had no growth.  He had finished Levaquin.  He is denying any fevers or chills.  No back pain is denying any other complaints at this time.    Objective:     Past Medical History:    BPH (benign prostatic hyperplasia)    Coronary atherosclerosis    Depression    Essential hypertension    Hearing impaired person, bilateral    High blood pressure    High cholesterol    History of stomach ulcers    Hyperlipidemia    Stroke (HCC)              Past Surgical History:   Procedure Laterality Date    Cath bare metal stent (bms)      Colonoscopy      Surgical stent      wife states Lft side of heart- [facility]                Social History     Socioeconomic History    Marital status:    Tobacco Use    Smoking status: Former     Types: Cigarettes     Passive exposure: Never    Smokeless tobacco: Never    Tobacco comments:     QUIT SMOKING IN 2000   Vaping Use    Vaping status: Never Used   Substance and Sexual Activity    Alcohol use: Not Currently    Drug use: Never     Social Drivers of Health     Food Insecurity: No Food Insecurity (2/19/2025)    NCSS - Food Insecurity     Worried About Running Out of Food in the Last  Year: No     Ran Out of Food in the Last Year: No   Transportation Needs: No Transportation Needs (2/19/2025)    NCSS - Transportation     Lack of Transportation: No   Housing Stability: Not At Risk (2/19/2025)    NCSS - Housing/Utilities     Has Housing: Yes     Worried About Losing Housing: No     Unable to Get Utilities: No                  Physical Exam     ED Triage Vitals   BP 03/04/25 1532 128/71   Pulse 03/04/25 1532 93   Resp 03/04/25 1532 20   Temp 03/04/25 1543 97.8 °F (36.6 °C)   Temp src 03/04/25 1543 Oral   SpO2 03/04/25 1532 97 %   O2 Device 03/04/25 1532 None (Room air)       Current Vitals:   Vital Signs  BP: 128/71  Pulse: 93  Resp: 20  Temp: 97.8 °F (36.6 °C)  Temp src: Oral    Oxygen Therapy  SpO2: 97 %  O2 Device: None (Room air)        Physical Exam  HEENT : NCAT, EOMI, PEERL,  neck supple, no JVD, trachea midline, No LAD  Heart: S1S2 normal. No murmurs, regular rate and rhythm  Lungs: Clear to auscultation bilaterally  Abdomen: Soft nontender nondistended normal active bowel sounds without rebound, guarding or masses noted  Back nontender without CVA tenderness  Extremity no clubbing, cyanosis or edema noted.  Full range of motion noted without tenderness  Neuro: No focal deficits noted    All measures to prevent infection transmission during my interaction with the patient were taken.  The patient was already wearing droplet mask on my arrival to the room.  Personal protective equipment including a droplet mask as well as gloves were worn throughout the duration of my exam.  Hand washing was performed prior to and after the exam.  Stethoscope and equipment used during my examination was cleaned with a super Sani cloth germicidal wipe following the exam.    ED Course   Labs Reviewed - No data to display         The patient was observed in the department       MDM      Differential diagnosis include constipation, UTI, obstruction but not limited to this.  The patient at this time has had a  negative workup including CT.  Discussed this with the patient as well as the patient's family for better clarification.  At this time he does not have a surgical abdomen.  He has an appointment with his primary physician tomorrow.  At this time they have agreed to discharge him here and follow-up.  They are comfortable with this plan.      This note was prepared using Dragon Medical voice recognition dictation software.  As a result errors may occur.  When identified to these areas have been corrected.  While every attempt is made to correct errors during dictation discrepancies may still exist.  Please contact if there are any errors.        Medical Decision Making      Disposition and Plan     Clinical Impression:  1. Bloating         Disposition:  Discharge  3/4/2025  6:19 pm    Follow-up:  Paulo James MD  [address] [place] [id number]  [phone]    Go in 1 day(s)            Medications Prescribed:  Current Discharge Medication List              Supplementary Documentation:

## 2025-03-06 ENCOUNTER — HOSPITAL ENCOUNTER (EMERGENCY)
Facility: HOSPITAL | Age: OVER 89
Discharge: HOME | End: 2025-03-06
Attending: EMERGENCY MEDICINE
Payer: COMMERCIAL

## 2025-03-06 ENCOUNTER — HOSPITAL ENCOUNTER (OUTPATIENT)
Age: OVER 89
Discharge: HOME | End: 2025-03-06
Payer: COMMERCIAL

## 2025-03-06 VITALS
HEART RATE: 91 BPM | RESPIRATION RATE: 20 BRPM | DIASTOLIC BLOOD PRESSURE: 64 MMHG | OXYGEN SATURATION: 97 % | SYSTOLIC BLOOD PRESSURE: 140 MMHG | TEMPERATURE: 98 F

## 2025-03-06 VITALS
WEIGHT: 169.75 LBS | SYSTOLIC BLOOD PRESSURE: 95 MMHG | DIASTOLIC BLOOD PRESSURE: 50 MMHG | HEART RATE: 88 BPM | TEMPERATURE: 97 F | OXYGEN SATURATION: 98 % | BODY MASS INDEX: 25 KG/M2 | RESPIRATION RATE: 16 BRPM

## 2025-03-06 DIAGNOSIS — T83.9XXA PROBLEM WITH FOLEY CATHETER, INITIAL ENCOUNTER: Primary | ICD-10-CM

## 2025-03-06 DIAGNOSIS — Z97.8 FOLEY CATHETER IN PLACE: Primary | ICD-10-CM

## 2025-03-06 LAB
BILIRUB UR QL STRIP.AUTO: NEGATIVE
COLOR UR AUTO: YELLOW
GLUCOSE UR STRIP.AUTO-MCNC: NORMAL MG/DL
HYALINE CASTS #/AREA URNS AUTO: PRESENT /LPF
KETONES UR STRIP.AUTO-MCNC: NEGATIVE MG/DL
LEUKOCYTE ESTERASE UR QL STRIP.AUTO: 250
NITRITE UR QL STRIP.AUTO: NEGATIVE
PH UR STRIP.AUTO: 6 [PH] (ref 5–8)
PROT UR STRIP.AUTO-MCNC: 100 MG/DL
RBC #/AREA URNS AUTO: >10 /HPF
SP GR UR STRIP.AUTO: 1.02 (ref 1–1.03)
UROBILINOGEN UR STRIP.AUTO-MCNC: NORMAL MG/DL
WBC #/AREA URNS AUTO: >50 /HPF

## 2025-03-06 PROCEDURE — 81001 URINALYSIS AUTO W/SCOPE: CPT | Performed by: EMERGENCY MEDICINE

## 2025-03-06 PROCEDURE — 99283 EMERGENCY DEPT VISIT LOW MDM: CPT

## 2025-03-06 PROCEDURE — 99212 OFFICE O/P EST SF 10 MIN: CPT | Performed by: NURSE PRACTITIONER

## 2025-03-06 NOTE — DISCHARGE INSTRUCTIONS
Call urology if blood persists in your catheter.  Follow-up with your primary care physician.  If you cannot contact urology please go to the emergency department for possible flushing

## 2025-03-06 NOTE — ED PROVIDER NOTES
Patient Seen in: [facility]      History     Chief Complaint   Patient presents with    Urinary Symptoms     Stated Complaint: pt will like to change the aaron bag to the leg bag    Subjective:   34-year-old male presents for request for catheter bag change.  Patient states that he has had a catheter for several days he has a large nighttime bag and is requesting to be switched to a leg bag.  Wife at bedside translating, blood noted in catheter bag states that showed up 2 days ago denies any pain, denies any decrease in urine output.      Objective:     Past Medical History:    BPH (benign prostatic hyperplasia)    Coronary atherosclerosis    Depression    Essential hypertension    Hearing impaired person, bilateral    High blood pressure    High cholesterol    History of stomach ulcers    Hyperlipidemia    Stroke (HCC)              Past Surgical History:   Procedure Laterality Date    Cath bare metal stent (bms)      Colonoscopy      Surgical stent      wife states Lft side of heart- [facility]                Social History     Socioeconomic History    Marital status:    Tobacco Use    Smoking status: Former     Types: Cigarettes     Passive exposure: Never    Smokeless tobacco: Never    Tobacco comments:     QUIT SMOKING IN 2000   Vaping Use    Vaping status: Never Used   Substance and Sexual Activity    Alcohol use: Not Currently    Drug use: Never     Social Drivers of Health     Food Insecurity: No Food Insecurity (2/19/2025)    NCSS - Food Insecurity     Worried About Running Out of Food in the Last Year: No     Ran Out of Food in the Last Year: No   Transportation Needs: No Transportation Needs (2/19/2025)    NCSS - Transportation     Lack of Transportation: No   Housing Stability: Not At Risk (2/19/2025)    NCSS - Housing/Utilities     Has Housing: Yes     Worried About Losing Housing: No     Unable to Get Utilities: No              Review of Systems   Constitutional:  Negative.    Respiratory: Negative.     Cardiovascular: Negative.    Gastrointestinal: Negative.    Skin: Negative.    Neurological: Negative.        Positive for stated complaint: pt will like to change the aaron bag to the leg bag  Other systems are as noted in HPI.  Constitutional and vital signs reviewed.      All other systems reviewed and negative except as noted above.    Physical Exam     ED Triage Vitals [03/06/25 0812]   /64   Pulse 91   Resp 20   Temp 97.8 °F (36.6 °C)   Temp src Oral   SpO2 97 %   O2 Device None (Room air)       Current Vitals:   Vital Signs  BP: 140/64  Pulse: 91  Resp: 20  Temp: 97.8 °F (36.6 °C)  Temp src: Oral    Oxygen Therapy  SpO2: 97 %  O2 Device: None (Room air)        Physical Exam  Vitals and nursing note reviewed.   Constitutional:       General: He is not in acute distress.  HENT:      Head: Normocephalic.   Cardiovascular:      Rate and Rhythm: Normal rate.   Pulmonary:      Effort: Pulmonary effort is normal.   Musculoskeletal:         General: Normal range of motion.   Skin:     General: Skin is warm and dry.   Neurological:      General: No focal deficit present.      Mental Status: He is alert and oriented to person, place, and time.             ED Course   Labs Reviewed - No data to display  Catheter intact without obstruction, bag changed to leg bag.           MDM        Medical Decision Making  Pertinent Labs & Imaging studies reviewed. (See chart for details).  Patient coming in with request for catheter bag change.   Differential diagnosis includes catheter bag  change    Advise close follow-up given hematuria strict return precautions discussed     Patient is comfortable with plan of care.     Overall Pt looks good. Non-toxic, well-hydrated and in no respiratory distress. Vital signs are reassuring. Exam is reassuring. I do not believe pt requires and additional diagnostic studies or intervention. I believe pt can be discharged home to continue evaluation as  an outpatient. Follow-up provider given. Discharge instructions given and reviewed. Return for any problems. All understand and agrees with the plan.        Problems Addressed:  Jay catheter in place: acute illness or injury        Disposition and Plan     Clinical Impression:  1. Jay catheter in place         Disposition:  Discharge  3/6/2025  8:35 am    Follow-up:  Paulo James MD  [address] [place] [id number]  [phone]                Medications Prescribed:  Discharge Medication List as of 3/6/2025  8:35 AM              Supplementary Documentation:

## 2025-03-06 NOTE — ED INITIAL ASSESSMENT (HPI)
PT and PT's wife state that they were directed to come to the ED by the PT's urologist due to dark red urine that has been getting worse since last night. PT states pain around the aaron catheter , describes pain as \"bloated\"

## 2025-03-06 NOTE — ED INITIAL ASSESSMENT (HPI)
Patient states requesting a leg bag instead of the aaron catheter  Blood tinged urine noted in the bag

## 2025-03-06 NOTE — ED PROVIDER NOTES
Patient Seen in: [facility] Emergency Department      History     Chief Complaint   Patient presents with    Urinary Symptoms     Stated Complaint: aaron catheter with dark output    Subjective:   HPI      Patient is a 89-year-old male who presents with his wife for evaluation of decreased output in his Aaron catheter as well as dark urine.  Patient has been having frequent recurrent problems with urinary retention and hematuria since a TURP in mid February.  Multiple ED visits and this is the 6 consecutive day he has been in the ED with some sort of Aaron issue.  He was actually at immediate care earlier today where he just requested that he be changed from a larger Aaron bag to a leg bag.  The catheter itself is not changed.  However he and his wife report minimal urine output since returning home and he feels like he has to go and cannot.  No fevers, nausea, vomiting.    Objective:     Past Medical History:    BPH (benign prostatic hyperplasia)    Coronary atherosclerosis    Depression    Essential hypertension    Hearing impaired person, bilateral    High blood pressure    High cholesterol    History of stomach ulcers    Hyperlipidemia    Stroke (HCC)              Past Surgical History:   Procedure Laterality Date    Cath bare metal stent (bms)      Colonoscopy      Surgical stent      wife states Lft side of heart- [facility]                Social History     Socioeconomic History    Marital status:    Tobacco Use    Smoking status: Former     Types: Cigarettes     Passive exposure: Never    Smokeless tobacco: Never    Tobacco comments:     QUIT SMOKING IN 2000   Vaping Use    Vaping status: Never Used   Substance and Sexual Activity    Alcohol use: Not Currently    Drug use: Never     Social Drivers of Health     Food Insecurity: No Food Insecurity (2/19/2025)    NCSS - Food Insecurity     Worried About Running Out of Food in the Last Year: No     Ran Out of Food in the Last Year: No    Transportation Needs: No Transportation Needs (2/19/2025)    NCSS - Transportation     Lack of Transportation: No   Housing Stability: Not At Risk (2/19/2025)    NCSS - Housing/Utilities     Has Housing: Yes     Worried About Losing Housing: No     Unable to Get Utilities: No                  Physical Exam     ED Triage Vitals [03/06/25 1446]   BP 95/50   Pulse 88   Resp 16   Temp 97.2 °F (36.2 °C)   Temp src    SpO2 98 %   O2 Device        Current Vitals:   Vital Signs  BP: 95/50  Pulse: 88  Resp: 16  Temp: 97.2 °F (36.2 °C)    Oxygen Therapy  SpO2: 98 %        Physical Exam  Vitals and nursing note reviewed.   Constitutional:       Appearance: He is well-developed.   HENT:      Head: Normocephalic and atraumatic.   Cardiovascular:      Rate and Rhythm: Normal rate and regular rhythm.      Heart sounds: Normal heart sounds.   Pulmonary:      Effort: Pulmonary effort is normal.      Breath sounds: Normal breath sounds.   Abdominal:      General: Bowel sounds are normal.      Palpations: Abdomen is soft.      Comments: Minimal suprapubic tenderness, no peritoneal signs.   Musculoskeletal:         General: Normal range of motion.   Skin:     General: Skin is warm and dry.   Neurological:      Mental Status: He is alert and oriented to person, place, and time.             ED Course     Labs Reviewed   URINALYSIS, ROUTINE                   MDM      89-year-old male with recurrent ED visits recently for various complaints with his Jay catheter.  The bag changed from a regular bag to leg bag this morning but feels that it has not drained since then.  Minimal urine in the bag and his wife states he had not emptied it since they returned home this morning.  He does feel like he has to go although he has minimal tenderness and there is not an obvious distended bladder.  Will do bladder scan to evaluate for urinary retention here.    Update at 4:10 PM.  Bladder scan only demonstrated 10 to 15 cc but patient was quite  insistent that something is wrong so per his request catheter was changed.  Per nurse.  That the balloon may not have been completely inflated and it may be that it was migrating about a bit causing the symptoms.  Patient passed large clot afterwards as well but now is passing slightly pinkish urine.  No evidence of clots or obstruction and he can be discharged home.  Will send a UA.      Past Medical History-hypertension, high cholesterol, stroke, dementia    Differential diagnosis before testing included infection, obstruction    Co-morbidities that add to the complexity of management include: None    Testing ordered during this visit included UA      External chart review showed reviewed prior ED notes            Disposition:        Discharge  I have discussed with the patient the results of test, differential diagnosis, treatment plan, warning signs and symptoms which should prompt immediate return.  They expressed understanding of these instructions and agrees to the following plan provided.  They were given written discharge instructions and agrees to return for any concerns and voiced understanding and all questions were answered.      Medical Decision Making      Disposition and Plan     Clinical Impression:  1. Problem with Jay catheter, initial encounter         Disposition:  Discharge  3/6/2025  4:11 pm    Follow-up:  Ryan Yeung MD  [address]  [address] [address]  [phone]    Follow up            Medications Prescribed:  Current Discharge Medication List              Supplementary Documentation:

## 2025-03-07 PROBLEM — F33.2 MDD (MAJOR DEPRESSIVE DISORDER), RECURRENT EPISODE, SEVERE (HCC): Status: ACTIVE | Noted: 2025-03-07

## 2025-03-08 ENCOUNTER — HOSPITAL ENCOUNTER (INPATIENT)
Facility: HOSPITAL | Age: OVER 89
LOS: 3 days | Discharge: NURSING, REHAB OR LONG-TERM CARE | End: 2025-03-11
Attending: EMERGENCY MEDICINE | Admitting: HOSPITALIST
Payer: COMMERCIAL

## 2025-03-08 ENCOUNTER — APPOINTMENT (OUTPATIENT)
Dept: CT IMAGING | Facility: HOSPITAL | Age: OVER 89
End: 2025-03-08
Attending: EMERGENCY MEDICINE
Payer: COMMERCIAL

## 2025-03-08 DIAGNOSIS — R45.851 SUICIDAL IDEATION: ICD-10-CM

## 2025-03-08 DIAGNOSIS — R31.9 URINARY TRACT INFECTION WITH HEMATURIA, SITE UNSPECIFIED: Primary | ICD-10-CM

## 2025-03-08 DIAGNOSIS — N39.0 URINARY TRACT INFECTION WITH HEMATURIA, SITE UNSPECIFIED: Primary | ICD-10-CM

## 2025-03-08 LAB
ALBUMIN SERPL-MCNC: 4.1 G/DL (ref 3.2–4.8)
ALBUMIN/GLOB SERPL: 1.4 {RATIO} (ref 1–2)
ALP LIVER SERPL-CCNC: 128 U/L
ALT SERPL-CCNC: 17 U/L
ANION GAP SERPL CALC-SCNC: 10 MMOL/L (ref 0–18)
APTT PPP: 28.3 SECONDS (ref 23–36)
AST SERPL-CCNC: 25 U/L (ref ?–34)
BASOPHILS # BLD AUTO: 0.06 X10(3) UL (ref 0–0.2)
BASOPHILS NFR BLD AUTO: 0.7 %
BILIRUB SERPL-MCNC: 0.5 MG/DL (ref 0.2–0.9)
BILIRUB UR QL CFM: NEGATIVE
BUN BLD-MCNC: 16 MG/DL (ref 9–23)
CALCIUM BLD-MCNC: 8.7 MG/DL (ref 8.7–10.6)
CHLORIDE SERPL-SCNC: 110 MMOL/L (ref 98–112)
CLARITY UR REFRACT.AUTO: CLEAR
CO2 SERPL-SCNC: 21 MMOL/L (ref 21–32)
CREAT BLD-MCNC: 0.96 MG/DL
EGFRCR SERPLBLD CKD-EPI 2021: 73 ML/MIN/1.73M2 (ref 60–?)
EOSINOPHIL # BLD AUTO: 0.08 X10(3) UL (ref 0–0.7)
EOSINOPHIL NFR BLD AUTO: 1 %
ERYTHROCYTE [DISTWIDTH] IN BLOOD BY AUTOMATED COUNT: 12.8 %
GLOBULIN PLAS-MCNC: 2.9 G/DL (ref 2–3.5)
GLUCOSE BLD-MCNC: 115 MG/DL (ref 70–99)
GLUCOSE UR STRIP.AUTO-MCNC: 100 MG/DL
HCT VFR BLD AUTO: 34.2 %
HGB BLD-MCNC: 10.5 G/DL
HGB BLD-MCNC: 12.2 G/DL
IMM GRANULOCYTES # BLD AUTO: 0.02 X10(3) UL (ref 0–1)
IMM GRANULOCYTES NFR BLD: 0.2 %
INR BLD: 0.99 (ref 0.8–1.2)
KETONES UR STRIP.AUTO-MCNC: 40 MG/DL
LYMPHOCYTES # BLD AUTO: 3.87 X10(3) UL (ref 1–4)
LYMPHOCYTES NFR BLD AUTO: 48.1 %
MCH RBC QN AUTO: 29.2 PG (ref 26–34)
MCHC RBC AUTO-ENTMCNC: 35.7 G/DL (ref 31–37)
MCV RBC AUTO: 81.8 FL
MONOCYTES # BLD AUTO: 0.42 X10(3) UL (ref 0.1–1)
MONOCYTES NFR BLD AUTO: 5.2 %
NEUTROPHILS # BLD AUTO: 3.6 X10 (3) UL (ref 1.5–7.7)
NEUTROPHILS # BLD AUTO: 3.6 X10(3) UL (ref 1.5–7.7)
NEUTROPHILS NFR BLD AUTO: 44.8 %
NITRITE UR QL STRIP.AUTO: NEGATIVE
OSMOLALITY SERPL CALC.SUM OF ELEC: 294 MOSM/KG (ref 275–295)
PH UR STRIP.AUTO: >=9 [PH] (ref 5–8)
PLATELET # BLD AUTO: 288 10(3)UL (ref 150–450)
POTASSIUM SERPL-SCNC: 4.2 MMOL/L (ref 3.5–5.1)
PROT SERPL-MCNC: 7 G/DL (ref 5.7–8.2)
PROT UR STRIP.AUTO-MCNC: >=300 MG/DL
PROTHROMBIN TIME: 13.2 SECONDS (ref 11.6–14.8)
RBC # BLD AUTO: 4.18 X10(6)UL
RBC #/AREA URNS AUTO: >10 /HPF
RBC #/AREA URNS AUTO: >10 /HPF
SODIUM SERPL-SCNC: 141 MMOL/L (ref 136–145)
SP GR UR STRIP.AUTO: <=1.005 (ref 1–1.03)
UROBILINOGEN UR STRIP.AUTO-MCNC: >=8 MG/DL
WBC # BLD AUTO: 8.1 X10(3) UL (ref 4–11)

## 2025-03-08 PROCEDURE — 74177 CT ABD & PELVIS W/CONTRAST: CPT | Performed by: EMERGENCY MEDICINE

## 2025-03-08 PROCEDURE — 99223 1ST HOSP IP/OBS HIGH 75: CPT | Performed by: INTERNAL MEDICINE

## 2025-03-08 RX ORDER — POLYETHYLENE GLYCOL 3350 17 G/17G
17 POWDER, FOR SOLUTION ORAL DAILY
Status: DISCONTINUED | OUTPATIENT
Start: 2025-03-08 | End: 2025-03-11

## 2025-03-08 RX ORDER — ESCITALOPRAM OXALATE 20 MG/1
20 TABLET ORAL NIGHTLY
Status: DISCONTINUED | OUTPATIENT
Start: 2025-03-08 | End: 2025-03-11

## 2025-03-08 RX ORDER — ENALAPRIL MALEATE 10 MG/1
10 TABLET ORAL 2 TIMES DAILY
Status: DISCONTINUED | OUTPATIENT
Start: 2025-03-08 | End: 2025-03-11

## 2025-03-08 RX ORDER — ATORVASTATIN CALCIUM 80 MG/1
80 TABLET, FILM COATED ORAL DAILY
Status: DISCONTINUED | OUTPATIENT
Start: 2025-03-08 | End: 2025-03-11

## 2025-03-08 RX ORDER — BISACODYL 10 MG
10 SUPPOSITORY, RECTAL RECTAL
Status: DISCONTINUED | OUTPATIENT
Start: 2025-03-08 | End: 2025-03-11

## 2025-03-08 RX ORDER — SENNOSIDES 8.6 MG
17.2 TABLET ORAL NIGHTLY PRN
Status: DISCONTINUED | OUTPATIENT
Start: 2025-03-08 | End: 2025-03-11

## 2025-03-08 RX ORDER — ECHINACEA PURPUREA EXTRACT 125 MG
1 TABLET ORAL
Status: DISCONTINUED | OUTPATIENT
Start: 2025-03-08 | End: 2025-03-11

## 2025-03-08 RX ORDER — BENZONATATE 100 MG/1
200 CAPSULE ORAL 3 TIMES DAILY PRN
Status: DISCONTINUED | OUTPATIENT
Start: 2025-03-08 | End: 2025-03-11

## 2025-03-08 RX ORDER — SODIUM PHOSPHATE, DIBASIC AND SODIUM PHOSPHATE, MONOBASIC 7; 19 G/230ML; G/230ML
1 ENEMA RECTAL ONCE AS NEEDED
Status: DISCONTINUED | OUTPATIENT
Start: 2025-03-08 | End: 2025-03-11

## 2025-03-08 RX ORDER — TRAZODONE HYDROCHLORIDE 50 MG/1
50 TABLET ORAL NIGHTLY
Status: DISCONTINUED | OUTPATIENT
Start: 2025-03-08 | End: 2025-03-10

## 2025-03-08 RX ORDER — POLYETHYLENE GLYCOL 3350 17 G/17G
17 POWDER, FOR SOLUTION ORAL DAILY PRN
Status: DISCONTINUED | OUTPATIENT
Start: 2025-03-08 | End: 2025-03-11

## 2025-03-08 RX ORDER — SUCRALFATE 1 G/1
1 TABLET ORAL
Status: DISCONTINUED | OUTPATIENT
Start: 2025-03-08 | End: 2025-03-11

## 2025-03-08 RX ORDER — TAMSULOSIN HYDROCHLORIDE 0.4 MG/1
0.4 CAPSULE ORAL
Status: DISCONTINUED | OUTPATIENT
Start: 2025-03-09 | End: 2025-03-11

## 2025-03-08 RX ORDER — MIRTAZAPINE 15 MG/1
15 TABLET, FILM COATED ORAL NIGHTLY
Status: DISCONTINUED | OUTPATIENT
Start: 2025-03-08 | End: 2025-03-11

## 2025-03-08 RX ORDER — ACETAMINOPHEN 500 MG
500 TABLET ORAL EVERY 4 HOURS PRN
Status: DISCONTINUED | OUTPATIENT
Start: 2025-03-08 | End: 2025-03-11

## 2025-03-08 RX ORDER — ONDANSETRON 2 MG/ML
4 INJECTION INTRAMUSCULAR; INTRAVENOUS EVERY 6 HOURS PRN
Status: DISCONTINUED | OUTPATIENT
Start: 2025-03-08 | End: 2025-03-11

## 2025-03-08 RX ORDER — METOCLOPRAMIDE HYDROCHLORIDE 5 MG/ML
5 INJECTION INTRAMUSCULAR; INTRAVENOUS EVERY 8 HOURS PRN
Status: DISCONTINUED | OUTPATIENT
Start: 2025-03-08 | End: 2025-03-11

## 2025-03-08 RX ORDER — SODIUM CHLORIDE 9 MG/ML
INJECTION, SOLUTION INTRAVENOUS CONTINUOUS
Status: DISCONTINUED | OUTPATIENT
Start: 2025-03-08 | End: 2025-03-08

## 2025-03-08 NOTE — H&P
[facility]     Deven Atkinson Patient Status:  Emergency    1930 MRN [id number]   Location [facility] EMERGENCY DEPARTMENT Attending Rod Fajardo,    Hosp Day # 0 PCP Paulo James MD     Chief Complaint: Hematuria    Subjective:    History of Present Illness:   Deven Atkinson is a 89 year old male with PMHx BPH and urinary retention s/p chronic aaron and TURP in 2025, CAD s/p stent, HTN, HLD, CVA, MDD who presents from [facility] due to hematuria. Patient has been in the ER 6 times this month alone due to problem with his aaron catheter. Wife states there was trouble with it draining. He was never admitted for this problem as there were no issues in the ER. Yesterday he was in the ER for suicide attempt as he had lacerated his left wrist with a knife. It was superficial and did not require repair. He was then transferred to [facility]. Today he was noted to have hematuria and sent to the ER for evaluation. Patient denies fever, nausea, vomiting, abdominal pain or flank pain. He denies suicidal ideation. CT a/p showed gas within bladder diverticulum related to Aaron catheter, though emphysematous cystitis is not excluded. In the ER, aaron catheter was exchanged to 3 way aaron and CBI started.  UA concerning for UTI and he was given Ceftriaxone. He was admitted with urology on consult.     History/Other:    Past Medical History:  Past Medical History:    BPH (benign prostatic hyperplasia)    Coronary atherosclerosis    Depression    Essential hypertension    Hearing impaired person, bilateral    High blood pressure    High cholesterol    History of stomach ulcers    Hyperlipidemia    Stroke (HCC)    Urinary retention     Past Surgical History:   Past Surgical History:   Procedure Laterality Date    Cath bare metal stent (bms)      Colonoscopy      Surgical stent      wife states Lft side of heart- [facility]      Family History:   Family  History   Adopted: Yes   Family history unknown: Yes     Social History:    reports that he has quit smoking. His smoking use included cigarettes. He has never been exposed to tobacco smoke. He has never used smokeless tobacco. He reports that he does not currently use alcohol. He reports that he does not use drugs.     Allergies: Allergies[1]    Medications:  Medications Ordered Prior to Encounter[2]    Review of Systems:   A comprehensive review of systems was completed.    Pertinent positives and negatives noted in the HPI.    Objective:   Physical Exam:    /58 (BP Location: Left arm)   Pulse 66   Temp 98.2 °F (36.8 °C) (Oral)   Resp 18   Ht 5' 10\" (1.778 m)   Wt 163 lb 5.8 oz (74.1 kg)   SpO2 99%   BMI 23.44 kg/m²   General: No acute distress, awake and alert  Respiratory: No rhonchi, no wheezes  Cardiovascular: S1, S2. Regular rate and rhythm  Abdomen: Soft, Non-tender, non-distended, positive bowel sounds  : Jay in place draining light pink urine  Neuro: BAUMAN x 4  Extremities: No edema    Results:    Labs:      Labs Last 24 Hours:  Recent Labs   Lab 03/03/25  0858 03/07/25  1018 03/08/25  0755   RBC 4.05 3.92 4.18   HGB 11.6* 11.0* 12.2*   HCT 34.6* 33.1* 34.2*   MCV 85.4 84.4 81.8   MCH 28.6 28.1 29.2   MCHC 33.5 33.2 35.7   RDW 12.8 12.9 12.8   NEPRELIM 4.53 4.00 3.60   WBC 7.2 7.2 8.1   .0 266.0 288.0     Recent Labs   Lab 03/03/25  0858 03/05/25  1629 03/07/25  1018 03/08/25  0755   * 134* 140* 115*   BUN 18 14 14 16   CREATSERUM 1.06 1.06 0.94 0.96   EGFRCR 65 65 75 73   CA 8.8 8.8 8.5* 8.7   ALB 3.9  --  3.8 4.1    139 142 141   K 4.1 4.2 3.8 4.2    106 109 110   CO2 20.0* 26.0 23.0 21.0   ALKPHO 116  --  119* 128*   AST 19  --  20 25   ALT 12  --  15 17   BILT 0.4  --  0.3 0.5   TP 6.8  --  6.4 7.0     Estimated Glomerular Filtration Rate: 73 mL/min/1.73m2 (result from lab).    Lab Results   Component Value Date    INR 0.99 03/08/2025    INR 1.06 02/10/2025     INR 0.97 10/24/2024     No results for input(s): \"TROP\", \"TROPHS\", \"CK\" in the last 168 hours.    No results for input(s): \"TROP\", \"PBNP\" in the last 168 hours.    No results for input(s): \"PCT\" in the last 168 hours.    Imaging: Imaging data reviewed in Epic.    Assessment & Plan:      #Hematuria  #BPH with urinary retention s/p chronic aaron  #S/p TURP in February 2025  #Suspected UTI  -CT a/p showed gas within bladder diverticulum related to Aaron catheter, though emphysematous cystitis is not excluded  -Empiric abx  -Follow urine culture  -Flomax for alfuzosin   -3 way aaron in for CBI, monitor urine color  -Hold ASA  -Urology consult    #Recent suicidal ideation  -Currently denies  -Sitter at beside, suicide precautions  -Came from [facility]  -Psych consult    #CAD s/p stent  -Hold ASA  -Statin    #Hypertension  -Enalapril    #Hyperlipidemia  -Statin    #Hx of CVA  -Hold ASA  -Statin    #Depression  -Trazodone, Lexapro, Remeron    #PUD  -Carafate    #Dementia      Plan of care discussed with patient, wife, RN.    Arnulfo Edmondson,     Supplementary Documentation:     The 21st Century Cures Act makes medical notes like these available to patients in the interest of transparency. Please be advised this is a medical document. Medical documents are intended to carry relevant information, facts as evident, and the clinical opinion of the practitioner. The medical note is intended as peer to peer communication and may appear blunt or direct. It is written in medical language and may contain abbreviations or verbiage that are unfamiliar.                 [1]   Allergies  Allergen Reactions    Losartan UNKNOWN     Unable to remember reaction       [2]   Current Facility-Administered Medications on File Prior to Encounter   Medication Dose Route Frequency Provider Last Rate Last Admin    [COMPLETED] acetaminophen (Tylenol Extra Strength) tab 1,000 mg  1,000 mg Oral Once Joshua Ferreira MD   1,000 mg at 03/07/25  1322    [COMPLETED] ketorolac (Toradol) 15 MG/ML injection 10.05 mg  10.05 mg Intravenous Once Joshua Ferreira MD   10.05 mg at 25 1445    [COMPLETED] sodium chloride 0.9 % IV bolus 1,000 mL  1,000 mL Intravenous Once Carmella Nguyen MD   Stopped at 25 1106    [COMPLETED] iopamidol 76% (ISOVUE-370) injection for power injector  85 mL Intravenous ONCE PRN Carmella Nguyen MD   85 mL at 25 1020    [COMPLETED] morphINE PF 2 MG/ML injection 2 mg  2 mg Intravenous Once Rick Holland MD   2 mg at 25 0331    [COMPLETED] acetaminophen (Tylenol Extra Strength) tab 1,000 mg  1,000 mg Oral Once Ryan Yeung MD   1,000 mg at 25 1232    [COMPLETED] ceFEPIme (Maxipime) 1 g in sodium chloride 0.9% 100 mL IVPB-MBP  1 g Intravenous Q8H Ryan Yeung MD   Stopped at 25 0900    [COMPLETED] ceFEPIme (Maxipime) 1 g in sodium chloride 0.9% 100 mL IVPB-MBP  1 g Intravenous Q8H Ryan Yeung MD   Stopped at 25 1011    [COMPLETED] hydrALAzine (Apresoline) 20 mg/mL injection 5 mg  5 mg Intravenous Once Ester Virgen MD   5 mg at 25 1644    [COMPLETED] furosemide (Lasix) tab 40 mg  40 mg Oral Once Naila Guidry MD   40 mg at 25 1258    [] sodium chloride 0.9% infusion   Intravenous Continuous Tigre Vásquez MD   Stopped at 25 0100    [COMPLETED] ciprofloxacin (Cipro) tab 250 mg  250 mg Oral Once Carmella Nguyen MD   250 mg at 01/10/25 1343    [COMPLETED] iopamidol 76% (ISOVUE-370) injection for power injector  80 mL Intravenous ONCE PRN Maximus Urban MD   80 mL at 24 1120     Current Outpatient Medications on File Prior to Encounter   Medication Sig Dispense Refill    Sodium Chloride-Xylitol (XLEAR SINUS CARE SPRAY NA) by Nasal route 3 (three) times daily.      enalapril 10 MG Oral Tab Take 1 tablet (10 mg total) by mouth 2 (two) times daily.      alfuzosin ER 10 MG Oral Tablet 24 Hr Take 1 tablet (10 mg total) by mouth daily.      Polyethylene Glycol  3350 17 g Oral Powd Pack Take 17 g by mouth daily.      traZODone 50 MG Oral Tab Take 1 tablet (50 mg total) by mouth nightly.      zinc sulfate 220 (50 Zn) MG Oral Cap Take 1 capsule (220 mg total) by mouth daily.      niacin 500 MG Oral Tab Take 1 tablet (500 mg total) by mouth daily with breakfast.      sucralfate 1 g Oral Tab Take 1 tablet (1 g total) by mouth 3 (three) times daily before meals.      aspirin 81 MG Oral Chew Tab Chew 1 tablet (81 mg total) by mouth daily.      atorvastatin 80 MG Oral Tab Take 1 tablet (80 mg total) by mouth daily.      escitalopram 20 MG Oral Tab Take 1 tablet (20 mg total) by mouth at bedtime.      mirtazapine 15 MG Oral Tab Take 1 tablet (15 mg total) by mouth nightly.      [] fleet enema Rectal Enema Place 133 mL rectally once as needed. 2 each 0    [] Magnesium Citrate Oral Solution Take 296 mL by mouth once for 1 dose. Use 1/2 bottle.  Repeat as needed for constipation 296 mL 0    [] levoFLOXacin 750 MG Oral Tab Take 1 tablet (750 mg total) by mouth daily for 7 days. 7 tablet 0    gabapentin 100 MG Oral Cap Take 1 capsule (100 mg total) by mouth 3 (three) times daily.

## 2025-03-08 NOTE — ED QUICK NOTES
Pt appears comfortable on cart, denies complaints.     Patient with 1:1 sitter every 15 minute documentation per paper record, suicide precautions in place. Belongings removed, patient wearing hospital safety gown.

## 2025-03-08 NOTE — ED QUICK NOTES
Pt  appears comfortable on cart, CBI continues to infuse. Pt still with clear to pink tinged return.

## 2025-03-08 NOTE — ED QUICK NOTES
Jay does appear to be draining red  urine at this time. Will discuss with Dr. Fajardo.     Wife at bedside. Pt requesting to eat but instructed to remain NPO at this time.

## 2025-03-08 NOTE — ED QUICK NOTES
Jay irrigated with 1 liter of sterile water.     3 very small clots removed and pink drainage noted upon completion. Pt tolerated well.     Will obtain urine sample shortly.

## 2025-03-08 NOTE — ED INITIAL ASSESSMENT (HPI)
Pt presents to the ED from [facility] with c/o hematuria. Pt was seen here yesterday for suicidal ideation and admitted to [facility]. Per EAS pt has blood in urine today. Pt speaks primarily mandarin. Pt awake and alert, skin pale pink, warm and dry, resps appear unlabored. + drainage of red urine with no obvious clots into aaron bag.

## 2025-03-08 NOTE — ED PROVIDER NOTES
Patient Seen in: [facility] Emergency Department      History     Chief Complaint   Patient presents with    Bleeding     Stated Complaint: hematura    Subjective:   HPI      89-year-old male presents to the emergency room from [facility] for evaluation of hematuria.  Patient has chronic indwelling Jay catheter, was admitted to the [facility] yesterday after he attempted to slit his wrist in a suicide attempt.  The lacerations were superficial and did not require repair.  Patient was noted to have blood in the Jay catheter bag this morning and was sent to the ER for evaluation.  Patient denies any complaints denies headache or neck pain denies chest pain shortness of breath denies abdominal pain.  Denies back or flank pain.  Denies fevers or chills    Objective:     Past Medical History:    BPH (benign prostatic hyperplasia)    Coronary atherosclerosis    Depression    Essential hypertension    Hearing impaired person, bilateral    High blood pressure    High cholesterol    History of stomach ulcers    Hyperlipidemia    Stroke (HCC)    Urinary retention              Past Surgical History:   Procedure Laterality Date    Cath bare metal stent (bms)      Colonoscopy      Surgical stent      wife states Lft side of heart- [facility]                Social History     Socioeconomic History    Marital status:    Tobacco Use    Smoking status: Former     Types: Cigarettes     Passive exposure: Never    Smokeless tobacco: Never    Tobacco comments:     QUIT SMOKING IN 2000   Vaping Use    Vaping status: Never Used   Substance and Sexual Activity    Alcohol use: Not Currently    Drug use: Never     Social Drivers of Health     Food Insecurity: No Food Insecurity (2/19/2025)    NCSS - Food Insecurity     Worried About Running Out of Food in the Last Year: No     Ran Out of Food in the Last Year: No   Transportation Needs: No Transportation Needs (2/19/2025)    NCSS - Transportation      Lack of Transportation: No   Housing Stability: Not At Risk (2/19/2025)    NCSS - Housing/Utilities     Has Housing: Yes     Worried About Losing Housing: No     Unable to Get Utilities: No                  Physical Exam     ED Triage Vitals [03/08/25 0754]   /66   Pulse 60   Resp 18   Temp 97.9 °F (36.6 °C)   Temp src Temporal   SpO2 99 %   O2 Device None (Room air)       Current Vitals:   Vital Signs  BP: 147/57  Pulse: 57  Resp: 18  Temp: 97.9 °F (36.6 °C)  Temp src: Temporal  MAP (mmHg): 83    Oxygen Therapy  SpO2: 99 %  O2 Device: None (Room air)        Physical Exam  GENERAL: Patient is awake, alert, in no acute distress.  HEENT: no scleral icterus.  Mucous membranes are moist  HEART: Regular rate and rhythm, no murmurs.  LUNGS: Clear to auscultation bilaterally.  No Rales, no rhonchi, no wheezing, no stridor.  ABDOMEN: Soft, nondistended,non tender, bowel sounds are present, no rebound, no rigidity, no guarding.no pulsatile masses. No CVA tenderness  EXTREMITIES: No peripheral edema, no calf tenderness,      ED Course     Labs Reviewed   CBC WITH DIFFERENTIAL WITH PLATELET - Abnormal; Notable for the following components:       Result Value    HGB 12.2 (*)     HCT 34.2 (*)     All other components within normal limits   URINALYSIS WITH CULTURE REFLEX - Abnormal; Notable for the following components:    Urine Color Red (*)     Glucose Urine 100 (*)     Ketones Urine 40 (*)     Blood Urine Large (*)     pH Urine >=9.0 (*)     Protein Urine >=300 (*)     Urobilinogen Urine >=8.0 (*)     Leukocyte Esterase Urine Large (*)     WBC Urine 11-20 (*)     RBC Urine >10 (*)     All other components within normal limits   COMP METABOLIC PANEL (14) - Abnormal; Notable for the following components:    Glucose 115 (*)     Alkaline Phosphatase 128 (*)     All other components within normal limits   UA MICROSCOPIC ONLY, URINE - Abnormal; Notable for the following components:    WBC Urine 11-20 (*)     RBC Urine >10 (*)      All other components within normal limits   PROTHROMBIN TIME (PT) - Normal   PTT, ACTIVATED - Normal   ICTOTEST - Normal   RAINBOW DRAW LAVENDER   RAINBOW DRAW LIGHT GREEN   RAINBOW DRAW BLUE   URINE CULTURE, ROUTINE                   MDM        Differential diagnosis before testing includes but not limited to urinary tract infection/hemorrhagic cystitis, bladder mass/malignancy, electrolyte abnormality, acute kidney injury, anemia, coagulopathy, which is a medical condition that poses a threat to life/function      Radiographic images  I personally reviewed the radiographs and my individual interpretation shows CT abdomen, no free air  I also reviewed the official reports that showed Urinary bladder is decompressed with Jay catheter in place.  There are multiple foci of gas within the superior and anterior urinary bladder with adjacent enhancing soft tissue nodule measuring up to 1.3 cm.  Findings may be related to gas within   bladder diverticulum related to Jay catheter, though emphysematous cystitis is not excluded.  The soft tissue nodule superior and anterior to the urinary bladder appears enlarged when compared to 7/2/2024, correlate clinically for possible underlying   neoplasm.   2. Colonic diverticulum along the cecum and ascending colon, with surrounding fat stranding, could represent mild diverticulitis.  The appendix appears in otherwise normal.     Discussion of management (consult/physicians, social work, pharmacy,ect) Dr Rosales urology, Dr. Edmondson hospitalist    Medications Provided: IV ceftriaxone    Course of Events during Emergency Room Visit include IV established, blood work obtained.  Patient was noted to have dark bloody urine in Jay catheter bag, Jay was exchanged for three-way catheter and CBI was performed.  Urine is clear and is now light pink.  CT performed, patient did receive dose of IV antibiotics.  CBC and chemistry are unremarkable.  Discussed with hospitalist and urology,  patient will be admitted for further evaluation treatment    Shared decision making was utilized           Disposition:    Admission  I have discussed with the patient the results of test, differential diagnosis, and treatment plan. They expressed clear understanding of these instructions and agrees to the plan provided.       Note to patient: The 21st Century Cures Act makes medical notes like these available to patients in the interest of transparency. However, this is a medical document intended as peer to peer communication. It is written in medical language and may contain abbreviations or verbiage that are unfamiliar. It may appear blunt or direct. Medical documents are intended to carry relevant information, facts as evident, and the clinical opinion of the practitioner.               Admission disposition: 3/8/2025 11:03 AM           Medical Decision Making      Disposition and Plan     Clinical Impression:  1. Urinary tract infection with hematuria, site unspecified    2. Suicidal ideation         Disposition:  Admit  3/8/2025 11:03 am    Follow-up:  No follow-up provider specified.        Medications Prescribed:  Current Discharge Medication List              Supplementary Documentation:         Hospital Problems       Present on Admission  Date Reviewed: 3/6/2025            ICD-10-CM Noted POA    * (Principal) Urinary tract infection with hematuria, site unspecified N39.0, R31.9 3/8/2025 Unknown

## 2025-03-08 NOTE — ED QUICK NOTES
Pt voiced to his wife that at [facility] when a staff member was adjusting his blanket last night that his aaron catheter had been accidentally pulled.     Upon removal of catheter, pt noted to have blood around meatus and blood to tip of catheter.     Area cleansed prior to placement of 3 way aaron.

## 2025-03-08 NOTE — ED QUICK NOTES
Bloody urine draining from aaron. Pt's wife and pt aware will remove current aaron and will place 3 way in order to provide continuous irrigation. Pt and wife verbalized understanding.

## 2025-03-08 NOTE — ED QUICK NOTES
Pt noted to have some bloody urine in aaron bag so aaron manually irrigated again with 1 liter of saline until pink tinge return.    Pt tolerated very well and small amount of clots noted with irrigation.     Discussed with Dr. Fajardo and ok to place 3 way aaron for irrigation.

## 2025-03-08 NOTE — ED QUICK NOTES
Orders for admission, patient is aware of plan and ready to go upstairs. Any questions, please call ED ORACIO Roland at extension [phone].     Patient Covid vaccination status: Unvaccinated     COVID Test Ordered in ED: None    COVID Suspicion at Admission: N/A    Running Infusions:    sodium chloride 125 mL/hr at 03/08/25 1123        Mental Status/LOC at time of transport: pt alert, speaks some english but mainly mandarin.     Other pertinent information: pt with continuous bladder irrigation in place.  Pt sent here from  [facility] for blood in urine.  Pt had TURP done in February and has had aaron in place, began bleeding this morning. Aaron switched over to 22f with CBI, 1st 2 bags up. Pt tolerating well. Pt has had several ER visits over past few days, admtted to [facility] last night for attempt to harm self by cutting wrist.   CIWA score: N/A   NIH score:  N/A

## 2025-03-08 NOTE — ED QUICK NOTES
Pt repositioned on cart for comfort. Pt still requesting to eat and drink. Pt instructed to remain NPO for now.     Pt denies complaints, sitter and wife at bedside.     CBI in place, aaron draining slight pink tinged urine.

## 2025-03-08 NOTE — ED QUICK NOTES
Pt awaiting bed assignment.     Food tray ordered:  Peach yogurt  Oatmeal with brown sugar and a berry cup  Apple juice  Whipped sweet potatoes  Cottage cheese and peaches  Cream of chicken soup  Baked cinnamon apples

## 2025-03-08 NOTE — PLAN OF CARE
Assume care @ 0730  AO X3, Verbally responsive, on RA  SB on Tele with HR 59- 60s. Denies pain  Continent of BM. SBA with ambulation  Jay in place with CBI. Output color light pink  SI precaution in place. 1:1 sitter in place for safety  Good appetite meals. Wife at bedside  Urology to see. PT/OT to see  Call light placed within reach.   Fall and safety precautions in place.      Problem: PAIN - ADULT  Goal: Verbalizes/displays adequate comfort level or patient's stated pain goal  Description: INTERVENTIONS:  - Encourage pt to monitor pain and request assistance  - Assess pain using appropriate pain scale  - Administer analgesics based on type and severity of pain and evaluate response  - Implement non-pharmacological measures as appropriate and evaluate response  - Consider cultural and social influences on pain and pain management  - Manage/alleviate anxiety  - Utilize distraction and/or relaxation techniques  - Monitor for opioid side effects  - Notify MD/LIP if interventions unsuccessful or patient reports new pain  - Anticipate increased pain with activity and pre-medicate as appropriate  Outcome: Progressing

## 2025-03-08 NOTE — ED NOTES
Patient is not able to sign himself in at this time.  This writer spoke with patient's wife who reports she is the patient's only family, but she does not have power of  paperwork.  This writer explained the need for inpatient admission and provided patient with a copy of his petition.  This writer explained the rights of individuals having mental health and developmental disability services to patient.  This writer secure email JB copy of the petition, certificate, and rights.  This writer scanned in the petition, certificate, and rights.

## 2025-03-09 LAB
ANION GAP SERPL CALC-SCNC: 8 MMOL/L (ref 0–18)
BASOPHILS # BLD AUTO: 0.06 X10(3) UL (ref 0–0.2)
BASOPHILS NFR BLD AUTO: 0.7 %
BUN BLD-MCNC: 12 MG/DL (ref 9–23)
CALCIUM BLD-MCNC: 8.5 MG/DL (ref 8.7–10.6)
CHLORIDE SERPL-SCNC: 112 MMOL/L (ref 98–112)
CO2 SERPL-SCNC: 24 MMOL/L (ref 21–32)
CREAT BLD-MCNC: 0.89 MG/DL
EGFRCR SERPLBLD CKD-EPI 2021: 79 ML/MIN/1.73M2 (ref 60–?)
EOSINOPHIL # BLD AUTO: 0.17 X10(3) UL (ref 0–0.7)
EOSINOPHIL NFR BLD AUTO: 2 %
ERYTHROCYTE [DISTWIDTH] IN BLOOD BY AUTOMATED COUNT: 13.1 %
GLUCOSE BLD-MCNC: 117 MG/DL (ref 70–99)
HCT VFR BLD AUTO: 33.5 %
HGB BLD-MCNC: 11.4 G/DL
IMM GRANULOCYTES # BLD AUTO: 0.02 X10(3) UL (ref 0–1)
IMM GRANULOCYTES NFR BLD: 0.2 %
LYMPHOCYTES # BLD AUTO: 4.07 X10(3) UL (ref 1–4)
LYMPHOCYTES NFR BLD AUTO: 48.1 %
MCH RBC QN AUTO: 28.9 PG (ref 26–34)
MCHC RBC AUTO-ENTMCNC: 34 G/DL (ref 31–37)
MCV RBC AUTO: 84.8 FL
MONOCYTES # BLD AUTO: 0.54 X10(3) UL (ref 0.1–1)
MONOCYTES NFR BLD AUTO: 6.4 %
NEUTROPHILS # BLD AUTO: 3.6 X10 (3) UL (ref 1.5–7.7)
NEUTROPHILS # BLD AUTO: 3.6 X10(3) UL (ref 1.5–7.7)
NEUTROPHILS NFR BLD AUTO: 42.6 %
OSMOLALITY SERPL CALC.SUM OF ELEC: 299 MOSM/KG (ref 275–295)
PLATELET # BLD AUTO: 270 10(3)UL (ref 150–450)
POTASSIUM SERPL-SCNC: 4.1 MMOL/L (ref 3.5–5.1)
RBC # BLD AUTO: 3.95 X10(6)UL
SODIUM SERPL-SCNC: 144 MMOL/L (ref 136–145)
WBC # BLD AUTO: 8.5 X10(3) UL (ref 4–11)

## 2025-03-09 PROCEDURE — 99232 SBSQ HOSP IP/OBS MODERATE 35: CPT | Performed by: INTERNAL MEDICINE

## 2025-03-09 PROCEDURE — 90792 PSYCH DIAG EVAL W/MED SRVCS: CPT

## 2025-03-09 RX ORDER — ASPIRIN 81 MG/1
81 TABLET, CHEWABLE ORAL DAILY
Status: DISCONTINUED | OUTPATIENT
Start: 2025-03-09 | End: 2025-03-09

## 2025-03-09 RX ORDER — NIACIN 100 MG
500 TABLET ORAL
Status: DISCONTINUED | OUTPATIENT
Start: 2025-03-09 | End: 2025-03-09

## 2025-03-09 RX ORDER — ATORVASTATIN CALCIUM 80 MG/1
80 TABLET, FILM COATED ORAL DAILY
Status: DISCONTINUED | OUTPATIENT
Start: 2025-03-09 | End: 2025-03-09

## 2025-03-09 RX ORDER — LORAZEPAM 2 MG/ML
0.5 INJECTION INTRAMUSCULAR EVERY 4 HOURS PRN
Status: DISCONTINUED | OUTPATIENT
Start: 2025-03-09 | End: 2025-03-09

## 2025-03-09 RX ORDER — SUCRALFATE 1 G/1
1 TABLET ORAL
Status: DISCONTINUED | OUTPATIENT
Start: 2025-03-09 | End: 2025-03-09

## 2025-03-09 RX ORDER — BISACODYL 10 MG
10 SUPPOSITORY, RECTAL RECTAL
Status: DISCONTINUED | OUTPATIENT
Start: 2025-03-09 | End: 2025-03-09

## 2025-03-09 RX ORDER — HALOPERIDOL 0.5 MG/1
0.5 TABLET ORAL EVERY 4 HOURS PRN
Status: DISCONTINUED | OUTPATIENT
Start: 2025-03-09 | End: 2025-03-09

## 2025-03-09 RX ORDER — ZINC SULFATE 50(220)MG
220 CAPSULE ORAL DAILY
Status: DISCONTINUED | OUTPATIENT
Start: 2025-03-09 | End: 2025-03-09

## 2025-03-09 RX ORDER — LORAZEPAM 2 MG/ML
0.5 INJECTION INTRAMUSCULAR ONCE
Status: COMPLETED | OUTPATIENT
Start: 2025-03-09 | End: 2025-03-09

## 2025-03-09 RX ORDER — SODIUM PHOSPHATE, DIBASIC AND SODIUM PHOSPHATE, MONOBASIC 7; 19 G/230ML; G/230ML
1 ENEMA RECTAL ONCE AS NEEDED
Status: DISCONTINUED | OUTPATIENT
Start: 2025-03-09 | End: 2025-03-09

## 2025-03-09 RX ORDER — MIRTAZAPINE 15 MG/1
15 TABLET, FILM COATED ORAL NIGHTLY
Status: DISCONTINUED | OUTPATIENT
Start: 2025-03-09 | End: 2025-03-09

## 2025-03-09 RX ORDER — MAGNESIUM HYDROXIDE/ALUMINUM HYDROXICE/SIMETHICONE 120; 1200; 1200 MG/30ML; MG/30ML; MG/30ML
30 SUSPENSION ORAL EVERY 4 HOURS PRN
Status: DISCONTINUED | OUTPATIENT
Start: 2025-03-09 | End: 2025-03-09

## 2025-03-09 RX ORDER — DOCUSATE SODIUM 100 MG/1
100 CAPSULE, LIQUID FILLED ORAL 2 TIMES DAILY PRN
Status: DISCONTINUED | OUTPATIENT
Start: 2025-03-09 | End: 2025-03-09

## 2025-03-09 RX ORDER — IBUPROFEN 200 MG
CAPSULE ORAL 2 TIMES DAILY
Status: DISCONTINUED | OUTPATIENT
Start: 2025-03-09 | End: 2025-03-09

## 2025-03-09 RX ORDER — ENALAPRIL MALEATE 10 MG/1
10 TABLET ORAL 2 TIMES DAILY
Status: DISCONTINUED | OUTPATIENT
Start: 2025-03-09 | End: 2025-03-09

## 2025-03-09 RX ORDER — HALOPERIDOL 5 MG/ML
0.5 INJECTION INTRAMUSCULAR EVERY 4 HOURS PRN
Status: DISCONTINUED | OUTPATIENT
Start: 2025-03-09 | End: 2025-03-09

## 2025-03-09 RX ORDER — ESCITALOPRAM OXALATE 10 MG/1
10 TABLET ORAL DAILY
Status: DISCONTINUED | OUTPATIENT
Start: 2025-03-09 | End: 2025-03-09

## 2025-03-09 RX ORDER — ACETAMINOPHEN 325 MG/1
325 TABLET ORAL EVERY 4 HOURS PRN
Status: DISCONTINUED | OUTPATIENT
Start: 2025-03-09 | End: 2025-03-09

## 2025-03-09 RX ORDER — HALOPERIDOL 5 MG/ML
2 INJECTION INTRAMUSCULAR EVERY 6 HOURS PRN
Status: DISCONTINUED | OUTPATIENT
Start: 2025-03-09 | End: 2025-03-11

## 2025-03-09 RX ORDER — VANCOMYCIN HYDROCHLORIDE 125 MG/1
125 CAPSULE ORAL DAILY
Status: DISCONTINUED | OUTPATIENT
Start: 2025-03-09 | End: 2025-03-10

## 2025-03-09 RX ORDER — ACETAMINOPHEN 325 MG/1
650 TABLET ORAL EVERY 4 HOURS PRN
Status: DISCONTINUED | OUTPATIENT
Start: 2025-03-09 | End: 2025-03-09

## 2025-03-09 RX ORDER — POLYETHYLENE GLYCOL 3350 17 G/17G
17 POWDER, FOR SOLUTION ORAL DAILY PRN
Status: DISCONTINUED | OUTPATIENT
Start: 2025-03-09 | End: 2025-03-09

## 2025-03-09 RX ORDER — TAMSULOSIN HYDROCHLORIDE 0.4 MG/1
0.4 CAPSULE ORAL
Status: DISCONTINUED | OUTPATIENT
Start: 2025-03-09 | End: 2025-03-09

## 2025-03-09 RX ORDER — LORAZEPAM 0.5 MG/1
0.5 TABLET ORAL EVERY 4 HOURS PRN
Status: DISCONTINUED | OUTPATIENT
Start: 2025-03-09 | End: 2025-03-09

## 2025-03-09 RX ORDER — TRAZODONE HYDROCHLORIDE 50 MG/1
50 TABLET ORAL NIGHTLY
Status: DISCONTINUED | OUTPATIENT
Start: 2025-03-09 | End: 2025-03-09

## 2025-03-09 RX ORDER — ECHINACEA PURPUREA EXTRACT 125 MG
2 TABLET ORAL 3 TIMES DAILY
Status: DISCONTINUED | OUTPATIENT
Start: 2025-03-09 | End: 2025-03-09

## 2025-03-09 NOTE — CONSULTS
[facility]  Report of Psychiatric Consultation    Deven Atkinson Patient Status:  Inpatient    1930 MRN [id number]   Location [facility] 3NE-A Attending Arnulfo Edmondson, DO   Hosp Day # 1 PCP Paulo James MD     Date of Admission: 3/8/2025  Date of Consult: 3/9/2025   Reason for Consultation: Second opinion     Impression: Patient is a 89-year-old male who presented to the hospital yesterday.     Primary Psychiatric Diagnosis:  Major depressive disorder, recurrent, severe      History of dementia diagnosis per patient's wife, however unable to find documentation of this in chart review.     Personality Traits:  Deferred      Pertinent Medical Diagnoses:  BPH, urinary retention, chronic aaron s/p cystoscopy and TURP 25, history of C.Diff, history of CVA, HTN, HLD     Recommendations:  Continue suicide precautions and 1:1 sitter.     Continue psychiatric medication regimen as currently ordered:   Lexapro 20 mg PO nightly   Mirtazapine 15 mg PO nightly   Trazodone 50 mg PO nightly     While in the ED, patient was unable to sign himself in for inpatient psychiatric treatment. His wife is not his POA, therefore involuntary hospitalization has been initiated. If patient is still at [facility] tomorrow, second cert will be completed by Dr. Hammond.     Psychiatry will continue to follow.     ADDENDUM @ 1520: Received update from RN that patient is becoming restless and appears anxious. Wife is at bedside requesting a medication to help him calm down. Will order Ativan IV 0.5 mg x1.     BERONICA Tompkins, [facility]     History of Present Illness:  Patient is a 89-year-old male who originally presented to the [facility] ED on 3/7 after a suicide attempt. During  level of care assessment on 3/7, patient endorsed feeling hopeless after a doctor's appointment and he then went to sit in his kitchen. His wife noted him to make a comment about wanting to die and patient then made a superficial cut to his  left wrist with a serrated knife. Did not require repair. While in the ED, patient did endorse wanting to commit suicide but then stated that he did not remember cutting himself. He was then transferred to [facility] on 3/7 and presented back to the ED on 3/8 due to hematuria. UA was concerning for a UTI as well and cultures are currently still pending. Patient was started on continuous bladder irrigation and medically admitted with a urology consult as well.     Per chart review, patient had a cystoscopy and TURP procedure done on 2/19/25 and has since required a aaron catheter. He has had several visits to the ED since this procedure was done due to problems with the aaron such as hematuria.     This morning, patient is seen resting in bed with a sitter at the bedside. His wife is not in the room but patient requests to call her during the conversation as he reports he does not understand English well. Did offer to use Mandarin  services, however patient declined and wishes to call his wife for translation. Did call his wife, Tatiana, who reports that patient presented to the ED due to attempting suicide by cutting his wrist. Tatiana details that patient recently had a visit with his urologist after which he felt that the urologist was unable to help him. Patient then felt hopeless regarding his condition and used the knife to cut his wrist. He confirms he was attempting to end his life. He denies suicidal ideations at this time.     Patient does admit to feeling hopeless but denies feeling helpless or worthless. He denies feeling as though he is a burden. He is unable to elaborate on how long his mood has been low. Does endorse that his appetite is fairly good and he is noted to be waiting for his lunch to arrive. He reports he sleeps well and overall feels well-rested during the day. However, during  level of care assessment, patient's wife reported to the [facility] that patient does not sleep well at night. He  currently denies any recent worrying or panic attacks but did endorse feeling anxious to the [facility]. No recent hallucinations or paranoia. During conversation this morning, he is unable to state our current location but presents as alert and oriented x3. He is noted to be forgetful at times.     While medically admitted, will continue suicide precautions and 1:1 sitter. Will continue his outpatient psychiatric medications while receiving further medical workup regarding his hematuria and possible UTI. Plan for transfer back to [facility] once medically cleared to do so.     Past Psychiatric History:  Per Tatiana, history of suicide attempt via hitting himself in the head with a hammer in February 2024. Tatiana reports he was hospitalized at [facility] after this. No other history of inpatient psychiatric hospitalizations. Does not see a psychiatrist or therapist. His primary care provider prescribes his psychiatric medications.     Substance Use History:  Denies. BAL <3 and UDS negative on 3/7.     Psych Family History:  Denies.     Social and Developmental History:   Patient and Tatiana live together in a senior building called the [facility] in [place]. They do not have any children. He is retired but previously worked as a .     Past Medical History:    BPH (benign prostatic hyperplasia)    Coronary atherosclerosis    Depression    Essential hypertension    Hearing impaired person, bilateral    High blood pressure    High cholesterol    History of stomach ulcers    Hyperlipidemia    Stroke (HCC)    Urinary retention     Past Surgical History:   Procedure Laterality Date    Cath bare metal stent (bms)      Colonoscopy      Surgical stent      wife states Lft side of heart- [facility]     Family History   Adopted: Yes   Family history unknown: Yes      reports that he has quit smoking. His smoking use included cigarettes. He has never been exposed to tobacco smoke. He has never used  smokeless tobacco. He reports that he does not currently use alcohol. He reports that he does not use drugs.    Allergies:  Allergies[1]    Medications:    Current Facility-Administered Medications:     tamsulosin (Flomax) cap 0.4 mg, 0.4 mg, Oral, Daily @ 0700    atorvastatin (Lipitor) tab 80 mg, 80 mg, Oral, Daily    enalapril (Vasotec) tab 10 mg, 10 mg, Oral, BID    escitalopram (Lexapro) tab 20 mg, 20 mg, Oral, Nightly    mirtazapine (Remeron) tab 15 mg, 15 mg, Oral, Nightly    polyethylene glycol (PEG 3350) (Miralax) 17 g oral packet 17 g, 17 g, Oral, Daily    sucralfate (Carafate) tab 1 g, 1 g, Oral, TID AC    traZODone (Desyrel) tab 50 mg, 50 mg, Oral, Nightly    acetaminophen (Tylenol Extra Strength) tab 500 mg, 500 mg, Oral, Q4H PRN    melatonin tab 3 mg, 3 mg, Oral, Nightly PRN    polyethylene glycol (PEG 3350) (Miralax) 17 g oral packet 17 g, 17 g, Oral, Daily PRN    sennosides (Senokot) tab 17.2 mg, 17.2 mg, Oral, Nightly PRN    bisacodyl (Dulcolax) 10 MG rectal suppository 10 mg, 10 mg, Rectal, Daily PRN    fleet enema (Fleet) rectal enema 133 mL, 1 enema, Rectal, Once PRN    ondansetron (Zofran) 4 MG/2ML injection 4 mg, 4 mg, Intravenous, Q6H PRN    metoclopramide (Reglan) 5 mg/mL injection 5 mg, 5 mg, Intravenous, Q8H PRN    benzonatate (Tessalon) cap 200 mg, 200 mg, Oral, TID PRN    guaiFENesin (Robitussin) 100 MG/5 ML oral liquid 200 mg, 200 mg, Oral, Q4H PRN    glycerin-hypromellose- (Artificial Tears) 0.2-0.2-1 % ophthalmic solution 1 drop, 1 drop, Both Eyes, QID PRN    sodium chloride (Saline Mist) 0.65 % nasal solution 1 spray, 1 spray, Each Nare, Q3H PRN    Review of Systems   Constitutional:  Positive for malaise/fatigue. Negative for chills, diaphoresis, fever and weight loss.   HENT: Negative.     Eyes: Negative.    Respiratory: Negative.     Cardiovascular: Negative.    Gastrointestinal: Negative.    Genitourinary:  Positive for hematuria. Negative for dysuria, flank pain, frequency  and urgency.   Musculoskeletal: Negative.    Skin: Negative.    Neurological:  Positive for weakness. Negative for dizziness, tingling, tremors, sensory change, speech change, focal weakness, seizures, loss of consciousness and headaches.   Endo/Heme/Allergies: Negative.    Psychiatric/Behavioral:  Positive for depression and suicidal ideas. Negative for hallucinations, memory loss and substance abuse. The patient has insomnia. The patient is not nervous/anxious.      Psychiatry Review Systems: No voices or visions. No elevated mood, racing thoughts, decreased need for sleep, grandiose thoughts, increased participation in risky behaviors, or history of trauma.     Mental Status Exam:     Risk Assessment  Suicidal ideation: no suicidal ideation currently but with recent attempt   Homicidal ideation: None noted    Appearance: unremarkable  Behavior: unremarkable  Attitude: guarded  Gait: Not observed     Speech: normal rate, rhythm and volume    Mood: sad and depressed  Affect: Congruent    Thought process: overall logical   Thought content: no delusions, obsessions, or other thought abnormalities  Perceptions: no hallucinations  Associations: Intact    Orientation: Alert and oriented x 3  Attention and Concentration:   fair  Memory: overall intact but forgetful   Language: Intact naming and repetition  Fund of Knowledge: Not assessed     Insight: impaired   Judgment: impaired     Objective    Vitals:    03/09/25 0730   BP: 152/52   Pulse: 54   Resp: 17   Temp: 98.4 °F (36.9 °C)     Suicide Risk Assessments:  Overall level of suicide risk is HIGH      Risk Factors: recent suicide attempt, depression, history of additional suicide attempt      Environmental Factors: lives at home with wife, does not have outpatient psychiatric providers, does take psychiatric medications     Protective Factors: wife     Laboratory Data:  Lab Results   Component Value Date    WBC 8.5 03/09/2025    HGB 11.4 03/09/2025    HCT 33.5  03/09/2025    .0 03/09/2025    CREATSERUM 0.89 03/09/2025    BUN 12 03/09/2025     03/09/2025    K 4.1 03/09/2025     03/09/2025    CO2 24.0 03/09/2025     03/09/2025    CA 8.5 03/09/2025       STUDIES:    BERONICA Tompkins, PMHNP-BC  3/9/2025  8:20 AM         [1]   Allergies  Allergen Reactions    Losartan UNKNOWN     Unable to remember reaction

## 2025-03-09 NOTE — PLAN OF CARE
End of shift note:  Pt is A&O 2-3, can be forgetful. Denied any pain overnight-appeared to sleep well/ On tele-sr/sb. Room air no reported sob. Eating and drinking w/ no issues. CBI-working well-urine now just pink tinged. Incontinent of bowel-briefed.  Wife at bedside. 1:1 sitter at bedside. Plan urology, pt/ot to see pt in am.Safety precautions in place. Pt and wife update on poc.

## 2025-03-09 NOTE — CONSULTS
[facility]   part of [facility]    Report of Consultation    Deven Atkinson Patient Status:  Inpatient    1930 MRN [id number]   Location [facility] 3NE-A Attending Arnulfo Edmondson, DO   Hosp Day # 0 PCP Paulo James MD     Date of Admission:  3/8/2025  Date of Consult:  3/9/2025  Reason for Consultation:   Hematuria    History of Present Illness:   Patient is a 89 year old male who was admitted to the hospital for Urinary tract infection with hematuria, site unspecified:    89 year old who recently underwent a TURP with DR. Yeung on 2025. He was admitted today from [facility] due to hematuria that was noted in his aaron catheter tubing.     A CT was performed which showed some air in the bladder. 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     UA today showed 11-20 WBCs, no bacteria, >10 RBCs.     His aaron was exchanged for a 3 way catheter in the ER and CBI was started.     Urine this AM is clear on a slow to moderate drip. I stopped it.     Past Medical History  Past Medical History:    BPH (benign prostatic hyperplasia)    Coronary atherosclerosis    Depression    Essential hypertension    Hearing impaired person, bilateral    High blood pressure    High cholesterol    History of stomach ulcers    Hyperlipidemia    Stroke (HCC)    Urinary retention       Past Surgical History  Past Surgical History:   Procedure Laterality Date    Cath bare metal stent (bms)      Colonoscopy      Surgical stent      wife states Lft side of heart- [facility]       Family History  Family History   Adopted: Yes   Family history unknown: Yes       Social History  Social History     Socioeconomic History    Marital status:    Tobacco Use    Smoking status: Former     Types: Cigarettes     Passive exposure: Never    Smokeless tobacco: Never    Tobacco comments:     QUIT SMOKING IN    Vaping Use    Vaping status: Never Used    Substance and Sexual Activity    Alcohol use: Not Currently    Drug use: Never     Social Drivers of Health     Food Insecurity: No Food Insecurity (3/8/2025)    NCSS - Food Insecurity     Worried About Running Out of Food in the Last Year: No     Ran Out of Food in the Last Year: No   Transportation Needs: No Transportation Needs (3/8/2025)    NCSS - Transportation     Lack of Transportation: No   Housing Stability: Not At Risk (3/8/2025)    NCSS - Housing/Utilities     Has Housing: Yes     Worried About Losing Housing: No     Unable to Get Utilities: No        Current Medications:  Current Facility-Administered Medications   Medication Dose Route Frequency    [START ON 3/9/2025] tamsulosin (Flomax) cap 0.4 mg  0.4 mg Oral Daily @ 0700    atorvastatin (Lipitor) tab 80 mg  80 mg Oral Daily    enalapril (Vasotec) tab 10 mg  10 mg Oral BID    escitalopram (Lexapro) tab 20 mg  20 mg Oral Nightly    mirtazapine (Remeron) tab 15 mg  15 mg Oral Nightly    polyethylene glycol (PEG 3350) (Miralax) 17 g oral packet 17 g  17 g Oral Daily    sucralfate (Carafate) tab 1 g  1 g Oral TID AC    traZODone (Desyrel) tab 50 mg  50 mg Oral Nightly    acetaminophen (Tylenol Extra Strength) tab 500 mg  500 mg Oral Q4H PRN    melatonin tab 3 mg  3 mg Oral Nightly PRN    polyethylene glycol (PEG 3350) (Miralax) 17 g oral packet 17 g  17 g Oral Daily PRN    sennosides (Senokot) tab 17.2 mg  17.2 mg Oral Nightly PRN    bisacodyl (Dulcolax) 10 MG rectal suppository 10 mg  10 mg Rectal Daily PRN    fleet enema (Fleet) rectal enema 133 mL  1 enema Rectal Once PRN    ondansetron (Zofran) 4 MG/2ML injection 4 mg  4 mg Intravenous Q6H PRN    metoclopramide (Reglan) 5 mg/mL injection 5 mg  5 mg Intravenous Q8H PRN    benzonatate (Tessalon) cap 200 mg  200 mg Oral TID PRN    guaiFENesin (Robitussin) 100 MG/5 ML oral liquid 200 mg  200 mg Oral Q4H PRN    glycerin-hypromellose- (Artificial Tears) 0.2-0.2-1 % ophthalmic solution 1 drop  1  drop Both Eyes QID PRN    sodium chloride (Saline Mist) 0.65 % nasal solution 1 spray  1 spray Each Nare Q3H PRN     Medications Prior to Admission   Medication Sig    enalapril 10 MG Oral Tab Take 1 tablet (10 mg total) by mouth 2 (two) times daily.    alfuzosin ER 10 MG Oral Tablet 24 Hr Take 1 tablet (10 mg total) by mouth daily.    Polyethylene Glycol 3350 17 g Oral Powd Pack Take 17 g by mouth daily.    traZODone 50 MG Oral Tab Take 1 tablet (50 mg total) by mouth nightly.    zinc sulfate 220 (50 Zn) MG Oral Cap Take 1 capsule (220 mg total) by mouth daily.    niacin 500 MG Oral Tab Take 1 tablet (500 mg total) by mouth daily with breakfast.    sucralfate 1 g Oral Tab Take 1 tablet (1 g total) by mouth 3 (three) times daily before meals.    aspirin 81 MG Oral Chew Tab Chew 1 tablet (81 mg total) by mouth daily.    atorvastatin 80 MG Oral Tab Take 1 tablet (80 mg total) by mouth daily.    escitalopram 20 MG Oral Tab Take 1 tablet (20 mg total) by mouth at bedtime.    mirtazapine 15 MG Oral Tab Take 1 tablet (15 mg total) by mouth nightly.    [] fleet enema Rectal Enema Place 133 mL rectally once as needed.    [] Magnesium Citrate Oral Solution Take 296 mL by mouth once for 1 dose. Use 1/2 bottle.  Repeat as needed for constipation    [] levoFLOXacin 750 MG Oral Tab Take 1 tablet (750 mg total) by mouth daily for 7 days.    Sodium Chloride-Xylitol (XLEAR SINUS CARE SPRAY NA) by Nasal route 3 (three) times daily.    gabapentin 100 MG Oral Cap Take 1 capsule (100 mg total) by mouth 3 (three) times daily.       Allergies  Allergies[1]    Review of Systems:    Review of systems not obtained due to patient factors.    Physical Exam:   Blood pressure 146/58, pulse 62, temperature 98.3 °F (36.8 °C), temperature source Oral, resp. rate 16, height 5' 10\" (1.778 m), weight 163 lb 5.8 oz (74.1 kg), SpO2 98%.    General appearance: alert, appears stated age, and cooperative  Head: Normocephalic, without  obvious abnormality, atraumatic  Eyes: EOMI  Pulmonary:  Non labored respirations  Cardiovascular: regular rate and rhythm  Abdominal: soft, non-tender; bowel sounds normal; no masses,  no organomegaly  Male genitalia: 3 way aaron in place on slow CBI drip with clear urine output  Extremities: extremities normal, atraumatic, no cyanosis or edema  Neurologic: Grossly normal  Psychiatric: calm    Results:     Laboratory Data:  Lab Results   Component Value Date    WBC 8.1 03/08/2025    HGB 10.5 (L) 03/08/2025    HCT 34.2 (L) 03/08/2025    .0 03/08/2025    CREATSERUM 0.96 03/08/2025    BUN 16 03/08/2025     03/08/2025    K 4.2 03/08/2025     03/08/2025    CO2 21.0 03/08/2025     (H) 03/08/2025    CA 8.7 03/08/2025    ALB 4.1 03/08/2025    ALKPHO 128 (H) 03/08/2025    TP 7.0 03/08/2025    AST 25 03/08/2025    ALT 17 03/08/2025    PTT 28.3 03/08/2025    INR 0.99 03/08/2025    PTP 13.2 03/08/2025    LIP 29 12/29/2024    DDIMER 0.61 09/13/2024    MG 1.9 11/03/2024    PHOS 3.3 11/03/2024    TROPHS 62 (HH) 02/22/2025    CK 81 02/22/2025    ETOH <3 03/07/2025         Imaging:  CT ABDOMEN+PELVIS(CONTRAST ONLY)(CPT=74177)    Result Date: 3/8/2025  CONCLUSION:  1. Urinary bladder is decompressed with Aaron catheter in place.  There are multiple foci of gas within the superior and anterior urinary bladder with adjacent enhancing soft tissue nodule measuring up to 1.3 cm.  Findings may be related to gas within bladder diverticulum related to Aaron catheter, though emphysematous cystitis is not excluded.  The soft tissue nodule superior and anterior to the urinary bladder appears enlarged when compared to 7/2/2024, correlate clinically for possible underlying neoplasm. 2. Colonic diverticulum along the cecum and ascending colon, with surrounding fat stranding, could represent mild diverticulitis.  The appendix appears in otherwise normal. 3. Please see above for further details.   LOCATION:  [facility]    Dictated by (CST): Delvis Laguerre MD on 3/08/2025 at 10:24 AM     Finalized by (CST): Delvis Laguerre MD on 3/08/2025 at 10:32 AM           Impression:   89 year old s/p recent TURP in Feb with Dr. Yeung who is transferred from [facility] with gross hematuria and a possible UTI.    -Agree with empiric antibiotics. UA in setting of recent urologic procedure can be difficult to interpret. Unclear if there is an infection, however with a chronic aaron bacteriuria is likely. Follow up on culture  -Urine has cleared this AM On CBI. I stopped it and ask nursing to disconnect the CBI tubing. Monitor urine coloration today.  -We discussed that intermittent hematuria is not uncommon in the first several weeks after a TURP procedure.  -We will discuss a potential void trial prior to discharge.     Urology will follow along.     Thank you for allowing me to participate in the care of your patient.    José Miguel Rosales MD  3/8/2025         [1]   Allergies  Allergen Reactions    Losartan UNKNOWN     Unable to remember reaction

## 2025-03-09 NOTE — OCCUPATIONAL THERAPY NOTE
Attempted to see pt this AM for OT, pt declined 2/2 pain at this time, OT will follow up as approp.

## 2025-03-09 NOTE — PHYSICAL THERAPY NOTE
Order received and chart reviewed.  Attempted to see patient this AM.  Patient currently refusing due to pain per wife's translation, RN aware.  Will reattempt PT services as able.

## 2025-03-09 NOTE — PROGRESS NOTES
[facility]   part of [facility]     Hospitalist Progress Note     Deven Atkinson Patient Status:  Inpatient    1930 MRN [id number]   Location [facility] 3NE-A Attending Arnulfo Edmondson,    Hosp Day # 1 PCP Paulo James MD     Chief Complaint: Hematuria    Subjective:     Patient denies suicidal ideation. RN reports some blood when wiping. Urine slightly pink-tinged.     Objective:    Review of Systems:   A comprehensive review of systems was completed; pertinent positive and negatives stated in subjective.    Vital signs:  Temp:  [97.6 °F (36.4 °C)-98.4 °F (36.9 °C)] 98.4 °F (36.9 °C)  Pulse:  [54-78] 61  Resp:  [14-24] 17  BP: (109-165)/(41-94) 152/52  SpO2:  [88 %-100 %] 88 %    Physical Exam:    General: No acute distress, awake and alert  Respiratory: No rhonchi, no wheezes  Cardiovascular: S1, S2. Regular rate and rhythm  Abdomen: Soft, Non-tender, non-distended, positive bowel sounds  : Jay in place with slight pink-tinged urine  Neuro: BAUMAN x 4  Extremities: No edema    Diagnostic Data:    Labs:  Recent Labs   Lab 25  0858 25  1018 25  0755 25  1837 25  0726   WBC 7.2 7.2 8.1  --  8.5   HGB 11.6* 11.0* 12.2* 10.5* 11.4*   MCV 85.4 84.4 81.8  --  84.8   .0 266.0 288.0  --  270.0   INR  --   --  0.99  --   --      Recent Labs   Lab 25  0858 25  1629 25  1018 25  0755 25  0726   *   < > 140* 115* 117*   BUN 18   < > 14 16 12   CREATSERUM 1.06   < > 0.94 0.96 0.89   CA 8.8   < > 8.5* 8.7 8.5*   ALB 3.9  --  3.8 4.1  --       < > 142 141 144   K 4.1   < > 3.8 4.2 4.1      < > 109 110 112   CO2 20.0*   < > 23.0 21.0 24.0   ALKPHO 116  --  119* 128*  --    AST 19  --  20 25  --    ALT 12  --  15 17  --    BILT 0.4  --  0.3 0.5  --    TP 6.8  --  6.4 7.0  --     < > = values in this interval not displayed.     Estimated Glomerular Filtration Rate: 79 mL/min/1.73m2 (result from lab).    No results for  input(s): \"TROP\", \"TROPHS\", \"CK\" in the last 168 hours.    Recent Labs   Lab 03/08/25  0755   PTP 13.2   INR 0.99      Microbiology  No results found for this visit on 03/08/25.    Imaging: Reviewed in Epic.    Medications:    tamsulosin  0.4 mg Oral Daily @ 0700    atorvastatin  80 mg Oral Daily    enalapril  10 mg Oral BID    escitalopram  20 mg Oral Nightly    mirtazapine  15 mg Oral Nightly    polyethylene glycol (PEG 3350)  17 g Oral Daily    sucralfate  1 g Oral TID AC    traZODone  50 mg Oral Nightly       Assessment & Plan:      #Hematuria  #BPH with urinary retention s/p chronic aaron  #S/p TURP in February 2025  #UTI  -CT a/p showed gas within bladder diverticulum related to Aaron catheter, though emphysematous cystitis is not excluded  -Empiric abx  -Follow urine culture  -Flomax for alfuzosin   -3 way aaron in for CBI, monitor urine color  -Hold ASA  -Monitor hgb  -Urology consult     #Recent suicidal ideation  -Currently denies SI  -Sitter at beside, suicide precautions  -Came from [facility]  -Psych consult     #CAD s/p stent  -Hold ASA  -Statin     #Hypertension  -Enalapril     #Hyperlipidemia  -Statin     #Hx of CVA  -Hold ASA  -Statin     #Depression  -Trazodone, Lexapro, Remeron     #PUD  -Carafate    #Hx of C difficile  -PO vancomycin daily while on abx     #Dementia      Arnulfo Edmondson,     Supplementary Documentation:     Quality:  DVT Mechanical Prophylaxis:   SCDs, Early ambuation  DVT Pharmacologic Prophylaxis   Medication   None     Code Status: DNAR/Selective Treatment  Aaron: Aaron catheter in place  MAG: TBD    Discharge is dependent on: Clinical state, consultant recs  At this point Mr. Atkinson is expected to be discharge to: TBD    The 21st Century Cures Act makes medical notes like these available to patients in the interest of transparency. Please be advised this is a medical document. Medical documents are intended to carry relevant information, facts as evident, and the  clinical opinion of the practitioner. The medical note is intended as peer to peer communication and may appear blunt or direct. It is written in medical language and may contain abbreviations or verbiage that are unfamiliar.

## 2025-03-09 NOTE — PLAN OF CARE
Assumed care at 0730  A/Ox2/3, RA, VSS  Tele, NSR  Denies n/v & pain meds per MAR  Jay, lazaro, pink tinge color   1:1 sitter for SI  Mandarin speaking, wife at bedside to translate  Updated w/ POC  All needs met at this time    Problem: PAIN - ADULT  Goal: Verbalizes/displays adequate comfort level or patient's stated pain goal  Description: INTERVENTIONS:  - Encourage pt to monitor pain and request assistance  - Assess pain using appropriate pain scale  - Administer analgesics based on type and severity of pain and evaluate response  - Implement non-pharmacological measures as appropriate and evaluate response  - Consider cultural and social influences on pain and pain management  - Manage/alleviate anxiety  - Utilize distraction and/or relaxation techniques  - Monitor for opioid side effects  - Notify MD/LIP if interventions unsuccessful or patient reports new pain  - Anticipate increased pain with activity and pre-medicate as appropriate  Outcome: Progressing

## 2025-03-10 PROBLEM — F32.2 SEVERE MAJOR DEPRESSION WITHOUT PSYCHOTIC FEATURES (HCC): Status: ACTIVE | Noted: 2025-03-10

## 2025-03-10 LAB
ANION GAP SERPL CALC-SCNC: 7 MMOL/L (ref 0–18)
ATRIAL RATE: 62 BPM
BASOPHILS # BLD AUTO: 0.09 X10(3) UL (ref 0–0.2)
BASOPHILS NFR BLD AUTO: 0.9 %
BUN BLD-MCNC: 11 MG/DL (ref 9–23)
CALCIUM BLD-MCNC: 8.6 MG/DL (ref 8.7–10.6)
CHLORIDE SERPL-SCNC: 113 MMOL/L (ref 98–112)
CO2 SERPL-SCNC: 24 MMOL/L (ref 21–32)
CREAT BLD-MCNC: 0.9 MG/DL
EGFRCR SERPLBLD CKD-EPI 2021: 79 ML/MIN/1.73M2 (ref 60–?)
EOSINOPHIL # BLD AUTO: 0.26 X10(3) UL (ref 0–0.7)
EOSINOPHIL NFR BLD AUTO: 2.6 %
ERYTHROCYTE [DISTWIDTH] IN BLOOD BY AUTOMATED COUNT: 13 %
GLUCOSE BLD-MCNC: 119 MG/DL (ref 70–99)
HCT VFR BLD AUTO: 33.8 %
HGB BLD-MCNC: 11.3 G/DL
IMM GRANULOCYTES # BLD AUTO: 0.03 X10(3) UL (ref 0–1)
IMM GRANULOCYTES NFR BLD: 0.3 %
LYMPHOCYTES # BLD AUTO: 5.02 X10(3) UL (ref 1–4)
LYMPHOCYTES NFR BLD AUTO: 50.5 %
MCH RBC QN AUTO: 28.6 PG (ref 26–34)
MCHC RBC AUTO-ENTMCNC: 33.4 G/DL (ref 31–37)
MCV RBC AUTO: 85.6 FL
MONOCYTES # BLD AUTO: 0.45 X10(3) UL (ref 0.1–1)
MONOCYTES NFR BLD AUTO: 4.5 %
NEUTROPHILS # BLD AUTO: 4.1 X10 (3) UL (ref 1.5–7.7)
NEUTROPHILS # BLD AUTO: 4.1 X10(3) UL (ref 1.5–7.7)
NEUTROPHILS NFR BLD AUTO: 41.2 %
OSMOLALITY SERPL CALC.SUM OF ELEC: 299 MOSM/KG (ref 275–295)
P AXIS: 46 DEGREES
P-R INTERVAL: 198 MS
PLATELET # BLD AUTO: 275 10(3)UL (ref 150–450)
POTASSIUM SERPL-SCNC: 3.9 MMOL/L (ref 3.5–5.1)
Q-T INTERVAL: 464 MS
QRS DURATION: 142 MS
QTC CALCULATION (BEZET): 470 MS
R AXIS: -34 DEGREES
RBC # BLD AUTO: 3.95 X10(6)UL
SODIUM SERPL-SCNC: 144 MMOL/L (ref 136–145)
T AXIS: 152 DEGREES
TROPONIN I SERPL HS-MCNC: 13 NG/L
VENTRICULAR RATE: 62 BPM
WBC # BLD AUTO: 10 X10(3) UL (ref 4–11)

## 2025-03-10 PROCEDURE — 99232 SBSQ HOSP IP/OBS MODERATE 35: CPT | Performed by: OTHER

## 2025-03-10 PROCEDURE — 99232 SBSQ HOSP IP/OBS MODERATE 35: CPT | Performed by: INTERNAL MEDICINE

## 2025-03-10 RX ORDER — ECHINACEA PURPUREA EXTRACT 125 MG
1 TABLET ORAL
Qty: 30 ML | Refills: 0 | Status: SHIPPED | OUTPATIENT
Start: 2025-03-10

## 2025-03-10 RX ORDER — ASPIRIN 81 MG/1
81 TABLET, CHEWABLE ORAL DAILY
Status: DISCONTINUED | OUTPATIENT
Start: 2025-03-10 | End: 2025-03-11

## 2025-03-10 NOTE — PHYSICAL THERAPY NOTE
PHYSICAL THERAPY EVALUATION - INPATIENT     Room Number: 3624/3624-A  Evaluation Date: 3/10/2025  Type of Evaluation: Initial  Physician Order: PT Eval and Treat    Presenting Problem: UTI  Co-Morbidities : HTN, HLD, h/o CDI, dementia, h/o CVA  Reason for Therapy: Mobility Dysfunction and Discharge Planning    PHYSICAL THERAPY ASSESSMENT   Patient is a 89 year old male admitted 3/8/2025 for UTI.  Prior to admission, patient's baseline is IND.  Patient is currently functioning near baseline with bed mobility, transfers, and gait.  Patient is requiring contact guard assist as a result of the following impairments: decreased endurance/aerobic capacity, impaired Gait balance, and decreased muscular endurance.  Physical Therapy will continue to follow for duration of hospitalization.    Patient will benefit from continued skilled PT Services to promote return to prior level of function and safety with continuous assistance and gradual rehabilitative therapy .    PLAN DURING HOSPITALIZATION  Nursing Mobility Recommendation : 1 Assist  PT Device Recommendation: Rolling walker  PT Treatment Plan: Bed mobility, Body mechanics, Gait training, Strengthening, Stair training, Transfer training, Balance training  Rehab Potential : Good  Frequency (Obs): 3-5x/week     CURRENT GOALS    Goal #1 Patient is able to demonstrate supine - sit EOB @ level: supervision     Goal #2 Patient is able to demonstrate transfers EOB to/from BSC at assistance level: supervision     Goal #3 Patient is able to ambulate 150 feet with assist device: walker - rolling at assistance level: supervision     Goal #4    Goal #5    Goal #6    Goal Comments: Goals established on 3/10/2025      PHYSICAL THERAPY MEDICAL/SOCIAL HISTORY  History related to current admission: Patient is a 89 year old male admitted on 3/8/2025 from Home for UTI.    HOME SITUATION  Type of Home: House                        Lives With: Spouse    Drives: Yes   Patient Regularly Uses:  None      Prior Level of [place]: Pt reports that he lives in a house with his spouse. Pt states that his wife helps him with everything. Pt states that he uses a rollator at home.    SUBJECTIVE  \"Walk\"  Pt was able to converse in english  OBJECTIVE  Precautions: None  Fall Risk: High fall risk    WEIGHT BEARING RESTRICTION     PAIN ASSESSMENT  Ratin  Location: Pt reports no pain       COGNITION  Overall Cognitive Status:  Pt is able to follow commands and understand directions  Following Commands:  follows one step commands with increased time    RANGE OF MOTION AND STRENGTH ASSESSMENT  Upper extremity ROM and strength are within functional limits     Lower extremity ROM is within functional limits     Lower extremity strength is within functional limits     BALANCE  Static Sitting: Fair +  Dynamic Sitting: Fair +  Static Standing: Fair  Dynamic Standing: Fair -    ADDITIONAL TESTS                                    ACTIVITY TOLERANCE                         O2 WALK       NEUROLOGICAL FINDINGS                        AM-PAC '6-Clicks' INPATIENT SHORT FORM - BASIC MOBILITY  How much difficulty does the patient currently have...  Patient Difficulty: Turning over in bed (including adjusting bedclothes, sheets and blankets)?: A Little   Patient Difficulty: Sitting down on and standing up from a chair with arms (e.g., wheelchair, bedside commode, etc.): A Little   Patient Difficulty: Moving from lying on back to sitting on the side of the bed?: A Little   How much help from another person does the patient currently need...   Help from Another: Moving to and from a bed to a chair (including a wheelchair)?: A Little   Help from Another: Need to walk in hospital room?: A Little   Help from Another: Climbing 3-5 steps with a railing?: A Little     AM-PAC Score:  Raw Score: 18   Approx Degree of Impairment: 46.58%   Standardized Score (AM-PAC Scale): 43.63   CMS Modifier (G-Code): CK    FUNCTIONAL ABILITY  STATUS  Gait Assessment   Functional Mobility/Gait Assessment  Gait Assistance: Contact guard assist  Distance (ft): 120  Assistive Device: Rolling walker  Pattern: Shuffle    Skilled Therapy Provided     Bed Mobility:  Rolling: NT  Supine to sit: CGA   Sit to supine: CGA     Transfer Mobility:  Sit to stand: CGA   Stand to sit: CGA  Gait = CGA 120ft RW    Therapist's Comments: Pt was observed with no LOB when ambulating using rolling walker. Pt required 1 seated rest break while ambulating.     Exercise/Education Provided:  Bed mobility  Body mechanics  Gait training  Transfer training    Patient End of Session: Up in chair, Needs met, Call light within reach, RN aware of session/findings, All patient questions and concerns addressed, Alarm set      Patient Evaluation Complexity Level:  History Moderate - 1 or 2 personal factors and/or co-morbidities   Examination of body systems Low -  addressing 1-2 elements   Clinical Presentation Low- Stable   Clinical Decision Making Low Complexity       PT Session Time: 23 minutes  Therapeutic Activity: 15 minutes

## 2025-03-10 NOTE — PLAN OF CARE
A&Ox2-3, knows month but not year. Wife at bedside. Room air, lungs clear. Abdomen soft and nontender, BS active. Jay catheter in place and draining pink urine. Receiving IV Rocephin for UTI. NSR, SB on tele. 1:1 sitter at bedside for SI. Patient is calm, cooperative and pleasant. Denies pain or discomfort. Needs met. Will monitor.     Problem: PAIN - ADULT  Goal: Verbalizes/displays adequate comfort level or patient's stated pain goal  Description: INTERVENTIONS:  - Encourage pt to monitor pain and request assistance  - Assess pain using appropriate pain scale  - Administer analgesics based on type and severity of pain and evaluate response  - Implement non-pharmacological measures as appropriate and evaluate response  - Consider cultural and social influences on pain and pain management  - Manage/alleviate anxiety  - Utilize distraction and/or relaxation techniques  - Monitor for opioid side effects  - Notify MD/LIP if interventions unsuccessful or patient reports new pain  - Anticipate increased pain with activity and pre-medicate as appropriate  Outcome: Progressing     Problem: INVOLUNTARY ADMIT  Goal: Will cooperate with staff recommendations and doctor's orders and will demonstrate appropriate behavior  Description: INTERVENTIONS:  - Treat underlying conditions and offer medication as ordered  - Educate regarding involuntary admission procedures and rules  - Contain excessive/inappropriate behavior per unit and hospital policies  Outcome: Progressing

## 2025-03-10 NOTE — PROGRESS NOTES
The patient is A/Ox2-3, On RA, no SOB, Tele -NSR/SB, Vitals Stable , Afebrile  Voids  Ambulatory  PT eval completed, see their notes  On ABX per MAR  Aaron is draining, no hematuria noted, the urine is yellow in color  Voiding trial as OP  SI precautions  1:1 sitter   Plan to transfer to inpatient psych  Psychiatry is following     1620 - pt reports having some pain in the groing and across the chest. Aaron is draining, vitals stable, RA, no SOB. EKG obtained. Hospitalist notified. Trops drawn and is normal.     1900- pt c/o increased pain in the bladder, aaron is draining, output appears red. Urology notified. Orders received to irrigate the aaron with 180 ml saline and restart the CPI.

## 2025-03-10 NOTE — PROGRESS NOTES
[facility]  Urology Progress Note    Deven Atkinson Patient Status:  Inpatient    1930 MRN [id number]   Location [facility] 3NE-A Attending Arnulfo Edmondson,    Hosp Day # 2 PCP Paulo James MD     Subjective:  Deven Atkinson is a(n) 89 year old male.    Current complaints: Patient resting comfortably undisturbed.  CBI clamped.    Sitter at bedside     Wife at bedside    Objective:  General appearance: no distress  Blood pressure 153/60, pulse 57, temperature 98 °F (36.7 °C), temperature source Oral, resp. rate 16, height 5' 10\" (1.778 m), weight 163 lb 5.8 oz (74.1 kg), SpO2 97%.  Lungs: non-labored respirations  Abdomen: soft  : aaron catheter in place draining light tea-colored urine  Lab Results   Component Value Date    WBC 10.0 03/10/2025    HGB 11.3 03/10/2025    HCT 33.8 03/10/2025    .0 03/10/2025    CREATSERUM 0.90 03/10/2025    BUN 11 03/10/2025     03/10/2025    K 3.9 03/10/2025     03/10/2025    CO2 24.0 03/10/2025     03/10/2025    CA 8.6 03/10/2025       CT ABDOMEN+PELVIS(CONTRAST ONLY)(CPT=74177)    Result Date: 3/8/2025  CONCLUSION:  1. Urinary bladder is decompressed with Aaron catheter in place.  There are multiple foci of gas within the superior and anterior urinary bladder with adjacent enhancing soft tissue nodule measuring up to 1.3 cm.  Findings may be related to gas within bladder diverticulum related to Aaron catheter, though emphysematous cystitis is not excluded.  The soft tissue nodule superior and anterior to the urinary bladder appears enlarged when compared to 2024, correlate clinically for possible underlying neoplasm. 2. Colonic diverticulum along the cecum and ascending colon, with surrounding fat stranding, could represent mild diverticulitis.  The appendix appears in otherwise normal. 3. Please see above for further details.   LOCATION:  [place]   Dictated by (CST): Delvis Laguerre MD on 3/08/2025 at 10:24 AM     Finalized by  (CST): Delvis Laguerre MD on 3/08/2025 at 10:32 AM         Assessment:    URINARY RETENTION, BPH  S/P TURP 2/19/25  Pathology - 4+4 adenocarcinoma prostate    ADMITTED WITH HEMATURIA, POSSIBLE UTI  Afebrile, VSS  No leukocytosis  Hgb 12.2 > 10.5 > 11.4 > 11.3  Serum creat 0.96 > 0.89 > 0.9  Urine culture 3/8/25: pending    Plan:    Check final culture  Abx per attending  Follow labs, temp  CPM with tamsulosin (has been substituted for uroxatral).  Spoke with Dr. Yeung - will plan to keep aaron catheter in place. Patient to follow-up with Dr. Yeung as scheduled 3/27/25.  Nurse and patient's wife informed patient may have intermittent hematuria as he is healing from his recent TURP.  Need to be notified if worsening hematuria/clots/issues with aaron catheter draining or any other worsening symptoms.      Will follow peripherally.    Above discussed with patient's wife, nurse, Dr. Yeung.    [facility]IRGILIO [facility]  Department of Urology  3/10/2025  8:13 AM

## 2025-03-10 NOTE — BH PROGRESS NOTE
3/10/2025    Seen in person at [facility]    Interval Chief Complaint: Follow-up on depression and insomnia suicidality    Subjective:  Wife at bedside.  Patient initially admitted to [facility] due to suicide attempt by cutting left wrist related to worsening depression and feelings of being useless due to medical issues.  He is found to have hematuria and thus was transferred back to [facility] and is currently being followed by urology.  He is being treated for urinary tract infection and is on IV antibiotics.    He acknowledges cutting his wrist and still endorses feeling useless and hopeless and feels like a burden due to his medical issues as well as financial stressors.  He feels like he has nothing to contribute and feels like he will never get better.  He does feel sad and struggles to find things that he is enjoying but still likes being with his family and friends.  His appetite is fair with good sleep.  He reports decreased energy and motivation.  He does have some ruminations about getting older as well as health issues but I pointed out that he still has a lot of life left to live and that none of his doctors are giving up on him but he seems to have given up on himself which he acknowledges.    I spoke with him and his wife about plan of care and recommended that he return back to [facility] under my care so that we can continue to work on his depression and manage his suicidal libido further before him returning home and they both agreed.  Wife appreciated my visits and also requests that we would arrange for aftercare once he leaves [facility] and advised her we will make this happen.    MEDS:    aspirin chewable tab 81 mg  81 mg Oral Daily    [COMPLETED] LORazepam (Ativan) 2 mg/mL injection 0.5 mg  0.5 mg Intravenous Once    haloperidol lactate (Haldol) 5 MG/ML injection 2 mg  2 mg Intravenous Q6H PRN    [COMPLETED] sodium chloride 0.9 % IV bolus 500 mL  500 mL  Intravenous Once    [COMPLETED] iopamidol 76% (ISOVUE-370) injection for power injector  80 mL Intravenous ONCE PRN    [COMPLETED] cefTRIAXone (Rocephin) 1 g in sodium chloride 0.9% 100 mL IVPB-ADDV  1 g Intravenous Once    tamsulosin (Flomax) cap 0.4 mg  0.4 mg Oral Daily @ 0700    atorvastatin (Lipitor) tab 80 mg  80 mg Oral Daily    enalapril (Vasotec) tab 10 mg  10 mg Oral BID    escitalopram (Lexapro) tab 20 mg  20 mg Oral Nightly    mirtazapine (Remeron) tab 15 mg  15 mg Oral Nightly    polyethylene glycol (PEG 3350) (Miralax) 17 g oral packet 17 g  17 g Oral Daily    sucralfate (Carafate) tab 1 g  1 g Oral TID AC    acetaminophen (Tylenol Extra Strength) tab 500 mg  500 mg Oral Q4H PRN    melatonin tab 3 mg  3 mg Oral Nightly PRN    polyethylene glycol (PEG 3350) (Miralax) 17 g oral packet 17 g  17 g Oral Daily PRN    sennosides (Senokot) tab 17.2 mg  17.2 mg Oral Nightly PRN    bisacodyl (Dulcolax) 10 MG rectal suppository 10 mg  10 mg Rectal Daily PRN    fleet enema (Fleet) rectal enema 133 mL  1 enema Rectal Once PRN    ondansetron (Zofran) 4 MG/2ML injection 4 mg  4 mg Intravenous Q6H PRN    metoclopramide (Reglan) 5 mg/mL injection 5 mg  5 mg Intravenous Q8H PRN    benzonatate (Tessalon) cap 200 mg  200 mg Oral TID PRN    guaiFENesin (Robitussin) 100 MG/5 ML oral liquid 200 mg  200 mg Oral Q4H PRN    glycerin-hypromellose- (Artificial Tears) 0.2-0.2-1 % ophthalmic solution 1 drop  1 drop Both Eyes QID PRN    sodium chloride (Saline Mist) 0.65 % nasal solution 1 spray  1 spray Each Nare Q3H PRN        Vitals:    03/10/25 0733   BP: 153/60   Pulse: 57   Resp: 16   Temp: 98 °F (36.7 °C)        ROS:   As above. Otherwise negative on 14 system exam.    MSE:   Conscious/Orientation: A + O x person, place, time, situation  Appearance: good grooming  Behavior: no psychomotor abnormalities, good eye contact and engagement with interview.  Speech: normal rate, rhythm, and volume  Mood: \"Not good\"  Affect:  congruent, flat  Thought process: linear, logical, goal directed  Thought content:   No delusions, obsessions, or other thought abnormalities  Suicidal ideation: Passive  Homicidal ideation: denies  Perceptions: no hallucinations  Insight: Poor  Judgment: fair  Loosening of associations: none    Major depression severe recurrent without psychosis, insomnia related to mental condition rule out generalized anxiety disorder-currently still symptomatic    Plan:  Patient continues to require inpatient psychiatric care and he is okay to transfer back to [facility] once medically cleared  Discontinue trazodone to minimize polypharmacy continue Lexapro and Remeron as well as as needed melatonin  Chart reviewed  Case discussed with wife at bedside  Certificate completed  Case discussed with  as well as nursing staff regarding plan of care  Continue sitter until transferred  Approximately 35 minutes total time spent in case with greater than 50% time spent in consultation care      Rell Hammond DO  Board Certified Adult and Geriatric Psychiatrist  Medical Director, Geriatric Psychiatry, [facility]   Medical Director, Psychiatric Consultation Service, [facility]

## 2025-03-10 NOTE — DISCHARGE INSTRUCTIONS
Caring for Your Catheter    A aaron catheter has been placed into your bladder to drain your urine.  A small balloon in the catheter is inflated and keeps the catheter in your bladder.  Proper care of your catheter and the drainage bag can help to prevent infections.    Care of the Catheter:  The catheter should be securely taped to your thigh or abdomen to prevent pulling or increased tension on the urethra.  At least once daily, gently wash the catheter starting where the catheter enters your body and cleanse down the entire length of the catheter. Use a mild soap.  Do not use any astringents or antibacterial soap. It's best done in the shower. Make sure that any encrustations on the catheter are washed away.  Rinse well.   If you are an uncircumcised male, push the foreskin back to clean and after cleaning the catheter push the foreskin back to its normal position  Care of the Drainage Bag       Empty the drainage bag when it is ½ to 1/3 full.  The drainage bag must always be to gravity drainage and must be below the level of your bladder.  The bag should never be left lying on the floor.  Drainage bags should be cleaned each time you switch from a leg bag to a night drainage bag.  If you do not switch from a leg bag to a night drainage system, you should wash your drainage bag 1 x/week.  After disconnecting the tubing from the catheter, pour a solution of 1 part bleach to 10 parts water through the tubing and the bag. Rinse the inside and the outside of the drainage bag with water to remove all the bleach solution so that no injury to your skin will develop.  Empty the drainage bag when it is ½ to 1/3 full.  The drainage bag must always have gravity drainage and must be below the level of your bladder.  What To Do If You Leak Around the Catheter:  Check the connections between the catheter and the drainage system to make sure they are intact and not the source of the leak.  Make sure the catheter is securely  taped and holding the catheter securely and not pulling.  Readjust as necessary.  Make sure that the catheter is gravity drainage.  Call the office. You may be experiencing bladder spasms. You may be asked to come in and have the catheter checked or you may be given a medication that stops the spasms.  Special Instructions:  Increase your fluid intake so that your urine output is between 1500 and 2000cc’s/day.  Prevent constipation.  Increase daily fiber or use Miralax as needed.  Contact Our Office If:  If you see excessive blood or clots in your urine  If you have a temperature that is greater than 101.  If you don’t see any urine in your drainage bag after 3-4 hours.  Check the position of the drainage bag first and also make sure the catheter isn’t kinked.  If you have any burning, pain, purulent drainage or bleeding from around the  catheter site.  If you have persistent leaking around the catheter.     NOTIFY UROLOGY IF WORSENING BLOOD IN URINE, CLOTS IN URINE, AND/OR ISSUES WITH CLAIRE CATHETER DRAINING.      ----------  Home Care Instructions  CYSTOSCOPY/TRANSURETHRAL RESECTION PROSTATE  (TURP) - WAS ON 2/19/25     Your urine will probably be cranberry colored on and off for a couple of weeks. If your urine becomes thick and red, or if you have a large amount of clots when you urinate, call the doctor. Also call the doctor if you are unable to urinate.     Do not strain to have a bowel movement as this can cause bleeding (keep stools soft). I suggest the use of an Over-the-Counter stool softener.     Drink 6-10 glasses of water/juice each day. This will help flush the bladder out. You will want to slow down your fluid intake just prior to bedtime so you are not up all night urinating.     If you take aspirin or Ibuprofen (Advil, Nuprin, Motrin) you must have your doctor’s approval before taking them again. Check with your doctor before starting any other medications.     You may shower.     You may eat your  usual diet.     No strenuous activity or lifting more than 15 pounds for 4 weeks.      [facility] UROLOGY  [phone]    Inpatient Program Snapshot    Geriatric Program    [facility], [address] [place] [address]      What to bring: One plastic bag (no larger than the size of a carry-on) packed with:  Three changes of clothing, undergarments, and socks. Strings must be removed from all clothing.  Sweatshirt or sweater  Sleepwear  Necessary toiletries We do NOT allow:   Suitcases  Strings  Metal  Glass  Electronics   Pillows, blankets, linens, towels  Stuffed animals  Cell phones       Patient phone hours: Monday - Sunday  7:30 am - 10 pm     Patients cannot make or receive phone calls during group sessions. All calls are monitored by staff. Patients create and provide a confidentiality code to their approved callers. Without this code, staff cannot confirm whether a patient is admitted to our facility. To speak with a patient, call the nurse’s station at [phone] and provide the confidentiality code.     Visiting hours: The unit will provide a schedule.   Schedule overview: Typical daily activities:  Therapeutic group(s)  Psychoeducational group(s)  Expressive therapy group(s)  Meals and free/break time Extra treatment sessions (scheduled as needed):  Individual sessions: Occurs typically 1-2 times weekly with the clinical therapist.  Support meetings: Usually occurs within 72 hours of admission and as needed with the patient/family and the clinical therapist.         Important contact information: Treatment information/questions:   Clinical therapist (CT)- Contact number provided in program   Medical provider- Contact the nurse’s station  Nurse’s station: [phone]  : Tasneem Champagne, MS, BSN, RN-BC, GURPREET-BC, CNE, [phone]  Insurance information/questions: Business Office, [phone]        PROGRAM DETAILS  Program length: 12-14 days is average  (depends on patient need)    Admission criteria: Patients 60 years of age and older experiencing mental health issues who require 24-hour nursing care in a secure environment. When cognitively able, patients take an active role in treatment and assert needs to treatment team, respect peers and staff, attend and participate in programming and therapeutic activities. If the patient is experiencing a decline in function, staff are present to provide support.    ADDITIONAL PROGRAM DETAILS  Discharge preparation:   Prior to discharge, patients are required to schedule an appointment with an outpatient therapist. Patients on a psychotropic medication are also required to make an appointment with an outpatient medical provider (APN, PA or psychiatrist).     Treatment team:  Clinical therapist (CT), medical provider (advanced practice nurse (APN), physician’s assistant (PA) and/or psychiatrist), behavioral health associate (BHA), registered nurse (RN), unit specialist (US), and clinical supervisor.  The patient’s primary point of contact is the clinical therapist(s) who will provide the patient with a direct contact number and the medical provider who can be contacted through the nurse’s station.   Additional team members: Registered dietitian, post-doctoral fellow, chemical dependency counselor, expressive therapist and/or discharge planner.    First day(s)/getting used to the program:   Patients may feel uncomfortable or overwhelmed and should give themselves time to get used to the group.  Within the first two days of program (often the first day), the patient is removed from group to complete the following assessments:    Nursing assessment: Nurse discusses medical history and/or any current medical concerns.  Psychiatric evaluation: Medical Provider discusses patient’s medical and treatment history to determine diagnoses and medical needs.   Psychosocial: Clinical therapist discusses patient’s history and a safety  plan.     Family & Medical Leave Act (FMLA) paperwork: FMLA paperwork must be turned into staff located at the nurse’s station who will contact the medical provider to complete (process may take a few days).    Medication/medical concerns:  Our pharmacy provides patients any medications prescribed by the patient’s medical provider and/or internist.   If our pharmacy does not carry a required patient medication, patients can ask someone to bring a new, unopened bottle of the medication to the hospital to be identified by our pharmacy and distributed by our nurses.    CAMPUS POLICIES  Patients are NOT allowed to:  Leave building   Contact (call, text, social media interaction, or meet) other patients outside of program  Smoke or vape (smoke-free campus)    Dress code:  Casual clothes that are comfortable for the season.   The following clothing items are NOT allowed:  Clothing that refers to sex, drugs, or illegal substances  Sleeveless/tank tops  Hoods  Hats  Shorts above mid-thigh  Clothing that exposes the patient’s stomach, shoulders or undergarments

## 2025-03-10 NOTE — BH PROGRESS NOTE
2nd Certificate sent via secure email to [facility].   Per [facility], no isolation bed today for C Diff Isolation status. [facility] will re-evaluate tomorrow 3/11/25. Pt is medically cleared for transfer to [facility] once appropriate bed is available. Care team at ward aware.

## 2025-03-10 NOTE — PROGRESS NOTES
[facility]   part of [facility]     Hospitalist Progress Note     Deven Atkinson Patient Status:  Inpatient    1930 MRN [id number]   Location [facility] 3NE-A Attending Arnulof Edmondson,    Hosp Day # 2 PCP Paulo James MD     Chief Complaint: Hematuria    Subjective:     CBI clamped yesterday. No significant hematuria and aaron draining well.  He walked with PT. Has no complaints.     Objective:    Review of Systems:   A comprehensive review of systems was completed; pertinent positive and negatives stated in subjective.    Vital signs:  Temp:  [97.3 °F (36.3 °C)-98.9 °F (37.2 °C)] 98 °F (36.7 °C)  Pulse:  [49-74] 57  Resp:  [16-19] 16  BP: (112-153)/(45-61) 153/60  SpO2:  [92 %-99 %] 97 %    Physical Exam:    General: No acute distress, awake and alert  Respiratory: No rhonchi, no wheezes  Cardiovascular: S1, S2. Regular rate and rhythm  Abdomen: Soft, Non-tender, non-distended, positive bowel sounds  : Aaron in place  Neuro: BAUMAN x 4  Extremities: No edema    Diagnostic Data:    Labs:  Recent Labs   Lab 25  1018 25  0755 25  1837 25  0726 03/10/25  0710   WBC 7.2 8.1  --  8.5 10.0   HGB 11.0* 12.2* 10.5* 11.4* 11.3*   MCV 84.4 81.8  --  84.8 85.6   .0 288.0  --  270.0 275.0   INR  --  0.99  --   --   --      Recent Labs   Lab 25  1018 25  0755 25  0726 03/10/25  0710   * 115* 117* 119*   BUN 14 16 12 11   CREATSERUM 0.94 0.96 0.89 0.90   CA 8.5* 8.7 8.5* 8.6*   ALB 3.8 4.1  --   --     141 144 144   K 3.8 4.2 4.1 3.9    110 112 113*   CO2 23.0 21.0 24.0 24.0   ALKPHO 119* 128*  --   --    AST 20 25  --   --    ALT 15 17  --   --    BILT 0.3 0.5  --   --    TP 6.4 7.0  --   --      Estimated Glomerular Filtration Rate: 79 mL/min/1.73m2 (result from lab).    No results for input(s): \"TROP\", \"TROPHS\", \"CK\" in the last 168 hours.    Recent Labs   Lab 25  0755   PTP 13.2   INR 0.99      Microbiology  No results  found for this visit on 03/08/25.    Imaging: Reviewed in Epic.    Medications:    cefTRIAXone  1 g Intravenous Q24H    vancomycin  125 mg Oral Daily    tamsulosin  0.4 mg Oral Daily @ 0700    atorvastatin  80 mg Oral Daily    enalapril  10 mg Oral BID    escitalopram  20 mg Oral Nightly    mirtazapine  15 mg Oral Nightly    polyethylene glycol (PEG 3350)  17 g Oral Daily    sucralfate  1 g Oral TID AC    traZODone  50 mg Oral Nightly       Assessment & Plan:      #Hematuria  #BPH with urinary retention s/p chronic aaron  #S/p TURP in February 2025  #UTI  -CT a/p showed gas within bladder diverticulum related to Aaron catheter, though emphysematous cystitis is not excluded  -Empiric abx  -Follow urine culture  -Flomax for alfuzosin   -CBI clamped. Monitor urine color  -Resume ASA  -Monitor hgb > stable  -Urology following peripherally, plan to keep aaron in place and f/u with Dr. Yeung 3/27/25     #Recent suicidal ideation  -Currently denies SI  -Sitter at beside, suicide precautions  -Came from [facility]  -Psych consult     #CAD s/p stent  -ASA  -Statin     #Hypertension  -Enalapril     #Hyperlipidemia  -Statin     #Hx of CVA  -ASA  -Statin     #Depression  -Trazodone, Lexapro, Remeron     #PUD  -Carafate    #Hx of C difficile  -PO vancomycin daily while on abx     #Dementia      Arnulfo Edmondson DO    Supplementary Documentation:     Quality:  DVT Mechanical Prophylaxis:   SCDs, Early ambuation  DVT Pharmacologic Prophylaxis   Medication   None     Code Status: DNAR/Selective Treatment  Aaron: Aaron catheter in place  MAG: 0-1 day    Discharge is dependent on: Clinical state, consultant recs  At this point Mr. Atkinson is expected to be discharge to: TBD    The 21st Century Cures Act makes medical notes like these available to patients in the interest of transparency. Please be advised this is a medical document. Medical documents are intended to carry relevant information, facts as evident, and the clinical  opinion of the practitioner. The medical note is intended as peer to peer communication and may appear blunt or direct. It is written in medical language and may contain abbreviations or verbiage that are unfamiliar.

## 2025-03-10 NOTE — OCCUPATIONAL THERAPY NOTE
OCCUPATIONAL THERAPY EVALUATION - INPATIENT    Room Number: 3624/3624-A   Evaluation Date: 3/10/2025   Type of Evaluation: Initial  Presenting Problem: UTI    Physician Order: IP Consult to Occupational Therapy  Reason for Therapy:  ADL/IADL Dysfunction and Discharge Planning      OCCUPATIONAL THERAPY ASSESSMENT   Patient is a 89 year old female admitted on 3/8/2025  with Presenting Problem: UTI. Co-Morbidities : HTN, HLD, h/o CDI, dementia, h/o CVA  Patient is currently functioning near baseline with self-care and functional mobility.  Prior to admission, patient's baseline is mod independent to min A level.  Patient met all OT goals at supervision to min A level.  Patient reports no further questions/concerns at this time.     Patient will be discharged from OT services at this time.  Will benefit from return to home setting at discharge from hospital.      WEIGHT BEARING RESTRICTION  Weight Bearing Restriction: None                Recommendations for nursing staff:   Transfers: one person   Toileting location: Toilet    EVALUATION SESSION:  Patient at start of session: supine  FUNCTIONAL TRANSFER ASSESSMENT  Sit to Stand: Chair; Edge of Bed  Edge of Bed: Supervision  Chair: Supervision  Toilet Transfer: Supervision      BED MOBILITY  Rolling: Independent  Supine to Sit : Modified Independent  Sit to Supine (OT): Modified Independent  Scooting: Mod I      BALANCE ASSESSMENT  Static Sitting: Modified Independent  Static Standing: Stand-by Assist      FUNCTIONAL ADL ASSESSMENT  Eating: Independent  Grooming Seated: Modified Independent  LB Dressing Seated: Minimal Assist  Toileting Seated: Minimal Assist        ACTIVITY TOLERANCE: WFL                         O2 SATURATIONS       COGNITION  Overall Cognitive Status:  WFL - within functional limits  COGNITION ASSESSMENTS       Upper Extremity:   ROM: within functional limits   Strength: is within functional limits   Coordination:  Gross motor: WFL  Fine motor:  WFL  Sensation: Light touch:  intact; no c/o numbness or tingling    Vision: no acute changes reported  Able to read clock on wall without difficulty    EDUCATION PROVIDED  Patient Education : Role of Occupational Therapy; Plan of Care; Discharge Recommendations; DME Recommendations; Fall Prevention; Functional Transfer Techniques  Patient's Response to Education: Verbalized Understanding      Therapist comments: OT educated patient on safety,  sequencing, energy conservation, pain management, home modifications and adaptive equipment to increase independence with ADLs.      Patient End of Session: Up in chair, With  staff, Needs met, Call light within reach, RN aware of session/findings, All patient questions and concerns addressed, Alarm set, Discussed recommendations with /    OCCUPATIONAL PROFILE    HOME SITUATION  Type of Home: House  Home Layout: One level  Lives With: Spouse    Toilet and Equipment: Standard height toilet  Shower/Tub and Equipment: Walk-in shower  Other Equipment:  (Rollator)       Hand Dominance: Right  Drives: Yes  Patient Regularly Uses: None    Prior Level of Function: Mod I with ADLs and functional mobility with walker  No falls reported in the past 6 months    SUBJECTIVE  PAIN ASSESSMENT  Ratin  Location: denies       OBJECTIVE  Precautions: None  Fall Risk: High fall risk    WEIGHT BEARING RESTRICTION  Weight Bearing Restriction: None                AM-PAC ‘6-Clicks’ Inpatient Daily Activity Short Form  -   Putting on and taking off regular lower body clothing?: A Little  -   Bathing (including washing, rinsing, drying)?: A Little  -   Toileting, which includes using toilet, bedpan or urinal? : None  -   Putting on and taking off regular upper body clothing?: None  -   Taking care of personal grooming such as brushing teeth?: None  -   Eating meals?: None    AM-PAC Score:  Score: 22  Approx Degree of Impairment: 25.8%  Standardized Score (AM-PAC Scale):  47.1      ADDITIONAL TESTS     NEUROLOGICAL FINDINGS        PLAN   Patient has been evaluated and presents with no skilled Occupational Therapy needs at this time.  Patient discharged from Occupational Therapy services.  Please re-order if a new functional limitation presents during this admission.      Patient Evaluation Complexity Level:   Occupational Profile/Medical History LOW - Brief history including review of medical or therapy records    Specific performance deficits impacting engagement in ADL/IADL LOW  1 - 3 performance deficits    Client Assessment/Performance Deficits LOW - No comorbidities nor modifications of tasks    Clinical Decision Making LOW - Analysis of occupational profile, problem-focused assessments, limited treatment options    Overall Complexity LOW     OT Session Time: 30 minutes  Self-Care Home Management: 15 minutes

## 2025-03-10 NOTE — DISCHARGE SUMMARY
[facility]     Deven Atkinson Patient Status:  Inpatient    1930 MRN [id number]   Location [facility] 3[facility]-A Attending Arnulfo Edmondson, DO   Hosp Day # 2 PCP Paulo James MD     Date of Admission: 3/8/2025  Date of Discharge: 3/10/2025  Discharge Disposition: [facility]    Discharge Diagnosis:   #Hematuria  #BPH with urinary retention s/p chronic aaron  #S/p TURP in 2025  #UTI, ruled out  #Recent suicidal ideation   #CAD s/p stent  #Hypertension  #Hyperlipidemia  #Hx of CVA  #Depression  #PUD  #Hx of C difficile  #Dementia    History of Present Illness: Deven Atkinson is a 89 year old male with PMHx BPH and urinary retention s/p chronic aaron and TURP in 2025, CAD s/p stent, HTN, HLD, CVA, MDD who presents from [facility] due to hematuria. Patient has been in the ER 6 times this month alone due to problem with his aaron catheter. Wife states there was trouble with it draining. He was never admitted for this problem as there were no issues in the ER. Yesterday he was in the ER for suicide attempt as he had lacerated his left wrist with a knife. It was superficial and did not require repair. He was then transferred to [facility]. Today he was noted to have hematuria and sent to the ER for evaluation. Patient denies fever, nausea, vomiting, abdominal pain or flank pain. He denies suicidal ideation. CT a/p showed gas within bladder diverticulum related to Aaron catheter, though emphysematous cystitis is not excluded. In the ER, aaron catheter was exchanged to 3 way aaron and CBI started.  UA concerning for UTI and he was given Ceftriaxone. He was admitted with urology on consult.     Brief Synopsis: Three-way Aaron was placed and CBI was started in the ER.  He was started on empiric antibiotics but cultures came back as chronic bacterium likely colonization and so antibiotics were discontinued.  Hematuria resolved and CBI was  clamped.  Plans to keep Jay in place and follow-up with his urologist on March 27.  He was seen by psychiatry in the hospital and will be discharged to [facility] where he came from in stable condition.    Lace+ Score: 82  59-90 High Risk  29-58 Medium Risk  0-28   Low Risk       TCM Follow-Up Recommendation:  LACE > 58: High Risk of readmission after discharge from the hospital.    Procedures during hospitalization:   None    Incidental or significant findings and recommendations (brief descriptions):  None    Lab/Test results pending at Discharge:   None    Consultants:  Psychiatry  Urology    Discharge Medication List:     Discharge Medications        CONTINUE taking these medications        Instructions Prescription details   alfuzosin ER 10 MG Tb24  Commonly known as: Uroxatral ER      Take 1 tablet (10 mg total) by mouth daily.   Refills: 0     aspirin 81 MG Chew      Chew 1 tablet (81 mg total) by mouth daily.   Refills: 0     atorvastatin 80 MG Tabs  Commonly known as: Lipitor      Take 1 tablet (80 mg total) by mouth daily.   Refills: 0     enalapril 10 MG Tabs  Commonly known as: Vasotec      Take 1 tablet (10 mg total) by mouth 2 (two) times daily.   Refills: 0     escitalopram 20 MG Tabs  Commonly known as: Lexapro      Take 1 tablet (20 mg total) by mouth at bedtime.   Refills: 0     gabapentin 100 MG Caps  Commonly known as: Neurontin      Take 1 capsule (100 mg total) by mouth 3 (three) times daily.   Refills: 0     mirtazapine 15 MG Tabs  Commonly known as: Remeron      Take 1 tablet (15 mg total) by mouth nightly.   Refills: 0     niacin 500 MG Tabs      Take 1 tablet (500 mg total) by mouth daily with breakfast.   Refills: 0     Polyethylene Glycol 3350 17 g Pack  Commonly known as: MIRALAX      Take 17 g by mouth daily.   Refills: 0     sucralfate 1 g Tabs  Commonly known as: Carafate      Take 1 tablet (1 g total) by mouth 3 (three) times daily before meals.   Refills: 0      XLEAR SINUS CARE SPRAY NA      by Nasal route 3 (three) times daily.   Refills: 0     zinc sulfate 220 (50 Zn) MG Caps  Commonly known as: Zincate      Take 1 capsule (220 mg total) by mouth daily.   Refills: 0            STOP taking these medications      fleet enema Enem        levoFLOXacin 750 MG Tabs  Commonly known as: Levaquin        Magnesium Citrate Soln        traZODone 50 MG Tabs  Commonly known as: Desyrel                 [place] substances prescribed at discharge.    Follow-up appointment:   Ryan Yeung MD  [address]  [address]  [facility] [id number] [phone]    Follow up on 3/27/2025  urology follow-up    Paulo James MD  [address] [place] [address]  [facility] [address] [phone]    Follow up in 1 week(s)      Appointments for Next 30 Days 3/10/2025 - 4/9/2025      None            Vital signs:  Temp:  [97.3 °F (36.3 °C)-98.6 °F (37 °C)] 98 °F (36.7 °C)  Pulse:  [49-74] 57  Resp:  [16-19] 16  BP: (112-153)/(45-61) 153/60  SpO2:  [93 %-99 %] 97 %    Physical Exam:    See progress note dated same day.  -----------------------------------------------------------------------------------------------  PATIENT DISCHARGE INSTRUCTIONS: See electronic chart    Arnulfo Edmondson DO    Time spent:  34 minutes

## 2025-03-10 NOTE — CERTIFICATION
Ref: [address] [facility] 5/3-403, 5/3-602, 5/3-607, 5/3-610    5/3-702, 5/3-813, 5/4-306, 5/4-402, 5/4-403    5/4-405, 5/4-501, 5/4-611, 5/4-705   Inpatient Certificate  Re: Deven Atkinson    (name)     I personally informed the above-named individual of the purpose of this examination and that he or she did not have to speak to me, and that any statements made might be related in court as to the individual's clinical condition or need for services.  Additionally, if this examination was for the purpose of determining that the above-named individual is developmentally disabled and dangerous, I informed the individual of his or her right to speak with a relative, friend or  before the examination, and of his or her right to have an  appointed for him or her if he or she so desired.     Electronically signed by Rell Hammond DO    Signature of Examiner     On 3/10 , 2025 , at 230  [] a.m. [x] p.m.,  I personally examined the    (date)  (year)  (time)    above-named individual. The examination was conducted in person at [facility].  Or   [] Via Interactive Communication System (telepsychiatry)      Based on the foregoing examination, it is my opinion that he or she is:  [x]  A person with mental illness who, because of his or her illness is reasonably expected, unless treated on an inpatient basis, to engage in conduct placing such person or another in physical harm or in reasonable expectation of being physically harmed;   []  A person with mental illness who, because of his or her illness is unable to provide for his or her basic physical needs so as to guard himself or herself from serious harm, without the assistance of family or others, unless treated on an inpatient basis;   []  A person with mental illness who: refuses treatment or is not adhering adequately to prescribed treatment; because of the nature of his or her illness is unable to understand his or her need for treatment; and if not  treated on an inpatient basis, is reasonably expected based on his or her behavioral history, to suffer mental or emotional deterioration and is reasonably expected, after such deterioration, to meet the criteria of either paragraph one or paragraph two above;   []  An individual who is developmentally disabled and unless treated on an in-patient basis is reasonably expected to inflict serious physical harm upon himself or herself or others in the near future, and/or   [x]  Is in need of immediate hospitalization for the prevention of such harm.   I base my opinion on the following (including clinical observations, factual information):  Depressed with recent suicide attempt by cutting wrist. Still feels like a burden remains high risk for self harm  I believe that the individual is subject to: []  Involuntary inpatient admission and is not in need of immediate hospitalization   (check one) [x]  Involuntary inpatient admission and is in need of immediate hospitalization    []  Judicial inpatient admission and is not in need of immediate hospitalization    []  Judicial inpatient admission and is in need of immediate hospitalization     Date: 3/10/2025 Signature: Electronically signed by Rell Hammond DO   Title: DO Printed Name: Rell Hammond DO     -2006 (R-12-22) Inpatient Certificate  Printed by Authority of the [facility] -0- Copies Page 1 of 1

## 2025-03-11 VITALS
WEIGHT: 163.38 LBS | DIASTOLIC BLOOD PRESSURE: 58 MMHG | TEMPERATURE: 98 F | BODY MASS INDEX: 23.39 KG/M2 | OXYGEN SATURATION: 100 % | RESPIRATION RATE: 16 BRPM | SYSTOLIC BLOOD PRESSURE: 151 MMHG | HEART RATE: 60 BPM | HEIGHT: 70 IN

## 2025-03-11 PROBLEM — F33.2 MDD (MAJOR DEPRESSIVE DISORDER), RECURRENT SEVERE, WITHOUT PSYCHOSIS (HCC): Status: ACTIVE | Noted: 2025-03-11

## 2025-03-11 PROBLEM — R45.851 SUICIDAL IDEATION: Status: RESOLVED | Noted: 2025-03-08 | Resolved: 2025-03-11

## 2025-03-11 PROCEDURE — 99239 HOSP IP/OBS DSCHRG MGMT >30: CPT | Performed by: INTERNAL MEDICINE

## 2025-03-11 PROCEDURE — 99231 SBSQ HOSP IP/OBS SF/LOW 25: CPT | Performed by: OTHER

## 2025-03-11 RX ORDER — LIDOCAINE HYDROCHLORIDE 20 MG/ML
10 JELLY TOPICAL AS NEEDED
Status: DISCONTINUED | OUTPATIENT
Start: 2025-03-11 | End: 2025-03-11

## 2025-03-11 NOTE — BH PROGRESS NOTE
[facility]'s medical clearance to inpatient psychiatric unit at [facility]. No response or indication of whether or not they can accommodate at this time. Assume they are awaiting discharges which are determined during rounds at 10-10:30 am.

## 2025-03-11 NOTE — PAYOR COMM NOTE
3/8-3/10  ADMISSION REVIEW     Payor: [employer] [place] [employer]  Subscriber #:  [id number]  Authorization Number: [id number]    Admit date: 3/8/25  Admit time:  1:29 PM       REVIEW DOCUMENTATION:     ED Provider Notes        ED Provider Notes signed by Rod Fajardo DO at 3/8/2025 11:16 AM       Author: Rod Fajardo DO Service: -- Author Type: Physician    Filed: 3/8/2025 11:16 AM Date of Service: 3/8/2025 11:09 AM Status: Signed    : Rod Fajardo DO (Physician)           Patient Seen in: [facility] Emergency Department      History     Chief Complaint   Patient presents with    Bleeding     Stated Complaint: hematura    Subjective:   HPI      89-year-old male presents to the emergency room from [facility] for evaluation of hematuria.  Patient has chronic indwelling Jay catheter, was admitted to the [facility] yesterday after he attempted to slit his wrist in a suicide attempt.  The lacerations were superficial and did not require repair.  Patient was noted to have blood in the Jay catheter bag this morning and was sent to the ER for evaluation.  Patient denies any complaints denies headache or neck pain denies chest pain shortness of breath denies abdominal pain.  Denies back or flank pain.  Denies fevers or chills    Objective:     Past Medical History:    BPH (benign prostatic hyperplasia)    Coronary atherosclerosis    Depression    Essential hypertension    Hearing impaired person, bilateral    High blood pressure    High cholesterol    History of stomach ulcers    Hyperlipidemia    Stroke (HCC)    Urinary retention              Past Surgical History:   Procedure Laterality Date    Cath bare metal stent (bms)      Colonoscopy      Surgical stent      wife states Lft side of heart- [facility]                Social History     Socioeconomic History    Marital status:    Tobacco Use    Smoking status: Former     Types: Cigarettes     Passive exposure: Never    Smokeless  tobacco: Never    Tobacco comments:     QUIT SMOKING IN 2000   Vaping Use    Vaping status: Never Used   Substance and Sexual Activity    Alcohol use: Not Currently    Drug use: Never     Social Drivers of Health     Food Insecurity: No Food Insecurity (2/19/2025)    NCSS - Food Insecurity     Worried About Running Out of Food in the Last Year: No     Ran Out of Food in the Last Year: No   Transportation Needs: No Transportation Needs (2/19/2025)    NCSS - Transportation     Lack of Transportation: No   Housing Stability: Not At Risk (2/19/2025)    NCSS - Housing/Utilities     Has Housing: Yes     Worried About Losing Housing: No     Unable to Get Utilities: No                  Physical Exam     ED Triage Vitals [03/08/25 0754]   /66   Pulse 60   Resp 18   Temp 97.9 °F (36.6 °C)   Temp src Temporal   SpO2 99 %   O2 Device None (Room air)       Current Vitals:   Vital Signs  BP: 147/57  Pulse: 57  Resp: 18  Temp: 97.9 °F (36.6 °C)  Temp src: Temporal  MAP (mmHg): 83    Oxygen Therapy  SpO2: 99 %  O2 Device: None (Room air)        Physical Exam  GENERAL: Patient is awake, alert, in no acute distress.  HEENT: no scleral icterus.  Mucous membranes are moist  HEART: Regular rate and rhythm, no murmurs.  LUNGS: Clear to auscultation bilaterally.  No Rales, no rhonchi, no wheezing, no stridor.  ABDOMEN: Soft, nondistended,non tender, bowel sounds are present, no rebound, no rigidity, no guarding.no pulsatile masses. No CVA tenderness  EXTREMITIES: No peripheral edema, no calf tenderness,      ED Course     Labs Reviewed   CBC WITH DIFFERENTIAL WITH PLATELET - Abnormal; Notable for the following components:       Result Value    HGB 12.2 (*)     HCT 34.2 (*)     All other components within normal limits   URINALYSIS WITH CULTURE REFLEX - Abnormal; Notable for the following components:    Urine Color Red (*)     Glucose Urine 100 (*)     Ketones Urine 40 (*)     Blood Urine Large (*)     pH Urine >=9.0 (*)     Protein  Urine >=300 (*)     Urobilinogen Urine >=8.0 (*)     Leukocyte Esterase Urine Large (*)     WBC Urine 11-20 (*)     RBC Urine >10 (*)     All other components within normal limits   COMP METABOLIC PANEL (14) - Abnormal; Notable for the following components:    Glucose 115 (*)     Alkaline Phosphatase 128 (*)     All other components within normal limits   UA MICROSCOPIC ONLY, URINE - Abnormal; Notable for the following components:    WBC Urine 11-20 (*)     RBC Urine >10 (*)     All other components within normal limits   PROTHROMBIN TIME (PT) - Normal   PTT, ACTIVATED - Normal   ICTOTEST - Normal   RAINBOW DRAW LAVENDER   RAINBOW DRAW LIGHT GREEN   RAINBOW DRAW BLUE   URINE CULTURE, ROUTINE                  MDM        Differential diagnosis before testing includes but not limited to urinary tract infection/hemorrhagic cystitis, bladder mass/malignancy, electrolyte abnormality, acute kidney injury, anemia, coagulopathy, which is a medical condition that poses a threat to life/function      Radiographic images  I personally reviewed the radiographs and my individual interpretation shows CT abdomen, no free air  I also reviewed the official reports that showed Urinary bladder is decompressed with Jay catheter in place.  There are multiple foci of gas within the superior and anterior urinary bladder with adjacent enhancing soft tissue nodule measuring up to 1.3 cm.  Findings may be related to gas within   bladder diverticulum related to Jay catheter, though emphysematous cystitis is not excluded.  The soft tissue nodule superior and anterior to the urinary bladder appears enlarged when compared to 7/2/2024, correlate clinically for possible underlying   neoplasm.   2. Colonic diverticulum along the cecum and ascending colon, with surrounding fat stranding, could represent mild diverticulitis.  The appendix appears in otherwise normal.     Discussion of management (consult/physicians, social work, pharmacy,ect)   [facility], Dr. Edmondson hospitalist    Medications Provided: IV ceftriaxone    Course of Events during Emergency Room Visit include IV established, blood work obtained.  Patient was noted to have dark bloody urine in Jay catheter bag, Jay was exchanged for three-way catheter and CBI was performed.  Urine is clear and is now light pink.  CT performed, patient did receive dose of IV antibiotics.  CBC and chemistry are unremarkable.  Discussed with hospitalist and urology, patient will be admitted for further evaluation treatment    Shared decision making was utilized           Disposition:    Admission  I have discussed with the patient the results of test, differential diagnosis, and treatment plan. They expressed clear understanding of these instructions and agrees to the plan provided.       Note to patient: The 21st Century Cures Act makes medical notes like these available to patients in the interest of transparency. However, this is a medical document intended as peer to peer communication. It is written in medical language and may contain abbreviations or verbiage that are unfamiliar. It may appear blunt or direct. Medical documents are intended to carry relevant information, facts as evident, and the clinical opinion of the practitioner.               Admission disposition: 3/8/2025 11:03 AM           Medical Decision Making      Disposition and Plan     Clinical Impression:  1. Urinary tract infection with hematuria, site unspecified    2. Suicidal ideation         Disposition:  Admit  3/8/2025 11:03 am    Follow-up:  No follow-up provider specified.        Medications Prescribed:  Current Discharge Medication List              Supplementary Documentation:         Hospital Problems       Present on Admission  Date Reviewed: 3/6/2025            ICD-10-CM Noted POA    * (Principal) Urinary tract infection with hematuria, site unspecified N39.0, R31.9 3/8/2025 Unknown                                                                Signed by Rod Fajardo DO on 3/8/2025 11:16 AM         MEDICATIONS ADMINISTERED IN LAST 1 DAY:  acetaminophen (Tylenol Extra Strength) tab 500 mg       Date Action Dose Route User    3/10/2025 1954 Given 500 mg Oral Tisha Timmons RN    3/10/2025 1515 Given 500 mg Oral Vance Spencer RN          aspirin chewable tab 81 mg       Date Action Dose Route User    3/11/2025 0932 Given 81 mg Oral Vanec Spencer RN          atorvastatin (Lipitor) tab 80 mg       Date Action Dose Route User    3/11/2025 0932 Given 80 mg Oral Vance Spencer RN          enalapril (Vasotec) tab 10 mg       Date Action Dose Route User    3/11/2025 0932 Given 10 mg Oral Vance Spencer RN    3/10/2025 1955 Given 10 mg Oral Tisha Timmons RN          escitalopram (Lexapro) tab 20 mg       Date Action Dose Route User    3/10/2025 1955 Given 20 mg Oral Tisha Timmons RN          haloperidol lactate (Haldol) 5 MG/ML injection 2 mg       Date Action Dose Route User    3/10/2025 1838 Given 2 mg Intravenous Vance Spencer RN          lidocaine (Urojet) 2 % urethral jelly 10 mL       Date Action Dose Route User    3/11/2025 0932 Given 10 mL Urethral Vance Spencer RN    3/11/2025 0234 Given 10 mL Urethral Tisha Timmons RN          mirtazapine (Remeron) tab 15 mg       Date Action Dose Route User    3/10/2025 1955 Given 15 mg Oral Tisha Timmons RN          polyethylene glycol (PEG 3350) (Miralax) 17 g oral packet 17 g       Date Action Dose Route User    3/11/2025 0932 Given 17 g Oral Vance Spencer RN          sodium chloride (Saline Mist) 0.65 % nasal solution 1 spray       Date Action Dose Route User    3/11/2025 0933 Given 1 spray Each Nare Vance Spencer RN    3/11/2025 0252 Given 1 spray Each Nare Tisha Timmons RN    3/10/2025 1630 Given 1 spray Each Nare Estela Treviño          sucralfate (Carafate) tab 1 g       Date Action Dose Route User    3/11/2025 1221 Given 1 g Oral Vance Spencer, RN     3/11/2025 0634 Given 1 g Oral Tisha Timmons RN    3/10/2025 1838 Given 1 g Oral Vance Spencer RN          tamsulosin (Flomax) cap 0.4 mg       Date Action Dose Route User    3/11/2025 0634 Given 0.4 mg Oral Tisha Timmons RN            Vitals (last day)       Date/Time Temp Pulse Resp BP SpO2 Weight O2 Device O2 Flow Rate (L/min) [facility]    03/11/25 1200 98.4 °F (36.9 °C) 62 16 151/58 94 % -- None (Room air) 0 L/min ER    03/11/25 1150 -- 60 -- -- 97 % -- -- -- ER    03/11/25 1117 -- 57 -- -- 96 % -- -- -- ER    03/11/25 0836 -- 65 -- -- 98 % -- -- -- ER    03/11/25 0818 -- 69 -- -- 97 % -- -- --     03/11/25 0810 98.3 °F (36.8 °C) 60 17 151/62 96 % -- None (Room air) 0 L/min     03/11/25 0723 -- 78 -- -- 94 % -- -- --     03/11/25 0357 98.4 °F (36.9 °C) 54 18 151/58 98 % -- None (Room air) -- AR    03/11/25 0227 -- 52 -- -- 98 % -- -- -- AR 03/11/25 0208 -- 49 -- -- 97 % -- -- -- AR    03/10/25 2319 98.1 °F (36.7 °C) 44 16 142/51 98 % -- None (Room air) -- AR    03/10/25 2042 -- 60 -- -- 99 % -- -- -- AR    03/10/25 2028 -- 47 -- -- 96 % -- -- -- AR    03/10/25 1936 97.5 °F (36.4 °C) 46 16 134/64 100 % -- None (Room air) -- AR    03/10/25 1923 -- 56 -- -- 100 % -- -- -- AR    03/10/25 1600 98.8 °F (37.1 °C) 55 16 152/55 100 % -- None (Room air) -- KH    03/10/25 0733 98 °F (36.7 °C) 57 16 153/60 97 % -- None (Room air) 0 L/min EG    03/10/25 0441 -- 56 -- -- 93 % -- -- -- HT    03/10/25 0423 97.3 °F (36.3 °C) 49 -- 112/45 96 % -- None (Room air) 0 L/min HT       H&P    Chief Complaint: Hematuria        Subjective:  History of Present Illness:   Deven Atkinson is a 89 year old male with PMHx BPH and urinary retention s/p chronic aaron and TURP in February 2025, CAD s/p stent, HTN, HLD, CVA, MDD who presents from [facility] due to hematuria. Patient has been in the ER 6 times this month alone due to problem with his aaron catheter. Wife states there was trouble with it draining. He was never admitted for this  problem as there were no issues in the ER. Yesterday he was in the ER for suicide attempt as he had lacerated his left wrist with a knife. It was superficial and did not require repair. He was then transferred to [facility]. Today he was noted to have hematuria and sent to the ER for evaluation. Patient denies fever, nausea, vomiting, abdominal pain or flank pain. He denies suicidal ideation. CT a/p showed gas within bladder diverticulum related to Aaron catheter, though emphysematous cystitis is not excluded. In the ER, aaron catheter was exchanged to 3 way aaron and CBI started.  UA concerning for UTI and he was given Ceftriaxone. He was admitted with urology on consult.       Objective:  Physical Exam:    /58 (BP Location: Left arm)   Pulse 66   Temp 98.2 °F (36.8 °C) (Oral)   Resp 18   Ht 5' 10\" (1.778 m)   Wt 163 lb 5.8 oz (74.1 kg)   SpO2 99%   BMI 23.44 kg/m²          Labs Last 24 Hours:        Recent Labs   Lab 03/03/25  0858 03/07/25  1018 03/08/25  0755   RBC 4.05 3.92 4.18   HGB 11.6* 11.0* 12.2*   HCT 34.6* 33.1* 34.2*   MCV 85.4 84.4 81.8   MCH 28.6 28.1 29.2   MCHC 33.5 33.2 35.7   RDW 12.8 12.9 12.8   NEPRELIM 4.53 4.00 3.60   WBC 7.2 7.2 8.1   .0 266.0 288.0             Recent Labs   Lab 03/03/25  0858 03/05/25  1629 03/07/25  1018 03/08/25  0755   * 134* 140* 115*   BUN 18 14 14 16   CREATSERUM 1.06 1.06 0.94 0.96   EGFRCR 65 65 75 73   CA 8.8 8.8 8.5* 8.7   ALB 3.9  --  3.8 4.1    139 142 141   K 4.1 4.2 3.8 4.2    106 109 110   CO2 20.0* 26.0 23.0 21.0   ALKPHO 116  --  119* 128*   AST 19  --  20 25   ALT 12  --  15 17   BILT 0.4  --  0.3 0.5   TP 6.8  --  6.4 7.0       Assessment & Plan:  #Hematuria  #BPH with urinary retention s/p chronic aaron  #S/p TURP in February 2025  #Suspected UTI  -CT a/p showed gas within bladder diverticulum related to Aaron catheter, though emphysematous cystitis is not excluded  -Empiric abx  -Follow urine  culture  -Flomax for alfuzosin   -3 way aaron in for CBI, monitor urine color  -Hold ASA  -Urology consult     #Recent suicidal ideation  -Currently denies  -Sitter at beside, suicide precautions  -Came from [facility]  -Psych consult     #CAD s/p stent  -Hold ASA  -Statin     #Hypertension  -Enalapril     #Hyperlipidemia  -Statin     #Hx of CVA  -Hold ASA  -Statin     #Depression  -Trazodone, Lexapro, Remeron     #PUD  -Carafate     #Dementia        Plan of care discussed with patient, wife, RN.     3/8 UROLOGY CONSULT NOTE  Date of Consult:  3/9/2025  Reason for Consultation:   Hematuria     History of Present Illness:   Patient is a 89 year old male who was admitted to the hospital for Urinary tract infection with hematuria, site unspecified:     89 year old who recently underwent a TURP with DR. Yeung on 2/19/2025. He was admitted today from [facility] due to hematuria that was noted in his aaron catheter tubing.      A CT was performed which showed some air in the bladder. 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      UA today showed 11-20 WBCs, no bacteria, >10 RBCs.      His aaron was exchanged for a 3 way catheter in the ER and CBI was started.      Urine this AM is clear on a slow to moderate drip. I stopped it.       Physical Exam:   Blood pressure 146/58, pulse 62, temperature 98.3 °F (36.8 °C), temperature source Oral, resp. rate 16, height 5' 10\" (1.778 m), weight 163 lb 5.8 oz (74.1 kg), SpO2 98%.         Impression:   89 year old s/p recent TURP in Feb with Dr. Yeung who is transferred from [facility] with gross hematuria and a possible UTI.     -Agree with empiric antibiotics. UA in setting of recent urologic procedure can be difficult to interpret. Unclear if there is an infection, however with a chronic aaron bacteriuria is likely. Follow up on culture  -Urine has cleared this AM On CBI. I stopped it and ask nursing to disconnect  the CBI tubing. Monitor urine coloration today.  -We discussed that intermittent hematuria is not uncommon in the first several weeks after a TURP procedure.  -We will discuss a potential void trial prior to discharge.      Urology will follow along.     3/9 HOSPITALIST NOTE    Chief Complaint: Hematuria        Subjective:  Patient denies suicidal ideation. RN reports some blood when wiping. Urine slightly pink-tinged.      Vital signs:  Temp:  [97.6 °F (36.4 °C)-98.4 °F (36.9 °C)] 98.4 °F (36.9 °C)  Pulse:  [54-78] 61  Resp:  [14-24] 17  BP: (109-165)/(41-94) 152/52  SpO2:  [88 %-100 %] 88 %      Physical Exam:    General: No acute distress, awake and alert  Respiratory: No rhonchi, no wheezes  Cardiovascular: S1, S2. Regular rate and rhythm  Abdomen: Soft, Non-tender, non-distended, positive bowel sounds  : Aaron in place with slight pink-tinged urine  Neuro: BAUMAN x 4  Extremities: No edema     Diagnostic Data:    Labs:          Recent Labs   Lab 03/03/25  0858 03/07/25  1018 03/08/25  0755 03/08/25  1837 03/09/25  0726   WBC 7.2 7.2 8.1  --  8.5   HGB 11.6* 11.0* 12.2* 10.5* 11.4*   MCV 85.4 84.4 81.8  --  84.8   .0 266.0 288.0  --  270.0   INR  --   --  0.99  --   --               Recent Labs   Lab 03/03/25  0858 03/05/25  1629 03/07/25  1018 03/08/25  0755 03/09/25  0726   *   < > 140* 115* 117*   BUN 18   < > 14 16 12   CREATSERUM 1.06   < > 0.94 0.96 0.89   CA 8.8   < > 8.5* 8.7 8.5*   ALB 3.9  --  3.8 4.1  --       < > 142 141 144   K 4.1   < > 3.8 4.2 4.1      < > 109 110 112   CO2 20.0*   < > 23.0 21.0 24.0   ALKPHO 116  --  119* 128*  --    AST 19  --  20 25  --    ALT 12  --  15 17  --    BILT 0.4  --  0.3 0.5  --    TP 6.8  --  6.4 7.0  --     < > = values in this interval not displayed.       Assessment & Plan:  #Hematuria  #BPH with urinary retention s/p chronic aaron  #S/p TURP in February 2025  #UTI  -CT a/p showed gas within bladder diverticulum related to Aaron catheter,  though emphysematous cystitis is not excluded  -Empiric abx  -Follow urine culture  -Flomax for alfuzosin   -3 way aaron in for CBI, monitor urine color  -Hold ASA  -Monitor hgb  -Urology consult     #Recent suicidal ideation  -Currently denies SI  -Sitter at beside, suicide precautions  -Came from [facility]  -Psych consult     #CAD s/p stent  -Hold ASA  -Statin     #Hypertension  -Enalapril     #Hyperlipidemia  -Statin     #Hx of CVA  -Hold ASA  -Statin     #Depression  -Trazodone, Lexapro, Remeron     #PUD  -Carafate     #Hx of C difficile  -PO vancomycin daily while on abx     #Dementia     3/10 UROLOGY NOTE  Current complaints: Patient resting comfortably undisturbed.  CBI clamped.     Sitter at bedside      Wife at bedside     Objective:  General appearance: no distress  Blood pressure 153/60, pulse 57, temperature 98 °F (36.7 °C), temperature source Oral, resp. rate 16, height 5' 10\" (1.778 m), weight 163 lb 5.8 oz (74.1 kg), SpO2 97%.  Lungs: non-labored respirations  Abdomen: soft  : aaron catheter in place draining light tea-colored urine        Lab Results   Component Value Date     WBC 10.0 03/10/2025     HGB 11.3 03/10/2025     HCT 33.8 03/10/2025     .0 03/10/2025     CREATSERUM 0.90 03/10/2025     BUN 11 03/10/2025      03/10/2025     K 3.9 03/10/2025      03/10/2025     CO2 24.0 03/10/2025      03/10/2025     CA 8.6 03/10/2025       Assessment:    URINARY RETENTION, BPH  S/P TURP 2/19/25  Pathology - 4+4 adenocarcinoma prostate     ADMITTED WITH HEMATURIA, POSSIBLE UTI  Afebrile, VSS  No leukocytosis  Hgb 12.2 > 10.5 > 11.4 > 11.3  Serum creat 0.96 > 0.89 > 0.9  Urine culture 3/8/25: pending     Plan:    Check final culture  Abx per attending  Follow labs, temp  CPM with tamsulosin (has been substituted for uroxatral).  Spoke with Dr. Anjana - will plan to keep aaron catheter in place. Patient to follow-up with Dr. Yeung as scheduled 3/27/25.  Nurse and patient's wife  informed patient may have intermittent hematuria as he is healing from his recent TURP.  Need to be notified if worsening hematuria/clots/issues with aaron catheter draining or any other worsening symptoms.           3/10 HOSPITALIST NOTE    Subjective:  CBI clamped yesterday. No significant hematuria and aaron draining well.  He walked with PT. Has no complaints.         Objective:  Review of Systems:   A comprehensive review of systems was completed; pertinent positive and negatives stated in subjective.     Vital signs:  Temp:  [97.3 °F (36.3 °C)-98.9 °F (37.2 °C)] 98 °F (36.7 °C)  Pulse:  [49-74] 57  Resp:  [16-19] 16  BP: (112-153)/(45-61) 153/60  SpO2:  [92 %-99 %] 97 %     Physical Exam:    General: No acute distress, awake and alert  Respiratory: No rhonchi, no wheezes  Cardiovascular: S1, S2. Regular rate and rhythm  Abdomen: Soft, Non-tender, non-distended, positive bowel sounds  : Aaron in place  Neuro: BAUMAN x 4  Extremities: No edema     Diagnostic Data:    Labs:          Recent Labs   Lab 03/07/25  1018 03/08/25  0755 03/08/25  1837 03/09/25  0726 03/10/25  0710   WBC 7.2 8.1  --  8.5 10.0   HGB 11.0* 12.2* 10.5* 11.4* 11.3*   MCV 84.4 81.8  --  84.8 85.6   .0 288.0  --  270.0 275.0   INR  --  0.99  --   --   --              Recent Labs   Lab 03/07/25  1018 03/08/25  0755 03/09/25  0726 03/10/25  0710   * 115* 117* 119*   BUN 14 16 12 11   CREATSERUM 0.94 0.96 0.89 0.90   CA 8.5* 8.7 8.5* 8.6*   ALB 3.8 4.1  --   --     141 144 144   K 3.8 4.2 4.1 3.9    110 112 113*   CO2 23.0 21.0 24.0 24.0   ALKPHO 119* 128*  --   --    AST 20 25  --   --    ALT 15 17  --   --    BILT 0.3 0.5  --   --    TP 6.4 7.0  --   --           Assessment & Plan:  #Hematuria  #BPH with urinary retention s/p chronic aaron  #S/p TURP in February 2025  #UTI  -CT a/p showed gas within bladder diverticulum related to Aaron catheter, though emphysematous cystitis is not excluded  -Empiric abx  -Follow urine  culture  -Flomax for alfuzosin   -CBI clamped. Monitor urine color  -Resume ASA  -Monitor hgb > stable  -Urology following peripherally, plan to keep aaron in place and f/u with Dr. Yeung 3/27/25     #Recent suicidal ideation  -Currently denies SI  -Sitter at beside, suicide precautions  -Came from [facility]  -Psych consult     #CAD s/p stent  -ASA  -Statin     #Hypertension  -Enalapril     #Hyperlipidemia  -Statin     #Hx of CVA  -ASA  -Statin     #Depression  -Trazodone, Lexapro, Remeron     #PUD  -Carafate     #Hx of C difficile  -PO vancomycin daily while on abx     #Dementia     PSYCH CONSULT NOTE  Reason for Consultation: Second opinion      Impression: Patient is a 89-year-old male who presented to the hospital yesterday.      Primary Psychiatric Diagnosis:  Major depressive disorder, recurrent, severe       History of dementia diagnosis per patient's wife, however unable to find documentation of this in chart review.      Personality Traits:  Deferred       Pertinent Medical Diagnoses:  BPH, urinary retention, chronic aaron s/p cystoscopy and TURP 2/19/25, history of C.Diff, history of CVA, HTN, HLD      Recommendations:  Continue suicide precautions and 1:1 sitter.      Continue psychiatric medication regimen as currently ordered:   Lexapro 20 mg PO nightly   Mirtazapine 15 mg PO nightly   Trazodone 50 mg PO nightly      While in the ED, patient was unable to sign himself in for inpatient psychiatric treatment. His wife is not his POA, therefore involuntary hospitalization has been initiated. If patient is still at [facility] tomorrow, second cert will be completed by Dr. Hammond.      Psychiatry will continue to follow.      ADDENDUM @ 1520: Received update from RN that patient is becoming restless and appears anxious. Wife is at bedside requesting a medication to help him calm down. Will order Ativan IV 0.5 mg x1.        History of Present Illness:  Patient is a 89-year-old male who originally  presented to the [facility] ED on 3/7 after a suicide attempt. During  level of care assessment on 3/7, patient endorsed feeling hopeless after a doctor's appointment and he then went to sit in his kitchen. His wife noted him to make a comment about wanting to die and patient then made a superficial cut to his left wrist with a serrated knife. Did not require repair. While in the ED, patient did endorse wanting to commit suicide but then stated that he did not remember cutting himself. He was then transferred to [facility] on 3/7 and presented back to the ED on 3/8 due to hematuria. UA was concerning for a UTI as well and cultures are currently still pending. Patient was started on continuous bladder irrigation and medically admitted with a urology consult as well.      Per chart review, patient had a cystoscopy and TURP procedure done on 2/19/25 and has since required a aaron catheter. He has had several visits to the ED since this procedure was done due to problems with the aaron such as hematuria.      This morning, patient is seen resting in bed with a sitter at the bedside. His wife is not in the room but patient requests to call her during the conversation as he reports he does not understand English well. Did offer to use Mandarin  services, however patient declined and wishes to call his wife for translation. Did call his wife, Tatiana, who reports that patient presented to the ED due to attempting suicide by cutting his wrist. Tatiana details that patient recently had a visit with his urologist after which he felt that the urologist was unable to help him. Patient then felt hopeless regarding his condition and used the knife to cut his wrist. He confirms he was attempting to end his life. He denies suicidal ideations at this time.      Patient does admit to feeling hopeless but denies feeling helpless or worthless. He denies feeling as though he is a burden. He is unable to elaborate on how long his mood has  been low. Does endorse that his appetite is fairly good and he is noted to be waiting for his lunch to arrive. He reports he sleeps well and overall feels well-rested during the day. However, during  level of care assessment, patient's wife reported to the [facility] that patient does not sleep well at night. He currently denies any recent worrying or panic attacks but did endorse feeling anxious to the [facility]. No recent hallucinations or paranoia. During conversation this morning, he is unable to state our current location but presents as alert and oriented x3. He is noted to be forgetful at times.      While medically admitted, will continue suicide precautions and 1:1 sitter. Will continue his outpatient psychiatric medications while receiving further medical workup regarding his hematuria and possible UTI. Plan for transfer back to [facility] once medically cleared to do so.     3/10 PSYCH NOTE    Subjective:  Wife at bedside.  Patient initially admitted to [facility] due to suicide attempt by cutting left wrist related to worsening depression and feelings of being useless due to medical issues.  He is found to have hematuria and thus was transferred back to [facility] and is currently being followed by urology.  He is being treated for urinary tract infection and is on IV antibiotics.     He acknowledges cutting his wrist and still endorses feeling useless and hopeless and feels like a burden due to his medical issues as well as financial stressors.  He feels like he has nothing to contribute and feels like he will never get better.  He does feel sad and struggles to find things that he is enjoying but still likes being with his family and friends.  His appetite is fair with good sleep.  He reports decreased energy and motivation.  He does have some ruminations about getting older as well as health issues but I pointed out that he still has a lot of life left to live and that none of his doctors are giving  up on him but he seems to have given up on himself which he acknowledges.     I spoke with him and his wife about plan of care and recommended that he return back to [facility] under my care so that we can continue to work on his depression and manage his suicidal libido further before him returning home and they both agreed.  Wife appreciated my visits and also requests that we would arrange for aftercare once he leaves [facility] and advised her we will make this happen.      MSE:   Conscious/Orientation: A + O x person, place, time, situation  Appearance: good grooming  Behavior: no psychomotor abnormalities, good eye contact and engagement with interview.  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, insomnia related to mental condition rule out generalized anxiety disorder-currently still symptomatic     Plan:  Patient continues to require inpatient psychiatric care and he is okay to transfer back to [facility] once medically cleared  Discontinue trazodone to minimize polypharmacy continue Lexapro and Remeron as well as as needed melatonin  Chart reviewed  Case discussed with wife at bedside  Certificate completed  Case discussed with  as well as nursing staff regarding plan of care  Continue sitter until transferred    3/10  NOTE  2nd Certificate sent via secure email to [facility].   Per [facility], no isolation bed today for C Diff Isolation status. [facility] will re-evaluate tomorrow 3/11/25. Pt is medically cleared for transfer to [facility] once appropriate bed is available. Care team at [facility].

## 2025-03-11 NOTE — PROGRESS NOTES
NURSING DISCHARGE NOTE    Discharged Home via Cart.  Accompanied by  EMS  Belongings Taken by patient/family.    IV and tele removed.

## 2025-03-11 NOTE — DISCHARGE SUMMARY
[facility]     Deven Atkinson Patient Status:  Inpatient    1930 MRN [id number]   Location [facility] 3NE-A Attending Arnulfo Edmondson, DO   Hosp Day # 3 PCP Paulo James MD     Date of Admission: 3/8/2025  Date of Discharge: 3/11/2025  Discharge Disposition: [facility]    Discharge Diagnosis:   #Hematuria, resolved  #BPH with urinary retention s/p chronic aaron  #S/p TURP in 2025  #UTI, ruled out  #Recent suicidal ideation  #Major depressive disorder   #CAD s/p stent  #Hypertension  #Hyperlipidemia  #Hx of CVA  #Depression  #PUD  #Hx of C difficile  #Dementia    History of Present Illness: Deven Atkinson is a 89 year old male with PMHx BPH and urinary retention s/p chronic aaron and TURP in 2025, CAD s/p stent, HTN, HLD, CVA, MDD who presents from [facility] due to hematuria. Patient has been in the ER 6 times this month alone due to problem with his aaron catheter. Wife states there was trouble with it draining. He was never admitted for this problem as there were no issues in the ER. Yesterday he was in the ER for suicide attempt as he had lacerated his left wrist with a knife. It was superficial and did not require repair. He was then transferred to [facility]. Today he was noted to have hematuria and sent to the ER for evaluation. Patient denies fever, nausea, vomiting, abdominal pain or flank pain. He denies suicidal ideation. CT a/p showed gas within bladder diverticulum related to Aaron catheter, though emphysematous cystitis is not excluded. In the ER, aaron catheter was exchanged to 3 way aaron and CBI started.  UA concerning for UTI and he was given Ceftriaxone. He was admitted with urology on consult.     Brief Synopsis: Patient presented with hematuria.  CBI started in the ER.  He was initially started on antibiotics for UTI but this was ruled out as urine culture came back as Corynebacterium.  Hematuria did improve and he  will have follow-up with urology later this month.  Psychiatry did follow along due to recent suicidal ideation.  He was medically cleared and discharged back to [facility].    Lace+ Score: 83  59-90 High Risk  29-58 Medium Risk  0-28   Low Risk       TCM Follow-Up Recommendation:  LACE > 58: High Risk of readmission after discharge from the hospital.    Procedures during hospitalization:   None    Incidental or significant findings and recommendations (brief descriptions):  None    Lab/Test results pending at Discharge:   None     Consultants:  Urology  Psychiatry    Discharge Medication List:     Discharge Medications        START taking these medications        Instructions Prescription details   sodium chloride 0.65 % Soln  Commonly known as: Saline Mist      1 spray by Nasal route every 3 (three) hours as needed for congestion.   Quantity: 30 mL  Refills: 0            CONTINUE taking these medications        Instructions Prescription details   alfuzosin ER 10 MG Tb24  Commonly known as: Uroxatral ER      Take 1 tablet (10 mg total) by mouth daily.   Refills: 0     aspirin 81 MG Chew      Chew 1 tablet (81 mg total) by mouth daily.   Refills: 0     atorvastatin 80 MG Tabs  Commonly known as: Lipitor      Take 1 tablet (80 mg total) by mouth daily.   Refills: 0     enalapril 10 MG Tabs  Commonly known as: Vasotec      Take 1 tablet (10 mg total) by mouth 2 (two) times daily.   Refills: 0     escitalopram 20 MG Tabs  Commonly known as: Lexapro      Take 1 tablet (20 mg total) by mouth at bedtime.   Refills: 0     gabapentin 100 MG Caps  Commonly known as: Neurontin      Take 1 capsule (100 mg total) by mouth 3 (three) times daily.   Refills: 0     mirtazapine 15 MG Tabs  Commonly known as: Remeron      Take 1 tablet (15 mg total) by mouth nightly.   Refills: 0     niacin 500 MG Tabs      Take 1 tablet (500 mg total) by mouth daily with breakfast.   Refills: 0     Polyethylene Glycol 3350 17 g  Pack  Commonly known as: MIRALAX      Take 17 g by mouth daily.   Refills: 0     sucralfate 1 g Tabs  Commonly known as: Carafate      Take 1 tablet (1 g total) by mouth 3 (three) times daily before meals.   Refills: 0     XLEAR SINUS CARE SPRAY NA      by Nasal route 3 (three) times daily.   Refills: 0     zinc sulfate 220 (50 Zn) MG Caps  Commonly known as: Zincate      Take 1 capsule (220 mg total) by mouth daily.   Refills: 0            STOP taking these medications      fleet enema Enem        levoFLOXacin 750 MG Tabs  Commonly known as: Levaquin        Magnesium Citrate Soln        traZODone 50 MG Tabs  Commonly known as: Desyrel                  Where to Get Your Medications        Please  your prescriptions at the location directed by your doctor or nurse    Bring a paper prescription for each of these medications  sodium chloride 0.65 % Soln         [place] No [place] substances prescribed at discharge.     Follow-up appointment:   Ryan Yeung MD  [address]  [address]  [facility] [place] [address] [phone]    Follow up on 3/27/2025  urology follow-up    Paulo James MD  [address] [place] [address] [address]  [facility] [place] [address] [phone]    Follow up in 1 week(s)      Appointments for Next 30 Days 3/11/2025 - 4/10/2025      None            Vital signs:  Temp:  [97.5 °F (36.4 °C)-98.4 °F (36.9 °C)] 98.4 °F (36.9 °C)  Pulse:  [44-78] 60  Resp:  [16-18] 16  BP: (134-151)/(51-64) 151/58  SpO2:  [94 %-100 %] 100 %    Physical Exam:    See progress note dated same day.  -----------------------------------------------------------------------------------------------  PATIENT DISCHARGE INSTRUCTIONS: See electronic chart    Arnulfo Edmondson DO    Time spent:  34 minutes

## 2025-03-11 NOTE — BH PROGRESS NOTE
Writer attempted to call [facility] for clarification on case dismissal.  No answer. Writer left voicemail requesting return call.

## 2025-03-11 NOTE — BH PROGRESS NOTE
Spoke with [facility] ([facility] [phone]) who states they did NOT receive a CHANGE OF STATUS FROM [facility] regarding Pt coming over to [facility] and that it appears the current petition and certificates are not valid as [facility] claims it appears that Pt is STILL at [facility].     Informed [facility] that original P&C was completed when Pt originally came into [facility] and that a COS was sent from our end once Pt was sent to [facility]. At [facility] pt had signed-in for treatment and [facility] sent a COS. Pt was then brought to [facility] and Petitioned and Certified x2 and all legal documentation has been sent and follows appropriate timeline. [facility] stated that the \"CASE IS CLOSED\" since they did not receive a COS when Pt was sent to [facility] and that the order in which documentation was received appears that Pt is still at [facility]. JB requesting entire petition and certificate process be re-completed.    Escalated this issue to Mirna MEMBRENO LCSW (supervisor) who requested this writer call back [facility] to inquire whether or not we can re-send the correct forms that we have already sent. Left brief VM requesting call back from [facility]. At this time, pt remains on petition and certificates although no court date has been set as per [facility]B case is closed.    2:30pm: Received call back from [facility] requesting new P&C for filing. Please see above.     Discussed with Dr. Hammond who reports pt reasonably will sign in at [facility], although he did not sign in at [facility]. Will send COS as typical protocol for pt transferring to [facility] today. New P&Cs for transfer have been completed.    2:45pm: Received another call from [facility] regarding that [facility] sent COS too late which had interrupted the process of filing correctly. Informed Tasneem Steward LCPC manager of [employer] of this and provided [facility] phone number per discussion with [facility].

## 2025-03-11 NOTE — BH PROGRESS NOTE
3/11/2025    Seen in person at [facility]    Interval Chief Complaint: Follow-up on depression and insomnia suicidality    Subjective:  No family at bedside.  Patient been calm and cooperative since yesterday and no behavior issues noted.  This afternoon he is more pleasant and smiling and tells me he feels \"okay.  No current suicidal thoughts but at times remains hopeless and helpless but is compliant with treatment.  He has been medically cleared and has been accepted by [facility] and be transferring over today under my care.  He is aware of this and agrees with admission.  He appears at his baseline mental status and is alert and oriented x 3 with 2/ 3 recall.  He denies any specific concerns.    MEDS:    lidocaine (Urojet) 2 % urethral jelly 10 mL  10 mL Urethral PRN    aspirin chewable tab 81 mg  81 mg Oral Daily    [COMPLETED] LORazepam (Ativan) 2 mg/mL injection 0.5 mg  0.5 mg Intravenous Once    haloperidol lactate (Haldol) 5 MG/ML injection 2 mg  2 mg Intravenous Q6H PRN    [COMPLETED] sodium chloride 0.9 % IV bolus 500 mL  500 mL Intravenous Once    [COMPLETED] iopamidol 76% (ISOVUE-370) injection for power injector  80 mL Intravenous ONCE PRN    [COMPLETED] cefTRIAXone (Rocephin) 1 g in sodium chloride 0.9% 100 mL IVPB-ADDV  1 g Intravenous Once    tamsulosin (Flomax) cap 0.4 mg  0.4 mg Oral Daily @ 0700    atorvastatin (Lipitor) tab 80 mg  80 mg Oral Daily    enalapril (Vasotec) tab 10 mg  10 mg Oral BID    escitalopram (Lexapro) tab 20 mg  20 mg Oral Nightly    mirtazapine (Remeron) tab 15 mg  15 mg Oral Nightly    polyethylene glycol (PEG 3350) (Miralax) 17 g oral packet 17 g  17 g Oral Daily    sucralfate (Carafate) tab 1 g  1 g Oral TID AC    acetaminophen (Tylenol Extra Strength) tab 500 mg  500 mg Oral Q4H PRN    melatonin tab 3 mg  3 mg Oral Nightly PRN    polyethylene glycol (PEG 3350) (Miralax) 17 g oral packet 17 g  17 g Oral Daily PRN    sennosides (Senokot) tab 17.2 mg  17.2 mg Oral  Nightly PRN    bisacodyl (Dulcolax) 10 MG rectal suppository 10 mg  10 mg Rectal Daily PRN    fleet enema (Fleet) rectal enema 133 mL  1 enema Rectal Once PRN    ondansetron (Zofran) 4 MG/2ML injection 4 mg  4 mg Intravenous Q6H PRN    metoclopramide (Reglan) 5 mg/mL injection 5 mg  5 mg Intravenous Q8H PRN    benzonatate (Tessalon) cap 200 mg  200 mg Oral TID PRN    guaiFENesin (Robitussin) 100 MG/5 ML oral liquid 200 mg  200 mg Oral Q4H PRN    glycerin-hypromellose- (Artificial Tears) 0.2-0.2-1 % ophthalmic solution 1 drop  1 drop Both Eyes QID PRN    sodium chloride (Saline Mist) 0.65 % nasal solution 1 spray  1 spray Each Nare Q3H PRN        Vitals:    03/11/25 1200   BP: 151/58   Pulse: 62   Resp: 16   Temp: 98.4 °F (36.9 °C)        ROS:   As above. Otherwise negative on 14 system exam.    MSE:   Conscious/Orientation: A + O x person, place, time, situation  Appearance: good grooming  Behavior: no psychomotor abnormalities, good eye contact and engagement with interview.  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, insomnia related to mental condition rule out generalized anxiety disorder-currently still symptomatic    Plan:  Patient continues to require inpatient psychiatric care and he is okay to transfer back to [facility] once medically cleared  continue Lexapro and Remeron as well as as needed melatonin  Chart reviewed  Certificate completed  Case discussed with  as well as nursing staff regarding plan of care  Continue sitter until transferred  Approximately 25 minutes total time spent in case with  greater than 50% time spent in counseling and coordination of care    Rell Hammond DO  Board Certified Adult and Geriatric Psychiatrist  Medical Director, Geriatric Psychiatry, [facility]   Medical Director, Psychiatric Consultation Service, [facility]

## 2025-03-11 NOTE — CERTIFICATION
Ref: [facility] 5/3-403, 5/3-602, 5/3-607, 5/3-610    5/3-702, 5/3-813, 5/4-306, 5/4-402, 5/4-403    5/4-405, 5/4-501, 5/4-611, 5/4-705   Inpatient Certificate  Re: Deven Atkinson    (name)     I personally informed the above-named individual of the purpose of this examination and that he or she did not have to speak to me, and that any statements made might be related in court as to the individual's clinical condition or need for services.  Additionally, if this examination was for the purpose of determining that the above-named individual is developmentally disabled and dangerous, I informed the individual of his or her right to speak with a relative, friend or  before the examination, and of his or her right to have an  appointed for him or her if he or she so desired.     Electronically signed by Rell Hammond DO    Signature of Examiner      3/11 , 2025 , at 130  [] a.m. [x] p.m.,  I personally examined the    (date)  (year)  (time)    above-named individual. The examination was conducted in person at [facility].  Or   [] Via Interactive Communication System (telepsychiatry)      Based on the foregoing examination, it is my opinion that he or she is:  [x]  A person with mental illness who, because of his or her illness is reasonably expected, unless treated on an inpatient basis, to engage in conduct placing such person or another in physical harm or in reasonable expectation of being physically harmed;   []  A person with mental illness who, because of his or her illness is unable to provide for his or her basic physical needs so as to guard himself or herself from serious harm, without the assistance of family or others, unless treated on an inpatient basis;   []  A person with mental illness who: refuses treatment or is not adhering adequately to prescribed treatment; because of the nature of his or her illness is unable to understand his or her need for treatment; and if not  treated on an inpatient basis, is reasonably expected based on his or her behavioral history, to suffer mental or emotional deterioration and is reasonably expected, after such deterioration, to meet the criteria of either paragraph one or paragraph two above;   []  An individual who is developmentally disabled and unless treated on an in-patient basis is reasonably expected to inflict serious physical harm upon himself or herself or others in the near future, and/or   [x]  Is in need of immediate hospitalization for the prevention of such harm.   I base my opinion on the following (including clinical observations, factual information):  Depressed, suicidal with recent attempt to cut wrist  I believe that the individual is subject to: []  Involuntary inpatient admission and is not in need of immediate hospitalization   (check one) [x]  Involuntary inpatient admission and is in need of immediate hospitalization    []  Judicial inpatient admission and is not in need of immediate hospitalization    []  Judicial inpatient admission and is in need of immediate hospitalization     Date: 3/11/2025 Signature: Electronically signed by Rell Hammond DO   Title: DO Printed Name: Rell Hammond DO     -2006 (R-12-22) Inpatient Certificate  Printed by Authority of the [facility] -0- Copies Page 1 of 1

## 2025-03-11 NOTE — TRIAGE
Pt was accepted to unit A2/[facility] in bed 914B under the care of Dr. Rell Hammond.  SBAR given to Brianna RN at 12:10.  Nurse to nurse given to Vance RN at 13:57.  Okay to set up transport once nurse to nurse is completed.

## 2025-03-11 NOTE — PROGRESS NOTES
The patient is A/Ox3 On RA, no SOB, Tele - NSR/SB, Vitals Stable , Afebrile  Ambulatory  Jay is draining with yellow output  Medically cleared for transfer to [facility]  SI precautions  1:1 sitter     Nurse to nurse report given to Ilya NARAYANAN @ [facility] #[id number]

## 2025-03-11 NOTE — BH PROGRESS NOTE
Petition and certificate completed for transfer scanned into electronic record. PCS form completed. [facility] scheduled for 4:30pm.     PCS form, facesheet, petition, and certificate in clearly labeled envelope provided directly to pt's RN to give to transport service for [facility].

## 2025-03-11 NOTE — PROGRESS NOTES
[facility]   part of formerly [facility]     Hospitalist Progress Note     Deven Mata Atkinson Patient Status:  Inpatient    1930 MRN [id number]   Location [facility] 3NE-A Attending Arnulfo Edmondson, DO   Hosp Day # 3 PCP Paulo James MD     Chief Complaint: Hematuria    Subjective:     Patient had hematuria yesterday but resolved after irrigating aaron. CBI was not started. No other complaints today but states it is too \"cold\" at [facility].    Objective:    Review of Systems:   A comprehensive review of systems was completed; pertinent positive and negatives stated in subjective.    Vital signs:  Temp:  [97.5 °F (36.4 °C)-98.8 °F (37.1 °C)] 98.3 °F (36.8 °C)  Pulse:  [44-78] 57  Resp:  [16-18] 17  BP: (134-152)/(51-64) 151/62  SpO2:  [94 %-100 %] 96 %    Physical Exam:    General: No acute distress, awake and alert  Respiratory: No rhonchi, no wheezes  Cardiovascular: S1, S2. Regular rate and rhythm  Abdomen: Soft, Non-tender, non-distended, positive bowel sounds  : Aaron in place  Extremities: No edema    Diagnostic Data:    Labs:  Recent Labs   Lab 25  1018 25  0755 25  1837 25  0726 03/10/25  0710   WBC 7.2 8.1  --  8.5 10.0   HGB 11.0* 12.2* 10.5* 11.4* 11.3*   MCV 84.4 81.8  --  84.8 85.6   .0 288.0  --  270.0 275.0   INR  --  0.99  --   --   --      Recent Labs   Lab 25  1018 25  0755 25  0726 03/10/25  0710   * 115* 117* 119*   BUN 14 16 12 11   CREATSERUM 0.94 0.96 0.89 0.90   CA 8.5* 8.7 8.5* 8.6*   ALB 3.8 4.1  --   --     141 144 144   K 3.8 4.2 4.1 3.9    110 112 113*   CO2 23.0 21.0 24.0 24.0   ALKPHO 119* 128*  --   --    AST 20 25  --   --    ALT 15 17  --   --    BILT 0.3 0.5  --   --    TP 6.4 7.0  --   --      Estimated Glomerular Filtration Rate: 79 mL/min/1.73m2 (result from lab).    Recent Labs   Lab 03/10/25  1712   TROPHS 13     Recent Labs   Lab 25  0755   PTP 13.2   INR 0.99      [facility]  Encounter on 03/08/25   1. Urine Culture, Routine     Status: Abnormal    Collection Time: 03/08/25 10:38 AM    Specimen: Urine, aaron catheter   Result Value Ref Range    Urine Culture (A) N/A     10,000 - 50,000 CFU/ML Corynebacterium species not urealyticum     Imaging: Reviewed in Epic.    Medications:    aspirin  81 mg Oral Daily    tamsulosin  0.4 mg Oral Daily @ 0700    atorvastatin  80 mg Oral Daily    enalapril  10 mg Oral BID    escitalopram  20 mg Oral Nightly    mirtazapine  15 mg Oral Nightly    polyethylene glycol (PEG 3350)  17 g Oral Daily    sucralfate  1 g Oral TID AC       Assessment & Plan:      #Hematuria, improved  #BPH with urinary retention s/p chronic aaron  #S/p TURP in February 2025  #UTI, ruled out  -CT a/p showed gas within bladder diverticulum related to Aaron catheter, though emphysematous cystitis is not excluded  -Urine culture with corynebacterium, suspect colonization. Abx discontinued   -Flomax for alfuzosin   -CBI remains clamped. Monitor urine color and irrigate PRN  -Resume ASA  -Monitor hgb > stable  -Urology following peripherally, plan to keep aaron in place and f/u with Dr. Yeung 3/27/25     #Recent suicidal ideation  -Currently denies SI  -Sitter at beside, suicide precautions  -Came from [facility] and plan is to go back there  -Psych consult     #CAD s/p stent  -ASA  -Statin     #Hypertension  -Enalapril     #Hyperlipidemia  -Statin     #Hx of CVA  -ASA  -Statin     #Depression  -Trazodone, Lexapro, Remeron     #PUD  -Carafate    #Hx of C difficile  -PO vancomycin discontinued as off abx     #Dementia      Arnulfo Edmondson,     Supplementary Documentation:     Quality:  DVT Mechanical Prophylaxis:   SCDs, Early ambuation  DVT Pharmacologic Prophylaxis   Medication   None     Code Status: DNAR/Selective Treatment  Aaron: Aaron catheter in place  MAG: Medically ready    Discharge is dependent on: Clinical state, consultant recs, bed at inpatient psych  At this point  Mr. Atkinson is expected to be discharge to: [facility]    The 21st Century Cures Act makes medical notes like these available to patients in the interest of transparency. Please be advised this is a medical document. Medical documents are intended to carry relevant information, facts as evident, and the clinical opinion of the practitioner. The medical note is intended as peer to peer communication and may appear blunt or direct. It is written in medical language and may contain abbreviations or verbiage that are unfamiliar.

## 2025-03-11 NOTE — PLAN OF CARE
A&Ox2-3, pleasantly confused. Can be anxious at times. Wife at bedside. Room air, lungs clear. Abdomen soft and nontender, BS active. Jay catheter in place and draining pink urine. Irrigated as directed by urology- no bright red blood noted, no significant clots noted. Jay continues to drain to gravity with no issues. Clarified with Urology and MD said no need to restart CBI at this time. Jay care done. NSR, SB on tele. 1:1 sitter at bedside for SI. Needs met. Will monitor     Problem: PAIN - ADULT  Goal: Verbalizes/displays adequate comfort level or patient's stated pain goal  Description: INTERVENTIONS:  - Encourage pt to monitor pain and request assistance  - Assess pain using appropriate pain scale  - Administer analgesics based on type and severity of pain and evaluate response  - Implement non-pharmacological measures as appropriate and evaluate response  - Consider cultural and social influences on pain and pain management  - Manage/alleviate anxiety  - Utilize distraction and/or relaxation techniques  - Monitor for opioid side effects  - Notify MD/LIP if interventions unsuccessful or patient reports new pain  - Anticipate increased pain with activity and pre-medicate as appropriate  Outcome: Progressing     Problem: INVOLUNTARY ADMIT  Goal: Will cooperate with staff recommendations and doctor's orders and will demonstrate appropriate behavior  Description: INTERVENTIONS:  - Treat underlying conditions and offer medication as ordered  - Educate regarding involuntary admission procedures and rules  - Contain excessive/inappropriate behavior per unit and hospital policies  Outcome: Progressing

## 2025-03-12 LAB
ATRIAL RATE: 54 BPM
P-R INTERVAL: 158 MS
Q-T INTERVAL: 490 MS
QRS DURATION: 140 MS
QTC CALCULATION (BEZET): 464 MS
R AXIS: -38 DEGREES
T AXIS: 183 DEGREES
VENTRICULAR RATE: 54 BPM

## 2025-03-25 ENCOUNTER — APPOINTMENT (OUTPATIENT)
Dept: CT IMAGING | Facility: HOSPITAL | Age: OVER 89
End: 2025-03-25
Attending: EMERGENCY MEDICINE
Payer: COMMERCIAL

## 2025-03-25 ENCOUNTER — HOSPITAL ENCOUNTER (EMERGENCY)
Facility: HOSPITAL | Age: OVER 89
End: 2025-03-25
Attending: EMERGENCY MEDICINE
Payer: COMMERCIAL

## 2025-03-25 ENCOUNTER — HOSPITAL ENCOUNTER (EMERGENCY)
Facility: HOSPITAL | Age: OVER 89
Discharge: HOME | End: 2025-03-25
Attending: EMERGENCY MEDICINE
Payer: COMMERCIAL

## 2025-03-25 VITALS
TEMPERATURE: 98 F | DIASTOLIC BLOOD PRESSURE: 60 MMHG | HEART RATE: 78 BPM | OXYGEN SATURATION: 98 % | RESPIRATION RATE: 17 BRPM | BODY MASS INDEX: 27 KG/M2 | WEIGHT: 160.94 LBS | SYSTOLIC BLOOD PRESSURE: 140 MMHG

## 2025-03-25 VITALS
TEMPERATURE: 97 F | RESPIRATION RATE: 16 BRPM | DIASTOLIC BLOOD PRESSURE: 70 MMHG | OXYGEN SATURATION: 97 % | HEART RATE: 74 BPM | SYSTOLIC BLOOD PRESSURE: 115 MMHG

## 2025-03-25 DIAGNOSIS — N39.0 URINARY TRACT INFECTION WITHOUT HEMATURIA, SITE UNSPECIFIED: Primary | ICD-10-CM

## 2025-03-25 DIAGNOSIS — T83.9XXA PROBLEM WITH FOLEY CATHETER, INITIAL ENCOUNTER: Primary | ICD-10-CM

## 2025-03-25 DIAGNOSIS — F33.2 SEVERE EPISODE OF RECURRENT MAJOR DEPRESSIVE DISORDER, WITHOUT PSYCHOTIC FEATURES (HCC): ICD-10-CM

## 2025-03-25 PROBLEM — F32.9 MDD (MAJOR DEPRESSIVE DISORDER): Status: ACTIVE | Noted: 2025-03-25

## 2025-03-25 LAB
ALBUMIN SERPL-MCNC: 4.2 G/DL (ref 3.2–4.8)
ALBUMIN/GLOB SERPL: 1.4 {RATIO} (ref 1–2)
ALP LIVER SERPL-CCNC: 129 U/L
ALT SERPL-CCNC: 20 U/L
AMPHET UR QL SCN: NEGATIVE
ANION GAP SERPL CALC-SCNC: 10 MMOL/L (ref 0–18)
APAP SERPL-MCNC: 2.2 UG/ML (ref 10–20)
AST SERPL-CCNC: 21 U/L (ref ?–34)
BASOPHILS # BLD AUTO: 0.07 X10(3) UL (ref 0–0.2)
BASOPHILS NFR BLD AUTO: 0.8 %
BENZODIAZ UR QL SCN: NEGATIVE
BILIRUB SERPL-MCNC: 0.5 MG/DL (ref 0.2–0.9)
BILIRUB UR QL STRIP.AUTO: NEGATIVE
BUN BLD-MCNC: 17 MG/DL (ref 9–23)
CALCIUM BLD-MCNC: 9.2 MG/DL (ref 8.7–10.6)
CANNABINOIDS UR QL SCN: NEGATIVE
CHLORIDE SERPL-SCNC: 108 MMOL/L (ref 98–112)
CO2 SERPL-SCNC: 21 MMOL/L (ref 21–32)
COCAINE UR QL: NEGATIVE
COLOR UR AUTO: YELLOW
CREAT BLD-MCNC: 1 MG/DL
CREAT UR-SCNC: 89.5 MG/DL
EGFRCR SERPLBLD CKD-EPI 2021: 70 ML/MIN/1.73M2 (ref 60–?)
EOSINOPHIL # BLD AUTO: 0.03 X10(3) UL (ref 0–0.7)
EOSINOPHIL NFR BLD AUTO: 0.4 %
ERYTHROCYTE [DISTWIDTH] IN BLOOD BY AUTOMATED COUNT: 12.4 %
ETHANOL SERPL-MCNC: <3 MG/DL (ref ?–3)
FENTANYL UR QL SCN: NEGATIVE
GLOBULIN PLAS-MCNC: 2.9 G/DL (ref 2–3.5)
GLUCOSE BLD-MCNC: 148 MG/DL (ref 70–99)
GLUCOSE UR STRIP.AUTO-MCNC: NORMAL MG/DL
HCT VFR BLD AUTO: 35.3 %
HGB BLD-MCNC: 12 G/DL
IMM GRANULOCYTES # BLD AUTO: 0.02 X10(3) UL (ref 0–1)
IMM GRANULOCYTES NFR BLD: 0.2 %
KETONES UR STRIP.AUTO-MCNC: NEGATIVE MG/DL
LEUKOCYTE ESTERASE UR QL STRIP.AUTO: 500
LYMPHOCYTES # BLD AUTO: 2.4 X10(3) UL (ref 1–4)
LYMPHOCYTES NFR BLD AUTO: 29 %
MCH RBC QN AUTO: 28 PG (ref 26–34)
MCHC RBC AUTO-ENTMCNC: 34 G/DL (ref 31–37)
MCV RBC AUTO: 82.5 FL
MDMA UR QL SCN: NEGATIVE
MONOCYTES # BLD AUTO: 0.4 X10(3) UL (ref 0.1–1)
MONOCYTES NFR BLD AUTO: 4.8 %
NEUTROPHILS # BLD AUTO: 5.36 X10 (3) UL (ref 1.5–7.7)
NEUTROPHILS # BLD AUTO: 5.36 X10(3) UL (ref 1.5–7.7)
NEUTROPHILS NFR BLD AUTO: 64.8 %
NITRITE UR QL STRIP.AUTO: NEGATIVE
OPIATES UR QL SCN: NEGATIVE
OSMOLALITY SERPL CALC.SUM OF ELEC: 292 MOSM/KG (ref 275–295)
OXYCODONE UR QL SCN: NEGATIVE
PH UR STRIP.AUTO: 7 [PH] (ref 5–8)
PLATELET # BLD AUTO: 221 10(3)UL (ref 150–450)
POTASSIUM SERPL-SCNC: 4.3 MMOL/L (ref 3.5–5.1)
PROT SERPL-MCNC: 7.1 G/DL (ref 5.7–8.2)
PROT UR STRIP.AUTO-MCNC: 30 MG/DL
RBC # BLD AUTO: 4.28 X10(6)UL
RBC #/AREA URNS AUTO: >10 /HPF
SALICYLATES SERPL-MCNC: <3 MG/DL (ref 3–20)
SARS-COV-2 RNA RESP QL NAA+PROBE: NOT DETECTED
SODIUM SERPL-SCNC: 139 MMOL/L (ref 136–145)
SP GR UR STRIP.AUTO: 1.01 (ref 1–1.03)
UROBILINOGEN UR STRIP.AUTO-MCNC: NORMAL MG/DL
WBC # BLD AUTO: 8.3 X10(3) UL (ref 4–11)
WBC #/AREA URNS AUTO: >50 /HPF
WBC CLUMPS UR QL AUTO: PRESENT /HPF

## 2025-03-25 PROCEDURE — 70498 CT ANGIOGRAPHY NECK: CPT | Performed by: EMERGENCY MEDICINE

## 2025-03-25 PROCEDURE — 85025 COMPLETE CBC W/AUTO DIFF WBC: CPT | Performed by: EMERGENCY MEDICINE

## 2025-03-25 PROCEDURE — 80307 DRUG TEST PRSMV CHEM ANLYZR: CPT | Performed by: EMERGENCY MEDICINE

## 2025-03-25 PROCEDURE — 87186 SC STD MICRODIL/AGAR DIL: CPT | Performed by: EMERGENCY MEDICINE

## 2025-03-25 PROCEDURE — 80143 DRUG ASSAY ACETAMINOPHEN: CPT | Performed by: EMERGENCY MEDICINE

## 2025-03-25 PROCEDURE — 99285 EMERGENCY DEPT VISIT HI MDM: CPT

## 2025-03-25 PROCEDURE — 80053 COMPREHEN METABOLIC PANEL: CPT | Performed by: EMERGENCY MEDICINE

## 2025-03-25 PROCEDURE — 87077 CULTURE AEROBIC IDENTIFY: CPT | Performed by: EMERGENCY MEDICINE

## 2025-03-25 PROCEDURE — 36415 COLL VENOUS BLD VENIPUNCTURE: CPT

## 2025-03-25 PROCEDURE — 81001 URINALYSIS AUTO W/SCOPE: CPT | Performed by: EMERGENCY MEDICINE

## 2025-03-25 PROCEDURE — 90791 PSYCH DIAGNOSTIC EVALUATION: CPT

## 2025-03-25 PROCEDURE — 70450 CT HEAD/BRAIN W/O DYE: CPT | Performed by: EMERGENCY MEDICINE

## 2025-03-25 PROCEDURE — 99283 EMERGENCY DEPT VISIT LOW MDM: CPT

## 2025-03-25 PROCEDURE — 87086 URINE CULTURE/COLONY COUNT: CPT | Performed by: EMERGENCY MEDICINE

## 2025-03-25 PROCEDURE — 99282 EMERGENCY DEPT VISIT SF MDM: CPT

## 2025-03-25 PROCEDURE — 82077 ASSAY SPEC XCP UR&BREATH IA: CPT | Performed by: EMERGENCY MEDICINE

## 2025-03-25 PROCEDURE — 80179 DRUG ASSAY SALICYLATE: CPT | Performed by: EMERGENCY MEDICINE

## 2025-03-25 RX ORDER — CEPHALEXIN 500 MG/1
500 CAPSULE ORAL ONCE
Status: COMPLETED | OUTPATIENT
Start: 2025-03-25 | End: 2025-03-25

## 2025-03-25 NOTE — DISCHARGE INSTRUCTIONS
Please follow-up with your primary care physician 1-2 days return to the ER if your symptoms worsen progress or if you have any further concerns.

## 2025-03-25 NOTE — ED PROVIDER NOTES
ADDENDUM NOTE    2:00 PM-assumed care from prior shift who evaluated patient for possible strangulation/hanging injury, suicide attempt.  CT angio imaging at pending at shift change.    4:01 PM -discussed case with neurointerventional list, given patient's normal exam and baseline neurologic status, recommends stroke neurology follow-up.  Patient is medically cleared for inpatient psychiatric treatment.  Updated wife with CTA findings and recommended neurology follow-up.    ED course: Uneventful    Diagnosis: Major depressive disorder, cerebrovascular disease    Disposition: Transfer to  [facility], accepted by Dr. Hammond

## 2025-03-25 NOTE — ED INITIAL ASSESSMENT (HPI)
A&Ox2-3 (hx of dementia) bib ems from home for +SI    Patient has hx of previous SI attempts and admission to [facility]    Per patient wife per EMS, patient attempted to hang self w/ rope    +bruising, abrasions and possible strangulation marks present around neck     When patient is asked what happened to his neck, states \"I was scratching\"     Denies any complaints at this time  Primarily Mandarin speaking    RR even/NL  Has aaron catheter/leg bag in place

## 2025-03-25 NOTE — ED QUICK NOTES
Patient has been accepted to [facility]  Report called and endorsed to Leigh NARAYANAN  Patient transport to be set up for 1930 per request of facility    Patient and family updated on POC

## 2025-03-25 NOTE — ED PROVIDER NOTES
Patient Seen in: [facility] Emergency Department      History     Chief Complaint   Patient presents with    Cath Tube Problem     Stated Complaint: aaron catheter leak, denies pain    Subjective:   HPI      89-year-old male past medical history of BPH chronic Aaron catheter with complaints of leakage around Aaron catheter tonight.  Had to change his clothing twice tonight.  Denies any fever or chills.    Objective:     Past Medical History:    BPH (benign prostatic hyperplasia)    Coronary atherosclerosis    Depression    Essential hypertension    Hearing impaired person, bilateral    High blood pressure    High cholesterol    History of stomach ulcers    Hyperlipidemia    Stroke (HCC)    Urinary retention              No pertinent past surgical history.              No pertinent social history.                Physical Exam     ED Triage Vitals [03/25/25 0138]   /68   Pulse 84   Resp 16   Temp 97.6 °F (36.4 °C)   Temp src Temporal   SpO2 96 %   O2 Device None (Room air)       Current Vitals:   Vital Signs  BP: 125/68  Pulse: 84  Resp: 16  Temp: 97.6 °F (36.4 °C)  Temp src: Temporal    Oxygen Therapy  SpO2: 96 %  O2 Device: None (Room air)        Physical Exam  Vitals and nursing note reviewed.   Constitutional:       Appearance: He is well-developed.   HENT:      Head: Normocephalic and atraumatic.   Eyes:      Pupils: Pupils are equal, round, and reactive to light.   Cardiovascular:      Rate and Rhythm: Normal rate and regular rhythm.   Pulmonary:      Effort: Pulmonary effort is normal.      Breath sounds: Normal breath sounds.   Abdominal:      General: Bowel sounds are normal.      Palpations: Abdomen is soft.   Musculoskeletal:         General: No deformity.      Cervical back: Normal range of motion and neck supple.   Skin:     General: Skin is warm and dry.      Capillary Refill: Capillary refill takes less than 2 seconds.   Neurological:      Mental Status: He is alert and oriented to person,  place, and time.             ED Course   Labs Reviewed - No data to display                MDM      This is a 89-year-old male who presents to the ER with complaints of leaking around his Jay catheter site.  Vital stable arrival patient peers nontoxic examination leaking seen on examination he is urine in his catheter bag.  It was flushed at bedside without any leakage around the catheter site.  Patient does have follow-up with his urologist.  Discussed supportive care with family member at bedside.  Discharged home stable condition        Medical Decision Making      Disposition and Plan     Clinical Impression:  1. Problem with Jay catheter, initial encounter         Disposition:  Discharge  3/25/2025  3:41 am    Follow-up:  Paulo James MD  [address] [address] [phone]    Call in 1 day(s)      Jeff Mercer DPM  [address] [facility]  [address] [facility] [address] [phone]    Call in 1 day(s)            Medications Prescribed:  Current Discharge Medication List              Supplementary Documentation:

## 2025-03-25 NOTE — ED QUICK NOTES
Patient appears in NAD, denies any complaints at this time, RR even/NL, updated on POC, waiting for transfer, bed in low and locked position     at bedside for 1:1 monitoring, safety precautions maintained

## 2025-03-25 NOTE — ED PROVIDER NOTES
Patient Seen in: [facility] Emergency Department      History     Chief Complaint   Patient presents with    Eval-P     Stated Complaint: SI    Subjective:   HPI      89-year-old man past medical history as below including history of MDD and multiple suicide attempts presents with presumed attempt.  Wife went to a doctor's appointment and when she returned she noticed a small plastic table shattered on the floor and found her  lying in bed and noticed bruising and redness to his neck.  He is denying everything is unsure where these may have come from.  Family MPD concern for another possible suicide attempt.    Objective:     Past Medical History:    BPH (benign prostatic hyperplasia)    Coronary atherosclerosis    Depression    Essential hypertension    Hearing impaired person, bilateral    High blood pressure    High cholesterol    History of stomach ulcers    Hyperlipidemia    Stroke (HCC)    Urinary retention              Past Surgical History:   Procedure Laterality Date    Cath bare metal stent (bms)      Colonoscopy      Surgical stent      wife states Lft side of heart- [place]                Social History     Socioeconomic History    Marital status:    Tobacco Use    Smoking status: Former     Types: Cigarettes     Passive exposure: Never    Smokeless tobacco: Never    Tobacco comments:     QUIT SMOKING IN 2000   Vaping Use    Vaping status: Never Used   Substance and Sexual Activity    Alcohol use: Not Currently    Drug use: Never     Social Drivers of Health     Food Insecurity: No Food Insecurity (3/8/2025)    NCSS - Food Insecurity     Worried About Running Out of Food in the Last Year: No     Ran Out of Food in the Last Year: No   Transportation Needs: No Transportation Needs (3/8/2025)    NCSS - Transportation     Lack of Transportation: No   Housing Stability: Not At Risk (3/8/2025)    NCSS - Housing/Utilities     Has Housing: Yes     Worried About Losing Housing:  No     Unable to Get Utilities: No                  Physical Exam     ED Triage Vitals [03/25/25 1201]   /51   Pulse 71   Resp 14   Temp 96.9 °F (36.1 °C)   Temp src Temporal   SpO2 97 %   O2 Device None (Room air)       Current Vitals:   Vital Signs  BP: 112/62  Pulse: 77  Resp: 16  Temp: 96.9 °F (36.1 °C)  Temp src: Temporal    Oxygen Therapy  SpO2: 98 %  O2 Device: None (Room air)        Physical Exam  Constitutional:       General: Is not in acute distress.     Appearance: Is not ill-appearing.   Neck: Anterior neck concerning for ligature marks, no thrill  HENT:      Head: Normocephalic and atraumatic.      Mouth/Throat:      Mouth: Mucous membranes are moist.   Eyes:      Conjunctiva/sclera: Conjunctivae normal.      Pupils: Pupils are equal, round, and reactive to light.   Cardiovascular:      Rate and Rhythm: Normal rate and regular rhythm.   Pulmonary:      Effort: Pulmonary effort is normal. No respiratory distress.      Breath sounds: Normal breath sounds.   Abdominal:      General: Abdomen is flat. There is no distension.      Tenderness: There is no abdominal tenderness.   Musculoskeletal:      Right lower leg: No edema.      Left lower leg: No edema.   Skin:     General: Skin is warm and dry.   Neurological:      General: No focal deficit present.      Mental Status: Is alert.       ED Course     Labs Reviewed   SALICYLATE, SERUM - Abnormal; Notable for the following components:       Result Value    Salicylate <3.0 (*)     All other components within normal limits   ACETAMINOPHEN (TYLENOL), S - Abnormal; Notable for the following components:    Acetaminophen 2.2 (*)     All other components within normal limits   COMP METABOLIC PANEL (14) - Abnormal; Notable for the following components:    Glucose 148 (*)     Alkaline Phosphatase 129 (*)     All other components within normal limits   CBC WITH DIFFERENTIAL WITH PLATELET - Abnormal; Notable for the following components:    HGB 12.0 (*)     HCT  35.3 (*)     All other components within normal limits   URINALYSIS WITH CULTURE REFLEX - Abnormal; Notable for the following components:    Clarity Urine Turbid (*)     Blood Urine 1+ (*)     Protein Urine 30 (*)     Leukocyte Esterase Urine 500 (*)     WBC Urine >50 (*)     RBC Urine >10 (*)     Bacteria Urine 1+ (*)     WBC Clump Present (*)     All other components within normal limits   ETHYL ALCOHOL - Normal   SARS-COV-2 BY PCR (GENEXPERT) - Normal   DRUG SCREEN 8 W/OUT CONFIRMATION, URINE    Narrative:     Results of the Urine Drug Screen should be used only for medical purposes.   URINE CULTURE, ROUTINE                   MDM      Labs largely unrevealing.  UA with evidence of UTI and started on p.o. antibiotics.  Presentation concerning for possible strangulation type injury, suicide attempt.  CT head negative.  CTA with numerous chronic changes but no signs of traumatic injuries, should be addressed medically as an outpatient pending appropriate psychiatric care.  Patient can be medically cleared.  Given patient's multiple risk factors we will have him evaluated by [facility]. Anticipate admission.  Signed out to oncoming physician.        Medical Decision Making      Disposition and Plan     Clinical Impression:  1. Urinary tract infection without hematuria, site unspecified    2. Severe episode of recurrent major depressive disorder, without psychotic features (HCC)         Disposition:  There is no disposition on file for this visit.  There is no disposition time on file for this visit.    Follow-up:  No follow-up provider specified.        Medications Prescribed:  Current Discharge Medication List              Supplementary Documentation:

## 2025-03-25 NOTE — BH LEVEL OF CARE ASSESSMENT
Crisis Evaluation Assessment    Deven Atkinson YOB: 1930   Age 89 year old MRN [id number]   [facility] EMERGENCY DEPARTMENT Attending Sean Mathew MD      Patient's legal sex: male  Patient identifies as: male  Patient's birth sex: male  Preferred pronouns: He/Him    Date of Service: 3/25/2025    Referral Source:  Referral Source  Where was crisis eval performed?: On-site  Referral Source: Self-Referral/Former Patient/Returning Patient  Referral Source Info: Wife called 911 and patient was brought to ED for assessment    Reason for Crisis Evaluation   Patient presents to the ED from home after wife called EMS due to finding a broken table at home and patient lying in bed with marks to his neck.  Per reports, wife found the patient lying in bed with what she thought were cuts to his neck and throat area however police reported that grove appeared more consistent with strangulation.  Wife had left to go to a doctor's appointment this morning and then returned home and found patient.    Hospital video  (Petsy ID# [id number]) was used during assessment as patient is primarily Mandarin speaking.  Patient was asked what brought him to the ED and per patient \"I don't know\".  Patient was asked if he knew why he was in the emergency room or if patient was aware of his surroundings and patient stated \"I don't know\".  Patient would often answer questions with \"I don't know or I can't remember\". Patient did have difficulty at times answering questions.  Patient was asked if tried to harm self this morning inpatient stated no.  Patient was asked then why he had marks to his neck and patient stated \"I don't know\".      Collateral  Patient's wife, Radha, presents at bedside.  Wife was spoken with to gather collateral information.  Wife reports that she left for a doctor's appointment this morning and patient remained at home.  Wife states that when she returned home she found that patient was still  sleeping in bed.  Wife states that she went to wake patient up so that he could get something to eat and noticed red marks around his neck.  Wife states that these were not present before leaving and called 911 due to concern that he had to hurt self.  Wife notes that patient has a history of prior suicide attempts.  Patient attempted suicide twice this year and twice last year.  Wife reports that patient has not made any recent statements about wanting to harm self but was recently inpatient for behavioral health treatment following a suicide attempt by cutting her wrist this month.  Wife states that she also noted that the bedroom door was broken and not able to close properly so she is not sure if patient tried to harm self by a hanging something from bedroom door.  Wife states that bedroom door was not previously broken.      Patient's chart was reviewed for additional information.  Patient did have a petition completed by police which was reviewed by writer.    Suicide Crisis Syndrome:  Suicide Crisis Syndrome  Do you feel trapped with no good options left?: Yes  Are you overwhelmed, or have you lost control by negative thoughts filling your head? : Yes    Suicide Risk Screening:  Source of information for CSSR: Patient  In what setting is the screener performed?: in person  1. Have you wished you were dead or wished you could go to sleep and not wake up? (past 30 days): Yes  2. Have you actually had any thoughts of killing yourself? (past 30 days): Yes  3. Have you been thinking about how you might kill yourself? (past 30 days): Yes  4. Have you had these thoughts and had some intention of acting on them? (past 30 days): Yes  5a. Have you started to work out or worked out the details of how to kill yourself? (past 30 days): Yes  5b. Do you intend to carry out this plan? (past 30 days): Yes  6. Have you ever done anything, started to do anything, or prepared to do anything to end your life? (lifetime): Yes  7.  How long ago did you do any of these?: Within the last three months  Score -  OV: 13 - High Risk     Suicide Risk Assessments:  Suicidal Thoughts, Plan and Intent (this information to be used in conjunction with CSSR-S Suicide Screening)  Describe thoughts, ideation and intent:: Patient denies current SI but does admit to a history of suicidal ideation with prior attempts and last attempt occurring a few weeks ago.  Frequency: How many times have you had these thoughts?: Other (comment) (Unable to assess)  Duration: When you have the thoughts, how long do they last?: Other (comment) (Unable to assess)  Controllability: Could/can you stop thinking about killing yourself or wanting to die if you want to?: Other (comment) (Unable to assess)  Identify Risk Factors  Do you have access to lethal methods to attempt suicide?: Yes  Clinical Status:: Hopelessness, Major depressive episode, Chronic physical pain or other acute medical problem (e.g. CNS disorders)  Activating Events/Recent Stressors:: Significant negative event(s) (legal, financial, relationship, etc.)  Identify Protective Factors  Internal: Frustration tolerance  External: Supportive social network of family or friends  Risk Stratification  Risk Level: High       Patient denies any current suicidal ideation or plans to harm self however did present to the ED with marks on his neck that appear to be congruent with possible strangulation.  Patient was asked directly about marks on his neck and stated that he did not know how he got them.  Wife notes that patient does have prior suicide attempts.  Patient recently attempted suicide by cutting his wrist a few weeks ago.  Wife reports that she noted the bedroom door was broken after she returned home as it would not close properly.  Wife feels that patient may have tried to hang himself by using the door but is unsure.    Non-Suicidal Self-Injury:   Patient denies any SIB. Wife confirms this.     Risk to  Others  Patient denies any HI with plan or intent.  Wife denies that patient has displayed any physical aggression towards her or others.  No property destruction.  No history of verbal aggression.    Access to Means:  Access to Means  Has access to means to attempt suicide, self-injure, harm others, or damage property?: Yes  Description of Access: Access to household items  Discussion of Removal of Access to Means: Upon discharge  Access to Firearm/Weapon: No  Do you have a firearm owner identification (FOID) card?: No    Protective Factors:   Supportive wife    Review of Psychiatric Systems:  Patient did not report any current symptoms of depression or anxiety however has a history of significant depression.  Patient has difficulty participating in assessment at times.  Patient is hard of hearing but also appears confused at times.  Patient is forgetful.  Patient is calm and cooperative with writer.  Patient did not report any auditory or visual hallucinations.  No significant changes in sleep or appetite reported per wife.    Substance Use:  Patient denies any alcohol use.  Patient denies any drug use.  Patient's UDS and BAL were negative in the ED.  Chart review does note a history of cigarette use but patient states he quit smoking.    Functional Achievement:   Patient requires assistance with completing ADLs.  Patient able to feed self but does require prompting and set up.  Patient requires some assistance with bathing and dressing self.  Wife states that patient is able to ambulate on his own within an apartment but will use a rolling walker when he is out of the home.    Ability to Care for Self::   See above    Current Treatment and Treatment History:  Patient has a history of depression and anxiety.  Patient has prior inpatient psychiatric admissions most recently through [facility]  Patient was admitted this month and discharged on 3/17/2025.  Patient has prior inpatient admissions through  [facility] as well in 2024.  Per charting, patient was to follow up upon discharge with Dr. Isabel through [facility] on 3/24/2025.  Patient was discharged on medications to include duloxetine (30 mg) and mirtazapine (15 mg).  Wife did confirm that patient did attend appointment yesterday.    School/Work Performance:  Patient is currently retired but patient has a history of working as a .    Relevant Social History:  Patient currently resides with his wife in an apartment.  Patient and wife have been  nearly 50 years.  Patient reports having children but was unable to report how many he has.  Patient was forgetful at times during assessment.  Patient does not have any current legal issues per wife.    Shubham and Complex (as applicable):  Geriatric Functioning  Dementia Symptoms Observed/Expressed: Yes  Cognitive Symptoms: mental confusion  Behavioral Symptoms: None  Muscular Symptoms: None  Current Living Situation  Current Living Situation: Private Residence  Sight and Sound  Is the patient verbal?: Yes  Vision or hearing correction?:  (Hard of hearing but does not use hearing aids)  Patient able to understand and follow directions?: No  Patient able to express needs?: Yes  Feeding and Swallowing  History of aspiration or choking?: No  Feeding assistance needed?: Requires set-up  Patient have any chewing or swallowing problems?: No  Special Diet: No  Mobility/Activity & Assistive Devices  Current/recent injuries or surgeries that affect mobility?: No  Physical Limitations Present: None  Independent in ambulation?: Yes  Transfer Assist: Standby Assist  Assistive Device Used:: Walker  History of falls?: No  Grooming  Level of independence in dressing: Minimal assist  Level of independence to shower/bathe/wash: Minimal assist  Level of independence in personal grooming tasks (e.g., brushing teeth, brushing hair, applying hearing aids, shaving, etc.): Minimal assist  Toileting  Patient  Incontinence: Bladder (Jay cath)  Special Considerations  Patient has pressures sores, surgical wounds, bandages/dressings?: No  Patient uses any kind of pump?: No  Intellectual disability reported?  Intellectual Disability?: No       Current Medical (as applicable):       EDP Assessment (as applicable):     SCOFF Questionnaire  Do you make yourself Sick because you feel uncomfortably full?: No  Do you worry that you have lost Control over how much you eat?: No  Have you recently lost more than One stone (14 lb) in a 3-month period?: No  Do you believe yourself to be Fat when others say you are too thin?: No  Would you say that Food dominates your life?: No  SCOFF Score: 0    Abuse Assessment:  Abuse Assessment  Physical Abuse: Unable to assess  Verbal Abuse: Unable to assess  Sexual Abuse: Unable to assess  Neglect: Unable to assess  Does anyone say or do something to you that makes you feel unsafe?: Unable to Assess (Comment)  Have You Ever Been Harmed by a Partner/Caregiver?: No  Health Concerns r/t Abuse: No  Possible Abuse Reportable to:: Not appropriate for reporting to authorities    Mental Status Exam:   General Appearance  Characteristics: Appropriate clothing  Eye Contact: Indirect  Psychomotor Behavior  Gait/Movement: Other (comment) (Gait not observed by writer)  Abnormal movements: None  Posture: Stooped  Rate of Movement: Normal  Mood and Affect  Mood or Feelings: Calm  Appropriateness of Affect: Congruent to mood  Range of Affect: Flat  Stability of Affect: Stable  Attitude toward staff: Co-operative  Speech  Rate of Speech: Appropriate  Flow of Speech: Hesitant  Intensity of Volume: Loud  Clarity: Clear  Cognition  Concentration: Impaired  Memory: Unable to assess  Orientation Level: Oriented to person, Oriented to time  Insight: Poor  Judgment: Poor  Thought Patterns  Clarity/Relevance: Coherent  Flow: Organized  Content: Ordinary  Level of Consciousness: Alert  Level of Consciousness:  Alert  Behavior  Exhibited behavior: Participated    Disposition:  Inpatient admission    Rationale for Treatment Recommendation:   Patient is a 89-year-old, Mandarin speaking,  male who presents to the emergency room from home.  Patient's wife called 911 after she returned home from a doctor's appointment and found patient lying in bed with red marks around his neck.  Wife is concerned that patient tried to harm self so called ambulance.  Patient presents to the ED for assessment.  Patient had difficulty participating in assessment and would often state that he \"did not know\" or \"could not remember\" when asked questions.  Patient denied that he tried to harm self this morning but could not report why he had marks on his neck.  Patient does have prior suicide attempts with last attempt occurring a few weeks ago by cutting his wrist.  Patient was recently admitted to [facility] for inpatient admission.  Patient denies any alcohol or drug use.  Patient currently resides with wife in a private residence.  No history of violence or aggression reported.  No HI reported.  Patient does present confused and forgetful at times.    Level of Care Recommendations  Consulted with: Dr. Mathew  Level of Care Recommendation: Inpatient Acute Care  Unit: A2  Reason for Unit Assigned: Age/symptoms  Inpatient Criteria: 24 hr behavior monitoring, Failure at lowest level of care, Suicidal/homicidal risk  Behavioral Precautions: Close Observation, Suicide  Medical Precautions: None  Refused Treatment: No  Sign-In  Patient Verbalized Understanding: No    Diagnoses with F-Codes:  Primary Psychiatric Diagnosis  Major depression, recurrent, severe F33.2     Secondary Psychiatric Diagnoses  Deferred   Pervasive Diagnoses (as applicable)  Deferred   Pertinent Non-Psychiatric Diagnoses  See medical chart    Mirna MENDOZA LCSW

## 2025-03-25 NOTE — ED INITIAL ASSESSMENT (HPI)
Pt arrives to ed w c/o leaking catheter. Family reports family said his brief was wet so they want his catheter evaluated. Pt denies any pain. Family reports pt is still having urine output.

## 2025-03-25 NOTE — ED QUICK NOTES
Jay cath irrigated with 100cc sterile irrigation, not resistance, no leaking in the meatus. + urine output of 80cc

## 2025-03-25 NOTE — ED QUICK NOTES
Patient wife and friend at bedside  Updated on POC, plan for admission/transfer based on bed availability

## 2025-03-25 NOTE — ED QUICK NOTES
Sitter remains 1:1 w/ patient; pt remains calm and cooperative on cart. No distress in respirations noted. No complains at this time. Pt belongings secured by [facility]     Sweater  Undershirt  Socks   Slippers   Pants   Watch

## 2025-03-25 NOTE — ED QUICK NOTES
Patient sleeping on cart, RR even/NL     at bedside for 1:1 monitoring, safety precautions maintained

## 2025-03-26 PROBLEM — F09 COGNITIVE DISORDER: Status: ACTIVE | Noted: 2025-03-26

## 2025-03-26 PROBLEM — F32.9 MDD (MAJOR DEPRESSIVE DISORDER): Status: RESOLVED | Noted: 2025-03-25 | Resolved: 2025-03-26

## 2025-03-26 PROBLEM — F41.1 GENERALIZED ANXIETY DISORDER: Status: ACTIVE | Noted: 2025-03-26

## 2025-03-26 NOTE — ED QUICK NOTES
Pt wife took charge of patient belongings. [facility] here to transport pt to [facility]

## 2025-03-26 NOTE — ED QUICK NOTES
Patient wife requesting to take patient belongings home once returned from public safety prior to transfer    Report endorsed to Clementina NARAYANAN assuming patient care

## 2025-04-03 ENCOUNTER — HOSPITAL ENCOUNTER (EMERGENCY)
Facility: HOSPITAL | Age: OVER 89
Discharge: HOME | End: 2025-04-03
Attending: EMERGENCY MEDICINE
Payer: COMMERCIAL

## 2025-04-03 ENCOUNTER — HOSPITAL ENCOUNTER (OUTPATIENT)
Age: OVER 89
Discharge: ANOTHER HOSPITAL | End: 2025-04-03
Payer: COMMERCIAL

## 2025-04-03 ENCOUNTER — APPOINTMENT (OUTPATIENT)
Dept: CT IMAGING | Facility: HOSPITAL | Age: OVER 89
End: 2025-04-03
Attending: EMERGENCY MEDICINE
Payer: COMMERCIAL

## 2025-04-03 VITALS
HEART RATE: 96 BPM | SYSTOLIC BLOOD PRESSURE: 125 MMHG | DIASTOLIC BLOOD PRESSURE: 59 MMHG | OXYGEN SATURATION: 99 % | RESPIRATION RATE: 20 BRPM | TEMPERATURE: 98 F

## 2025-04-03 VITALS
HEIGHT: 67 IN | BODY MASS INDEX: 25.74 KG/M2 | SYSTOLIC BLOOD PRESSURE: 145 MMHG | HEART RATE: 75 BPM | OXYGEN SATURATION: 100 % | DIASTOLIC BLOOD PRESSURE: 64 MMHG | WEIGHT: 164 LBS | RESPIRATION RATE: 20 BRPM | TEMPERATURE: 98 F

## 2025-04-03 DIAGNOSIS — R10.9 ABDOMINAL PAIN, ACUTE: Primary | ICD-10-CM

## 2025-04-03 DIAGNOSIS — G89.29 CHRONIC ABDOMINAL PAIN: Primary | ICD-10-CM

## 2025-04-03 DIAGNOSIS — R14.0 ABDOMINAL BLOATING: ICD-10-CM

## 2025-04-03 DIAGNOSIS — R10.9 CHRONIC ABDOMINAL PAIN: Primary | ICD-10-CM

## 2025-04-03 LAB
ALBUMIN SERPL-MCNC: 4.4 G/DL (ref 3.2–4.8)
ALBUMIN/GLOB SERPL: 1.4 {RATIO} (ref 1–2)
ALP LIVER SERPL-CCNC: 144 U/L
ALT SERPL-CCNC: 12 U/L
ANION GAP SERPL CALC-SCNC: 12 MMOL/L (ref 0–18)
AST SERPL-CCNC: 19 U/L (ref ?–34)
BASOPHILS # BLD AUTO: 0.06 X10(3) UL (ref 0–0.2)
BASOPHILS NFR BLD AUTO: 0.6 %
BILIRUB SERPL-MCNC: 0.6 MG/DL (ref 0.2–0.9)
BUN BLD-MCNC: 18 MG/DL (ref 9–23)
CALCIUM BLD-MCNC: 9.6 MG/DL (ref 8.7–10.6)
CHLORIDE SERPL-SCNC: 101 MMOL/L (ref 98–112)
CO2 SERPL-SCNC: 20 MMOL/L (ref 21–32)
CREAT BLD-MCNC: 1.03 MG/DL
EGFRCR SERPLBLD CKD-EPI 2021: 67 ML/MIN/1.73M2 (ref 60–?)
EOSINOPHIL # BLD AUTO: 0.07 X10(3) UL (ref 0–0.7)
EOSINOPHIL NFR BLD AUTO: 0.8 %
ERYTHROCYTE [DISTWIDTH] IN BLOOD BY AUTOMATED COUNT: 12.7 %
GLOBULIN PLAS-MCNC: 3.2 G/DL (ref 2–3.5)
GLUCOSE BLD-MCNC: 119 MG/DL (ref 70–99)
HCT VFR BLD AUTO: 36.5 %
HGB BLD-MCNC: 12.5 G/DL
IMM GRANULOCYTES # BLD AUTO: 0.02 X10(3) UL (ref 0–1)
IMM GRANULOCYTES NFR BLD: 0.2 %
LIPASE SERPL-CCNC: 27 U/L (ref 12–53)
LYMPHOCYTES # BLD AUTO: 3.78 X10(3) UL (ref 1–4)
LYMPHOCYTES NFR BLD AUTO: 40.9 %
MCH RBC QN AUTO: 27.9 PG (ref 26–34)
MCHC RBC AUTO-ENTMCNC: 34.2 G/DL (ref 31–37)
MCV RBC AUTO: 81.5 FL
MONOCYTES # BLD AUTO: 0.67 X10(3) UL (ref 0.1–1)
MONOCYTES NFR BLD AUTO: 7.2 %
NEUTROPHILS # BLD AUTO: 4.65 X10 (3) UL (ref 1.5–7.7)
NEUTROPHILS # BLD AUTO: 4.65 X10(3) UL (ref 1.5–7.7)
NEUTROPHILS NFR BLD AUTO: 50.3 %
OSMOLALITY SERPL CALC.SUM OF ELEC: 279 MOSM/KG (ref 275–295)
PLATELET # BLD AUTO: 276 10(3)UL (ref 150–450)
POTASSIUM SERPL-SCNC: 4.4 MMOL/L (ref 3.5–5.1)
PROT SERPL-MCNC: 7.6 G/DL (ref 5.7–8.2)
RBC # BLD AUTO: 4.48 X10(6)UL
SODIUM SERPL-SCNC: 133 MMOL/L (ref 136–145)
WBC # BLD AUTO: 9.3 X10(3) UL (ref 4–11)

## 2025-04-03 PROCEDURE — 99215 OFFICE O/P EST HI 40 MIN: CPT | Performed by: NURSE PRACTITIONER

## 2025-04-03 PROCEDURE — 85025 COMPLETE CBC W/AUTO DIFF WBC: CPT | Performed by: EMERGENCY MEDICINE

## 2025-04-03 PROCEDURE — 74177 CT ABD & PELVIS W/CONTRAST: CPT | Performed by: EMERGENCY MEDICINE

## 2025-04-03 PROCEDURE — 83690 ASSAY OF LIPASE: CPT | Performed by: EMERGENCY MEDICINE

## 2025-04-03 PROCEDURE — 85025 COMPLETE CBC W/AUTO DIFF WBC: CPT

## 2025-04-03 PROCEDURE — 80053 COMPREHEN METABOLIC PANEL: CPT

## 2025-04-03 PROCEDURE — 80053 COMPREHEN METABOLIC PANEL: CPT | Performed by: EMERGENCY MEDICINE

## 2025-04-03 PROCEDURE — 99285 EMERGENCY DEPT VISIT HI MDM: CPT

## 2025-04-03 PROCEDURE — 96374 THER/PROPH/DIAG INJ IV PUSH: CPT

## 2025-04-03 PROCEDURE — 83690 ASSAY OF LIPASE: CPT

## 2025-04-03 RX ORDER — LORAZEPAM 2 MG/ML
1 INJECTION INTRAMUSCULAR ONCE
Status: COMPLETED | OUTPATIENT
Start: 2025-04-03 | End: 2025-04-03

## 2025-04-03 NOTE — ED INITIAL ASSESSMENT (HPI)
ABDOMINAL PAIN   SINCE YESTERDAY , DENIES DIARRHEA AND VOMITING  PATIENT SEEN IN IMMEDIATE CARE TODAY THEY SENT HERE FOR FURTHER EVALUATION . LAST BOWEL MOVEMENT THIS MORNING .

## 2025-04-03 NOTE — ED PROVIDER NOTES
Patient Seen in: [facility]      History     Chief Complaint   Patient presents with    Abdominal Pain     Stated Complaint: bloated stomach  Subjective:   89-year-old male with BPH, depression, hypertension, high cholesterol, previous stroke presents from home.  He is here with his wife who is assisting with mandrin translation.  Patient has been complaining of abdominal bloating and pain today.  No constipation, states normal bowel movement today.  No vomiting or diarrhea.  No urinary symptoms.  Normal appetite.  He did take some Tylenol at home for pain.  Wife states he has taken Bentyl for similar symptoms in the past.  He has had a bowel obstruction previously.    The history is provided by the patient and the spouse. A  was used (Patient is Mandarin speaking, wife translating, declined iPad ).     Objective:   Past Medical History:    BPH (benign prostatic hyperplasia)    Coronary atherosclerosis    Depression    Essential hypertension    Hearing impaired person, bilateral    High blood pressure    High cholesterol    History of stomach ulcers    Hyperlipidemia    Stroke (HCC)    Urinary retention            Past Surgical History:   Procedure Laterality Date    Cath bare metal stent (bms)      Colonoscopy      Surgical stent      wife states Lft side of heart- [facility]              Social History     Socioeconomic History    Marital status:    Tobacco Use    Smoking status: Former     Types: Cigarettes     Passive exposure: Never    Smokeless tobacco: Never    Tobacco comments:     QUIT SMOKING IN 2000   Vaping Use    Vaping status: Never Used   Substance and Sexual Activity    Alcohol use: Not Currently    Drug use: Never     Social Drivers of Health     Food Insecurity: No Food Insecurity (3/26/2025)    NCSS - Food Insecurity     Worried About Running Out of Food in the Last Year: No     Ran Out of Food in the Last Year: No    Transportation Needs: No Transportation Needs (3/26/2025)    NCSS - Transportation     Lack of Transportation: No   Housing Stability: Not At Risk (3/26/2025)    NCSS - Housing/Utilities     Has Housing: Yes     Worried About Losing Housing: No     Unable to Get Utilities: No            Review of Systems    Positive for stated complaint: Abdominal Pain    Other systems are as noted in HPI.  Constitutional and vital signs reviewed.      All other systems reviewed and negative except as noted above.    Physical Exam     ED Triage Vitals [04/03/25 1617]   /59   Pulse 96   Resp 20   Temp 98.4 °F (36.9 °C)   Temp src Oral   SpO2 99 %   O2 Device None (Room air)     Current:/59   Pulse 96   Temp 98.4 °F (36.9 °C) (Oral)   Resp 20   SpO2 99%     Physical Exam  Vitals and nursing note reviewed.   Constitutional:       General: He is not in acute distress.     Appearance: Normal appearance. He is not ill-appearing or toxic-appearing.      Comments: Elderly and frail   HENT:      Head: Normocephalic and atraumatic.      Comments: Hard of hearing     Nose: Nose normal.      Mouth/Throat:      Mouth: Mucous membranes are moist.      Pharynx: Oropharynx is clear.   Eyes:      Pupils: Pupils are equal, round, and reactive to light.   Neck:      Comments: 6cm round soft tissue mass to right/posterior neck (chronic per wife)  Cardiovascular:      Rate and Rhythm: Normal rate and regular rhythm.      Pulses: Normal pulses.   Pulmonary:      Effort: Pulmonary effort is normal. No respiratory distress.      Breath sounds: Normal breath sounds.      Comments: Lungs clear.  No adventitious lung sounds.  No distress.  No hypoxia.  Pulse ox 99% ra. Which is normal    Abdominal:      General: Abdomen is flat.      Palpations: Abdomen is soft.      Comments: Abdomen slightly full but no significant distention.  No point tenderness. States generalized discomfort   Musculoskeletal:         General: No signs of injury. Normal  range of motion.      Cervical back: Normal range of motion and neck supple.   Skin:     General: Skin is warm and dry.      Capillary Refill: Capillary refill takes less than 2 seconds.   Neurological:      General: No focal deficit present.      Mental Status: He is alert and oriented to person, place, and time.      GCS: GCS eye subscore is 4. GCS verbal subscore is 5. GCS motor subscore is 6.   Psychiatric:         Mood and Affect: Mood normal.         Behavior: Behavior normal.         Thought Content: Thought content normal.         Judgment: Judgment normal.         ED Course   Radiology:  No results found.  Labs Reviewed - No data to display    MDM     Medical Decision Making  Differential diagnoses reflecting the complexity of care include: Abdominal pain, bowel obstruction, constipation, diverticulitis  Patient with complaint of abdominal pain and distention  History of bowel obstruction  No abdominal imaging available at this site.  Given patient's complaint, age, comorbidities recommend ED evaluation.  Wife agreeable.  She will take him to [facility]  The case was discussed with attending Dr Arreola. They are in agreement with the plan of care.             Problems Addressed:  Abdominal bloating: acute illness or injury  Abdominal pain, acute: acute illness or injury    Amount and/or Complexity of Data Reviewed  Independent Historian: spouse        Disposition and Plan     Clinical Impression:  1. Abdominal pain, acute    2. Abdominal bloating         Disposition:  Ic to ed  4/3/2025  4:18 pm    Follow-up:  No follow-up provider specified.        Medications Prescribed:  Current Discharge Medication List

## 2025-04-04 NOTE — ED PROVIDER NOTES
Patient Seen in: [facility] Emergency Department      History     Chief Complaint   Patient presents with    Abdominal Pain     Stated Complaint: R/o bowel obstruction, c/o abd pain    Subjective:     HPI    89-year-old male with hypertension, CVA, who reports experiencing abdominal pain for the past two days, with the pain being particularly severe during this period. The pain has been intermittent over the years but has worsened recently. There is no associated vomiting or fever. The patient has not had any abdominal surgeries such as gallbladder or appendix removal. A colonoscopy was performed in 2022, which was normal. The patient had a recent episode of diverticulitis and was treated with antibiotics. A CT scan was performed in March, but the details of the findings are not mentioned.    Patient has had many emergency department visits.  In fact, he comes in every 2 to 3 days.    Objective:   Past Medical History:    BPH (benign prostatic hyperplasia)    Coronary atherosclerosis    Depression    Essential hypertension    Hearing impaired person, bilateral    High blood pressure    High cholesterol    History of stomach ulcers    Hyperlipidemia    Stroke (HCC)    Urinary retention              Past Surgical History:   Procedure Laterality Date    Cath bare metal stent (bms)      Colonoscopy      Surgical stent      wife states Lft side of heart- [facility]                Social History     Socioeconomic History    Marital status:    Tobacco Use    Smoking status: Former     Types: Cigarettes     Passive exposure: Never    Smokeless tobacco: Never    Tobacco comments:     QUIT SMOKING IN 2000   Vaping Use    Vaping status: Never Used   Substance and Sexual Activity    Alcohol use: Not Currently    Drug use: Never     Social Drivers of Health     Food Insecurity: No Food Insecurity (3/26/2025)    NCSS - Food Insecurity     Worried About Running Out of Food in the Last Year: No     Ran Out  of Food in the Last Year: No   Transportation Needs: No Transportation Needs (3/26/2025)    NCSS - Transportation     Lack of Transportation: No   Housing Stability: Not At Risk (3/26/2025)    NCSS - Housing/Utilities     Has Housing: Yes     Worried About Losing Housing: No     Unable to Get Utilities: No              Review of Systems    Positive for stated complaint: R/o bowel obstruction, c/o abd pain  Other systems are as noted in HPI.  Constitutional and vital signs reviewed.      All other systems reviewed and negative except as noted above.    Physical Exam     ED Triage Vitals [04/03/25 1714]   /63   Pulse 81   Resp 18   Temp 97.8 °F (36.6 °C)   Temp src Temporal   SpO2 97 %   O2 Device None (Room air)       Current:/64   Pulse 75   Temp 97.8 °F (36.6 °C) (Temporal)   Resp 20   Ht 170.2 cm (5' 7\")   Wt 74.4 kg   SpO2 100%   BMI 25.69 kg/m²     General:  Vitals as listed.  No acute distress   HEENT: Sclerae anicteric.  Conjunctivae show no pallor.  Oropharynx clear, mucous membranes moist   Lungs: good air exchange and clear   Heart: regular rate rhythm and no murmur   Abdomen: Soft and nontender.  No abdominal masses.  No peritoneal signs   Extremities: no edema, normal peripheral pulses   Neuro: Alert slow to respond to questions.  Mild Parkinson's tremor.      ED Course     Labs Reviewed   COMP METABOLIC PANEL (14) - Abnormal; Notable for the following components:       Result Value    Glucose 119 (*)     Sodium 133 (*)     CO2 20.0 (*)     Alkaline Phosphatase 144 (*)     All other components within normal limits   CBC WITH DIFFERENTIAL WITH PLATELET - Abnormal; Notable for the following components:    HGB 12.5 (*)     HCT 36.5 (*)     All other components within normal limits   LIPASE - Normal   RAINBOW DRAW LAVENDER   RAINBOW DRAW LIGHT GREEN   RAINBOW DRAW BLUE     CT ABDOMEN+PELVIS(CONTRAST ONLY)(CPT=74177)    Result Date: 4/3/2025  CONCLUSION:  1. Cholelithiasis.  Abdominal  sonogram may be helpful for further evaluation as clinically indicated. 2. When compared to 3/8/2025 CT, Jay catheter has been removed.  There are areas of bladder wall thickening with anterior diverticula. 3. Colonic diverticulosis without evidence for acute diverticulitis. 4. Please see details as above.   LOCATION:  [facility]   Dictated by (CST): Suni Curry MD on 4/03/2025 at 10:19 PM     Finalized by (CST): Suni Curry MD on 4/03/2025 at 10:29 PM        ED COURSE and MDM     Sources of the medical history included the patient and his wife    Differential diagnosis before testing included diverticulitis versus mesenteric ischemia, a medical condition that poses a threat to life.    I reviewed prior external notes including evaluation by APN at urgent care earlier today for abdominal pain and bloating.  CT of the abdomen done 3/8/2025 was consistent with mild diverticulitis.    Patient CT began today since he recently was diagnosed with diverticulitis and possibly could have perforation or abscess.    I strongly suggested that his family look into SNF placement as he is getting harder to take care of at home    I have discussed with the patient the results of testing, differential diagnosis, and treatment plan. They expressed clear understanding of these instructions and agrees to the plan provided.    Disposition and Plan     Clinical Impression:  1. Chronic abdominal pain         Disposition:  Discharge  4/3/2025 11:12 pm    Follow-up:  Paulo James MD  [address] [place] [id number]  [phone]    Schedule an appointment as soon as possible for a visit in 1 week(s)          Medications Prescribed:  Discharge Medication List as of 4/3/2025 11:13 PM

## 2025-04-04 NOTE — DISCHARGE INSTRUCTIONS
Take 1000 mg acetaminophen (2 Tylenol tablets) every 6 hours as needed    To talk to the  about the possibility of skilled nursing facility or rehab call [phone]

## 2025-04-14 ENCOUNTER — HOSPITAL ENCOUNTER (OUTPATIENT)
Age: OVER 89
Discharge: HOME | End: 2025-04-14
Payer: COMMERCIAL

## 2025-04-14 VITALS
OXYGEN SATURATION: 97 % | TEMPERATURE: 98 F | SYSTOLIC BLOOD PRESSURE: 116 MMHG | RESPIRATION RATE: 18 BRPM | HEART RATE: 74 BPM | DIASTOLIC BLOOD PRESSURE: 61 MMHG

## 2025-04-14 DIAGNOSIS — R04.0 EPISTAXIS: Primary | ICD-10-CM

## 2025-04-14 PROCEDURE — 99213 OFFICE O/P EST LOW 20 MIN: CPT | Performed by: NURSE PRACTITIONER

## 2025-04-14 RX ORDER — CLOPIDOGREL BISULFATE 75 MG/1
TABLET ORAL
COMMUNITY
Start: 2025-04-04

## 2025-04-14 RX ORDER — VALPROIC ACID 250 MG/5ML
2.5 SOLUTION ORAL DAILY
COMMUNITY
Start: 2024-08-05

## 2025-04-14 NOTE — DISCHARGE INSTRUCTIONS
If you begin Mari bleeding from your nose, hold pressure or use the clamps given to you today placed firmly on the nose for at least 15 to 20 minutes.  Do not remove these clamps to check and see if rebleeding has occurred.  Hold continuous pressure for at least 15 minutes.  Once the clamps are removed, do not blow the nose.  Try not to bend over to put on socks or shoes as this can increase pressure to the head and cause rebleeding.  If bleeding persists after 15 minutes of continuous pressure to the nose, reevaluation in the emergency room or immediate care is warranted.  Likewise, reevaluation is warranted for any symptoms such as dizziness, weakness, feeling as though you may pass out or passing out.  Follow-up with your ENT in the next few days for reevaluation.

## 2025-04-14 NOTE — ED PROVIDER NOTES
Patient Seen in: [facility]    History   CC: nose bleed this am  HPI: Deven Atkinson 89 year old male w/ HTN, CVA, CAD s/p stent on Plavix and daily 81mg ASA, BPH and hematuria s/p Turp, Depression, Dementia, Hyperlipidemia who presents w/ his wife who is caretaker and helping with mandarin translation c/o nose bleeding upon wiping his nose this am. Wife states pt does blow his nose frequently and is currently using a nasal abx stray rx'ed by ENT for nasal infection x1mo. Denies current nose bleed. Denies dizziness, weakness, near syncope/syncope, cp, eunice, abd pain, vomiting, fever.     Past Medical History[1]    Past Surgical History[2]    Family History[3]    Short Social Hx on File[4]    ROS:  Systems reviewed: All pertinent positives noted in HPI. Unless otherwise noted, additional systems reviewed are negative.   Vital signs reviewed.    Positive for stated complaint: nose bleed  Other systems are as noted in HPI.  Constitutional and vital signs reviewed.      All other systems reviewed and negative except as noted above.    PSFH elements reviewed from today and agreed except as otherwise stated in HPI.             Constitutional and vital signs reviewed.        Physical Exam     ED Triage Vitals [04/14/25 1019]   /61   Pulse 74   Resp 18   Temp 98.2 °F (36.8 °C)   Temp src Oral   SpO2 97 %   O2 Device None (Room air)       Current:/61   Pulse 74   Temp 98.2 °F (36.8 °C) (Oral)   Resp 18   SpO2 97%         PE:  General - Appears well, non-toxic and in NAD  Head - Appears symmetrical without deformity/swelling cranium, scalp, or facial bones  Eyes - sclera not injected, no discharge noted, no periorbital edema  ENT - EAC bilaterally without discharge, TM pearly grey with COL visualized appropriately bilaterally.   No active epistaxis on exam. There is a small scabbed wound noted to the right nare along septal wall. No septal hematoma bilaterally.  Left  near within normal  limits  Oropharynx clear, posterior pharynx is without erythema and without tonsilar enlargement or exudate, uvula midline, +gag, voice is clear. No trismus  Neck - supple with trachea midline  Skin - no rashes or petechiae noted, pink warm and dry throughout, mmm, cap refill <2seconds  Neuro - A&O, steady gait  MSK - makes purposeful movements of all extremities  Psych - Interactive and appropriate      ED Course   Labs Reviewed - No data to display    MDM     DDx: Active epistaxis versus nasal wound versus septal hematoma     No active epistaxis noted on exam.  Scabbed wound noted to the right nare.  Advised precautions for rebleeding, actions to take if rebleeding occurs, sent home with nasal clamps and strict return/ED precautions reviewed.  Advised to follow-up with patient's ENT Dr. Mcgarry in the next few days for reevaluation.  Wife/patient is historian and demonstrates understanding of all instruction and agrees with plan of care.      Disposition and Plan     Clinical Impression:  1. Epistaxis        Disposition:  Discharge    Follow-up:  Terrence Mcgarry MD  [address] [place] [address]  [address]  [facility] [address]  [phone]    Go in 2 days        Medications Prescribed:  Discharge Medication List as of 4/14/2025 10:24 AM                         [1]   Past Medical History:   BPH (benign prostatic hyperplasia)    Coronary atherosclerosis    Depression    Essential hypertension    Hearing impaired person, bilateral    High blood pressure    High cholesterol    History of stomach ulcers    Hyperlipidemia    Stroke (HCC)    Urinary retention   [2]   Past Surgical History:  Procedure Laterality Date    Cath bare metal stent (bms)      Colonoscopy      Surgical stent      wife states Lft side of heart- [place]   [3]   Family History  Adopted: Yes   Family history unknown: Yes   [4]   Social History  Socioeconomic History    Marital status:    Tobacco Use    Smoking status: Former     Types:  Cigarettes     Passive exposure: Never    Smokeless tobacco: Never    Tobacco comments:     QUIT SMOKING IN 2000   Vaping Use    Vaping status: Never Used   Substance and Sexual Activity    Alcohol use: Not Currently    Drug use: Never     Social Drivers of Health     Food Insecurity: No Food Insecurity (3/26/2025)    NCSS - Food Insecurity     Worried About Running Out of Food in the Last Year: No     Ran Out of Food in the Last Year: No   Transportation Needs: No Transportation Needs (3/26/2025)    NCSS - Transportation     Lack of Transportation: No   Housing Stability: Not At Risk (3/26/2025)    NCSS - Housing/Utilities     Has Housing: Yes     Worried About Losing Housing: No     Unable to Get Utilities: No      room air

## 2025-04-16 ENCOUNTER — OFFICE VISIT (OUTPATIENT)
Facility: LOCATION | Age: OVER 89
End: 2025-04-16
Payer: COMMERCIAL

## 2025-04-16 VITALS
HEART RATE: 98 BPM | SYSTOLIC BLOOD PRESSURE: 132 MMHG | DIASTOLIC BLOOD PRESSURE: 61 MMHG | OXYGEN SATURATION: 78 % | TEMPERATURE: 98 F

## 2025-04-16 DIAGNOSIS — I50.32 CHRONIC HEART FAILURE WITH PRESERVED EJECTION FRACTION (HFPEF) (HCC): ICD-10-CM

## 2025-04-16 DIAGNOSIS — F01.B0 MODERATE VASCULAR DEMENTIA WITHOUT BEHAVIORAL DISTURBANCE, PSYCHOTIC DISTURBANCE, MOOD DISTURBANCE, OR ANXIETY (HCC): ICD-10-CM

## 2025-04-16 DIAGNOSIS — D17.0 LIPOMA OF NECK: Primary | ICD-10-CM

## 2025-04-16 DIAGNOSIS — C61 PROSTATE CANCER (HCC): ICD-10-CM

## 2025-04-16 DIAGNOSIS — N18.31 STAGE 3A CHRONIC KIDNEY DISEASE (HCC): ICD-10-CM

## 2025-04-16 PROCEDURE — 99215 OFFICE O/P EST HI 40 MIN: CPT | Performed by: STUDENT IN AN ORGANIZED HEALTH CARE EDUCATION/TRAINING PROGRAM

## 2025-04-16 RX ORDER — AMOXICILLIN 500 MG/1
500 CAPSULE ORAL 3 TIMES DAILY
COMMUNITY
Start: 2025-04-14

## 2025-04-16 NOTE — H&P
The following individual(s) verbally consented to be recorded using ambient AI listening technology and understand that they can each withdraw their consent to this listening technology at any point by asking the clinician to turn off or pause the recording:    Patient name: Deven Atkinson  Additional names:  Radha GARCIA

## 2025-04-16 NOTE — H&P
New Patient Visit Note       Active Problems      1. Lipoma of neck        Chief Complaint   Chief Complaint   Patient presents with    New Patient     NP - lump on right side of neck, no other symptoms.        History of Present Illness   History of Present Illness  Deven Atkinson is a 89-year-old male who presents with a fatty mass on the back of his neck.    He has had a fatty mass on the back of his neck for approximately three years. The mass has grown slightly in size over time but causes no pain or discomfort. It was initially observed on a CT scan performed for his carotid arteries.    He has a history of heart conditions, including the placement of a stent in December 2023 and a previous episode of heart failure. He is under the care of a cardiologist in [place] but missed a recent appointment due to an emergency room visit.    His current medications include aspirin and clopidogrel (Plavix), which he takes as blood thinners. He previously stopped these medications for a week prior to a TURP procedure in February 2024 without issues.          Allergies  Deven is allergic to losartan.    Past Medical / Surgical / Social / Family History    The past medical and past surgical history have been reviewed by me today.    Past Medical History[1]  Past Surgical History[2]    The family history and social history have been reviewed by me today.    Family History[3]  Social Hx on file[4]   Medications - Current[5]      Review of Systems  The Review of Systems has been reviewed by me during today.  Review of Systems   Constitutional:  Negative for chills, diaphoresis, fatigue and fever.   HENT:  Negative for ear discharge, ear pain and sore throat.    Eyes:  Negative for pain and discharge.   Respiratory:  Negative for cough, chest tightness and shortness of breath.    Cardiovascular:  Negative for chest pain, palpitations and leg swelling.   Gastrointestinal:  Negative for abdominal distention, abdominal pain, blood in  stool, constipation, diarrhea, nausea and vomiting.   Genitourinary:  Negative for dysuria, frequency, hematuria and urgency.   Skin:  Negative for color change, pallor and rash.   Neurological:  Negative for weakness, light-headedness, numbness and headaches.   Hematological:  Negative for adenopathy. Does not bruise/bleed easily.   Psychiatric/Behavioral:  Negative for agitation and confusion.        Physical Findings   /61 (BP Location: Right arm, Patient Position: Sitting, Cuff Size: adult)   Pulse 98   Temp 97.5 °F (36.4 °C) (Temporal)   SpO2 (!) 78%   Physical Exam  Constitutional:       Appearance: Normal appearance.   HENT:      Head: Normocephalic and atraumatic.   Neck:        Comments: There is a well circumscribed, non adherent lipomatous mass in the right posterior neck  Cardiovascular:      Pulses: Normal pulses.   Pulmonary:      Effort: Pulmonary effort is normal.   Skin:     General: Skin is warm.      Capillary Refill: Capillary refill takes less than 2 seconds.   Neurological:      Mental Status: He is alert and oriented to person, place, and time. Mental status is at baseline.             Assessment/Plan  1. Lipoma of neck        Deven Atkinson is a 89 year old male referred by Paulo James MD for evaluation of right posterior neck lipomatous mass. I discussed with him and his wife in detail the pathology and potential differential diagnosis including lipoma and liposarcoma. This mass has been present for 3 years with very minimal change. I am able to see it on CT of the carotids from March and measures approximately 7 cms. This has a benign appearance on exam and CT. I discussed with them the treatment options including observation and surgical excision. I explained that although this is likely a benign mass pathology would be evident with excision only. At this time this is asymptomatic and stable. They would like to continue observation without intervention. Follow up in 6  months or sooner if changes arise.      No orders of the defined types were placed in this encounter.      Imaging & Referrals   None    Follow Up  No follow-ups on file.    Nancy Dillon MD           [1]   Past Medical History:   BPH (benign prostatic hyperplasia)    Coronary atherosclerosis    Depression    Essential hypertension    Hearing impaired person, bilateral    High blood pressure    High cholesterol    History of stomach ulcers    Hyperlipidemia    Stroke (HCC)    Urinary retention   [2]   Past Surgical History:  Procedure Laterality Date    Cath bare metal stent (bms)      Colonoscopy      Surgical stent      wife states Lft side of heart- [facility]   [3]   Family History  Adopted: Yes   Family history unknown: Yes   [4]   Social History  Socioeconomic History    Marital status:    Tobacco Use    Smoking status: Former     Types: Cigarettes     Passive exposure: Never    Smokeless tobacco: Never    Tobacco comments:     QUIT SMOKING IN 2000   Vaping Use    Vaping status: Never Used   Substance and Sexual Activity    Alcohol use: Not Currently    Drug use: Never   [5]   Current Outpatient Medications:     amoxicillin 500 MG Oral Cap, Take 1 capsule (500 mg total) by mouth 3 (three) times daily., Disp: , Rfl:     DULoxetine 60 MG Oral Cap DR Particles, Take 1 capsule (60 mg total) by mouth daily., Disp: 30 capsule, Rfl: 0    sodium chloride 0.65 % Nasal Solution, 1 spray by Nasal route every 3 (three) hours as needed for congestion., Disp: 30 mL, Rfl: 0    alfuzosin ER 10 MG Oral Tablet 24 Hr, Take 1 tablet (10 mg total) by mouth in the morning., Disp: , Rfl:     Polyethylene Glycol 3350 17 g Oral Powd Pack, Take 17 g by mouth in the morning., Disp: , Rfl:     zinc sulfate 220 (50 Zn) MG Oral Cap, Take 1 capsule (220 mg total) by mouth in the morning., Disp: , Rfl:     niacin 500 MG Oral Tab, Take 1 tablet (500 mg total) by mouth daily with breakfast., Disp: , Rfl:     sucralfate 1 g  Oral Tab, Take 1 tablet (1 g total) by mouth in the morning and 1 tablet (1 g total) at noon and 1 tablet (1 g total) in the evening. Take before meals., Disp: , Rfl:     aspirin 81 MG Oral Chew Tab, Chew 1 tablet (81 mg total) by mouth in the morning., Disp: , Rfl:     atorvastatin 80 MG Oral Tab, Take 1 tablet (80 mg total) by mouth in the morning., Disp: , Rfl:     mirtazapine 15 MG Oral Tab, Take 1 tablet (15 mg total) by mouth nightly., Disp: , Rfl:     clopidogrel 75 MG Oral Tab, , Disp: , Rfl:     Valproate Sodium (VALPROIC ACID) 250 MG/5ML Oral Solution, Take 2.5 mL by mouth daily. (Patient not taking: Reported on 4/16/2025), Disp: , Rfl:     Trospium Chloride 20 MG Oral Tab, Take 1 tablet (20 mg total) by mouth 2 (two) times daily. (Patient not taking: Reported on 4/16/2025), Disp: , Rfl:     enalapril 10 MG Oral Tab, Take 1 tablet (10 mg total) by mouth in the morning and 1 tablet (10 mg total) before bedtime. (Patient not taking: Reported on 4/16/2025), Disp: , Rfl:

## 2025-04-22 ENCOUNTER — APPOINTMENT (OUTPATIENT)
Dept: CV DIAGNOSTICS | Facility: HOSPITAL | Age: OVER 89
DRG: 321 | End: 2025-04-22
Attending: HOSPITALIST
Payer: COMMERCIAL

## 2025-04-22 ENCOUNTER — APPOINTMENT (OUTPATIENT)
Dept: INTERVENTIONAL RADIOLOGY/VASCULAR | Facility: HOSPITAL | Age: OVER 89
DRG: 321 | End: 2025-04-22
Attending: INTERNAL MEDICINE
Payer: COMMERCIAL

## 2025-04-22 ENCOUNTER — APPOINTMENT (OUTPATIENT)
Dept: GENERAL RADIOLOGY | Facility: HOSPITAL | Age: OVER 89
End: 2025-04-22
Attending: EMERGENCY MEDICINE
Payer: COMMERCIAL

## 2025-04-22 ENCOUNTER — APPOINTMENT (OUTPATIENT)
Dept: CV DIAGNOSTICS | Facility: HOSPITAL | Age: OVER 89
End: 2025-04-22
Attending: HOSPITALIST
Payer: COMMERCIAL

## 2025-04-22 ENCOUNTER — HOSPITAL ENCOUNTER (INPATIENT)
Facility: HOSPITAL | Age: OVER 89
LOS: 2 days | Discharge: HOME HEALTH | DRG: 321 | End: 2025-04-24
Attending: EMERGENCY MEDICINE | Admitting: INTERNAL MEDICINE
Payer: COMMERCIAL

## 2025-04-22 ENCOUNTER — APPOINTMENT (OUTPATIENT)
Dept: GENERAL RADIOLOGY | Facility: HOSPITAL | Age: OVER 89
DRG: 321 | End: 2025-04-22
Attending: EMERGENCY MEDICINE
Payer: COMMERCIAL

## 2025-04-22 ENCOUNTER — APPOINTMENT (OUTPATIENT)
Dept: INTERVENTIONAL RADIOLOGY/VASCULAR | Facility: HOSPITAL | Age: OVER 89
End: 2025-04-22
Attending: INTERNAL MEDICINE
Payer: COMMERCIAL

## 2025-04-22 ENCOUNTER — HOSPITAL ENCOUNTER (INPATIENT)
Facility: HOSPITAL | Age: OVER 89
LOS: 2 days | Discharge: HOME | End: 2025-04-24
Attending: EMERGENCY MEDICINE | Admitting: INTERNAL MEDICINE
Payer: COMMERCIAL

## 2025-04-22 DIAGNOSIS — R79.89 ELEVATED TROPONIN: ICD-10-CM

## 2025-04-22 DIAGNOSIS — E87.6 HYPOKALEMIA: ICD-10-CM

## 2025-04-22 DIAGNOSIS — R07.9 CHEST PAIN WITH MODERATE RISK FOR CARDIAC ETIOLOGY: Primary | ICD-10-CM

## 2025-04-22 PROBLEM — I21.4 NSTEMI (NON-ST ELEVATED MYOCARDIAL INFARCTION) (HCC): Status: ACTIVE | Noted: 2025-04-22

## 2025-04-22 PROBLEM — I25.10 CORONARY ARTERY DISEASE INVOLVING NATIVE CORONARY ARTERY OF NATIVE HEART WITHOUT ANGINA PECTORIS: Status: ACTIVE | Noted: 2025-04-22

## 2025-04-22 LAB
ALBUMIN SERPL-MCNC: 4 G/DL (ref 3.2–4.8)
ALBUMIN/GLOB SERPL: 1.4 {RATIO} (ref 1–2)
ALP LIVER SERPL-CCNC: 124 U/L (ref 45–117)
ALT SERPL-CCNC: 24 U/L (ref 10–49)
ANION GAP SERPL CALC-SCNC: 13 MMOL/L (ref 0–18)
APTT PPP: 27.4 SECONDS (ref 23–36)
AST SERPL-CCNC: 31 U/L (ref ?–34)
BASOPHILS # BLD AUTO: 0.06 X10(3) UL (ref 0–0.2)
BASOPHILS NFR BLD AUTO: 0.6 %
BILIRUB SERPL-MCNC: 0.6 MG/DL (ref 0.2–0.9)
BUN BLD-MCNC: 13 MG/DL (ref 9–23)
CALCIUM BLD-MCNC: 8.9 MG/DL (ref 8.7–10.6)
CHLORIDE SERPL-SCNC: 113 MMOL/L (ref 98–112)
CHOLEST SERPL-MCNC: 86 MG/DL (ref ?–200)
CO2 SERPL-SCNC: 19 MMOL/L (ref 21–32)
CREAT BLD-MCNC: 0.83 MG/DL (ref 0.7–1.3)
EGFRCR SERPLBLD CKD-EPI 2021: 81 ML/MIN/1.73M2 (ref 60–?)
EOSINOPHIL # BLD AUTO: 0.15 X10(3) UL (ref 0–0.7)
EOSINOPHIL NFR BLD AUTO: 1.6 %
ERYTHROCYTE [DISTWIDTH] IN BLOOD BY AUTOMATED COUNT: 13.7 %
FLUAV + FLUBV RNA SPEC NAA+PROBE: NEGATIVE
FLUAV + FLUBV RNA SPEC NAA+PROBE: NEGATIVE
GLOBULIN PLAS-MCNC: 2.8 G/DL (ref 2–3.5)
GLUCOSE BLD-MCNC: 140 MG/DL (ref 70–99)
HCT VFR BLD AUTO: 35.1 % (ref 39–53)
HDLC SERPL-MCNC: 33 MG/DL (ref 40–59)
HGB BLD-MCNC: 11.8 G/DL (ref 13–17.5)
IMM GRANULOCYTES # BLD AUTO: 0.02 X10(3) UL (ref 0–1)
IMM GRANULOCYTES NFR BLD: 0.2 %
INR BLD: 1.04 (ref 0.8–1.2)
ISTAT ACTIVATED CLOTTING TIME: 245 SECONDS (ref 74–137)
ISTAT ACTIVATED CLOTTING TIME: 268 SECONDS (ref 74–137)
LDLC SERPL CALC-MCNC: 39 MG/DL (ref ?–100)
LYMPHOCYTES # BLD AUTO: 4.14 X10(3) UL (ref 1–4)
LYMPHOCYTES NFR BLD AUTO: 43 %
MCH RBC QN AUTO: 27.4 PG (ref 26–34)
MCHC RBC AUTO-ENTMCNC: 33.6 G/DL (ref 31–37)
MCV RBC AUTO: 81.4 FL (ref 80–100)
MONOCYTES # BLD AUTO: 0.56 X10(3) UL (ref 0.1–1)
MONOCYTES NFR BLD AUTO: 5.8 %
NEUTROPHILS # BLD AUTO: 4.7 X10 (3) UL (ref 1.5–7.7)
NEUTROPHILS # BLD AUTO: 4.7 X10(3) UL (ref 1.5–7.7)
NEUTROPHILS NFR BLD AUTO: 48.8 %
NONHDLC SERPL-MCNC: 53 MG/DL (ref ?–130)
OSMOLALITY SERPL CALC.SUM OF ELEC: 302 MOSM/KG (ref 275–295)
PLATELET # BLD AUTO: 200 10(3)UL (ref 150–450)
POTASSIUM SERPL-SCNC: 3.1 MMOL/L (ref 3.5–5.1)
POTASSIUM SERPL-SCNC: 3.9 MMOL/L (ref 3.5–5.1)
PROT SERPL-MCNC: 6.8 G/DL (ref 5.7–8.2)
PROTHROMBIN TIME: 13.7 SECONDS (ref 11.6–14.8)
RBC # BLD AUTO: 4.31 X10(6)UL (ref 3.8–5.8)
RSV RNA SPEC NAA+PROBE: NEGATIVE
SARS-COV-2 RNA RESP QL NAA+PROBE: NOT DETECTED
SODIUM SERPL-SCNC: 145 MMOL/L (ref 136–145)
TRIGL SERPL-MCNC: 57 MG/DL (ref 30–149)
TROPONIN I SERPL HS-MCNC: 1328 NG/L (ref ?–53)
TROPONIN I SERPL HS-MCNC: 787 NG/L (ref ?–53)
TROPONIN I SERPL HS-MCNC: 874 NG/L (ref ?–53)
TROPONIN I SERPL HS-MCNC: 996 NG/L (ref ?–53)
VLDLC SERPL CALC-MCNC: 8 MG/DL (ref 0–30)
WBC # BLD AUTO: 9.6 X10(3) UL (ref 4–11)

## 2025-04-22 PROCEDURE — B2111ZZ FLUOROSCOPY OF MULTIPLE CORONARY ARTERIES USING LOW OSMOLAR CONTRAST: ICD-10-PCS | Performed by: INTERNAL MEDICINE

## 2025-04-22 PROCEDURE — 93306 TTE W/DOPPLER COMPLETE: CPT | Performed by: HOSPITALIST

## 2025-04-22 PROCEDURE — 4A023N7 MEASUREMENT OF CARDIAC SAMPLING AND PRESSURE, LEFT HEART, PERCUTANEOUS APPROACH: ICD-10-PCS | Performed by: INTERNAL MEDICINE

## 2025-04-22 PROCEDURE — 71045 X-RAY EXAM CHEST 1 VIEW: CPT | Performed by: EMERGENCY MEDICINE

## 2025-04-22 PROCEDURE — B241ZZ3 ULTRASONOGRAPHY OF MULTIPLE CORONARY ARTERIES, INTRAVASCULAR: ICD-10-PCS | Performed by: INTERNAL MEDICINE

## 2025-04-22 PROCEDURE — 99223 1ST HOSP IP/OBS HIGH 75: CPT | Performed by: HOSPITALIST

## 2025-04-22 PROCEDURE — 027135Z DILATION OF CORONARY ARTERY, TWO ARTERIES WITH TWO DRUG-ELUTING INTRALUMINAL DEVICES, PERCUTANEOUS APPROACH: ICD-10-PCS | Performed by: INTERNAL MEDICINE

## 2025-04-22 RX ORDER — HEPARIN SODIUM AND DEXTROSE 10000; 5 [USP'U]/100ML; G/100ML
INJECTION INTRAVENOUS CONTINUOUS
Status: DISCONTINUED | OUTPATIENT
Start: 2025-04-22 | End: 2025-04-22

## 2025-04-22 RX ORDER — HEPARIN SODIUM 5000 [USP'U]/ML
5000 INJECTION, SOLUTION INTRAVENOUS; SUBCUTANEOUS EVERY 8 HOURS SCHEDULED
Status: DISCONTINUED | OUTPATIENT
Start: 2025-04-23 | End: 2025-04-24

## 2025-04-22 RX ORDER — SPIRONOLACTONE 25 MG/1
25 TABLET ORAL DAILY
Status: DISCONTINUED | OUTPATIENT
Start: 2025-04-23 | End: 2025-04-24

## 2025-04-22 RX ORDER — LIDOCAINE HYDROCHLORIDE 10 MG/ML
INJECTION, SOLUTION EPIDURAL; INFILTRATION; INTRACAUDAL; PERINEURAL
Status: COMPLETED
Start: 2025-04-22 | End: 2025-04-22

## 2025-04-22 RX ORDER — MIDAZOLAM HYDROCHLORIDE 1 MG/ML
INJECTION INTRAMUSCULAR; INTRAVENOUS
Status: COMPLETED
Start: 2025-04-22 | End: 2025-04-22

## 2025-04-22 RX ORDER — MIRTAZAPINE 7.5 MG/1
15 TABLET, FILM COATED ORAL NIGHTLY
Status: DISCONTINUED | OUTPATIENT
Start: 2025-04-22 | End: 2025-04-24

## 2025-04-22 RX ORDER — SODIUM PHOSPHATE, DIBASIC AND SODIUM PHOSPHATE, MONOBASIC 7; 19 G/230ML; G/230ML
1 ENEMA RECTAL ONCE AS NEEDED
Status: DISCONTINUED | OUTPATIENT
Start: 2025-04-22 | End: 2025-04-24

## 2025-04-22 RX ORDER — ASPIRIN 81 MG/1
324 TABLET, CHEWABLE ORAL ONCE
Status: COMPLETED | OUTPATIENT
Start: 2025-04-22 | End: 2025-04-22

## 2025-04-22 RX ORDER — VERAPAMIL HYDROCHLORIDE 2.5 MG/ML
INJECTION INTRAVENOUS
Status: COMPLETED
Start: 2025-04-22 | End: 2025-04-22

## 2025-04-22 RX ORDER — OXYMETAZOLINE HYDROCHLORIDE 0.05 G/100ML
1 SPRAY NASAL 2 TIMES DAILY
COMMUNITY

## 2025-04-22 RX ORDER — NITROGLYCERIN 20 MG/100ML
INJECTION INTRAVENOUS
Status: COMPLETED
Start: 2025-04-22 | End: 2025-04-22

## 2025-04-22 RX ORDER — METOCLOPRAMIDE HYDROCHLORIDE 5 MG/ML
5 INJECTION INTRAMUSCULAR; INTRAVENOUS EVERY 8 HOURS PRN
Status: DISCONTINUED | OUTPATIENT
Start: 2025-04-22 | End: 2025-04-24

## 2025-04-22 RX ORDER — FUROSEMIDE 10 MG/ML
20 INJECTION INTRAMUSCULAR; INTRAVENOUS ONCE
Status: COMPLETED | OUTPATIENT
Start: 2025-04-22 | End: 2025-04-22

## 2025-04-22 RX ORDER — HEPARIN SODIUM AND DEXTROSE 10000; 5 [USP'U]/100ML; G/100ML
12 INJECTION INTRAVENOUS ONCE
Status: COMPLETED | OUTPATIENT
Start: 2025-04-22 | End: 2025-04-22

## 2025-04-22 RX ORDER — ATORVASTATIN CALCIUM 80 MG/1
80 TABLET, FILM COATED ORAL DAILY
Status: DISCONTINUED | OUTPATIENT
Start: 2025-04-22 | End: 2025-04-24

## 2025-04-22 RX ORDER — TAMSULOSIN HYDROCHLORIDE 0.4 MG/1
0.4 CAPSULE ORAL
Status: DISCONTINUED | OUTPATIENT
Start: 2025-04-22 | End: 2025-04-24

## 2025-04-22 RX ORDER — METOPROLOL SUCCINATE 25 MG/1
25 TABLET, EXTENDED RELEASE ORAL
Status: DISCONTINUED | OUTPATIENT
Start: 2025-04-23 | End: 2025-04-24

## 2025-04-22 RX ORDER — SENNOSIDES 8.6 MG
17.2 TABLET ORAL NIGHTLY PRN
Status: DISCONTINUED | OUTPATIENT
Start: 2025-04-22 | End: 2025-04-24

## 2025-04-22 RX ORDER — SUCRALFATE 1 G/1
1 TABLET ORAL
Status: DISCONTINUED | OUTPATIENT
Start: 2025-04-22 | End: 2025-04-24

## 2025-04-22 RX ORDER — DULOXETIN HYDROCHLORIDE 30 MG/1
60 CAPSULE, DELAYED RELEASE ORAL DAILY
Status: DISCONTINUED | OUTPATIENT
Start: 2025-04-22 | End: 2025-04-24

## 2025-04-22 RX ORDER — POTASSIUM CHLORIDE 1500 MG/1
40 TABLET, EXTENDED RELEASE ORAL EVERY 4 HOURS
Status: COMPLETED | OUTPATIENT
Start: 2025-04-22 | End: 2025-04-22

## 2025-04-22 RX ORDER — BISACODYL 10 MG
10 SUPPOSITORY, RECTAL RECTAL
Status: DISCONTINUED | OUTPATIENT
Start: 2025-04-22 | End: 2025-04-24

## 2025-04-22 RX ORDER — IOPAMIDOL 755 MG/ML
100 INJECTION, SOLUTION INTRAVASCULAR
Status: COMPLETED | OUTPATIENT
Start: 2025-04-22 | End: 2025-04-22

## 2025-04-22 RX ORDER — POTASSIUM CHLORIDE 1500 MG/1
40 TABLET, EXTENDED RELEASE ORAL ONCE
Status: COMPLETED | OUTPATIENT
Start: 2025-04-22 | End: 2025-04-22

## 2025-04-22 RX ORDER — POLYETHYLENE GLYCOL 3350 17 G/17G
17 POWDER, FOR SOLUTION ORAL DAILY PRN
Status: DISCONTINUED | OUTPATIENT
Start: 2025-04-22 | End: 2025-04-24

## 2025-04-22 RX ORDER — HEPARIN SODIUM 1000 [USP'U]/ML
60 INJECTION, SOLUTION INTRAVENOUS; SUBCUTANEOUS ONCE
Status: COMPLETED | OUTPATIENT
Start: 2025-04-22 | End: 2025-04-22

## 2025-04-22 RX ORDER — ENOXAPARIN SODIUM 100 MG/ML
40 INJECTION SUBCUTANEOUS DAILY
Status: DISCONTINUED | OUTPATIENT
Start: 2025-04-22 | End: 2025-04-22

## 2025-04-22 RX ORDER — HEPARIN SODIUM 5000 [USP'U]/ML
INJECTION, SOLUTION INTRAVENOUS; SUBCUTANEOUS
Status: COMPLETED
Start: 2025-04-22 | End: 2025-04-22

## 2025-04-22 RX ORDER — ACETAMINOPHEN 500 MG
500 TABLET ORAL EVERY 4 HOURS PRN
Status: DISCONTINUED | OUTPATIENT
Start: 2025-04-22 | End: 2025-04-24

## 2025-04-22 RX ORDER — ONDANSETRON 2 MG/ML
4 INJECTION INTRAMUSCULAR; INTRAVENOUS EVERY 6 HOURS PRN
Status: DISCONTINUED | OUTPATIENT
Start: 2025-04-22 | End: 2025-04-24

## 2025-04-22 RX ORDER — ASPIRIN 81 MG/1
81 TABLET, CHEWABLE ORAL EVERY MORNING
Status: DISCONTINUED | OUTPATIENT
Start: 2025-04-23 | End: 2025-04-24

## 2025-04-22 NOTE — ED INITIAL ASSESSMENT (HPI)
Pt to ED with c/o chest pain since yesterday. Denies SOB.   Pt states, \"pain is less severe today.\"

## 2025-04-22 NOTE — PROGRESS NOTES
[facility]   part of [facility]     Hospitalist Progress Note     Deven Atkinson Patient Status:  Observation    1930 MRN [id number]   Location [facility] 8NE-A Attending Aguilar Chavez MD   Hosp Day # 1 PCP Paulo James MD     Chief Complaint: NSTEMI    Subjective:   Patient denies chest pain. Does have some SOB.    Current medications:  Scheduled Medications[1]    Objective:    Review of Systems:   10 point ROS completed and was negative, except for pertinent positive and negatives stated in subjective.    Vital signs:  Temp:  [97.7 °F (36.5 °C)-98.4 °F (36.9 °C)] 98.4 °F (36.9 °C)  Pulse:  [67-98] 85  Resp:  [16-24] 21  BP: (135-144)/() 141/68  SpO2:  [91 %-99 %] 95 %  Patient Weight for the past 72 hrs:   Weight   25 0149 164 lb (74.4 kg)   25 0538 167 lb 12.3 oz (76.1 kg)   25 0613 167 lb 8.8 oz (76 kg)     Physical Exam:    General: No acute distress. Awake and alert  Respiratory: Fine bibasilar rales. No wheezing  Cardiovascular: S1, S2. Regular rate and rhythm.   Abdomen: Soft, nontender, nondistended.  Positive bowel sounds.   Extremities: No edema.  Neuro: AAOx3    Diagnostic Data:    Labs:  Recent Labs   Lab 25  0158 25  0537   WBC 9.6 8.1   HGB 11.8* 11.3*   MCV 81.4 82.0   .0 195.0   INR 1.04  --      Recent Labs   Lab 25  0158 25  1450 25  0540   *  --  135*   BUN 13  --  11   CREATSERUM 0.83  --  0.97   CA 8.9  --  9.3   ALB 4.0  --   --      --  146*   K 3.1* 3.9 3.8   *  --  111   CO2 19.0*  --  23.0   ALKPHO 124*  --   --    AST 31  --   --    ALT 24  --   --    BILT 0.6  --   --    TP 6.8  --   --      Estimated Creatinine Clearance: 46.6 mL/min (based on SCr of 0.97 mg/dL).    Recent Labs   Lab 25  0158   PTP 13.7   INR 1.04      Recent Labs   Lab 25  0400 25  0602 25  1146   TROPHS 874* 996* 1,328*     Imaging: Imaging data reviewed in Epic.    Medications:  Scheduled Medications[2]    Assessment & Plan:      #NSTEMI s/p PCI proximal and distal LAD and proximal ramus  #Hx of CAD s/p PCI 2023  -Troponin trended up. S/p C 4/21 showing multivessel CAD s/p PCI of proximal and distal LAD and proximal ramus  -ASA, Brilinta, statin, metoprolol  -LVEDP elevated, may need diuresis pending echo  -Telemetry  -Cardiology following    #Essential hypertension  -Started on metoprolol and aldactone     #Dyslipidemia  -Statin    #BPH with hx of TURP in Feb 2025  -Flomax for alfuzosin    #Cerebrovascular disease  -ASA, statin    #PUD  -Carafate    #Depression  -Remeron, duloxetine    #Hypokalemia  -Replace per protocol PRN    #Chronic mild normocytic anemia  -Stable    #Dementia  #Hx of C difficile    At this point Mr. Atkinson is expected to be discharge to: Home    Plan of care discussed with patient, wife and RN.      Arnulfo dEmondson DO    Supplementary Documentation:   DVT Mechanical Prophylaxis:   SCDs,    DVT Pharmacologic Prophylaxis   Medication    heparin (Porcine) 5000 UNIT/ML injection 5,000 Units      DVT Pharmacologic prophylaxis: Aspirin 162 mg       Code Status: DNAR/Comfort Care  Jay: No urinary catheter in place  MAG: 0-1 day            [1]    tamsulosin  0.4 mg Oral Daily @ 0700    aspirin  81 mg Oral QAM    atorvastatin  80 mg Oral Daily    DULoxetine  60 mg Oral Daily    mirtazapine  15 mg Oral Nightly    sucralfate  1 g Oral TID AC    ticagrelor  90 mg Oral BID    metoprolol succinate  25 mg Oral Daily Beta Blocker    spironolactone  25 mg Oral Daily    heparin  5,000 Units Subcutaneous Q8H YRN   [2]    tamsulosin  0.4 mg Oral Daily @ 0700    aspirin  81 mg Oral QAM    atorvastatin  80 mg Oral Daily    DULoxetine  60 mg Oral Daily    mirtazapine  15 mg Oral Nightly    sucralfate  1 g Oral TID AC    ticagrelor  90 mg Oral BID    metoprolol succinate  25 mg Oral Daily Beta Blocker    spironolactone  25 mg Oral Daily    heparin  5,000 Units Subcutaneous Q8H YRN

## 2025-04-22 NOTE — CONSULTS
[facility] Cardiology  Consultation Note      Deven Atkinson Patient Status:  Observation    1930 MRN [id number]   Location [facility] 8NE-A Attending Aguilar Chavez MD   Hosp Day # 0 PCP Paulo James MD     Reason for consult: Chest pain, NSTEMI    Primary cardiologist: Dr. Erwin ([facility])     History of Present Illness:  Deven Atkinson is a 89 year old male who presented to [facility] on 2025 with chest pain. Began last night, states all over the chest and his entire body was aching. He felt feverish though no documented fever. Also with significant SOB. No cough. Currently feels comfortable. No edema, palps, LH or syncope. He is mostly sedentary these days, but no recent exertional CP. Wife reports he had a stent at [facility] in , though she does not have a stent card or any other details. Said it wasn't a heart attack or a \"100%\" blockage. He also had a Linq placed in 2023, his wife states had a small stroke at the time. Per outpatient EP note from Dr. Pinto, there was a questionable 12 min of Afib, but not confirmed with EGMs and he was not started on OAC. He does have a baseline LBBB.     Medications:  Current Hospital Medications[1]    Past Medical History[2]    Past Surgical History[3]    Family History  He was adopted. Family history is unknown by patient.    Social History   reports that he has quit smoking. His smoking use included cigarettes. He has never been exposed to tobacco smoke. He has never used smokeless tobacco. He reports that he does not currently use alcohol. He reports that he does not use drugs.     Allergies  Allergies[4]    Review of Systems:  As per HPI, otherwise 10 point ROS is negative in detail.    Physical Exam:  Blood pressure 141/89, pulse 82, temperature 98.1 °F (36.7 °C), temperature source Oral, resp. rate 17, height 69\", weight 167 lb 8.8 oz (76 kg), SpO2 93%.  Temp (24hrs), Av.9 °F (36.6 °C), Min:97.6 °F  (36.4 °C), Max:98.1 °F (36.7 °C)    Wt Readings from Last 3 Encounters:   04/22/25 167 lb 8.8 oz (76 kg)   04/03/25 164 lb (74.4 kg)   03/25/25 160 lb 15 oz (73 kg)       General: Awake and alert; in no acute distress  HEENT: Extraocular movements are intact; sclerae are anicteric; scalp is atraumatic  Neck: Supple; no JVD; no carotid bruits  Cardiac: Regular rate and regular rhythm; normal S1 and S2, no murmurs, rubs, or gallops are appreciated  Lungs: Clear to auscultation bilaterally; no accessory muscle use is noted, no wheezes, rhonci or rales  Abdomen: Soft, non-distended, non-tender; bowel sounds are normoactive  Extremities: Warm, no edema, clubbing or cyanosis; moves all 4 extremities normally, distal pulses intact and equal  Psychiatric: Normal mood and affect; answers questions appropriately  Dermatologic: No rashes; normal skin turgor    Diagnostic testing:    Labs:   Lab Results   Component Value Date    INR 1.04 04/22/2025    INR 0.99 03/08/2025     Lab Results   Component Value Date    LDL 39 04/22/2025    HDL 33 04/22/2025    TRIG 57 04/22/2025    VLDL 8 04/22/2025     Lab Results   Component Value Date    WBC 9.6 04/22/2025    HGB 11.8 04/22/2025    HCT 35.1 04/22/2025    .0 04/22/2025    CREATSERUM 0.83 04/22/2025    BUN 13 04/22/2025     04/22/2025    K 3.1 04/22/2025     04/22/2025    CO2 19.0 04/22/2025     04/22/2025    CA 8.9 04/22/2025    ALB 4.0 04/22/2025    ALKPHO 124 04/22/2025    BILT 0.6 04/22/2025    TP 6.8 04/22/2025    AST 31 04/22/2025    ALT 24 04/22/2025    PTT 27.4 04/22/2025    INR 1.04 04/22/2025    PTP 13.7 04/22/2025       Cardiac diagnostics:    CXR 4/22/2025  CONCLUSION:  Preliminary reading provided by radiologist from [facility].  Stable moderate cardiac enlargement without overt CHF.  Bilateral mid-lower lung opacities greater on the left concerning for developing pneumonia. No sizable effusion, but   small effusion difficult to exclude on  portable chest x-ray. No evidence for pneumothorax.  Consider upright PA and lateral examination of the chest, when tolerated.       EKG 4/22/2025:   Sinus rhythm with 1st degree A-V block with Fusion complexes   Possible Left atrial enlargement   Left axis deviation   Left bundle branch block     Echo 1/2024:  The left ventricular cavity size is normal. Left ventricular wall thickness   is mildly increased. The estimated left ventricular ejection fraction is   60-65%.   Mild aortic regurgitation.   Mild tricuspid regurgitation with normal pulmonary pressures.   No evidence of intra-atrial communication by color doppler or agitated   saline injection.     Impression:  89 year old male presenting with chest pain and SOB  Positive troponin, consistent with NSTEMI  CAD h/o PCI 2023, unclear details   H/o TIA, s/p LINQ 12/2023 - reported possible 12 min Afib, though not confirmed and not on OAC per outpatient EP Dr. Pinto  Possible PNA on CXR  HTN  HLD  Baseline LBBB  Depressions - h/o suicidal ideation  Mandarin speaking    Recommendations:  Discussed symptoms and test results and options for management including medical therapy alone or coronary angiography, either with CT or cardiac catheterization. Given symptoms and positive troponin, we have mutually agreed to proceed with cardiac catheterization and coronary angiography for definitive evaluation, with possible PCI.   ASA, statin   BB, ARB next   Check echo  Consider Abx, defer to hosp    Informed consent:  Patient seen and examined independently. H and P reviewed. No changes in H and P. Risks and benefits of procedure were discussed with patient. Airway examined.  Patient is ASA class 2 and Mallampati class 2. Pt is appropriate for conscious sedation. No history of difficult airway. The risks, benefits, indications and alternatives of left heart catheterization and coronary angigoraphy with possible percutaneous coronary intervention were discussed. The risks  include, but are not limited to: bleeding, anemia requiring blood transfusion, allergic reaction, hypotension, infection, vascular injury, injury to upper or lower extremity requiring endovascular or open surgical repair,  kidney failure, stroke, cardiac or pulmonary artery perforation, pericardial effusion and tamponade requiring pericardiocentesis, myocardial infarction (heart attack), respiratory failure needing intubation, cardiac arrest, need for emergent surgery, and death. Benefits of the procedure include: symptomatic improvement, diagnosis of coronary artery disease or other forms of heart disease and possibly prevention of myocardial infarction. Alternatives to the procedure include: not performing cardiac catheterization, treatment with medications only, and observation. The patient and any accompanying family have considered the above, all questions have been answered to the best of my ability to their satisfaction, and have decided to proceed with the procedure. Appropriate candidate for Sedation/Analgesia: Yes. Plan for Sedation reviewed: Yes. Explained Anesthesia options and attendant risks, and have determined patient is an appropriate candidate. Yes. Consent for Sedation obtained: Yes. Patient reevaluated immediately prior to Sedation/Analgesia: Yes.      Thank you for allowing our practice to participate in the care of your patient. Please do not hesitate to contact me if you have any questions.    Jero Gilbert MD  Interventional Cardiology  [facility]  Office: [phone]         [1]   Current Facility-Administered Medications   Medication Dose Route Frequency    tamsulosin (Flomax) cap 0.4 mg  0.4 mg Oral Daily @ 0700    [START ON 4/23/2025] aspirin chewable tab 81 mg  81 mg Oral QAM    atorvastatin (Lipitor) tab 80 mg  80 mg Oral Daily    DULoxetine (Cymbalta) DR cap 60 mg  60 mg Oral Daily    melatonin tab 3 mg  3 mg Oral Nightly PRN    ondansetron (Zofran) 4 MG/2ML injection 4 mg  4  mg Intravenous Q6H PRN    metoclopramide (Reglan) 5 mg/mL injection 5 mg  5 mg Intravenous Q8H PRN    acetaminophen (Tylenol Extra Strength) tab 500 mg  500 mg Oral Q4H PRN    polyethylene glycol (PEG 3350) (Miralax) 17 g oral packet 17 g  17 g Oral Daily PRN    sennosides (Senokot) tab 17.2 mg  17.2 mg Oral Nightly PRN    bisacodyl (Dulcolax) 10 MG rectal suppository 10 mg  10 mg Rectal Daily PRN    fleet enema (Fleet) rectal enema 133 mL  1 enema Rectal Once PRN    heparin (Porcine) 21135 units/250mL infusion ED INITIAL DOSE  12 Units/kg/hr Intravenous Once    heparin (Porcine) 09076 units/250mL infusion ACS/AFIB CONTINUOUS  200-3,000 Units/hr Intravenous Continuous    potassium chloride (Klor-Con M20) tab 40 mEq  40 mEq Oral Q4H    iopamidol (ISOVUE-370) 76 % injection 100 mL  100 mL Injection ONCE PRN   [2]   Past Medical History:   BPH (benign prostatic hyperplasia)    Coronary atherosclerosis    Depression    Essential hypertension    Hearing impaired person, bilateral    High blood pressure    High cholesterol    History of stomach ulcers    Hyperlipidemia    Stroke (HCC)    Urinary retention   [3]   Past Surgical History:  Procedure Laterality Date    Cath bare metal stent (bms)      Colonoscopy      Surgical stent      wife states Lft side of heart- Ahsan Anaya   [4]   Allergies  Allergen Reactions    Losartan UNKNOWN     Unable to remember reaction

## 2025-04-22 NOTE — PLAN OF CARE
Received pt at 0730.   Pt is A&Ox4, Mandarin speaking -  utilized, no pain. On room air, lungs are diminished, no coughing. NSR, no chest pain. Continent of B&B, active bowel sounds, last BM 4-20: denies constipation. Pt updated with plan of care.     Approx 0800- notified cards of increasing troponin- 996. Pt now NPO, angiogram this AM. Dr puentes discussed with pt/family.         Problem: Patient/Family Goals  Goal: Patient/Family Long Term Goal  Description: Patient's Long Term Goal: stay out of hospital 4-22    POC  - med compliance, follow ups    Outcome: Progressing  Goal: Patient/Family Short Term Goal  Description: Patient's Short Term Goal: no pain 4-22    POC  - PRN pain, tell RN if in pain  Outcome: Progressing     Problem: CARDIOVASCULAR - ADULT  Goal: Maintains optimal cardiac output and hemodynamic stability  Description: INTERVENTIONS:- Monitor vital signs, rhythm, and trends- Monitor for bleeding, hypotension and signs of decreased cardiac output- Evaluate effectiveness of vasoactive medications to optimize hemodynamic stability- Monitor arterial and/or venous puncture sites for bleeding and/or hematoma- Assess quality of pulses, skin color and temperature- Assess for signs of decreased coronary artery perfusion - ex. Angina- Evaluate fluid balance, assess for edema, trend weights  Outcome: Progressing  Goal: Absence of cardiac arrhythmias or at baseline  Description: INTERVENTIONS:- Continuous cardiac monitoring, monitor vital signs, obtain 12 lead EKG if indicated- Evaluate effectiveness of antiarrhythmic and heart rate control medications as ordered- Initiate emergency measures for life threatening arrhythmias- Monitor electrolytes and administer replacement therapy as ordered  Outcome: Progressing

## 2025-04-22 NOTE — PLAN OF CARE
Received patient from ED, accompanied by his spouse. He is alert and oriented and not showing any signs of distress. He denied any pain. Skin check was done with the PCT. Needs attended.    Problem: Patient/Family Goals  Goal: Patient/Family Long Term Goal  Description: Patient's Long Term Goal: will be able to go home without complication  Outcome: Progressing  Goal: Patient/Family Short Term Goal  Description: Patient's Short Term Goal: will have no more chest pain  Interventions: - routine vs, heparin drip, meds as ordered  Outcome: Progressing     Problem: CARDIOVASCULAR - ADULT  Goal: Maintains optimal cardiac output and hemodynamic stability  Description: INTERVENTIONS:- Monitor vital signs, rhythm, and trends- Monitor for bleeding, hypotension and signs of decreased cardiac output- Evaluate effectiveness of vasoactive medications to optimize hemodynamic stability- Monitor arterial and/or venous puncture sites for bleeding and/or hematoma- Assess quality of pulses, skin color and temperature- Assess for signs of decreased coronary artery perfusion - ex. Angina- Evaluate fluid balance, assess for edema, trend weights  Outcome: Progressing  Goal: Absence of cardiac arrhythmias or at baseline  Description: INTERVENTIONS:- Continuous cardiac monitoring, monitor vital signs, obtain 12 lead EKG if indicated- Evaluate effectiveness of antiarrhythmic and heart rate control medications as ordered- Initiate emergency measures for life threatening arrhythmias- Monitor electrolytes and administer replacement therapy as ordered  Outcome: Progressing

## 2025-04-22 NOTE — H&P
[facility]     Deven Atkinson Patient Status:  Emergency    1930 MRN [id number]   Location [facility] EMERGENCY DEPARTMENT Attending Dk Silver MD   Hosp Day # 0 PCP Paulo James MD     Chief Complaint: chest pain    Subjective:    History of Present Illness:     Deven Atkinson is a 89 year old male with hx of CAD S/P stent in , BPH, hyperlipidemia, stroke, depression presents to the emergency room with chest pain.  Patient reports that only hurts when he coughs.  No diaphoresis, shortness of breath.  Wife states that he does not usually cough and is nonproductive.  No orthopnea or PND.  No fevers, chills, nausea, vomiting.  Other than coughing no other exacerbating factors    History/Other:    Past Medical History:  Past Medical History[1]  Past Surgical History:   Past Surgical History[2]   Family History:   Family History[3]  Social History:    reports that he has quit smoking. His smoking use included cigarettes. He has never been exposed to tobacco smoke. He has never used smokeless tobacco. He reports that he does not currently use alcohol. He reports that he does not use drugs.     Allergies: Allergies[4]    Medications:  Medications Ordered Prior to Encounter[5]    Review of Systems:   A comprehensive review of systems was completed.    Pertinent positives and negatives noted in the HPI.    Objective:   Physical Exam:    /69   Pulse 85   Temp 98 °F (36.7 °C) (Oral)   Resp 18   Ht 5' 9\" (1.753 m)   Wt 164 lb (74.4 kg)   SpO2 94%   BMI 24.22 kg/m²   General: No acute distress, Alert  Respiratory: No rhonchi, no wheezes  Cardiovascular: S1, S2. Regular rate and rhythm  Abdomen: Soft, Non-tender, non-distended, positive bowel sounds  Neuro: No new focal deficits  Extremities: No edema      Results:    Labs:      Labs Last 24 Hours:    Recent Labs   Lab 25  0158   RBC 4.31   HGB 11.8*   HCT 35.1*   MCV 81.4   MCH 27.4   MCHC 33.6   RDW  13.7   NEPRELIM 4.70   WBC 9.6   .0       Recent Labs   Lab 04/22/25  0158   *   BUN 13   CREATSERUM 0.83   EGFRCR 81   CA 8.9   ALB 4.0      K 3.1*   *   CO2 19.0*   ALKPHO 124*   AST 31   ALT 24   BILT 0.6   TP 6.8       Estimated Glomerular Filtration Rate: 81 mL/min/1.73m2 (result from lab).    Lab Results   Component Value Date    INR 1.04 04/22/2025    INR 0.99 03/08/2025    INR 1.06 02/10/2025       Recent Labs   Lab 04/22/25  0158   TROPHS 787*       No results for input(s): \"TROP\", \"PBNP\" in the last 168 hours.    No results for input(s): \"PCT\" in the last 168 hours.    Imaging: Imaging data reviewed in Epic.    Assessment & Plan:      #NSTEMI  - Will trend troponins  - echo in the am  - Cardiology consult    # CAD  - Hx of stent in 2023  - continue ASA, stain    # BPH  - Will continue on BPH.    Plan of care discussed with patient at bedside.    Andrew Boone, DO    Supplementary Documentation:     The 21st Century Cures Act makes medical notes like these available to patients in the interest of transparency. Please be advised this is a medical document. Medical documents are intended to carry relevant information, facts as evident, and the clinical opinion of the practitioner. The medical note is intended as peer to peer communication and may appear blunt or direct. It is written in medical language and may contain abbreviations or verbiage that are unfamiliar.                                     [1]   Past Medical History:   BPH (benign prostatic hyperplasia)    Coronary atherosclerosis    Depression    Essential hypertension    Hearing impaired person, bilateral    High blood pressure    High cholesterol    History of stomach ulcers    Hyperlipidemia    Stroke (HCC)    Urinary retention   [2]   Past Surgical History:  Procedure Laterality Date    Cath bare metal stent (bms)      Colonoscopy      Surgical stent      wife states Lft side of heart- Vader,  Ahsan   [3]   Family History  Adopted: Yes   Family history unknown: Yes   [4]   Allergies  Allergen Reactions    Losartan UNKNOWN     Unable to remember reaction       [5]   Current Facility-Administered Medications on File Prior to Encounter   Medication Dose Route Frequency Provider Last Rate Last Admin    [COMPLETED] LORazepam (Ativan) 2 mg/mL injection 1 mg  1 mg Intravenous Once Sky Meyer MD   1 mg at 04/03/25 2125    [COMPLETED] iopamidol 76% (ISOVUE-370) injection for power injector  75 mL Intravenous ONCE PRN Sky Meyer MD   75 mL at 04/03/25 2159    [COMPLETED] levoFLOXacin (Levaquin) tab 500 mg  500 mg Oral Once Rell Hammond DO   500 mg at 03/27/25 1821    Followed by    [COMPLETED] levoFLOXacin (Levaquin) tab 250 mg  250 mg Oral Daily Rell Hammond DO   250 mg at 04/02/25 1101    [COMPLETED] iopamidol 76% (ISOVUE-370) injection for power injector  75 mL Intravenous ONCE PRN Sean Mathew MD   75 mL at 03/25/25 1313    [COMPLETED] cephALEXin (Keflex) cap 500 mg  500 mg Oral Once Sean Mathew MD   500 mg at 03/25/25 1345    [COMPLETED] LORazepam (Ativan) 2 mg/mL injection 0.5 mg  0.5 mg Intravenous Once Nikole Dang APRN   0.5 mg at 03/09/25 1528    [COMPLETED] sodium chloride 0.9 % IV bolus 500 mL  500 mL Intravenous Once Rod Fajardo DO   Stopped at 03/08/25 1123    [COMPLETED] iopamidol 76% (ISOVUE-370) injection for power injector  80 mL Intravenous ONCE PRN Rod Fajardo DO   80 mL at 03/08/25 1008    [COMPLETED] cefTRIAXone (Rocephin) 1 g in sodium chloride 0.9% 100 mL IVPB-ADDV  1 g Intravenous Once Rod Fajardo DO   Stopped at 03/08/25 1153    [COMPLETED] acetaminophen (Tylenol Extra Strength) tab 1,000 mg  1,000 mg Oral Once Joshua Ferreira MD   1,000 mg at 03/07/25 1322    [COMPLETED] ketorolac (Toradol) 15 MG/ML injection 10.05 mg  10.05 mg Intravenous Once Joshua Ferreira MD   10.05 mg at 03/07/25 1449    [COMPLETED] sodium chloride 0.9 % IV bolus 1,000  mL  1,000 mL Intravenous Once Carmella Nguyen MD   Stopped at 03/03/25 1106    [COMPLETED] iopamidol 76% (ISOVUE-370) injection for power injector  85 mL Intravenous ONCE PRN Carmella Nguyen MD   85 mL at 03/03/25 1020    [COMPLETED] morphINE PF 2 MG/ML injection 2 mg  2 mg Intravenous Once Rick Holland MD   2 mg at 03/01/25 0331    [COMPLETED] acetaminophen (Tylenol Extra Strength) tab 1,000 mg  1,000 mg Oral Once Ryan Yeung MD   1,000 mg at 02/19/25 1232    [COMPLETED] ceFEPIme (Maxipime) 1 g in sodium chloride 0.9% 100 mL IVPB-MBP  1 g Intravenous Q8H Ryan Yeung MD   Stopped at 02/20/25 0900    [COMPLETED] ceFEPIme (Maxipime) 1 g in sodium chloride 0.9% 100 mL IVPB-MBP  1 g Intravenous Q8H Ryan Yeung MD   Stopped at 02/20/25 1011    [COMPLETED] hydrALAzine (Apresoline) 20 mg/mL injection 5 mg  5 mg Intravenous Once Ester Virgen MD   5 mg at 02/19/25 1644    [COMPLETED] furosemide (Lasix) tab 40 mg  40 mg Oral Once Naila Guidry MD   40 mg at 02/02/25 1258     Current Outpatient Medications on File Prior to Encounter   Medication Sig Dispense Refill    amoxicillin 500 MG Oral Cap Take 1 capsule (500 mg total) by mouth 3 (three) times daily.      clopidogrel 75 MG Oral Tab  (Patient not taking: Reported on 4/16/2025)      Valproate Sodium (VALPROIC ACID) 250 MG/5ML Oral Solution Take 2.5 mL by mouth daily. (Patient not taking: Reported on 4/16/2025)      DULoxetine 60 MG Oral Cap DR Particles Take 1 capsule (60 mg total) by mouth daily. 30 capsule 0    Trospium Chloride 20 MG Oral Tab Take 1 tablet (20 mg total) by mouth 2 (two) times daily. (Patient not taking: Reported on 4/16/2025)      sodium chloride 0.65 % Nasal Solution 1 spray by Nasal route every 3 (three) hours as needed for congestion. 30 mL 0    enalapril 10 MG Oral Tab Take 1 tablet (10 mg total) by mouth in the morning and 1 tablet (10 mg total) before bedtime. (Patient not taking: Reported on 4/16/2025)      alfuzosin ER 10 MG  Oral Tablet 24 Hr Take 1 tablet (10 mg total) by mouth in the morning.      Polyethylene Glycol 3350 17 g Oral Powd Pack Take 17 g by mouth in the morning.      zinc sulfate 220 (50 Zn) MG Oral Cap Take 1 capsule (220 mg total) by mouth in the morning.      niacin 500 MG Oral Tab Take 1 tablet (500 mg total) by mouth daily with breakfast.      sucralfate 1 g Oral Tab Take 1 tablet (1 g total) by mouth in the morning and 1 tablet (1 g total) at noon and 1 tablet (1 g total) in the evening. Take before meals.      aspirin 81 MG Oral Chew Tab Chew 1 tablet (81 mg total) by mouth in the morning.      atorvastatin 80 MG Oral Tab Take 1 tablet (80 mg total) by mouth in the morning.      mirtazapine 15 MG Oral Tab Take 1 tablet (15 mg total) by mouth nightly.

## 2025-04-22 NOTE — ED PROVIDER NOTES
Patient Seen in: [facility] Emergency Department      History     Chief Complaint   Patient presents with    Chest Pain Angina     Stated Complaint: chest pain    Subjective:   HPI    Patient is a 89-year-old male presenting for evaluation of chest pain.  Patient has a history of hypertension, high cholesterol, dementia.  History from him is somewhat limited although his wife is at bedside to help.  He states that he is having some cough and his chest hurts when he coughs but not in between coughing episodes.  Wife concerns that the cough is unusual for him although it is not productive and he has not had fevers.  He has not complained of shortness of breath.    History of Present Illness               Objective:     Past Medical History:    BPH (benign prostatic hyperplasia)    Coronary atherosclerosis    Depression    Essential hypertension    Hearing impaired person, bilateral    High blood pressure    High cholesterol    History of stomach ulcers    Hyperlipidemia    Stroke (HCC)    Urinary retention              Past Surgical History:   Procedure Laterality Date    Cath bare metal stent (bms)      Colonoscopy      Surgical stent      wife states Lft side of heart- [facility]                Social History     Socioeconomic History    Marital status:    Tobacco Use    Smoking status: Former     Types: Cigarettes     Passive exposure: Never    Smokeless tobacco: Never    Tobacco comments:     QUIT SMOKING IN 2000   Vaping Use    Vaping status: Never Used   Substance and Sexual Activity    Alcohol use: Not Currently    Drug use: Never     Social Drivers of Health     Food Insecurity: No Food Insecurity (3/26/2025)    NCSS - Food Insecurity     Worried About Running Out of Food in the Last Year: No     Ran Out of Food in the Last Year: No   Transportation Needs: No Transportation Needs (3/26/2025)    NCSS - Transportation     Lack of Transportation: No   Housing Stability: Not At Risk  (3/26/2025)    NCSS - Housing/Utilities     Has Housing: Yes     Worried About Losing Housing: No     Unable to Get Utilities: No                                Physical Exam     ED Triage Vitals [04/22/25 0149]   /69   Pulse 85   Resp 18   Temp 98 °F (36.7 °C)   Temp src Oral   SpO2 94 %   O2 Device None (Room air)       Current Vitals:   Vital Signs  BP: 133/63  Pulse: 84  Resp: 22  Temp: 97.6 °F (36.4 °C)  Temp src: Oral  MAP (mmHg): 84    Oxygen Therapy  SpO2: 97 %  O2 Device: None (Room air)  O2 Flow Rate (L/min): 0 L/min  Pulse Oximetry Type: Continuous  Oximetry Probe Site Changed: Yes  Pulse Ox Probe Location: Left hand        Physical Exam  Vitals and nursing note reviewed.   Constitutional:       Appearance: He is well-developed.   HENT:      Head: Normocephalic and atraumatic.   Eyes:      Conjunctiva/sclera: Conjunctivae normal.      Pupils: Pupils are equal, round, and reactive to light.   Cardiovascular:      Rate and Rhythm: Normal rate and regular rhythm.      Heart sounds: Normal heart sounds.      Comments: No obvious reproducible chest wall tenderness to palpation.  Pulmonary:      Effort: Pulmonary effort is normal.      Breath sounds: Normal breath sounds.   Abdominal:      General: Bowel sounds are normal.      Palpations: Abdomen is soft.   Musculoskeletal:         General: Normal range of motion.      Cervical back: Normal range of motion and neck supple.   Skin:     General: Skin is warm and dry.   Neurological:      Mental Status: He is alert and oriented to person, place, and time.           Physical Exam                ED Course     Labs Reviewed   COMP METABOLIC PANEL (14) - Abnormal; Notable for the following components:       Result Value    Glucose 140 (*)     Potassium 3.1 (*)     Chloride 113 (*)     CO2 19.0 (*)     Calculated Osmolality 302 (*)     Alkaline Phosphatase 124 (*)     All other components within normal limits   CBC WITH DIFFERENTIAL WITH PLATELET - Abnormal;  Notable for the following components:    HGB 11.8 (*)     HCT 35.1 (*)     Lymphocyte Absolute 4.14 (*)     All other components within normal limits   TROPONIN I HIGH SENSITIVITY - Abnormal; Notable for the following components:    Troponin I (High Sensitivity) 787 (*)     All other components within normal limits   LIPID PANEL - Abnormal; Notable for the following components:    HDL Cholesterol 33 (*)     All other components within normal limits   TROPONIN I HIGH SENSITIVITY - Abnormal; Notable for the following components:    Troponin I (High Sensitivity) 874 (*)     All other components within normal limits   PROTHROMBIN TIME (PT) - Normal   PTT, ACTIVATED - Normal   SARS-COV-2/FLU A AND B/RSV BY PCR (GENEXPERT) - Normal    Narrative:     This test is intended for the qualitative detection and differentiation of SARS-CoV-2, influenza A, influenza B, and respiratory syncytial virus (RSV) viral RNA in nasopharyngeal or nares swabs from individuals suspected of respiratory viral infection consistent with COVID-19 by their healthcare provider. Signs and symptoms of respiratory viral infection due to SARS-CoV-2, influenza, and RSV can be similar.    Test performed using the Xpert Xpress SARS-CoV-2/FLU/RSV (real time RT-PCR)  assay on the Nex[employer] instrument, [employer], [place] [address].   This test is being used under the Food and Drug Administration's Emergency Use Authorization.    The authorized Fact Sheet for Healthcare Providers for this assay is available upon request from the laboratory.   PTT, ACTIVATED   TROPONIN I HIGH SENSITIVITY   TROPONIN I HIGH SENSITIVITY   RAINBOW DRAW LAVENDER   RAINBOW DRAW LIGHT GREEN   RAINBOW DRAW BLUE     EKG    Rate, intervals and axes as noted on EKG Report.  Rate: 91  Rhythm: Sinus Rhythm  Reading: Sinus rhythm with first-degree AV block, left bundle branch block, left axis deviation.  No other ST segment or T wave changes.  No significant change compared to prior EKG from  3/10/2025.             Heart Score:    HEART Score      Title      Criteria Score   Age: 65 and older Age Score: 2   History: Slightly Suspicious Hx Score: 0     EKG: Non-Spec Changes EKG Score: 1   HTN: Yes   Hypercholesterolemia: Yes   Atherosclerosis/PVD: Yes     DM: No   BMI>30kg/m2: No   Smoking: No   Family History: No         Other Risk Factor Score: 3                     HEART Score: 7        Risk of adverse cardiac event is 50-65%              Results                                 MDM      89-year-old male presenting for evaluation of chest pain.  History from him is somewhat limited due to his dementia but his wife reports he has been coughing since yesterday complains of chest pain with the coughing.  He tells me he does not have it unless he is coughing.  Suggestive of musculoskeletal pain although is not clearly reproducible with palpation anywhere in the chest wall.  No crepitus or focal deformity.  Comfortable and says he has no pain right now.  EKG has left bundle branch block with left axis deviation but this is old.  Do not see any history of stress test results here.  Along with chest wall pain differential clues pneumonia, pneumothorax, ACS.  Labs ordered and will follow-up on the results.      Update at 2:50 AM.  Troponin is elevated at 787.  A month ago he was here and had very slight troponin elevations in the 50s and 60s but not this high.  With this elevation and history of CAD he will need to be admitted and at least have his troponins followed and seen by cardiology.  Discussed this result as well as plan for admission with the patient and his wife.  She reports his stent was done in December 2023 at a hospital in [place].  Will see if those records are available in Care Everywhere.  Has not had a stress test since then that she recalls.    Admission disposition: 4/22/2025  3:05 AM           Past Medical History-hypertension, CAD, dementia    Differential diagnosis before testing  included ACS    Co-morbidities that add to the complexity of management include: None    Testing ordered during this visit included labs, chest x-ray      External chart review showed reviewed old EKG and cardiology testing    History obtained by an independent source included from wife at bedside    Discussion of management with hospitalist, cardiology      Medications Provided: Aspirin, nitroglycerin            Disposition:    Admission  I have discussed with the patient the results of test, differential diagnosis, and treatment plan. They expressed clear understanding of these instructions and agrees to the plan provided.           Medical Decision Making      Disposition and Plan     Clinical Impression:  1. Chest pain with moderate risk for cardiac etiology    2. Elevated troponin    3. Hypokalemia         Disposition:  Admit  4/22/2025  3:05 am    Follow-up:  No follow-up provider specified.        Medications Prescribed:  Current Discharge Medication List          Supplementary Documentation:         Hospital Problems       Present on Admission  Date Reviewed: 4/16/2025          ICD-10-CM Noted POA    * (Principal) Chest pain with moderate risk for cardiac etiology R07.9 4/22/2025 Unknown    Elevated troponin R79.89 4/22/2025 Unknown    Hypokalemia E87.6 4/22/2025 Unknown

## 2025-04-22 NOTE — PROGRESS NOTES
[facility]   part of [facility]     Hospitalist Progress Note     Deven Atkinson Patient Status:  Observation    1930 MRN [id number]   Location [facility] 8NE-A Attending Aguilar Chavez MD   Hosp Day # 0 PCP Paulo James MD     Chief Complaint: NSTEMI    Subjective:   Patient denies chest pain and shortness of breath. On room air.   Presenting chest pain was similar to that requiring PCI in .    Current medications:  Scheduled Medications[1]    Objective:    Review of Systems:   10 point ROS completed and was negative, except for pertinent positive and negatives stated in subjective.    Vital signs:  Temp:  [97.6 °F (36.4 °C)-98.1 °F (36.7 °C)] 97.8 °F (36.6 °C)  Pulse:  [67-94] 90  Resp:  [16-28] 20  BP: (132-156)/(62-89) 140/62  SpO2:  [91 %-100 %] 91 %  Patient Weight for the past 72 hrs:   Weight   25 0149 164 lb (74.4 kg)   25 0538 167 lb 12.3 oz (76.1 kg)   25 0613 167 lb 8.8 oz (76 kg)     Physical Exam:    General: No acute distress.   Respiratory: Clear to auscultation bilaterally.   Cardiovascular: S1, S2. Regular rate and rhythm.   Abdomen: Soft, nontender, nondistended.  Positive bowel sounds.   Extremities: No edema.  Neuro: AAOx3    Diagnostic Data:    Labs:  Recent Labs   Lab 25  0158   WBC 9.6   HGB 11.8*   MCV 81.4   .0   INR 1.04       Recent Labs   Lab 25  0158   *   BUN 13   CREATSERUM 0.83   CA 8.9   ALB 4.0      K 3.1*   *   CO2 19.0*   ALKPHO 124*   AST 31   ALT 24   BILT 0.6   TP 6.8       Estimated Creatinine Clearance: 54.4 mL/min (based on SCr of 0.83 mg/dL).    Recent Labs   Lab 25  0158   PTP 13.7   INR 1.04            COVID-19 Lab Results    COVID-19  Lab Results   Component Value Date    COVID19 Not Detected 2025    COVID19 Not Detected 2025    COVID19 Not Detected 2025       Pro-Calcitonin  No results for input(s): \"PCT\" in the last 168 hours.    Cardiac  No results for  input(s): \"TROP\", \"PBNP\" in the last 168 hours.    Creatinine Kinase  No results for input(s): \"CK\" in the last 168 hours.    Inflammatory Markers  No results for input(s): \"CRP\", \"LADAN\", \"LDH\", \"DDIMER\" in the last 168 hours.    Recent Labs   Lab 04/22/25  0158 04/22/25  0400 04/22/25  0602   [facility] 874* 996*       Imaging: Imaging data reviewed in Epic.    Medications: Scheduled Medications[2]    Assessment & Plan:      NSTEMI  CAD sp PCI 2023  Essential hypertension  Dyslipidemia  LBBB  Cerebrovascular disease  Depression   BPH    Plan:  NPO for [facility]  Aspirin  Lipitor   Heparin drip  ECHO  Monitor hemodynamics  Telemetry  Cardiology consult     DVT Px: Heparin drip    At this point Mr. Atkinson is expected to be discharge to: Home    Plan of care discussed with patient, wife and RN.    Aguilar Chavez MD        Supplementary Documentation:   DVT Mechanical Prophylaxis:   SCDs,    DVT Pharmacologic Prophylaxis   Medication    heparin (Porcine) 88963 units/250mL infusion ACS/AFIB CONTINUOUS      DVT Pharmacologic prophylaxis: Aspirin 162 mg         Code Status: DNAR/Comfort Care  Jay: No urinary catheter in place  Jay Duration (in days):   Central line:    MAG: 4/23/2025                           [1]    tamsulosin  0.4 mg Oral Daily @ 0700    [START ON 4/23/2025] aspirin  81 mg Oral QAM    atorvastatin  80 mg Oral Daily    DULoxetine  60 mg Oral Daily    potassium chloride  40 mEq Oral Q4H   [2]    tamsulosin  0.4 mg Oral Daily @ 0700    [START ON 4/23/2025] aspirin  81 mg Oral QAM    atorvastatin  80 mg Oral Daily    DULoxetine  60 mg Oral Daily    potassium chloride  40 mEq Oral Q4H

## 2025-04-22 NOTE — ED QUICK NOTES
Orders for admission, patient is aware of plan and ready to go upstairs. Any questions, please call ED ORACIO Figueroa at extension [phone].     Patient Covid vaccination status: Unvaccinated     COVID Test Ordered in ED: SARS-CoV-2/Flu A and B/RSV by PCR (GeneXpert)    COVID Suspicion at Admission: N/A    Running Infusions: Medication Infusions[1]     Mental Status/LOC at time of transport: A&Ox4    Other pertinent information:   CIWA score: N/A   NIH score:  N/A             [1]    continuous dose heparin

## 2025-04-23 LAB
ANION GAP SERPL CALC-SCNC: 12 MMOL/L (ref 0–18)
BASOPHILS # BLD AUTO: 0.06 X10(3) UL (ref 0–0.2)
BASOPHILS NFR BLD AUTO: 0.7 %
BUN BLD-MCNC: 11 MG/DL (ref 9–23)
CALCIUM BLD-MCNC: 9.3 MG/DL (ref 8.7–10.6)
CHLORIDE SERPL-SCNC: 111 MMOL/L (ref 98–112)
CO2 SERPL-SCNC: 23 MMOL/L (ref 21–32)
CREAT BLD-MCNC: 0.97 MG/DL (ref 0.7–1.3)
EGFRCR SERPLBLD CKD-EPI 2021: 72 ML/MIN/1.73M2 (ref 60–?)
EOSINOPHIL # BLD AUTO: 0.09 X10(3) UL (ref 0–0.7)
EOSINOPHIL NFR BLD AUTO: 1.1 %
ERYTHROCYTE [DISTWIDTH] IN BLOOD BY AUTOMATED COUNT: 14 %
GLUCOSE BLD-MCNC: 135 MG/DL (ref 70–99)
HCT VFR BLD AUTO: 34.1 % (ref 39–53)
HGB BLD-MCNC: 11.3 G/DL (ref 13–17.5)
IMM GRANULOCYTES # BLD AUTO: 0 X10(3) UL (ref 0–1)
IMM GRANULOCYTES NFR BLD: 0 %
LYMPHOCYTES # BLD AUTO: 3.44 X10(3) UL (ref 1–4)
LYMPHOCYTES NFR BLD AUTO: 42.7 %
MCH RBC QN AUTO: 27.2 PG (ref 26–34)
MCHC RBC AUTO-ENTMCNC: 33.1 G/DL (ref 31–37)
MCV RBC AUTO: 82 FL (ref 80–100)
MONOCYTES # BLD AUTO: 0.65 X10(3) UL (ref 0.1–1)
MONOCYTES NFR BLD AUTO: 8.1 %
NEUTROPHILS # BLD AUTO: 3.82 X10 (3) UL (ref 1.5–7.7)
NEUTROPHILS # BLD AUTO: 3.82 X10(3) UL (ref 1.5–7.7)
NEUTROPHILS NFR BLD AUTO: 47.4 %
OSMOLALITY SERPL CALC.SUM OF ELEC: 303 MOSM/KG (ref 275–295)
PLATELET # BLD AUTO: 195 10(3)UL (ref 150–450)
POTASSIUM SERPL-SCNC: 3.8 MMOL/L (ref 3.5–5.1)
RBC # BLD AUTO: 4.16 X10(6)UL (ref 3.8–5.8)
SODIUM SERPL-SCNC: 146 MMOL/L (ref 136–145)
WBC # BLD AUTO: 8.1 X10(3) UL (ref 4–11)

## 2025-04-23 PROCEDURE — 99232 SBSQ HOSP IP/OBS MODERATE 35: CPT | Performed by: INTERNAL MEDICINE

## 2025-04-23 RX ORDER — FUROSEMIDE 40 MG/1
40 TABLET ORAL DAILY
Status: DISCONTINUED | OUTPATIENT
Start: 2025-04-24 | End: 2025-04-24

## 2025-04-23 RX ORDER — POTASSIUM CHLORIDE 1500 MG/1
40 TABLET, EXTENDED RELEASE ORAL ONCE
Status: COMPLETED | OUTPATIENT
Start: 2025-04-23 | End: 2025-04-23

## 2025-04-23 RX ORDER — FUROSEMIDE 10 MG/ML
40 INJECTION INTRAMUSCULAR; INTRAVENOUS
Status: DISCONTINUED | OUTPATIENT
Start: 2025-04-23 | End: 2025-04-23

## 2025-04-23 NOTE — PROGRESS NOTES
Final echo summary pasted below. Working with IT to have fill report cross over from [facility] to [facility].

## 2025-04-23 NOTE — PLAN OF CARE
Received pt at shift change. AxOx2. Room air. Denies pain. C/o some SOB on ambulation. Vitals stable. NSR on tele. Impulsive & restless - frequently getting out of bed.   See flowsheets for additional assessments.   Pt updated on POC. Call light within reach. All needs met at this time.     Plan:  -Await echo results & cards clearance    Problem: Patient/Family Goals  Goal: Patient/Family Long Term Goal  Description: Patient's Long Term Goal: stay out of hospital 4-22    POC  - med compliance, follow ups    Outcome: Progressing  Goal: Patient/Family Short Term Goal  Description: Patient's Short Term Goal: no pain 4-22    POC  - PRN pain, tell RN if in pain  Outcome: Progressing     Problem: CARDIOVASCULAR - ADULT  Goal: Maintains optimal cardiac output and hemodynamic stability  Description: INTERVENTIONS:- Monitor vital signs, rhythm, and trends- Monitor for bleeding, hypotension and signs of decreased cardiac output- Evaluate effectiveness of vasoactive medications to optimize hemodynamic stability- Monitor arterial and/or venous puncture sites for bleeding and/or hematoma- Assess quality of pulses, skin color and temperature- Assess for signs of decreased coronary artery perfusion - ex. Angina- Evaluate fluid balance, assess for edema, trend weights  Outcome: Progressing  Goal: Absence of cardiac arrhythmias or at baseline  Description: INTERVENTIONS:- Continuous cardiac monitoring, monitor vital signs, obtain 12 lead EKG if indicated- Evaluate effectiveness of antiarrhythmic and heart rate control medications as ordered- Initiate emergency measures for life threatening arrhythmias- Monitor electrolytes and administer replacement therapy as ordered  Outcome: Progressing

## 2025-04-23 NOTE — CONSULTS
[facility] Cardiology  Consultation Note      Deven Atkinson Patient Status:  Observation    1930 MRN [id number]   Location [facility] 8NE-A Attending Aguilar Chavez MD   Hosp Day # 1 PCP Paulo James MD     Reason for consult: Chest pain, NSTEMI    Events: Reports sporadic chest pain and SOB. But appears very comfortable in bed.     Primary cardiologist: Dr. Erwin ([facility])     History of Present Illness:  Deven Atkinson is a 89 year old male who presented to [facility] on 2025 with chest pain. Began last night, states all over the chest and his entire body was aching. He felt feverish though no documented fever. Also with significant SOB. No cough. Currently feels comfortable. No edema, palps, LH or syncope. He is mostly sedentary these days, but no recent exertional CP. Wife reports he had a stent at [facility] in , though she does not have a stent card or any other details. Said it wasn't a heart attack or a \"100%\" blockage. He also had a Linq placed in 2023, his wife states had a small stroke at the time. Per outpatient EP note from Dr. Pinto, there was a questionable 12 min of Afib, but not confirmed with EGMs and he was not started on OAC. He does have a baseline LBBB.     Medications:  Current Hospital Medications[1]    Past Medical History[2]    Past Surgical History[3]    Family History  He was adopted. Family history is unknown by patient.    Social History   reports that he has quit smoking. His smoking use included cigarettes. He has never been exposed to tobacco smoke. He has never used smokeless tobacco. He reports that he does not currently use alcohol. He reports that he does not use drugs.     Allergies  Allergies[4]    Review of Systems:  As per HPI, otherwise 10 point ROS is negative in detail.    Physical Exam:  Blood pressure 137/67, pulse 73, temperature 98 °F (36.7 °C), temperature source Oral, resp. rate 18, height 69\", weight  167 lb 8.8 oz (76 kg), SpO2 95%.  Temp (24hrs), Av.1 °F (36.7 °C), Min:97.9 °F (36.6 °C), Max:98.4 °F (36.9 °C)    Wt Readings from Last 3 Encounters:   25 167 lb 8.8 oz (76 kg)   25 164 lb (74.4 kg)   25 160 lb 15 oz (73 kg)       General: Awake and alert; in no acute distress  HEENT: Extraocular movements are intact; sclerae are anicteric; scalp is atraumatic  Neck: Supple; no JVD; no carotid bruits  Cardiac: Regular rate and regular rhythm; normal S1 and S2, no murmurs, rubs, or gallops are appreciated  Lungs: Clear to auscultation bilaterally; no accessory muscle use is noted, no wheezes, rhonci or rales  Abdomen: Soft, non-distended, non-tender; bowel sounds are normoactive  Extremities: Warm, no edema, clubbing or cyanosis; moves all 4 extremities normally, distal pulses intact and equal  Psychiatric: Normal mood and affect; answers questions appropriately  Dermatologic: No rashes; normal skin turgor    Diagnostic testing:    Labs:   Lab Results   Component Value Date    INR 1.04 2025    INR 0.99 2025          Lab Results   Component Value Date    WBC 8.1 2025    HGB 11.3 2025    HCT 34.1 2025    .0 2025    CREATSERUM 0.97 2025    BUN 11 2025     2025    K 3.8 2025     2025    CO2 23.0 2025     2025    CA 9.3 2025       Cardiac diagnostics:    CXR 2025  CONCLUSION:  Preliminary reading provided by radiologist from [facility].  Stable moderate cardiac enlargement without overt CHF.  Bilateral mid-lower lung opacities greater on the left concerning for developing pneumonia. No sizable effusion, but   small effusion difficult to exclude on portable chest x-ray. No evidence for pneumothorax.  Consider upright PA and lateral examination of the chest, when tolerated.       EKG 2025:   Sinus rhythm with 1st degree A-V block with Fusion complexes   Possible Left atrial  enlargement   Left axis deviation   Left bundle branch block     Echo 1/2024:  The left ventricular cavity size is normal. Left ventricular wall thickness   is mildly increased. The estimated left ventricular ejection fraction is   60-65%.   Mild aortic regurgitation.   Mild tricuspid regurgitation with normal pulmonary pressures.   No evidence of intra-atrial communication by color doppler or agitated   saline injection.     Impression:  89 year old male presenting with chest pain and SOB  Positive troponin, consistent with NSTEMI  CAD  Cath 4/22/25 MV CAD s/p PCI TALISHA x 2 prox distal LAD and prox ramus, RCA med rx  h/o PCI 2023, unclear details   Ischemic CM - EF 20%  Acute HFrEF  H/o TIA, s/p LINQ 12/2023 - reported possible 12 min Afib, though not confirmed and not on OAC per outpatient EP Dr. Pinto  Possible PNA on CXR  HTN  HLD  Baseline LBBB  Depressions - h/o suicidal ideation  Mandarin speaking    Recommendations:  ASA, statin   Lasix IV today. Appears comfortable currently. He pulled out his own IV. Will plan for lasix 40mg PO daily tmrw.   GDMT: Toprol, peterson started. Unclear allergy to ARB.   No plan for Lifevest or ICD given age and poor overall prognosis, confusion and dementia    Thank you for allowing our practice to participate in the care of your patient. Please do not hesitate to contact me if you have any questions.    Jero Gilbert MD  Interventional Cardiology  [facility]  Office: [phone]         [1]   Current Facility-Administered Medications   Medication Dose Route Frequency    furosemide (Lasix) 10 mg/mL injection 40 mg  40 mg Intravenous BID (Diuretic)    tamsulosin (Flomax) cap 0.4 mg  0.4 mg Oral Daily @ 0700    aspirin chewable tab 81 mg  81 mg Oral QAM    atorvastatin (Lipitor) tab 80 mg  80 mg Oral Daily    DULoxetine (Cymbalta) DR cap 60 mg  60 mg Oral Daily    melatonin tab 3 mg  3 mg Oral Nightly PRN    ondansetron (Zofran) 4 MG/2ML injection 4 mg  4 mg Intravenous Q6H PRN     metoclopramide (Reglan) 5 mg/mL injection 5 mg  5 mg Intravenous Q8H PRN    acetaminophen (Tylenol Extra Strength) tab 500 mg  500 mg Oral Q4H PRN    polyethylene glycol (PEG 3350) (Miralax) 17 g oral packet 17 g  17 g Oral Daily PRN    sennosides (Senokot) tab 17.2 mg  17.2 mg Oral Nightly PRN    bisacodyl (Dulcolax) 10 MG rectal suppository 10 mg  10 mg Rectal Daily PRN    fleet enema (Fleet) rectal enema 133 mL  1 enema Rectal Once PRN    mirtazapine (Remeron) tab 15 mg  15 mg Oral Nightly    sucralfate (Carafate) tab 1 g  1 g Oral TID AC    ticagrelor (Brilinta) tab 90 mg  90 mg Oral BID    metoprolol succinate ER (Toprol XL) 24 hr tab 25 mg  25 mg Oral Daily Beta Blocker    spironolactone (Aldactone) tab 25 mg  25 mg Oral Daily    heparin (Porcine) 5000 UNIT/ML injection 5,000 Units  5,000 Units Subcutaneous Q8H [facility]   [2]   Past Medical History:   BPH (benign prostatic hyperplasia)    Coronary atherosclerosis    Depression    Essential hypertension    Hearing impaired person, bilateral    High blood pressure    High cholesterol    History of stomach ulcers    Hyperlipidemia    Stroke (HCC)    Urinary retention   [3]   Past Surgical History:  Procedure Laterality Date    Cath bare metal stent (bms)      Colonoscopy      Surgical stent      wife states Lft side of heart- [facility]   [4]   Allergies  Allergen Reactions    Losartan UNKNOWN     Unable to remember reaction

## 2025-04-23 NOTE — CARDIAC REHAB
CAD booklet given to Pt's wife along with stent card. Chart reviewed and Pt is not approp for phase 2 cardiac rehab. Wife verbalized understanding and denied further questions

## 2025-04-23 NOTE — PROCEDURES
OPERATIVE REPORT - CARDIAC CATHETERIZATION    Date of Procedure: 4/22/2025     Performing Physician: Jero Gilbert MD    Pre-Procedure Diagnosis:   NSTEMI    Post-Procedure Diagnosis:  Same as pre     Findings:  Left main: No stenosis  LAD: Proximal 90%, spans a small diagonal with ostial , faint left to left retrograde collaterals, distal LAD 75%  Ramus: Proximal 90%  Left circumflex: Supplies several smaller OM branches, luminal irregularities    RCA: Dominant to PDA. Mid 75%, distal 90%  Hemodynamics: LVEDP 30 mmHg. No LV-Ao gradient on pullback.     Conclusions / Recommendations:  Multivessel CAD s/p PCI of proximal and distal LAD and proximal ramus (likely culprit in NSTEMI). Medical therapy for the small diagonal branch  and RCA for now.. Consider staged PCI of RCA if further symptoms.  Consider need for diuresis given elevated LVEDP consistent with CHF. Will follow up echo results.  DAPT: ASA, Brilinta at least 1 year  Continue medical therapy, statin  Remove the TR band per protocol    -----------------------    Procedures performed:   1. Left heart catheterization  2. Selective bilateral coronary angiography  3. Moderate sedation  4. Access of right radial artery  5. PCI proximal and distal LAD, proximal ramus  6. IVUS LAD and ramus    Indications: This is a 89 year old male presenting for left heart catheterization with coronary angiography to evaluate for obstructive CAD.     Description of Procedure: Informed consent was obtained from the patient in writing. The risks and benefits of the procedure were reviewed in detail with the patient, including but not limited to the risk of myocardial infarction, stroke, renal failure, bleeding, and death. After a thorough discussion of these risks and benefits, the patient agreed to proceed and signed an informed consent document. The patient was brought to the cardiac catheterization laboratory in the fasting state. Moderate sedation was employed using a  total of IV Versed 2 mg and IV fentanyl 50 mcg in divided doses.  I directly observed the patient for > 30 min, and an independent trained observer was present and assisted in the monitoring of the patient's level of consciousness and physiological status, heart rate, blood pressure, oximetry, and rhythm. All operators present for the case performed standard pre-procedural prep including hand washing, sterile gloves, gown, mask, and cap. All aspects of the maximum sterile barrier technique were followed. A preprocedural time out was performed with all physicians, technologists, and nurses involved with the procedure. 1% lidocaine was infiltrated subcutaneously in the right wrist for local anesthesia. Ultrasound guided access was obtained in the right radial artery with a 5/6F Slender Glidesheath using the Seldinger technique and a micropuncture needle with a single anterior wall puncture. Standard vasodilator cocktail was given with heparin 5000 units, nitroglycerin 200 mcg and verapamil 2.5 mg through the arterial sheath. A 6F TIG catheter was advanced over a J tipped wire through the arterial sheath and placed in the aortic root, then used to selectively engage the left coronary artery. Standard angiographic views were taken in orthogonal projections. The RCA was then engaged and standard angiographic views were performed. The catheter was then prolapsed into the LV over a wire and placed at the base of the LV. LV systolic and end diastolic pressure was then recorded. The catheter was pulled back and pullback measurements of LV and aortic pressure and recorded. The catheter was then removed over a wire and exchanged for a 6F EBU 3.5 guide, which was not long enough to fit the left main, so we exchanged again for a 6F EBU 3.75 guide which was a better fit for the left main. I then wired into the distal LAD with a Ayo blue. I tried to wire the diagonal  with a Runthrough, and this would not easily pass,  therefore I put the second wire into the distal ramus. I then predilated the proximal and distal LAD with a 2.0 x 12 mm balloon to nominal. I then treated the distal LAD with a 2.5 x 15 mm Xience Skypoint to nominal power, and then post dilated this with a 2.5 x 12 mm NC balloon to 16 power within the stent edges. I also predilated the proximal LAD with the 2.5 mm balloon. I then placed a 3.5 x 28 mm Xience Skypoint stent in the proximal LAD to 12 power. I then post dilated this with a 3.5 x 15 mm NC balloon to 18 power within the stent edges. I then predilated the proximal ramus with a 2.5 x 12 mm balloon to nominal and then placed a 3.0 x 28 mm Xience Skypoint stent in the proxima ramus to nominal power. I then post dilated this with a 3.25 x 15 mm NC to 18 power within the stent edges. Final angiography and IVUS in the LAD and ramus both showed full stent expansion and apposition without dissection or perforation with TIFFANY 3 flow. At the conclusion of the procedure, all catheters and wires were removed over a wire. The arterial sheath was removed and hemostasis achieved with standard TR band using patent hemostasis technique. There was no bleeding or hematoma. The patient tolerated the procedure well without complication. EBL <10 mL. Total contrast ~ 200 mL. See procedure log for fluoro time and radiation dose.      Jero Gilbert MD  Interventional Cardiology  [facility]

## 2025-04-23 NOTE — DISCHARGE INSTRUCTIONS
HOME CARE INSTRUCTIONS FOLLOWING CORONARY ANGIOGRAPHY, ANGIOPLASTY (PTCA/PTA) OR INSERTION OF STENT IN THE CORONARY ARTERIES        Activity  DO NOT drive after the procedure.  You may resume driving late the following day according to the nurse or physician's instructions  Plan on resting and relaxing tonight and tomorrow  Resume your normal activity after 48 hours, or as instructed by your physician  Do not lift anything over 10 pounds for the next 24 hours  Avoid drinking alcohol for the next 24 hours  If the groin site was used, avoid repeated stair use and excessive walking for the next 24 hours  If the wrist was used, avoid bending/flexing of the wrist for the next 24 hours     What is Normal?  A small lump at the procedure site associated with mild tenderness when touched  The procedure site may be bruised or discolored  There may be a small amount of drainage on the bandage     Special Instructions  Drink plenty of fluids during the next 24 hours to \"flush\" the contrast from your system  The bandage is to remain in place for 24 hours  Keep the bandage clean and dry  DO NOT submerge the procedure site for 72 hours (no bath tubs or pools)  This includes dishwashing/submersion of the wrist, if the wrist was used  After 24 hours, you must remove the bandage  You should shower after removing the bandage, and wash the procedure site gently with soap and water  If you choose to wear a bandage for a few days, make sure it remains clean and dry and that it is changed daily     When you should NOTIFY YOUR PHYSICIAN  Bleeding can occur at the procedure site - both on the outside of the skin and/or beneath the surface of the skin  Swelling or a large lump at the procedure site can occur, which may be accompanied by moderate to severe pain     If either of the above occurs, lie down flat.  Have someone apply pressure to the procedure site with both hands, as instructed by the nurse.  Hold pressure for 20 minutes and the  bleeding should stop.  Notify your physician of the occurrence  If the bleeding does not stop, call 911 and continue to apply pressure     If you experience signs of a fever, temperature > 101°, chills, infection (redness, swelling, thick yellow drainage, or a foul odor from the procedure site)  If you notice any numbness, tingling, or loss of feeling to your leg or foot or groin access  If you notice any numbness, tingling, or loss of feeling to your fingers or hand, if wrist access was utilized     If You Received a Stent:     You will remain on an antiplatelet drug and/or aspirin.  Antiplatelet medications are usually taken for six months to one year and should not be stopped unless your cardiologist directs you to do so.  These medications help to prevent blockage at the stent site.  If another physician or dentist asks you to stop your antiplatelet medication, you need to consult your cardiologist first.  Together, your cardiologist and other physician can discuss the risks that may be involved if you are not taking the antiplatelet medication   If an MRI is necessary, it may be done 4-6 weeks after your procedure.  Verify this with your cardiologist  Keep your stent card with you at all times!  If you need an MRI in the future, your stent card will need to be shown to the technologist before performing the MRI.  A duplicate card CANNOT be reproduced.     Other  You may resume your present diet, unless otherwise specified by your physician.  You may resume all of your medications as prescribed, unless otherwise directed by your physician.  A list of your medications was provided to you at discharge.  Continue the walking program initiated in the hospital and progress your walking as directed.  Or, gradually resume your previous aerobic exercise schedule as tolerated.  Please call your physician’s office for a follow-up appointment.  You should be seen in 2 weeks.

## 2025-04-23 NOTE — CM/SW NOTE
04/23/25 1600   CM/SW Referral Data   Referral Source Social Work (self-referral)   Reason for Referral Discharge planning;Readmission   Informant Spouse/Significant Other   Readmission Assessment   Factors that patient feels contributed to this readmission Acute/Chronic Clinical Presentation   Medical Hx   Does patient have an established PCP? Yes   Patient Info   Patient's Home Environment Condo/Apt with elevator   Patient lives with Spouse/Significant other   Discharge Needs   Anticipated D/C needs Home health care;Caregiver services     SW met with pt and spouse at bedside to discuss discharge planning. Spouse provided information. Pt and spouse reside in [place]. Pt has had frequent readmissions. Pt is ambulatory at home, but is requiring more assist. Spouse open to caregiver support, states that finances are limited. Spouse agreeable to HHC and a referral to [facility]. SW placed call to [facility] to see if pt qualifies for any services. HH orders entered. SW encouraged neighbors and their [facility] to check in on pt/spouse. Spouse states that they do not have any children. Anticipate discharge later on.     Will secure HH choice once options are available.      &  to remain available and supportive for discharge planning needs.    Hoa BARBER, CONRADOW  Discharge Planner

## 2025-04-23 NOTE — PLAN OF CARE
Assumed patient at 1930. Alert and orientated x 3-4 on room air. Lung sounds diminished bilaterally. Normal sinus on tele. Right radial soft, no hematoma. Continent of bowel and bladder. Up standby assist. Patient and family updated on plan of care and verbalized understanding.     POC: possible discharge today.     Problem: Patient/Family Goals  Goal: Patient/Family Long Term Goal  Description: Patient's Long Term Goal: stay out of hospital 4-22    POC  - med compliance, follow ups    Outcome: Progressing  Goal: Patient/Family Short Term Goal  Description: Patient's Short Term Goal: no pain 4-22    POC  - PRN pain, tell RN if in pain  Outcome: Progressing     Problem: CARDIOVASCULAR - ADULT  Goal: Maintains optimal cardiac output and hemodynamic stability  Description: INTERVENTIONS:- Monitor vital signs, rhythm, and trends- Monitor for bleeding, hypotension and signs of decreased cardiac output- Evaluate effectiveness of vasoactive medications to optimize hemodynamic stability- Monitor arterial and/or venous puncture sites for bleeding and/or hematoma- Assess quality of pulses, skin color and temperature- Assess for signs of decreased coronary artery perfusion - ex. Angina- Evaluate fluid balance, assess for edema, trend weights  Outcome: Progressing  Goal: Absence of cardiac arrhythmias or at baseline  Description: INTERVENTIONS:- Continuous cardiac monitoring, monitor vital signs, obtain 12 lead EKG if indicated- Evaluate effectiveness of antiarrhythmic and heart rate control medications as ordered- Initiate emergency measures for life threatening arrhythmias- Monitor electrolytes and administer replacement therapy as ordered  Outcome: Progressing

## 2025-04-24 VITALS
WEIGHT: 167.56 LBS | RESPIRATION RATE: 17 BRPM | HEIGHT: 69 IN | DIASTOLIC BLOOD PRESSURE: 73 MMHG | SYSTOLIC BLOOD PRESSURE: 134 MMHG | BODY MASS INDEX: 24.82 KG/M2 | HEART RATE: 71 BPM | OXYGEN SATURATION: 95 % | TEMPERATURE: 98 F

## 2025-04-24 LAB
ANION GAP SERPL CALC-SCNC: 11 MMOL/L (ref 0–18)
BUN BLD-MCNC: 13 MG/DL (ref 9–23)
CALCIUM BLD-MCNC: 9.4 MG/DL (ref 8.7–10.6)
CHLORIDE SERPL-SCNC: 108 MMOL/L (ref 98–112)
CO2 SERPL-SCNC: 25 MMOL/L (ref 21–32)
CREAT BLD-MCNC: 0.95 MG/DL (ref 0.7–1.3)
EGFRCR SERPLBLD CKD-EPI 2021: 74 ML/MIN/1.73M2 (ref 60–?)
GLUCOSE BLD-MCNC: 119 MG/DL (ref 70–99)
OSMOLALITY SERPL CALC.SUM OF ELEC: 299 MOSM/KG (ref 275–295)
POTASSIUM SERPL-SCNC: 3.6 MMOL/L (ref 3.5–5.1)
POTASSIUM SERPL-SCNC: 3.6 MMOL/L (ref 3.5–5.1)
POTASSIUM SERPL-SCNC: 4.6 MMOL/L (ref 3.5–5.1)
SODIUM SERPL-SCNC: 144 MMOL/L (ref 136–145)

## 2025-04-24 PROCEDURE — 99239 HOSP IP/OBS DSCHRG MGMT >30: CPT | Performed by: INTERNAL MEDICINE

## 2025-04-24 RX ORDER — METOPROLOL SUCCINATE 25 MG/1
25 TABLET, EXTENDED RELEASE ORAL
Qty: 90 TABLET | Refills: 1 | Status: SHIPPED | OUTPATIENT
Start: 2025-04-25 | End: 2025-04-24

## 2025-04-24 RX ORDER — FUROSEMIDE 40 MG/1
40 TABLET ORAL DAILY
Qty: 90 TABLET | Refills: 0 | Status: SHIPPED | OUTPATIENT
Start: 2025-04-25

## 2025-04-24 RX ORDER — CLOPIDOGREL BISULFATE 75 MG/1
75 TABLET ORAL DAILY
Qty: 90 TABLET | Refills: 3 | Status: SHIPPED | OUTPATIENT
Start: 2025-04-24 | End: 2025-04-24

## 2025-04-24 RX ORDER — METOPROLOL SUCCINATE 25 MG/1
25 TABLET, EXTENDED RELEASE ORAL
Qty: 90 TABLET | Refills: 1 | Status: SHIPPED | OUTPATIENT
Start: 2025-04-25

## 2025-04-24 RX ORDER — SPIRONOLACTONE 25 MG/1
25 TABLET ORAL DAILY
Qty: 90 TABLET | Refills: 1 | Status: SHIPPED | OUTPATIENT
Start: 2025-04-25 | End: 2025-04-24

## 2025-04-24 RX ORDER — POTASSIUM CHLORIDE 1500 MG/1
40 TABLET, EXTENDED RELEASE ORAL EVERY 4 HOURS
Status: COMPLETED | OUTPATIENT
Start: 2025-04-24 | End: 2025-04-24

## 2025-04-24 RX ORDER — CLOPIDOGREL BISULFATE 75 MG/1
75 TABLET ORAL DAILY
Qty: 90 TABLET | Refills: 3 | Status: SHIPPED | OUTPATIENT
Start: 2025-04-24 | End: 2026-04-19

## 2025-04-24 RX ORDER — FUROSEMIDE 40 MG/1
40 TABLET ORAL DAILY
Qty: 90 TABLET | Refills: 0 | Status: SHIPPED | OUTPATIENT
Start: 2025-04-25 | End: 2025-04-24

## 2025-04-24 RX ORDER — CLOPIDOGREL BISULFATE 75 MG/1
600 TABLET ORAL ONCE
Status: COMPLETED | OUTPATIENT
Start: 2025-04-24 | End: 2025-04-24

## 2025-04-24 RX ORDER — SPIRONOLACTONE 25 MG/1
25 TABLET ORAL DAILY
Qty: 90 TABLET | Refills: 1 | Status: SHIPPED | OUTPATIENT
Start: 2025-04-25

## 2025-04-24 NOTE — PLAN OF CARE
Received pt at shift change. AxOx2. Room air. Denies pain/SOB. Vitals stable. NSR on tele. Wife at bedside.  See flowsheets for additional assessments.   Pt updated on POC. Call light within reach. All needs met at this time.     Plan:  -PO lasix daily  -Discharge planning    Problem: Patient/Family Goals  Goal: Patient/Family Long Term Goal  Description: Patient's Long Term Goal: stay out of hospital 4-22    POC  - med compliance, follow ups    Outcome: Progressing  Goal: Patient/Family Short Term Goal  Description: Patient's Short Term Goal: no pain 4-22    POC  - PRN pain, tell RN if in pain  Outcome: Progressing     Problem: CARDIOVASCULAR - ADULT  Goal: Maintains optimal cardiac output and hemodynamic stability  Description: INTERVENTIONS:- Monitor vital signs, rhythm, and trends- Monitor for bleeding, hypotension and signs of decreased cardiac output- Evaluate effectiveness of vasoactive medications to optimize hemodynamic stability- Monitor arterial and/or venous puncture sites for bleeding and/or hematoma- Assess quality of pulses, skin color and temperature- Assess for signs of decreased coronary artery perfusion - ex. Angina- Evaluate fluid balance, assess for edema, trend weights  Outcome: Progressing  Goal: Absence of cardiac arrhythmias or at baseline  Description: INTERVENTIONS:- Continuous cardiac monitoring, monitor vital signs, obtain 12 lead EKG if indicated- Evaluate effectiveness of antiarrhythmic and heart rate control medications as ordered- Initiate emergency measures for life threatening arrhythmias- Monitor electrolytes and administer replacement therapy as ordered  Outcome: Progressing

## 2025-04-24 NOTE — CM/SW NOTE
Pt's Brillinta is $191.15 per month. Pt can use a free 30 day coupon to get Brillinta free for one month.     Hoa BARBER, LCSW  Discharge Planner

## 2025-04-24 NOTE — PLAN OF CARE
Assumed patient at 1930. Alert and oriented x 4 on room air. Lung sounds diminished bilaterally. Normal sinus on tele. Continent of bowel and bladder. Up standby assist. Patient updated on plan of care and verbalized understanding.     POC: waiting cardiac clearance for discharge    Problem: Patient/Family Goals  Goal: Patient/Family Long Term Goal  Description: Patient's Long Term Goal: stay out of hospital 4-22    POC  - med compliance, follow ups    Outcome: Progressing  Goal: Patient/Family Short Term Goal  Description: Patient's Short Term Goal: no pain 4-22    POC  - PRN pain, tell RN if in pain  Outcome: Progressing     Problem: CARDIOVASCULAR - ADULT  Goal: Maintains optimal cardiac output and hemodynamic stability  Description: INTERVENTIONS:- Monitor vital signs, rhythm, and trends- Monitor for bleeding, hypotension and signs of decreased cardiac output- Evaluate effectiveness of vasoactive medications to optimize hemodynamic stability- Monitor arterial and/or venous puncture sites for bleeding and/or hematoma- Assess quality of pulses, skin color and temperature- Assess for signs of decreased coronary artery perfusion - ex. Angina- Evaluate fluid balance, assess for edema, trend weights  Outcome: Progressing  Goal: Absence of cardiac arrhythmias or at baseline  Description: INTERVENTIONS:- Continuous cardiac monitoring, monitor vital signs, obtain 12 lead EKG if indicated- Evaluate effectiveness of antiarrhythmic and heart rate control medications as ordered- Initiate emergency measures for life threatening arrhythmias- Monitor electrolytes and administer replacement therapy as ordered  Outcome: Progressing

## 2025-04-24 NOTE — DISCHARGE SUMMARY
[place] [facility]  DISCHARGE SUMMARY     Deven Atkinson Patient Status:  Inpatient    1930 MRN [id number]   Location [facility] 8NE-A Attending No att. providers found   Hosp Day # 2 PCP Paulo James MD     Date of Admission: 2025  Date of Discharge: 2025  Discharge Disposition: Home or Self Care    Discharge Diagnosis:   #NSTEMI s/p PCI proximal and distal LAD and proximal ramus  #Hx of CAD s/p PCI   #Ischemic cardiomyopathy with EF 20%  #Acute HFrEF  #Essential hypertension  #Dyslipidemia  #BPH with hx of TURP in 2025  #Cerebrovascular disease  #PUD  #Depression  #Chronic mild normocytic anemia  #Dementia  #Hx of C difficile    History of Present Illness: Deven Atkinson is a 89 year old male with hx of CAD S/P stent in , BPH, hyperlipidemia, stroke, depression presents to the emergency room with chest pain.  Patient reports that only hurts when he coughs.  No diaphoresis, shortness of breath.  Wife states that he does not usually cough and is nonproductive.  No orthopnea or PND.  No fevers, chills, nausea, vomiting.  Other than coughing no other exacerbating factors.    Brief Synopsis: Patient admitted with NSTEMI.  He was started on heparin drip.  He underwent left heart cath with PCI to proximal and distal LAD as well as proximal ramus.  He was started on DAPT.  Echo showed EF 15 to 20% with elevated LVEDP.  He was diuresed.  He improved symptomatically.  No plan for LifeVest or ICD per cardiology given age, poor overall prognosis, confusion and dementia.  He was advised to follow-up with his primary cardiologist in 1 week and if they were having trouble arranging, he was recommended to follow-up with Dr. Gilbert.  He was discharged in stable condition.    Lace+ Score: 82  59-90 High Risk  29-58 Medium Risk  0-28   Low Risk       TCM Follow-Up Recommendation:  LACE > 58: High Risk of readmission after discharge from the hospital.    Procedures during hospitalization:   Left  heart cath    Incidental or significant findings and recommendations (brief descriptions):  Please see brief synopsis above    Lab/Test results pending at Discharge:   None    Consultants:  Cardiology    Discharge Medication List:     Discharge Medications        START taking these medications        Instructions Prescription details   clopidogrel 75 MG Tabs  Commonly known as: Plavix      Take 1 tablet (75 mg total) by mouth daily.   Quantity: 90 tablet  Refills: 3     furosemide 40 MG Tabs  Commonly known as: Lasix  Start taking on: April 25, 2025      Take 1 tablet (40 mg total) by mouth daily.   Quantity: 90 tablet  Refills: 0     metoprolol succinate ER 25 MG Tb24  Commonly known as: Toprol XL  Start taking on: April 25, 2025      Take 1 tablet (25 mg total) by mouth Daily Beta Blocker.   Quantity: 90 tablet  Refills: 1     spironolactone 25 MG Tabs  Commonly known as: Aldactone  Start taking on: April 25, 2025      Take 1 tablet (25 mg total) by mouth daily.   Quantity: 90 tablet  Refills: 1            CONTINUE taking these medications        Instructions Prescription details   alfuzosin ER 10 MG Tb24  Commonly known as: Uroxatral ER      Take 1 tablet (10 mg total) by mouth in the morning.   Refills: 0     aspirin 81 MG Chew      Chew 1 tablet (81 mg total) by mouth in the morning.   Refills: 0     atorvastatin 80 MG Tabs  Commonly known as: Lipitor      Take 1 tablet (80 mg total) by mouth in the morning.   Refills: 0     DULoxetine 60 MG Cpep  Commonly known as: Cymbalta      Take 1 capsule (60 mg total) by mouth daily.   Quantity: 30 capsule  Refills: 0     mirtazapine 15 MG Tabs  Commonly known as: Remeron      Take 1 tablet (15 mg total) by mouth nightly.   Refills: 0     niacin 500 MG Tabs      Take 1 tablet (500 mg total) by mouth daily with breakfast.   Refills: 0     oxymetazoline 0.05 % Soln  Commonly known as: Nasal Decongestant      1 spray by Nasal route 2 (two) times daily.   Refills: 0      Polyethylene Glycol 3350 17 g Pack  Commonly known as: MIRALAX      Take 17 g by mouth in the morning.   Refills: 0     sodium chloride 0.65 % Soln  Commonly known as: Saline Mist      1 spray by Nasal route every 3 (three) hours as needed for congestion.   Quantity: 30 mL  Refills: 0     sucralfate 1 g Tabs  Commonly known as: Carafate      Take 1 tablet (1 g total) by mouth in the morning and 1 tablet (1 g total) at noon and 1 tablet (1 g total) in the evening. Take before meals.   Refills: 0     zinc sulfate 220 (50 Zn) MG Caps  Commonly known as: Zincate      Take 1 capsule (220 mg total) by mouth in the morning.   Refills: 0               Where to Get Your Medications        These medications were sent to [facility] [address] [place] [address] [address] [phone], [phone] [address] [facility] [address]      Phone: [phone]   clopidogrel 75 MG Tabs  furosemide 40 MG Tabs  metoprolol succinate ER 25 MG Tb24  spironolactone 25 MG Tabs         [place] No [place] substances prescribed at discharge.    Follow-up appointment:   Paulo James MD  [address] [place] [address] [address]  [facility] [address] [phone]    Schedule an appointment as soon as possible for a visit in 1 week(s)      Jaime Pinto MD  [address] [facility]  [address]  [facility] [address] [phone]    Schedule an appointment as soon as possible for a visit in 2 week(s)      Sky Erwin MD  [address] [facility]  [address]TH FLOOR OF [facility] [address] [phone]    Schedule an appointment as soon as possible for a visit in 1 week(s)  For follow-up    Appointments for Next 30 Days 4/24/2025 - 5/24/2025      None            Vital signs:  Temp:  [97.8 °F (36.6 °C)-98.1 °F (36.7 °C)] 98.1 °F (36.7 °C)  Pulse:  [64-76] 71  Resp:  [17-18] 17  BP: (123-134)/(64-91) 134/73  SpO2:  [91 %-98 %] 95 %    Physical Exam:    See progress note dated same  day.  -----------------------------------------------------------------------------------------------  PATIENT DISCHARGE INSTRUCTIONS: See electronic chart    Arnulfo Edmondson DO    Time spent:  34 minutes

## 2025-04-24 NOTE — CM/SW NOTE
Pt over income for services through [facility].     Hoa BARBER, [facility]  Discharge Planner

## 2025-04-24 NOTE — CM/SW NOTE
No available home health options at this time. Expanded HH options in [place].     Hoa HOUSER MSW, LCSW  Discharge Planner

## 2025-04-24 NOTE — PROGRESS NOTES
[facility]   part of [facility]     Hospitalist Progress Note     Deven Atkinson Patient Status:  Observation    1930 MRN [id number]   Location [facility] 8NE-A Attending Aguilar Chavez MD   Hosp Day # 2 PCP Paulo Jamse MD     Chief Complaint: NSTEMI    Subjective:   Patient states he is doing good. Wife at bedside.    Current medications:  Scheduled Medications[1]    Objective:    Review of Systems:   10 point ROS completed and was negative, except for pertinent positive and negatives stated in subjective.    Vital signs:  Temp:  [97.8 °F (36.6 °C)-98 °F (36.7 °C)] 97.8 °F (36.6 °C)  Pulse:  [66-78] 76  Resp:  [18] 18  BP: (123-144)/(64-80) 134/69  SpO2:  [95 %-98 %] 96 %  Patient Weight for the past 72 hrs:   Weight   25 0149 164 lb (74.4 kg)   25 0538 167 lb 12.3 oz (76.1 kg)   25 0613 167 lb 8.8 oz (76 kg)     Physical Exam:    General: No acute distress. Awake and alert  Respiratory: Clear bilaterally. No wheezing  Cardiovascular: S1, S2. Regular rate and rhythm.   Abdomen: Soft, nontender, nondistended.  Positive bowel sounds.   Extremities: No edema.  Neuro: AAOx3    Diagnostic Data:    Labs:  Recent Labs   Lab 25  0158 25  0537   WBC 9.6 8.1   HGB 11.8* 11.3*   MCV 81.4 82.0   .0 195.0   INR 1.04  --      Recent Labs   Lab 25  0158 25  1450 25  0540 25  0453   *  --  135* 119*   BUN 13  --  11 13   CREATSERUM 0.83  --  0.97 0.95   CA 8.9  --  9.3 9.4   ALB 4.0  --   --   --      --  146* 144   K 3.1* 3.9 3.8 3.6  3.6   *  --  111 108   CO2 19.0*  --  23.0 25.0   ALKPHO 124*  --   --   --    AST 31  --   --   --    ALT 24  --   --   --    BILT 0.6  --   --   --    TP 6.8  --   --   --      Estimated Creatinine Clearance: 47.5 mL/min (based on SCr of 0.95 mg/dL).    Recent Labs   Lab 25  0158   PTP 13.7   INR 1.04      Recent Labs   Lab 25  0400 25  0602 25  1146   DENICE  874* 996* 1,328*     Imaging: Imaging data reviewed in Epic.    Medications: Scheduled Medications[2]    Assessment & Plan:      #NSTEMI s/p PCI proximal and distal LAD and proximal ramus  #Hx of CAD s/p PCI 2023  #Ischemic cardiomyopathy with EF 20%  #Acute HFrEF  -Troponin trended up. S/p [facility] 4/21 showing multivessel CAD s/p PCI of proximal and distal LAD and proximal ramus  -ASA, Brilinta, statin, metoprolol  -Echo with EF 15-20%, severe diffuse hypokinesis, grade 2 diastolic dysfunction  -LVEDP elevated, on lasix  -Telemetry  -Cardiology following    #Essential hypertension  -Started on metoprolol, lasix and aldactone     #Dyslipidemia  -Statin    #BPH with hx of TURP in Feb 2025  -Flomax for alfuzosin    #Cerebrovascular disease  -ASA, statin    #PUD  -Carafate    #Depression  -Remeron, duloxetine    #Hypokalemia  -Replace per protocol PRN    #Chronic mild normocytic anemia  -Stable    #Dementia  #Hx of C difficile    At this point Mr. Atkinson is expected to be discharge to: Home    Plan of care discussed with patient, wife and RN.      Arnulfo Edmondson DO    Supplementary Documentation:   DVT Mechanical Prophylaxis:   SCDs,    DVT Pharmacologic Prophylaxis   Medication    heparin (Porcine) 5000 UNIT/ML injection 5,000 Units      DVT Pharmacologic prophylaxis: Aspirin 162 mg       Code Status: DNAR/Comfort Care  Jay: External urinary catheter in place  MAG: 0-1 day              [1]    potassium chloride  40 mEq Oral Q4H    furosemide  40 mg Oral Daily    tamsulosin  0.4 mg Oral Daily @ 0700    aspirin  81 mg Oral QAM    atorvastatin  80 mg Oral Daily    DULoxetine  60 mg Oral Daily    mirtazapine  15 mg Oral Nightly    sucralfate  1 g Oral TID AC    ticagrelor  90 mg Oral BID    metoprolol succinate  25 mg Oral Daily Beta Blocker    spironolactone  25 mg Oral Daily    heparin  5,000 Units Subcutaneous Q8H YRN   [2]    potassium chloride  40 mEq Oral Q4H    furosemide  40 mg Oral Daily    tamsulosin  0.4 mg Oral  Daily @ 0700    aspirin  81 mg Oral QAM    atorvastatin  80 mg Oral Daily    DULoxetine  60 mg Oral Daily    mirtazapine  15 mg Oral Nightly    sucralfate  1 g Oral TID AC    ticagrelor  90 mg Oral BID    metoprolol succinate  25 mg Oral Daily Beta Blocker    spironolactone  25 mg Oral Daily    heparin  5,000 Units Subcutaneous Q8H YRN

## 2025-04-24 NOTE — PAYOR COMM NOTE
--------------  ADMISSION REVIEW   4/22-4/24  Payor: [employer] [place] [employer]  Subscriber #:  [id number]  Authorization Number: [id number]    Admit date: 4/22/25  Admit time:  4:26 PM       REVIEW DOCUMENTATION:    ED Provider Notes signed by Dk Silver MD at 4/22/2025  5:50 AM            Patient Seen in: [facility] Emergency Department      History     Chief Complaint   Patient presents with    Chest Pain Angina     Stated Complaint: chest pain    Subjective:   Patient is a 89-year-old male presenting for evaluation of chest pain.  Patient has a history of hypertension, high cholesterol, dementia.  History from him is somewhat limited although his wife is at bedside to help.  He states that he is having some cough and his chest hurts when he coughs but not in between coughing episodes.  Wife concerns that the cough is unusual for him although it is not productive and he has not had fevers.  He has not complained of shortness of breath.  Objective:     Past Medical History:    BPH (benign prostatic hyperplasia)    Coronary atherosclerosis    Depression    Essential hypertension    Hearing impaired person, bilateral    High blood pressure    High cholesterol    History of stomach ulcers    Hyperlipidemia    Stroke (HCC)    Urinary retention     Physical Exam     ED Triage Vitals [04/22/25 0149]   /69   Pulse 85   Resp 18   Temp 98 °F (36.7 °C)   Temp src Oral   SpO2 94 %   O2 Device None (Room air)       Current Vitals:   Vital Signs  BP: 133/63  Pulse: 84  Resp: 22  Temp: 97.6 °F (36.4 °C)  Temp src: Oral  MAP (mmHg): 84    Oxygen Therapy  SpO2: 97 %  O2 Device: None (Room air)  O2 Flow Rate (L/min): 0 L/min  Pulse Oximetry Type: Continuous  Oximetry Probe Site Changed: Yes  Pulse Ox Probe Location: Left hand        Physical Exam  Vitals and nursing note reviewed.   Constitutional:       Appearance: He is well-developed.   HENT:      Head: Normocephalic and atraumatic.   Eyes:      Conjunctiva/sclera:  Conjunctivae normal.      Pupils: Pupils are equal, round, and reactive to light.   Cardiovascular:      Rate and Rhythm: Normal rate and regular rhythm.      Heart sounds: Normal heart sounds.      Comments: No obvious reproducible chest wall tenderness to palpation.  Pulmonary:      Effort: Pulmonary effort is normal.      Breath sounds: Normal breath sounds.   Abdominal:      General: Bowel sounds are normal.      Palpations: Abdomen is soft.   Musculoskeletal:         General: Normal range of motion.      Cervical back: Normal range of motion and neck supple.   Skin:     General: Skin is warm and dry.   Neurological:      Mental Status: He is alert and oriented to person, place, and time.       ED Course     Labs Reviewed   COMP METABOLIC PANEL (14) - Abnormal; Notable for the following components:       Result Value    Glucose 140 (*)     Potassium 3.1 (*)     Chloride 113 (*)     CO2 19.0 (*)     Calculated Osmolality 302 (*)     Alkaline Phosphatase 124 (*)     All other components within normal limits   CBC WITH DIFFERENTIAL WITH PLATELET - Abnormal; Notable for the following components:    HGB 11.8 (*)     HCT 35.1 (*)     Lymphocyte Absolute 4.14 (*)     All other components within normal limits   TROPONIN I HIGH SENSITIVITY - Abnormal; Notable for the following components:    Troponin I (High Sensitivity) 787 (*)     All other components within normal limits   LIPID PANEL - Abnormal; Notable for the following components:    HDL Cholesterol 33 (*)     All other components within normal limits   TROPONIN I HIGH SENSITIVITY - Abnormal; Notable for the following components:    Troponin I (High Sensitivity) 874 (*)     All other components within normal limits   PROTHROMBIN TIME (PT) - Normal   PTT, ACTIVATED - Normal   SARS-COV-2/FLU A AND B/RSV BY PCR (GENEXPERT) - Normal    Narrative:     This test is intended for the qualitative detection and differentiation of SARS-CoV-2, influenza A, influenza B, and  respiratory syncytial virus (RSV) viral RNA in nasopharyngeal or nares swabs from individuals suspected of respiratory viral infection consistent with COVID-19 by their healthcare provider. Signs and symptoms of respiratory viral infection due to SARS-CoV-2, influenza, and RSV can be similar.    Test performed using the Xpert Xpress SARS-CoV-2/FLU/RSV (real time RT-PCR)  assay on the GeneXpert instrument, [employer], [place] [address].   This test is being used under the Food and Drug Administration's Emergency Use Authorization.    The authorized Fact Sheet for Healthcare Providers for this assay is available upon request from the laboratory.   PTT, ACTIVATED   TROPONIN I HIGH SENSITIVITY   TROPONIN I HIGH SENSITIVITY   RAINBOW DRAW LAVENDER   RAINBOW DRAW LIGHT GREEN   RAINBOW DRAW BLUE     EKG    Rate, intervals and axes as noted on EKG Report.  Rate: 91  Rhythm: Sinus Rhythm  Reading: Sinus rhythm with first-degree AV block, left bundle branch block, left axis deviation.  No other ST segment or T wave changes.  No significant change compared to prior EKG from 3/10/2025.      Heart Score:    HEART Score      Title      Criteria Score   Age: 65 and older Age Score: 2   History: Slightly Suspicious Hx Score: 0     EKG: Non-Spec Changes EKG Score: 1   HTN: Yes   Hypercholesterolemia: Yes   Atherosclerosis/PVD: Yes     DM: No   BMI>30kg/m2: No   Smoking: No   Family History: No         Other Risk Factor Score: 3          HEART Score: 7        Risk of adverse cardiac event is 50-65%          MDM      89-year-old male presenting for evaluation of chest pain.  History from him is somewhat limited due to his dementia but his wife reports he has been coughing since yesterday complains of chest pain with the coughing.  He tells me he does not have it unless he is coughing.  Suggestive of musculoskeletal pain although is not clearly reproducible with palpation anywhere in the chest wall.  No crepitus or focal deformity.   Comfortable and says he has no pain right now.  EKG has left bundle branch block with left axis deviation but this is old.  Do not see any history of stress test results here.  Along with chest wall pain differential clues pneumonia, pneumothorax, ACS.  Labs ordered and will follow-up on the results.      Update at 2:50 AM.  Troponin is elevated at 787.  A month ago he was here and had very slight troponin elevations in the 50s and 60s but not this high.  With this elevation and history of CAD he will need to be admitted and at least have his troponins followed and seen by cardiology.  Discussed this result as well as plan for admission with the patient and his wife.  She reports his stent was done in December 2023 at a hospital in [place].  Will see if those records are available in Care Everywhere.  Has not had a stress test since then that she recalls.    Admission disposition: 4/22/2025  3:05 AM           Past Medical History-hypertension, CAD, dementia    Differential diagnosis before testing included ACS    Co-morbidities that add to the complexity of management include: None    Testing ordered during this visit included labs, chest x-ray      External chart review showed reviewed old EKG and cardiology testing    History obtained by an independent source included from wife at bedside    Discussion of management with hospitalist, cardiology      Medications Provided: Aspirin, nitroglycerin    Disposition:    Admission  I have discussed with the patient the results of test, differential diagnosis, and treatment plan. They expressed clear understanding of these instructions and agrees to the plan provided.       Disposition and Plan     Clinical Impression:  1. Chest pain with moderate risk for cardiac etiology    2. Elevated troponin    3. Hypokalemia         Disposition:  Admit  4/22/2025  3:05 am     Hospital Problems       Present on Admission  Date Reviewed: 4/16/2025          ICD-10-CM Noted POA    *  (Principal) Chest pain with moderate risk for cardiac etiology R07.9 4/22/2025 Unknown    Elevated troponin R79.89 4/22/2025 Unknown    Hypokalemia E87.6 4/22/2025 Unknown                 4/22 H&P      Deven Atkinson is a 89 year old male with hx of CAD S/P stent in 2023, BPH, hyperlipidemia, stroke, depression presents to the emergency room with chest pain.  Patient reports that only hurts when he coughs.  No diaphoresis, shortness of breath.  Wife states that he does not usually cough and is nonproductive.  No orthopnea or PND.  No fevers, chills, nausea, vomiting.  Other than coughing no other exacerbating factors     Assessment & Plan:  #NSTEMI  - Will trend troponins  - echo in the am  - Cardiology consult     # CAD  - Hx of stent in 2023  - continue ASA, stain     # BPH  - Will continue on BPH.           4/22 Cardiology      Reason for consult: Chest pain, NSTEMI     Deven Atkinson is a 89 year old male who presented to [facility] on 4/22/2025 with chest pain. Began last night, states all over the chest and his entire body was aching. He felt feverish though no documented fever. Also with significant SOB. No cough. Currently feels comfortable. No edema, palps, LH or syncope. He is mostly sedentary these days, but no recent exertional CP. Wife reports he had a stent at [facility] in 2023, though she does not have a stent card or any other details. Said it wasn't a heart attack or a \"100%\" blockage. He also had a Linq placed in 12/2023, his wife states had a small stroke at the time. Per outpatient EP note from Dr. Pinto, there was a questionable 12 min of Afib, but not confirmed with EGMs and he was not started on OAC. He does have a baseline LBBB.       Impression:  89 year old male presenting with chest pain and SOB  Positive troponin, consistent with NSTEMI  CAD h/o PCI 2023, unclear details   H/o TIA, s/p LINQ 12/2023 - reported possible 12 min Afib, though not confirmed and not on OAC per  outpatient EP Dr. Pinto  Possible PNA on CXR  HTN  HLD  Baseline LBBB  Depressions - h/o suicidal ideation  Mandarin speaking     Recommendations:  Discussed symptoms and test results and options for management including medical therapy alone or coronary angiography, either with CT or cardiac catheterization. Given symptoms and positive troponin, we have mutually agreed to proceed with cardiac catheterization and coronary angiography for definitive evaluation, with possible PCI.   ASA, statin   BB, ARB next   Check echo  Consider Abx, defer to hosp          OPERATIVE REPORT - CARDIAC CATHETERIZATION     Date of Procedure: 4/22/2025      Performing Physician: Jero Gilbert MD     Pre-Procedure Diagnosis:   NSTEMI     Post-Procedure Diagnosis:  Same as pre      Findings:  Left main: No stenosis  LAD: Proximal 90%, spans a small diagonal with ostial , faint left to left retrograde collaterals, distal LAD 75%  Ramus: Proximal 90%  Left circumflex: Supplies several smaller OM branches, luminal irregularities    RCA: Dominant to PDA. Mid 75%, distal 90%  Hemodynamics: LVEDP 30 mmHg. No LV-Ao gradient on pullback.      Conclusions / Recommendations:  Multivessel CAD s/p PCI of proximal and distal LAD and proximal ramus (likely culprit in NSTEMI). Medical therapy for the small diagonal branch  and RCA for now.. Consider staged PCI of RCA if further symptoms.  Consider need for diuresis given elevated LVEDP consistent with CHF. Will follow up echo results.  DAPT: ASA, Brilinta at least 1 year  Continue medical therapy, statin  Remove the TR band per protocol     -----------------------     Procedures performed:   1. Left heart catheterization  2. Selective bilateral coronary angiography  3. Moderate sedation  4. Access of right radial artery  5. PCI proximal and distal LAD, proximal ramus  6. IVUS LAD and ramus     Indications: This is a 89 year old male presenting for left heart catheterization with coronary  angiography to evaluate for obstructive CAD.             4/23    Chief Complaint: NSTEMI   Does have some SOB.       Temp:  [97.7 °F (36.5 °C)-98.4 °F (36.9 °C)] 98.4 °F (36.9 °C)  Pulse:  [67-98] 85  Resp:  [16-24] 21  BP: (135-144)/() 141/68  SpO2:  [91 %-99 %] 95 %           Recent Labs   Lab 04/22/25  0158 04/23/25  0537   WBC 9.6 8.1   HGB 11.8* 11.3*   MCV 81.4 82.0   .0 195.0   INR 1.04  --             Recent Labs   Lab 04/22/25  0158 04/22/25  1450 04/23/25  0540   *  --  135*   BUN 13  --  11   CREATSERUM 0.83  --  0.97   CA 8.9  --  9.3   ALB 4.0  --   --      --  146*   K 3.1* 3.9 3.8   *  --  111   CO2 19.0*  --  23.0   ALKPHO 124*  --   --    AST 31  --   --    ALT 24  --   --    BILT 0.6  --   --    TP 6.8  --   --       Estimated Creatinine Clearance: 46.6 mL/min (based on SCr of 0.97 mg/dL).         Recent Labs   Lab 04/22/25  0158   PTP 13.7   INR 1.04         Recent Labs   Lab 04/22/25  0400 04/22/25  0602 04/22/25  1146   TROPHS 874* 996* 1,328*     Assessment & Plan:  #NSTEMI s/p PCI proximal and distal LAD and proximal ramus  #Hx of CAD s/p PCI 2023  -Troponin trended up. S/p [facility] 4/21 showing multivessel CAD s/p PCI of proximal and distal LAD and proximal ramus  -ASA, Brilinta, statin, metoprolol  -LVEDP elevated, may need diuresis pending echo  -Telemetry  -Cardiology following     #Essential hypertension  -Started on metoprolol and aldactone      #Dyslipidemia  -Statin     #BPH with hx of TURP in Feb 2025  -Flomax for alfuzosin     #Cerebrovascular disease  -ASA, statin     #PUD  -Carafate     #Depression  -Remeron, duloxetine     #Hypokalemia  -Replace per protocol PRN     #Chronic mild normocytic anemia  -Stable     #Dementia  #Hx of C difficile         4/24 Cardiology      Reason for consult: Chest pain, NSTEMI     Events: Reports sporadic chest pain and SOB. But appears very comfortable in bed.      Primary cardiologist: Dr. Erwin ([facility])      History  of Present Illness:  Deven Atkinson is a 89 year old male who presented to [facility] on 4/22/2025 with chest pain. Began last night, states all over the chest and his entire body was aching. He felt feverish though no documented fever. Also with significant SOB. No cough. Currently feels comfortable. No edema, palps, LH or syncope. He is mostly sedentary these days, but no recent exertional CP. Wife reports he had a stent at [facility] in 2023, though she does not have a stent card or any other details. Said it wasn't a heart attack or a \"100%\" blockage. He also had a Linq placed in 12/2023, his wife states had a small stroke at the time. Per outpatient EP note from Dr. Pinto, there was a questionable 12 min of Afib, but not confirmed with EGMs and he was not started on OAC. He does have a baseline LBBB.       Impression:  89 year old male presenting with chest pain and SOB  Positive troponin, consistent with NSTEMI  CAD  Cath 4/22/25 MV CAD s/p PCI TALISHA x 2 prox distal LAD and prox ramus, RCA med rx  h/o PCI 2023, unclear details   Ischemic CM - EF 20%  Acute HFrEF  H/o TIA, s/p LINQ 12/2023 - reported possible 12 min Afib, though not confirmed and not on OAC per outpatient EP Dr. Pinto  Possible PNA on CXR  HTN  HLD  Baseline LBBB  Depressions - h/o suicidal ideation  Mandarin speaking     Recommendations:  ASA, statin   Lasix IV today. Appears comfortable currently. He pulled out his own IV. Will plan for lasix 40mg PO daily tmrw.   GDMT: Toprol, peterson started. Unclear allergy to ARB.   No plan for Lifevest or ICD given age and poor overall prognosis, confusion and dementia        4/24      Temp:  [97.8 °F (36.6 °C)-98 °F (36.7 °C)] 97.8 °F (36.6 °C)  Pulse:  [66-78] 76  Resp:  [18] 18  BP: (123-144)/(64-80) 134/69  SpO2:  [95 %-98 %] 96 %      Lab 04/22/25  0158 04/22/25  1450 04/23/25  0540 04/24/25  0453   *  --  135* 119*   BUN 13  --  11 13   CREATSERUM 0.83  --  0.97 0.95   CA 8.9  --   9.3 9.4   ALB 4.0  --   --   --      --  146* 144   K 3.1* 3.9 3.8 3.6  3.6   *  --  111 108   CO2 19.0*  --  23.0 25.0     Assessment & Plan:  #NSTEMI s/p PCI proximal and distal LAD and proximal ramus  #Hx of CAD s/p PCI 2023  #Ischemic cardiomyopathy with EF 20%  #Acute HFrEF  -Troponin trended up. S/p [facility] 4/21 showing multivessel CAD s/p PCI of proximal and distal LAD and proximal ramus  -ASA, Brilinta, statin, metoprolol  -Echo with EF 15-20%, severe diffuse hypokinesis, grade 2 diastolic dysfunction  -LVEDP elevated, on lasix  -Telemetry  -Cardiology following     #Essential hypertension  -Started on metoprolol, lasix and aldactone      #Dyslipidemia  -Statin     #BPH with hx of TURP in Feb 2025  -Flomax for alfuzosin     #Cerebrovascular disease  -ASA, statin     #PUD  -Carafate     #Depression  -Remeron, duloxetine     #Hypokalemia  -Replace per protocol PRN     #Chronic mild normocytic anemia  -Stable     #Dementia  #Hx of C difficile          Medications 04/22/25 04/23/25 04/24/25              furosemide (Lasix) 10 mg/mL injection 20 mg  Dose: 20 mg  Freq: Once Route: IV  Start: 04/22/25 1415 End: 04/22/25 1436    1436 NORBERTO-Given            furosemide (Lasix) 10 mg/mL injection 20 mg  Dose: 20 mg  Freq: Once Route: IV  Start: 04/22/25 1345 End: 04/22/25 1346    1346 NORBERTO-Given            furosemide (Lasix) 10 mg/mL injection 40 mg  Dose: 40 mg  Freq: 2 times daily (diuretic) Route: IV  Start: 04/23/25 1115 End: 04/23/25 1806     1242 SH-Given   1806-D/C'd                   heparin (Porcine) 1000 UNIT/ML injection - BOLUS IV 4,500 Units  Dose: 60 Units/kg  Weight Dosing Info: 74.4 kg  Freq: Once Route: IV  Start: 04/22/25 0338 End: 04/22/25 0434   Admin Instructions:   BOLUS dose for Heparin ACS/A-fib  Maximum bolus dose = 5000 units    0434 JM/FABY-Given            heparin (Porcine) 91953 units/250mL infusion ED INITIAL DOSE  Dose: 12 Units/kg/hr  Weight Dosing Info: 74.4 kg  Freq: Once Route:  IV  Last Dose: Stopped (04/22/25 1000)  Start: 04/22/25 0338 End: 04/22/25 1000   Admin Instructions:   Max initial heparin dose is 1000 units/hour    0435 JM/FABY-New Bag   0505 FABY/FABY-Handoff   0753 MD/NORBERTO-Handoff     1000 NORBERTO-Stopped [C]                       potassium chloride (Klor-Con M20) tab 40 mEq  Dose: 40 mEq  Freq: Every 4 hours Route: OR  Start: 04/24/25 0630 End: 04/24/25 1429   Admin Instructions:   Do not crush      0632 AW-Given   1030   1429-D/C'd      potassium chloride (Klor-Con M20) tab 40 mEq  Dose: 40 mEq  Freq: Once Route: OR  Start: 04/23/25 0815 End: 04/23/25 0829   Admin Instructions:   Do not crush     0829 SH-Given           potassium chloride (Klor-Con M20) tab 40 mEq  Dose: 40 mEq  Freq: Every 4 hours Route: OR  Start: 04/22/25 0800 End: 04/22/25 1246   Admin Instructions:   Do not crush    0843 NORBERTO-Given   1246 NORBERTO-Given           potassium chloride (Klor-Con M20) tab 40 mEq  Dose: 40 mEq  Freq: Once Route: OR  Start: 04/22/25 0305 End: 04/22/25 0329   Admin Instructions:   Do not crush    0329 JM-Given                Vitals (last day)       Date/Time Temp Pulse Resp BP SpO2 Weight O2 Device O2 Flow Rate (L/min) Who    04/24/25 0755 98 °F (36.7 °C) 64 18 125/91 91 % -- None (Room air) 0 L/min GA    04/24/25 0406 97.8 °F (36.6 °C) 76 18 134/69 96 % -- None (Room air) 0 L/min ZK    04/23/25 2337 98 °F (36.7 °C) 66 18 133/70 97 % -- None (Room air) 0 L/min ZK    04/23/25 1957 97.8 °F (36.6 °C) 70 18 123/64 98 % -- None (Room air) 0 L/min ZK    04/23/25 1600 98 °F (36.7 °C) 73 18 137/67 95 % -- None (Room air) -- NC    04/23/25 1258 97.9 °F (36.6 °C) 78 18 144/69 95 % -- None (Room air) -- NC    04/23/25 0844 97.9 °F (36.6 °C) 68 18 138/80 97 % -- None (Room air) -- NC    04/23/25 0400 98.4 °F (36.9 °C) -- 21 -- -- -- -- -- EJ

## 2025-04-24 NOTE — PROGRESS NOTES
[facility] Cardiology  Consultation Note      Deven Atkinson Patient Status:  Observation    1930 MRN [id number]   Location [facility] 8NE-A Attending Aguilar Chavez MD   Hosp Day # 2 PCP Paulo James MD     Reason for consult: Chest pain, NSTEMI    Events: No recurrent CP or SOB. Wants to go home.     Primary cardiologist: Dr. Erwin ([facility])     History of Present Illness:  Deven Atkinson is a 89 year old male who presented to [facility] on 2025 with chest pain. Began last night, states all over the chest and his entire body was aching. He felt feverish though no documented fever. Also with significant SOB. No cough. Currently feels comfortable. No edema, palps, LH or syncope. He is mostly sedentary these days, but no recent exertional CP. Wife reports he had a stent at [facility] in , though she does not have a stent card or any other details. Said it wasn't a heart attack or a \"100%\" blockage. He also had a Linq placed in 2023, his wife states had a small stroke at the time. Per outpatient EP note from Dr. Pinto, there was a questionable 12 min of Afib, but not confirmed with EGMs and he was not started on OAC. He does have a baseline LBBB.     Medications:  Current Hospital Medications[1]    Past Medical History[2]    Past Surgical History[3]    Family History  He was adopted. Family history is unknown by patient.    Social History   reports that he has quit smoking. His smoking use included cigarettes. He has never been exposed to tobacco smoke. He has never used smokeless tobacco. He reports that he does not currently use alcohol. He reports that he does not use drugs.     Allergies  Allergies[4]    Review of Systems:  As per HPI, otherwise 10 point ROS is negative in detail.    Physical Exam:  Blood pressure 134/73, pulse 71, temperature 98.1 °F (36.7 °C), temperature source Oral, resp. rate 17, height 69\", weight 167 lb 8.8 oz (76 kg), SpO2  95%.  Temp (24hrs), Av °F (36.7 °C), Min:97.8 °F (36.6 °C), Max:98.1 °F (36.7 °C)    Wt Readings from Last 3 Encounters:   25 167 lb 8.8 oz (76 kg)   25 164 lb (74.4 kg)   25 160 lb 15 oz (73 kg)       General: Awake and alert; in no acute distress  HEENT: Extraocular movements are intact; sclerae are anicteric; scalp is atraumatic  Neck: Supple; no JVD; no carotid bruits  Cardiac: Regular rate and regular rhythm; normal S1 and S2, no murmurs, rubs, or gallops are appreciated  Lungs: Clear to auscultation bilaterally; no accessory muscle use is noted, no wheezes, rhonci or rales  Abdomen: Soft, non-distended, non-tender; bowel sounds are normoactive  Extremities: Warm, no edema, clubbing or cyanosis; moves all 4 extremities normally, distal pulses intact and equal  Psychiatric: Normal mood and affect; answers questions appropriately  Dermatologic: No rashes; normal skin turgor    Diagnostic testing:    Labs:   Lab Results   Component Value Date    INR 1.04 2025    INR 0.99 2025          Lab Results   Component Value Date    CREATSERUM 0.95 2025    BUN 13 2025     2025    K 4.6 2025     2025    CO2 25.0 2025     2025    CA 9.4 2025       Cardiac diagnostics:    CXR 2025  CONCLUSION:  Preliminary reading provided by radiologist from [facility].  Stable moderate cardiac enlargement without overt CHF.  Bilateral mid-lower lung opacities greater on the left concerning for developing pneumonia. No sizable effusion, but   small effusion difficult to exclude on portable chest x-ray. No evidence for pneumothorax.  Consider upright PA and lateral examination of the chest, when tolerated.       EKG 2025:   Sinus rhythm with 1st degree A-V block with Fusion complexes   Possible Left atrial enlargement   Left axis deviation   Left bundle branch block     Echo 2024:  The left ventricular cavity size is normal.  Left ventricular wall thickness   is mildly increased. The estimated left ventricular ejection fraction is   60-65%.   Mild aortic regurgitation.   Mild tricuspid regurgitation with normal pulmonary pressures.   No evidence of intra-atrial communication by color doppler or agitated   saline injection.     Impression:  89 year old male presenting with chest pain and SOB  Positive troponin, consistent with NSTEMI  CAD  Cath 4/22/25 MV CAD s/p PCI TALISHA x 2 prox distal LAD and prox ramus, RCA med rx  h/o PCI 2023, unclear details   Ischemic CM - EF 20%  Acute HFrEF - LVEDP ~ 30, SOB improved with lasix  H/o TIA, s/p LINQ 12/2023 - reported possible 12 min Afib, though not confirmed and not on OAC per outpatient EP Dr. Pinto  Possible PNA on CXR  HTN  HLD  Baseline LBBB  Depressions - h/o suicidal ideation  Mandarin speaking    Recommendations:  ASA 81mg daily  Per SW, Brilinta ~ 200 dollars a month. Will reload with plavix today and start 75mg daily tmrw.  Atorvastatin 80mg qhs  S/p IV Lasix. Start PO today.   GDMT: Toprol, peterson. Unclear allergy to ARB. SGLT2i as outpatient if able.    No plan for Lifevest or ICD given age and poor overall prognosis, confusion and dementia  Ok for dc. Advised wife and patient to contact his primary cardiologist Dr. Erwin for appt in the next 1 week. If they have trouble arranging, I did provide my office info.     Thank you for allowing our practice to participate in the care of your patient. Please do not hesitate to contact me if you have any questions.    Jero Gilbert MD  Interventional Cardiology  [facility]  Office: [phone]         [1]   Current Facility-Administered Medications   Medication Dose Route Frequency    furosemide (Lasix) tab 40 mg  40 mg Oral Daily    tamsulosin (Flomax) cap 0.4 mg  0.4 mg Oral Daily @ 0700    aspirin chewable tab 81 mg  81 mg Oral QAM    atorvastatin (Lipitor) tab 80 mg  80 mg Oral Daily    DULoxetine (Cymbalta) DR cap 60 mg  60 mg Oral  Daily    melatonin tab 3 mg  3 mg Oral Nightly PRN    ondansetron (Zofran) 4 MG/2ML injection 4 mg  4 mg Intravenous Q6H PRN    metoclopramide (Reglan) 5 mg/mL injection 5 mg  5 mg Intravenous Q8H PRN    acetaminophen (Tylenol Extra Strength) tab 500 mg  500 mg Oral Q4H PRN    polyethylene glycol (PEG 3350) (Miralax) 17 g oral packet 17 g  17 g Oral Daily PRN    sennosides (Senokot) tab 17.2 mg  17.2 mg Oral Nightly PRN    bisacodyl (Dulcolax) 10 MG rectal suppository 10 mg  10 mg Rectal Daily PRN    fleet enema (Fleet) rectal enema 133 mL  1 enema Rectal Once PRN    mirtazapine (Remeron) tab 15 mg  15 mg Oral Nightly    sucralfate (Carafate) tab 1 g  1 g Oral TID AC    metoprolol succinate ER (Toprol XL) 24 hr tab 25 mg  25 mg Oral Daily Beta Blocker    spironolactone (Aldactone) tab 25 mg  25 mg Oral Daily    heparin (Porcine) 5000 UNIT/ML injection 5,000 Units  5,000 Units Subcutaneous Q8H YRN   [2]   Past Medical History:   BPH (benign prostatic hyperplasia)    Coronary atherosclerosis    Depression    Essential hypertension    Hearing impaired person, bilateral    High blood pressure    High cholesterol    History of stomach ulcers    Hyperlipidemia    Stroke (HCC)    Urinary retention   [3]   Past Surgical History:  Procedure Laterality Date    Cath bare metal stent (bms)      Colonoscopy      Surgical stent      wife states Lft side of heart- [facility]   [4]   Allergies  Allergen Reactions    Losartan UNKNOWN     Unable to remember reaction

## 2025-04-24 NOTE — PROGRESS NOTES
Explained discharge instructions including medications and follow-ups to the pt w spouse present. Verbalized understanding, tele monitor discontinued. Will be transported via wheelchair.

## 2025-04-25 LAB
ATRIAL RATE: 67 BPM
ATRIAL RATE: 85 BPM
ATRIAL RATE: 91 BPM
P AXIS: 54 DEGREES
P AXIS: 56 DEGREES
P AXIS: 67 DEGREES
P-R INTERVAL: 162 MS
P-R INTERVAL: 208 MS
P-R INTERVAL: 230 MS
Q-T INTERVAL: 398 MS
Q-T INTERVAL: 410 MS
Q-T INTERVAL: 462 MS
QRS DURATION: 134 MS
QRS DURATION: 136 MS
QRS DURATION: 140 MS
QTC CALCULATION (BEZET): 487 MS
QTC CALCULATION (BEZET): 488 MS
QTC CALCULATION (BEZET): 489 MS
R AXIS: -37 DEGREES
R AXIS: -5 DEGREES
R AXIS: 3 DEGREES
T AXIS: 134 DEGREES
T AXIS: 193 DEGREES
T AXIS: 2 DEGREES
VENTRICULAR RATE: 67 BPM
VENTRICULAR RATE: 85 BPM
VENTRICULAR RATE: 91 BPM

## 2025-04-25 NOTE — PROGRESS NOTES
Mrs. Atkinson called this a.m.  She went to [facility] to  patient's medication.  Was told meds were filled elsewhere.  Called [facility] and was told they were unable to fill Rxs because they were filled elsewhere.  Pharmacy tech called [facility] insurance to find out where Rx went.  Returned call.  Sent to [facility].  Called [facility].  They were going to reverse the claim so [facility] could fill the Rxs.  Called [facility] back to update them and then called spouse to inform them.  Was too soon to fill Plavix but spouse said they have that at home.

## 2025-04-29 NOTE — PAYOR COMM NOTE
DISCHARGE REVIEW    Payor: [employer] [facility]  Subscriber #:  [id number]  Authorization Number: [id number]    Admit date: 25  Admit time:   4:26 PM  Discharge Date: 2025  4:29 PM     Admitting Physician: Paulo Ayala MD  Attending Physician:  No att. providers found  Primary Care Physician: Paulo James MD          Discharge Summary Notes        Discharge Summary signed by Arnulfo Edmondson DO at 2025  5:05 PM       Author: Arnulfo Edmondsno DO Specialty: HOSPITALIST, Internal Medicine Author Type: Physician    Filed: 2025  5:05 PM Date of Service: 2025  5:02 PM Status: Signed    : Arnulfo Edmondson DO (Physician)           [facility]     Deven Atkinson Patient Status:  Inpatient    1930 MRN [id number]   Location [address]- Attending No att. providers found   Hosp Day # 2 PCP Paulo James MD     Date of Admission: 2025  Date of Discharge: 2025  Discharge Disposition: Home or Self Care    Discharge Diagnosis:   #NSTEMI s/p PCI proximal and distal LAD and proximal ramus  #Hx of CAD s/p PCI   #Ischemic cardiomyopathy with EF 20%  #Acute HFrEF  #Essential hypertension  #Dyslipidemia  #BPH with hx of TURP in 2025  #Cerebrovascular disease  #PUD  #Depression  #Chronic mild normocytic anemia  #Dementia  #Hx of C difficile    History of Present Illness: Deven Atkinson is a 89 year old male with hx of CAD S/P stent in , BPH, hyperlipidemia, stroke, depression presents to the emergency room with chest pain.  Patient reports that only hurts when he coughs.  No diaphoresis, shortness of breath.  Wife states that he does not usually cough and is nonproductive.  No orthopnea or PND.  No fevers, chills, nausea, vomiting.  Other than coughing no other exacerbating factors.    Brief Synopsis: Patient admitted with NSTEMI.  He was started on heparin drip.  He underwent left heart cath with PCI to proximal and distal LAD  as well as proximal ramus.  He was started on DAPT.  Echo showed EF 15 to 20% with elevated LVEDP.  He was diuresed.  He improved symptomatically.  No plan for LifeVest or ICD per cardiology given age, poor overall prognosis, confusion and dementia.  He was advised to follow-up with his primary cardiologist in 1 week and if they were having trouble arranging, he was recommended to follow-up with Dr. Gilbert.  He was discharged in stable condition.    Lace+ Score: 82  59-90 High Risk  29-58 Medium Risk  0-28   Low Risk       TCM Follow-Up Recommendation:  LACE > 58: High Risk of readmission after discharge from the hospital.    Procedures during hospitalization:   Left heart cath    Incidental or significant findings and recommendations (brief descriptions):  Please see brief synopsis above    Lab/Test results pending at Discharge:   None    Consultants:  Cardiology    Discharge Medication List:     Discharge Medications        START taking these medications        Instructions Prescription details   clopidogrel 75 MG Tabs  Commonly known as: Plavix      Take 1 tablet (75 mg total) by mouth daily.   Quantity: 90 tablet  Refills: 3     furosemide 40 MG Tabs  Commonly known as: Lasix  Start taking on: April 25, 2025      Take 1 tablet (40 mg total) by mouth daily.   Quantity: 90 tablet  Refills: 0     metoprolol succinate ER 25 MG Tb24  Commonly known as: Toprol XL  Start taking on: April 25, 2025      Take 1 tablet (25 mg total) by mouth Daily Beta Blocker.   Quantity: 90 tablet  Refills: 1     spironolactone 25 MG Tabs  Commonly known as: Aldactone  Start taking on: April 25, 2025      Take 1 tablet (25 mg total) by mouth daily.   Quantity: 90 tablet  Refills: 1            CONTINUE taking these medications        Instructions Prescription details   alfuzosin ER 10 MG Tb24  Commonly known as: Uroxatral ER      Take 1 tablet (10 mg total) by mouth in the morning.   Refills: 0     aspirin 81 MG Chew      Chew 1 tablet (81  mg total) by mouth in the morning.   Refills: 0     atorvastatin 80 MG Tabs  Commonly known as: Lipitor      Take 1 tablet (80 mg total) by mouth in the morning.   Refills: 0     DULoxetine 60 MG Cpep  Commonly known as: Cymbalta      Take 1 capsule (60 mg total) by mouth daily.   Quantity: 30 capsule  Refills: 0     mirtazapine 15 MG Tabs  Commonly known as: Remeron      Take 1 tablet (15 mg total) by mouth nightly.   Refills: 0     niacin 500 MG Tabs      Take 1 tablet (500 mg total) by mouth daily with breakfast.   Refills: 0     oxymetazoline 0.05 % Soln  Commonly known as: Nasal Decongestant      1 spray by Nasal route 2 (two) times daily.   Refills: 0     Polyethylene Glycol 3350 17 g Pack  Commonly known as: MIRALAX      Take 17 g by mouth in the morning.   Refills: 0     sodium chloride 0.65 % Soln  Commonly known as: Saline Mist      1 spray by Nasal route every 3 (three) hours as needed for congestion.   Quantity: 30 mL  Refills: 0     sucralfate 1 g Tabs  Commonly known as: Carafate      Take 1 tablet (1 g total) by mouth in the morning and 1 tablet (1 g total) at noon and 1 tablet (1 g total) in the evening. Take before meals.   Refills: 0     zinc sulfate 220 (50 Zn) MG Caps  Commonly known as: Zincate      Take 1 capsule (220 mg total) by mouth in the morning.   Refills: 0               Where to Get Your Medications        These medications were sent to [facility] [address] [place] [address] [address] [phone], [phone]  [facility] [facility] [address]      Phone: [phone]   clopidogrel 75 MG Tabs  furosemide 40 MG Tabs  metoprolol succinate ER 25 MG Tb24  spironolactone 25 MG Tabs         [place] No [place] substances prescribed at discharge.    Follow-up appointment:   Paulo James MD  [address] [facility] [address]  [facility] [address] [phone]    Schedule an appointment as soon as possible for a visit in 1 week(s)      Jaime Pinto MD  [address] [facility]  [address]  [facility] [address]  [phone]    Schedule an appointment as soon as possible for a visit in 2 week(s)      Sky Erwin MD  [address] [place] [address]  4TH FLOOR OF [facility] [id number] [phone]    Schedule an appointment as soon as possible for a visit in 1 week(s)  For follow-up    Appointments for Next 30 Days 4/24/2025 - 5/24/2025      None            Vital signs:  Temp:  [97.8 °F (36.6 °C)-98.1 °F (36.7 °C)] 98.1 °F (36.7 °C)  Pulse:  [64-76] 71  Resp:  [17-18] 17  BP: (123-134)/(64-91) 134/73  SpO2:  [91 %-98 %] 95 %    Physical Exam:    See progress note dated same day.  -----------------------------------------------------------------------------------------------  PATIENT DISCHARGE INSTRUCTIONS: See electronic chart    Arnulfo Edmondson DO    Time spent:  34 minutes    Electronically signed by Arnulfo Edmondson DO on 4/24/2025  5:05 PM         REVIEWER COMMENTS

## 2025-05-07 ENCOUNTER — HOSPITAL ENCOUNTER (OUTPATIENT)
Age: OVER 89
Discharge: HOME | End: 2025-05-07
Payer: COMMERCIAL

## 2025-05-07 VITALS
DIASTOLIC BLOOD PRESSURE: 69 MMHG | SYSTOLIC BLOOD PRESSURE: 157 MMHG | RESPIRATION RATE: 17 BRPM | OXYGEN SATURATION: 98 % | TEMPERATURE: 98 F | HEART RATE: 73 BPM

## 2025-05-07 DIAGNOSIS — T14.8XXA SKIN AVULSION: Primary | ICD-10-CM

## 2025-05-07 PROCEDURE — 99213 OFFICE O/P EST LOW 20 MIN: CPT | Performed by: PHYSICIAN ASSISTANT

## 2025-05-07 NOTE — ED INITIAL ASSESSMENT (HPI)
Per family pt stubbed his great toe of his right foot.  Pt was seen by the foot doctor. Toe keeps bleeding.

## 2025-05-08 NOTE — ED PROVIDER NOTES
Patient Seen in: [facility]      History     Chief Complaint   Patient presents with    Toe Injury     Stated Complaint: Rt toe bleeding    Subjective:   HPI    89 YO male with below stated past medical history presents to immediate care with family members after stubbing his right great toe earlier today.  Family states they came because bleeding persisted.  On anticoagulants. Last Tdap in 2024.    Not a diabetic.         Objective:     Past Medical History:    BPH (benign prostatic hyperplasia)    Coronary atherosclerosis    Depression    Essential hypertension    Hearing impaired person, bilateral    High blood pressure    High cholesterol    History of stomach ulcers    Hyperlipidemia    Stroke (HCC)    Urinary retention              Past Surgical History:   Procedure Laterality Date    Cath bare metal stent (bms)      Colonoscopy      Surgical stent      wife states Lft side of heart- [facility]                Social History     Socioeconomic History    Marital status:    Tobacco Use    Smoking status: Former     Types: Cigarettes     Passive exposure: Never    Smokeless tobacco: Never    Tobacco comments:     QUIT SMOKING IN 2000   Vaping Use    Vaping status: Never Used   Substance and Sexual Activity    Alcohol use: Not Currently    Drug use: Never     Social Drivers of Health     Food Insecurity: No Food Insecurity (4/22/2025)    NCSS - Food Insecurity     Worried About Running Out of Food in the Last Year: No     Ran Out of Food in the Last Year: No   Transportation Needs: No Transportation Needs (4/22/2025)    NCSS - Transportation     Lack of Transportation: No   Housing Stability: Not At Risk (4/22/2025)    NCSS - Housing/Utilities     Has Housing: Yes     Worried About Losing Housing: No     Unable to Get Utilities: No              Review of Systems    Positive for stated complaint: Rt toe bleeding  Other systems are as noted in HPI.  Constitutional and vital  signs reviewed.      All other systems reviewed and negative except as noted above.        Physical Exam     ED Triage Vitals [05/07/25 1853]   /69   Pulse 73   Resp 17   Temp 97.5 °F (36.4 °C)   Temp src Oral   SpO2 98 %   O2 Device None (Room air)       Current Vitals:   Vital Signs  BP: 157/69  Pulse: 73  Resp: 17  Temp: 97.5 °F (36.4 °C)  Temp src: Oral    Oxygen Therapy  SpO2: 98 %  O2 Device: None (Room air)        Physical Exam  Vitals and nursing note reviewed.   Constitutional:       General: He is not in acute distress.     Appearance: Normal appearance. He is not ill-appearing, toxic-appearing or diaphoretic.   Cardiovascular:      Rate and Rhythm: Normal rate.   Pulmonary:      Effort: Pulmonary effort is normal. No respiratory distress.   Skin:     Comments: Superficial skin avulsion to right great toe. Scant bleeding. See image below.   Neurological:      Mental Status: He is alert and oriented to person, place, and time.   Psychiatric:         Behavior: Behavior normal.             ED Course   Labs Reviewed - No data to display        MDM      Patient sustained skin avulsion to right great toe today.  Anticoagulated.  Physical exam and image as above.  Scant bleeding.  Asepsis performed by myself.    Gelfoam, non-adhesive bandage and coban applied.   Home care and return instructions discussed with understanding.      Medical Decision Making      Disposition and Plan     Clinical Impression:  1. Skin avulsion         Disposition:  Discharge  5/7/2025  7:13 pm    Follow-up:  [facility] [address] [address]  [phone]    If symptoms worsen          Medications Prescribed:  Discharge Medication List as of 5/7/2025  7:21 PM          Supplementary Documentation:

## 2025-05-17 ENCOUNTER — APPOINTMENT (OUTPATIENT)
Dept: CT IMAGING | Facility: HOSPITAL | Age: OVER 89
End: 2025-05-17
Attending: EMERGENCY MEDICINE
Payer: COMMERCIAL

## 2025-05-17 ENCOUNTER — HOSPITAL ENCOUNTER (EMERGENCY)
Facility: HOSPITAL | Age: OVER 89
Discharge: HOME | End: 2025-05-17
Attending: EMERGENCY MEDICINE
Payer: COMMERCIAL

## 2025-05-17 VITALS
TEMPERATURE: 99 F | HEART RATE: 67 BPM | DIASTOLIC BLOOD PRESSURE: 60 MMHG | SYSTOLIC BLOOD PRESSURE: 121 MMHG | OXYGEN SATURATION: 99 % | RESPIRATION RATE: 19 BRPM

## 2025-05-17 DIAGNOSIS — N39.0 URINARY TRACT INFECTION WITHOUT HEMATURIA, SITE UNSPECIFIED: Primary | ICD-10-CM

## 2025-05-17 DIAGNOSIS — E87.6 HYPOKALEMIA: ICD-10-CM

## 2025-05-17 LAB
ALBUMIN SERPL-MCNC: 3.8 G/DL (ref 3.2–4.8)
ALBUMIN/GLOB SERPL: 1.4 {RATIO} (ref 1–2)
ALP LIVER SERPL-CCNC: 113 U/L (ref 45–117)
ALT SERPL-CCNC: 15 U/L (ref 10–49)
ANION GAP SERPL CALC-SCNC: 8 MMOL/L (ref 0–18)
AST SERPL-CCNC: 21 U/L (ref ?–34)
BASOPHILS # BLD AUTO: 0.05 X10(3) UL (ref 0–0.2)
BASOPHILS NFR BLD AUTO: 0.6 %
BILIRUB SERPL-MCNC: 0.5 MG/DL (ref 0.2–0.9)
BILIRUB UR QL STRIP.AUTO: NEGATIVE
BUN BLD-MCNC: 17 MG/DL (ref 9–23)
CALCIUM BLD-MCNC: 8.8 MG/DL (ref 8.7–10.6)
CHLORIDE SERPL-SCNC: 110 MMOL/L (ref 98–112)
CLARITY UR REFRACT.AUTO: CLEAR
CO2 SERPL-SCNC: 22 MMOL/L (ref 21–32)
COLOR UR AUTO: YELLOW
CREAT BLD-MCNC: 0.86 MG/DL (ref 0.7–1.3)
EGFRCR SERPLBLD CKD-EPI 2021: 80 ML/MIN/1.73M2 (ref 60–?)
EOSINOPHIL # BLD AUTO: 0.07 X10(3) UL (ref 0–0.7)
EOSINOPHIL NFR BLD AUTO: 0.8 %
ERYTHROCYTE [DISTWIDTH] IN BLOOD BY AUTOMATED COUNT: 14.6 %
GLOBULIN PLAS-MCNC: 2.7 G/DL (ref 2–3.5)
GLUCOSE BLD-MCNC: 153 MG/DL (ref 70–99)
GLUCOSE UR STRIP.AUTO-MCNC: NORMAL MG/DL
HCT VFR BLD AUTO: 33.7 % (ref 39–53)
HGB BLD-MCNC: 11.6 G/DL (ref 13–17.5)
IMM GRANULOCYTES # BLD AUTO: 0.02 X10(3) UL (ref 0–1)
IMM GRANULOCYTES NFR BLD: 0.2 %
KETONES UR STRIP.AUTO-MCNC: NEGATIVE MG/DL
LEUKOCYTE ESTERASE UR QL STRIP.AUTO: 75
LIPASE SERPL-CCNC: 39 U/L (ref 12–53)
LYMPHOCYTES # BLD AUTO: 3.1 X10(3) UL (ref 1–4)
LYMPHOCYTES NFR BLD AUTO: 37.4 %
MCH RBC QN AUTO: 27.2 PG (ref 26–34)
MCHC RBC AUTO-ENTMCNC: 34.4 G/DL (ref 31–37)
MCV RBC AUTO: 78.9 FL (ref 80–100)
MONOCYTES # BLD AUTO: 0.44 X10(3) UL (ref 0.1–1)
MONOCYTES NFR BLD AUTO: 5.3 %
NEUTROPHILS # BLD AUTO: 4.61 X10 (3) UL (ref 1.5–7.7)
NEUTROPHILS # BLD AUTO: 4.61 X10(3) UL (ref 1.5–7.7)
NEUTROPHILS NFR BLD AUTO: 55.7 %
NITRITE UR QL STRIP.AUTO: NEGATIVE
OSMOLALITY SERPL CALC.SUM OF ELEC: 295 MOSM/KG (ref 275–295)
PH UR STRIP.AUTO: 5.5 [PH] (ref 5–8)
PLATELET # BLD AUTO: 181 10(3)UL (ref 150–450)
POTASSIUM SERPL-SCNC: 2.9 MMOL/L (ref 3.5–5.1)
PROT SERPL-MCNC: 6.5 G/DL (ref 5.7–8.2)
PROT UR STRIP.AUTO-MCNC: 20 MG/DL
RBC # BLD AUTO: 4.27 X10(6)UL (ref 3.8–5.8)
RBC UR QL AUTO: NEGATIVE
SODIUM SERPL-SCNC: 140 MMOL/L (ref 136–145)
SP GR UR STRIP.AUTO: 1.03 (ref 1–1.03)
UROBILINOGEN UR STRIP.AUTO-MCNC: NORMAL MG/DL
WBC # BLD AUTO: 8.3 X10(3) UL (ref 4–11)

## 2025-05-17 PROCEDURE — 83690 ASSAY OF LIPASE: CPT | Performed by: EMERGENCY MEDICINE

## 2025-05-17 PROCEDURE — 80053 COMPREHEN METABOLIC PANEL: CPT | Performed by: EMERGENCY MEDICINE

## 2025-05-17 PROCEDURE — 81001 URINALYSIS AUTO W/SCOPE: CPT | Performed by: EMERGENCY MEDICINE

## 2025-05-17 PROCEDURE — 87086 URINE CULTURE/COLONY COUNT: CPT | Performed by: EMERGENCY MEDICINE

## 2025-05-17 PROCEDURE — 99284 EMERGENCY DEPT VISIT MOD MDM: CPT

## 2025-05-17 PROCEDURE — 96374 THER/PROPH/DIAG INJ IV PUSH: CPT

## 2025-05-17 PROCEDURE — 85025 COMPLETE CBC W/AUTO DIFF WBC: CPT | Performed by: EMERGENCY MEDICINE

## 2025-05-17 PROCEDURE — 74177 CT ABD & PELVIS W/CONTRAST: CPT | Performed by: EMERGENCY MEDICINE

## 2025-05-17 RX ORDER — POTASSIUM CHLORIDE 1500 MG/1
40 TABLET, EXTENDED RELEASE ORAL ONCE
Status: COMPLETED | OUTPATIENT
Start: 2025-05-17 | End: 2025-05-17

## 2025-05-17 RX ORDER — CEPHALEXIN 500 MG/1
500 CAPSULE ORAL 3 TIMES DAILY
Qty: 21 CAPSULE | Refills: 0 | Status: SHIPPED | OUTPATIENT
Start: 2025-05-17 | End: 2025-05-24

## 2025-05-17 NOTE — ED QUICK NOTES
Rounding Completed    Plan of Care reviewed with patient and wife at bedside. Waiting for CT result.    Bed is locked and in lowest position

## 2025-05-17 NOTE — ED QUICK NOTES
Okay for discharge per Dr. Grant, script was sent to patient's pharmacy; patient is AOX2 per norm, escorted by wife at bedside, no questions/complaints, independently ambulatory, verbalized understanding of discharge instructions

## 2025-05-17 NOTE — ED QUICK NOTES
Patient was provided with a urinal for urine sample, additional warm blankets were provided; patient and wife at bedside were updated on plan of care with no questions/complaints, awaiting CT

## 2025-05-17 NOTE — ED PROVIDER NOTES
Patient Seen in: [facility] Emergency Department      History     Chief Complaint   Patient presents with    Abdominal Pain     Stated Complaint: Abd Pain    Subjective:   HPI  History of Present Illness            89-year-old male with past medical history of hypertension, hyperlipidemia presents today for evaluation of abdominal pain.  History is supplemented by patient's wife.  Patient complained of some abdominal pain earlier today.  He points diffusely throughout his abdomen as a source of his pain.  He was given warm tea and then Tylenol and his pain is improved.  He has not had any fevers, vomiting or diarrhea.      Objective:     Past Medical History:    BPH (benign prostatic hyperplasia)    Coronary atherosclerosis    Depression    Essential hypertension    Hearing impaired person, bilateral    High blood pressure    High cholesterol    History of stomach ulcers    Hyperlipidemia    Stroke (HCC)    Urinary retention              Past Surgical History:   Procedure Laterality Date    Cath bare metal stent (bms)      Colonoscopy      Surgical stent      wife states Lft side of heart- [facility]                Social History     Socioeconomic History    Marital status:    Tobacco Use    Smoking status: Former     Types: Cigarettes     Passive exposure: Never    Smokeless tobacco: Never    Tobacco comments:     QUIT SMOKING IN 2000   Vaping Use    Vaping status: Never Used   Substance and Sexual Activity    Alcohol use: Not Currently    Drug use: Never     Social Drivers of Health     Food Insecurity: No Food Insecurity (4/22/2025)    NCSS - Food Insecurity     Worried About Running Out of Food in the Last Year: No     Ran Out of Food in the Last Year: No   Transportation Needs: No Transportation Needs (4/22/2025)    NCSS - Transportation     Lack of Transportation: No   Housing Stability: Not At Risk (4/22/2025)    NCSS - Housing/Utilities     Has Housing: Yes     Worried About Losing  Housing: No     Unable to Get Utilities: No                                Physical Exam     ED Triage Vitals [05/17/25 0815]   /66   Pulse 80   Resp 17   Temp 98.6 °F (37 °C)   Temp src Oral   SpO2 97 %   O2 Device None (Room air)       Current Vitals:   Vital Signs  BP: 121/60  Pulse: 67  Resp: 19  Temp: 98.6 °F (37 °C)  Temp src: Oral    Oxygen Therapy  SpO2: 99 %  O2 Device: None (Room air)          Physical Exam  Vitals and nursing note reviewed.   Constitutional:       Appearance: Normal appearance.   HENT:      Head: Normocephalic.      Nose: Nose normal.      Mouth/Throat:      Mouth: Mucous membranes are moist.   Eyes:      Extraocular Movements: Extraocular movements intact.   Cardiovascular:      Rate and Rhythm: Normal rate.   Pulmonary:      Effort: Pulmonary effort is normal.   Abdominal:      General: Abdomen is flat.      Tenderness: There is no abdominal tenderness. There is no guarding or rebound.   Musculoskeletal:         General: Normal range of motion.   Skin:     General: Skin is warm.   Neurological:      General: No focal deficit present.      Mental Status: He is alert.   Psychiatric:         Mood and Affect: Mood normal.               ED Course     Labs Reviewed   CBC WITH DIFFERENTIAL WITH PLATELET - Abnormal; Notable for the following components:       Result Value    HGB 11.6 (*)     HCT 33.7 (*)     MCV 78.9 (*)     All other components within normal limits   COMP METABOLIC PANEL (14) - Abnormal; Notable for the following components:    Glucose 153 (*)     Potassium 2.9 (*)     All other components within normal limits   URINALYSIS WITH CULTURE REFLEX - Abnormal; Notable for the following components:    Protein Urine 20 (*)     Leukocyte Esterase Urine 75 (*)     WBC Urine 21-50 (*)     Squamous Epi. Cells Few (*)     Ca Oxalate Crystals Moderate (*)     All other components within normal limits   LIPASE - Normal   URINE CULTURE, ROUTINE          Results            CT  ABDOMEN+PELVIS(CONTRAST ONLY)(CPT=74177)  Result Date: 5/17/2025  PROCEDURE:  CT ABDOMEN+PELVIS (CONTRAST ONLY) (CPT=74177)  COMPARISON:  [facility] , CT, CT ABDOMEN+PELVIS(CONTRAST ONLY)(CPT=74177), 4/03/2025, 9:33 PM.  INDICATIONS:  Abd Pain  TECHNIQUE:  CT scanning was performed from the dome of the diaphragm to the pubic symphysis with non-ionic intravenous contrast material. Post contrast coronal MPR imaging was performed.  Dose reduction techniques were used. Dose information is transmitted to the ACR (American College of Radiology) NRDR (National Radiology Data Registry) which includes the Dose Index Registry.  PATIENT STATED HISTORY:(As transcribed by Technologist)   c/o abdominal cramping since this morning   CONTRAST USED:  80cc of Isovue 370  FINDINGS:  LUNG BASE:  Bibasilar atelectasis/scarring.  Trace bilateral pleural thickening.  Bilateral pulmonary nodules.  These appears stable with largest in the right lung base measuring 7 mm. . LIVER:  Stable low-attenuation focus in the left lobe of the liver likely representing a cyst.  BILIARY:  Cholelithiasis.  No biliary ductal dilatation. PANCREAS:  Homogeneous enhancement. SPLEEN:  Normal caliber. KIDNEYS:  No hydronephrosis ADRENALS:  Normal. AORTA/VASCULAR:  No aneurysm.  Dense aortoiliac calcification with calcified and atheromatous plaque in the abdominal aorta, common iliac and external iliac arteries bilaterally. RETROPERITONEUM:  No enlarged adenopathy. BOWEL/MESENTERY:  Normal caliber appendix. Uncomplicated colonic diverticulosis.  Moderate degree of stool throughout the colon. ABDOMINAL WALL:  No ventral wall hernia. PELVIC ORGANS:  Wall thickening of the urinary bladder.  The urinary bladder appears incompletely distended.  Recommend clinical correlation to exclude cystitis.  There is a stable 12 x 12 mm probable lymph node anterior to the urinary bladder on the right.  Prostatic calcification.  BONES:  Multilevel endplate hypertrophic changes  involving the thoracic and lumbar spine with multilevel degenerative disc disease.  Mild degenerative changes in the lower lumbar facets.             CONCLUSION:  Incomplete distension of the urinary bladder with bladder wall thickening.  Recommend clinical correlation to exclude cystitis.  CT of the abdomen and pelvis is otherwise stable when compared to 4/3/2025.  LOCATION:  [facility]   Dictated by (CST): Tyesha Lind MD on 5/17/2025 at 10:25 AM     Finalized by (CST): Tyesha Lind MD on 5/17/2025 at 10:32 AM                         [facility]      Differential Diagnosis  89-year-old male presents today for evaluation of improving abdominal pain.  His belly is benign.  Differential would include gastritis, cholelithiasis, cholecystitis, pancreatitis, diverticulitis.  Plan for CT along with labs.    11:40 am  Urine demonstrates UTI.  Patient's potassium was 2.9 and was repleted orally.  Antibiotics were given in the ED.  On reassessment, patient is requesting discharge home.  I advised PCP follow-up reassessment return for any worsening symptoms.  Patient is comfortable with that plan.    External Chart Reviewed  I reviewed an ER note from April 3 the patient presented for abdominal pain.    History from [facility]  Wife helps supplement history        Medical Decision Making      Disposition and Plan     Clinical Impression:  1. Urinary tract infection without hematuria, site unspecified    2. Hypokalemia         Disposition:  Discharge  5/17/2025 11:41 am    Follow-up:  Paulo James MD  [address] [place] [id number]  [phone]    Call in 1 day(s)            Medications Prescribed:  Discharge Medication List as of 5/17/2025 11:44 AM        START taking these medications    Details   cephALEXin 500 MG Oral Cap Take 1 capsule (500 mg total) by mouth 3 (three) times daily for 7 days., Normal, Disp-21 capsule, R-0                   Supplementary Documentation:

## 2025-05-17 NOTE — ED INITIAL ASSESSMENT (HPI)
Presents with wife for c/o abdominal cramping since this morning. Patient is AOX2, disoriented to time, poor historian, when asked about pain replied \"I don't know;\" wife at bedside denies N/V/D, denies urinary problems

## 2025-05-17 NOTE — DISCHARGE INSTRUCTIONS
Follow-up with your primary care doctor within the next week for reassessment.  Return to the ER for any fevers, weakness or severe pain.  Take the antibiotic as prescribed.

## 2025-05-19 ENCOUNTER — PATIENT OUTREACH (OUTPATIENT)
Dept: CASE MANAGEMENT | Age: OVER 89
End: 2025-05-19

## 2025-05-19 RX ORDER — MULTIVIT WITH MINERALS/LUTEIN
2 TABLET ORAL DAILY
COMMUNITY

## 2025-05-19 RX ORDER — CHOLECALCIFEROL (VITAMIN D3) 50 MCG
2000 TABLET ORAL DAILY
COMMUNITY

## 2025-05-19 NOTE — PROGRESS NOTES
Transitions of Care Navigation  Discharge Date: 25  Contact Date: 2025    Transitions of Care Assessment:  HUNTER Initial Assessment    General:  Assessment completed with: Spouse or significant other  Patient Subjective: The spouse reported the patient has been resting at home. The patient has tolerated meals and ambulated with a walker around the house for exercise. The spouse denies any further abdominal pain, but did report continued fatigue. The spouse denies any urinary frequency/urgency, hematuria, dysuria,  malodorous/cloudy urine, pelvic/flank pain or fever. The spouse reported the patient has remained confused. The spouse also denies any difficulty breathing, increased weakness, muscle cramps, dizziness, irregular heart beat, new/increased edema, sudden weight gain, or chest pain. The patient's blood pressure is checked daily at home. The spouse reported a blood pressure of 153/80 with a pulse of 67  (before medications)and a weight of 158 pounds this morning.  Chief Complaint: Urinary tract infection without hematuria, site unspecified    Hypokalemia  Verify patient name and  with patient/ caregiver: Yes    Hospital Stay/Discharge:  Tell me what you understand of why you were in the hospital or emergency department: He had an urinary tract infection.  Prior to leaving the hospital were your Discharge Instructions reviewed with you?: Yes  Did you receive a copy of your written Discharge Instructions?: Yes  What questions do you have about your Discharge Instructions?: Not at this time.  Do you feel better or worse since you left the hospital or emergency department?: Better    Follow - Up Appointment:  Do you have a follow-up appointment?: No  Are there any barriers to getting to your follow-up appointment?: No    Home Health/DME:  Prior to leaving the hospital was Home Health (HH) arranged for you?: No     Prior to leaving the hospital or emergency department was Durable Medical Equipment (DME),  medical supplies, or infusions arranged for you?: No (The aptient has a walker at home.)  Are DME/medical supply/infusions needs identified by staff during this assessment?: No     Medications/Diet:  Did any of your medications change, during or after your hospital stay or ED visit?: Yes  Do you have your new or updated medications?: Yes  Do you understand what your medications are for and possible side effects?: Yes  Are there any reasons that keep you from taking your medication as prescribed?: No  Any concerns about medication refills?: No    Were you given a different diet per your Discharge Instructions?: No     Questions/Concerns:  Do you have any questions or concerns that have not been discussed?: No           Nursing Interventions:    Patient symptoms were assessed, education was completed for signs/symptoms to monitor, and reviewed when to contact providers versus go to the emergency department/call 911.   Reviewed all discharge instructions with a focus on signs/symptoms of urinary tract infection.  Nurse care manager did review/stress the importance of fall precautions.   All medications were reviewed and educated on the importance of taking the medications as directed.   NCM did stress the importance of completing the antibiotic medication as prescribed regardless of symptom improvement.   Appointments were reviewed and stressed the importance of a close follow up with providers. An outreach was sent to the [facility] Scheduling Team.  Confirmed the patient has a walker at home.   The spouse verbalized understanding and will contact the office with any further questions or concerns.       Medications:  Medication Reconciliation:  I am aware of an inpatient discharge within the last 30 days.  The discharge medication list has been reconciled with the patient's current medication list and reviewed by me. See medication list for additions of new medication, and changes to current doses of medications  and discontinued medications.  Current Medications[1]      Follow-up Appointments:  Your appointments       Date & Time Appointment Department (Center)    Oct 16, 2025 10:00 AM CDT Exam - Established with Nancy Dillon MD [facility], [facility],[place] ([facility])              [facility], [facility]  [address] [facility] [id number] [phone]            Transitional [facility] Clinic  Was TCC Ordered: No    Primary Care Provider (If no TCC appointment)  Does patient already have a PCP appointment scheduled? No    An outreach was sent to the scheduling team for further assistance.     Specialist  Does the patient have any other follow-up appointment(s) need to be scheduled? No    [x]  Patient verbally agrees to additional follow-up calls from Nurse Care Manager    Book By Date: 5/24/2025         [1]   Current Outpatient Medications   Medication Sig Dispense Refill    cephALEXin 500 MG Oral Cap Take 1 capsule (500 mg total) by mouth 3 (three) times daily for 7 days. 21 capsule 0    spironolactone 25 MG Oral Tab Take 1 tablet (25 mg total) by mouth daily. 90 tablet 1    metoprolol succinate ER 25 MG Oral Tablet 24 Hr Take 1 tablet (25 mg total) by mouth Daily Beta Blocker. 90 tablet 1    furosemide 40 MG Oral Tab Take 1 tablet (40 mg total) by mouth daily. 90 tablet 0    clopidogrel 75 MG Oral Tab Take 1 tablet (75 mg total) by mouth daily. 90 tablet 3    oxymetazoline 0.05 % Nasal Solution 1 spray by Nasal route 2 (two) times daily.      DULoxetine 60 MG Oral Cap DR Particles Take 1 capsule (60 mg total) by mouth daily. 30 capsule 0    sodium chloride 0.65 % Nasal Solution 1 spray by Nasal route every 3 (three) hours as needed for congestion. 30 mL 0    alfuzosin ER 10 MG Oral Tablet 24 Hr Take 1 tablet (10 mg total) by mouth in the morning.      Polyethylene Glycol 3350 17 g Oral Powd Pack Take 17 g  by mouth in the morning.      zinc sulfate 220 (50 Zn) MG Oral Cap Take 1 capsule (220 mg total) by mouth in the morning.      niacin 500 MG Oral Tab Take 1 tablet (500 mg total) by mouth daily with breakfast.      sucralfate 1 g Oral Tab Take 1 tablet (1 g total) by mouth in the morning and 1 tablet (1 g total) at noon and 1 tablet (1 g total) in the evening. Take before meals.      aspirin 81 MG Oral Chew Tab Chew 1 tablet (81 mg total) by mouth in the morning.      atorvastatin 80 MG Oral Tab Take 1 tablet (80 mg total) by mouth in the morning.      mirtazapine 15 MG Oral Tab Take 1 tablet (15 mg total) by mouth nightly.

## 2025-05-19 NOTE — PROGRESS NOTES
Transitions of Care team has contacted patient's spouse (per HIPAA) and patient needs additional appointments.  Please contact spouse for assistance with scheduling a follow-up appointment for primary care physician .      ER Follow-upappointment needed by 5/24/2025.  Please advise.    BOOK BY DATE: 5/24/2025    Dr. Paulo James   [address] [place] [id number]  [phone]    Thank you!

## 2025-05-19 NOTE — PROGRESS NOTES
Left message, with the assistance of the language line, for patient to call nurse care manager back for transitions of care call.  Nurse care  information included in message.

## 2025-05-20 ENCOUNTER — HOSPITAL ENCOUNTER (EMERGENCY)
Facility: HOSPITAL | Age: OVER 89
Discharge: HOME | End: 2025-05-20
Attending: EMERGENCY MEDICINE
Payer: COMMERCIAL

## 2025-05-20 ENCOUNTER — APPOINTMENT (OUTPATIENT)
Dept: CT IMAGING | Facility: HOSPITAL | Age: OVER 89
End: 2025-05-20
Attending: EMERGENCY MEDICINE
Payer: COMMERCIAL

## 2025-05-20 ENCOUNTER — APPOINTMENT (OUTPATIENT)
Dept: GENERAL RADIOLOGY | Facility: HOSPITAL | Age: OVER 89
End: 2025-05-20
Attending: EMERGENCY MEDICINE
Payer: COMMERCIAL

## 2025-05-20 VITALS
OXYGEN SATURATION: 100 % | HEART RATE: 58 BPM | RESPIRATION RATE: 19 BRPM | DIASTOLIC BLOOD PRESSURE: 58 MMHG | SYSTOLIC BLOOD PRESSURE: 142 MMHG | TEMPERATURE: 98 F | BODY MASS INDEX: 25 KG/M2 | WEIGHT: 167.56 LBS

## 2025-05-20 DIAGNOSIS — R05.1 ACUTE COUGH: Primary | ICD-10-CM

## 2025-05-20 LAB
ALBUMIN SERPL-MCNC: 4 G/DL (ref 3.2–4.8)
ALBUMIN/GLOB SERPL: 1.4 {RATIO} (ref 1–2)
ALP LIVER SERPL-CCNC: 121 U/L (ref 45–117)
ALT SERPL-CCNC: 10 U/L (ref 10–49)
ANION GAP SERPL CALC-SCNC: 7 MMOL/L (ref 0–18)
AST SERPL-CCNC: 31 U/L (ref ?–34)
BASOPHILS # BLD AUTO: 0.05 X10(3) UL (ref 0–0.2)
BASOPHILS NFR BLD AUTO: 0.5 %
BILIRUB SERPL-MCNC: 0.5 MG/DL (ref 0.2–0.9)
BILIRUB UR QL STRIP.AUTO: NEGATIVE
BUN BLD-MCNC: 12 MG/DL (ref 9–23)
CALCIUM BLD-MCNC: 8.6 MG/DL (ref 8.7–10.6)
CHLORIDE SERPL-SCNC: 109 MMOL/L (ref 98–112)
CLARITY UR REFRACT.AUTO: CLEAR
CO2 SERPL-SCNC: 24 MMOL/L (ref 21–32)
COLOR UR AUTO: YELLOW
CREAT BLD-MCNC: 0.88 MG/DL (ref 0.7–1.3)
EGFRCR SERPLBLD CKD-EPI 2021: 80 ML/MIN/1.73M2 (ref 60–?)
EOSINOPHIL # BLD AUTO: 0.31 X10(3) UL (ref 0–0.7)
EOSINOPHIL NFR BLD AUTO: 3.1 %
ERYTHROCYTE [DISTWIDTH] IN BLOOD BY AUTOMATED COUNT: 14.9 %
FLUAV + FLUBV RNA SPEC NAA+PROBE: NEGATIVE
FLUAV + FLUBV RNA SPEC NAA+PROBE: NEGATIVE
GLOBULIN PLAS-MCNC: 2.8 G/DL (ref 2–3.5)
GLUCOSE BLD-MCNC: 111 MG/DL (ref 70–99)
GLUCOSE UR STRIP.AUTO-MCNC: NORMAL MG/DL
HCT VFR BLD AUTO: 33.9 % (ref 39–53)
HGB BLD-MCNC: 11.2 G/DL (ref 13–17.5)
IMM GRANULOCYTES # BLD AUTO: 0.03 X10(3) UL (ref 0–1)
IMM GRANULOCYTES NFR BLD: 0.3 %
KETONES UR STRIP.AUTO-MCNC: NEGATIVE MG/DL
LACTATE SERPL-SCNC: 1.4 MMOL/L (ref 0.5–2)
LEUKOCYTE ESTERASE UR QL STRIP.AUTO: 75
LIPASE SERPL-CCNC: 29 U/L (ref 12–53)
LYMPHOCYTES # BLD AUTO: 4.18 X10(3) UL (ref 1–4)
LYMPHOCYTES NFR BLD AUTO: 41.3 %
MCH RBC QN AUTO: 26.8 PG (ref 26–34)
MCHC RBC AUTO-ENTMCNC: 33 G/DL (ref 31–37)
MCV RBC AUTO: 81.1 FL (ref 80–100)
MONOCYTES # BLD AUTO: 0.52 X10(3) UL (ref 0.1–1)
MONOCYTES NFR BLD AUTO: 5.1 %
NEUTROPHILS # BLD AUTO: 5.02 X10 (3) UL (ref 1.5–7.7)
NEUTROPHILS # BLD AUTO: 5.02 X10(3) UL (ref 1.5–7.7)
NEUTROPHILS NFR BLD AUTO: 49.7 %
NITRITE UR QL STRIP.AUTO: NEGATIVE
NT-PROBNP SERPL-MCNC: 2546 PG/ML (ref ?–450)
OSMOLALITY SERPL CALC.SUM OF ELEC: 290 MOSM/KG (ref 275–295)
PH UR STRIP.AUTO: 5.5 [PH] (ref 5–8)
PLATELET # BLD AUTO: 186 10(3)UL (ref 150–450)
POTASSIUM SERPL-SCNC: 4 MMOL/L (ref 3.5–5.1)
PROT SERPL-MCNC: 6.8 G/DL (ref 5.7–8.2)
RBC # BLD AUTO: 4.18 X10(6)UL (ref 3.8–5.8)
RBC UR QL AUTO: NEGATIVE
RSV RNA SPEC NAA+PROBE: NEGATIVE
SARS-COV-2 RNA RESP QL NAA+PROBE: NOT DETECTED
SODIUM SERPL-SCNC: 140 MMOL/L (ref 136–145)
SP GR UR STRIP.AUTO: 1.02 (ref 1–1.03)
TROPONIN I SERPL HS-MCNC: 20 NG/L (ref ?–53)
UROBILINOGEN UR STRIP.AUTO-MCNC: NORMAL MG/DL
WBC # BLD AUTO: 10.1 X10(3) UL (ref 4–11)
WBC #/AREA URNS AUTO: >50 /HPF

## 2025-05-20 PROCEDURE — 80053 COMPREHEN METABOLIC PANEL: CPT | Performed by: EMERGENCY MEDICINE

## 2025-05-20 PROCEDURE — 87186 SC STD MICRODIL/AGAR DIL: CPT | Performed by: EMERGENCY MEDICINE

## 2025-05-20 PROCEDURE — 87040 BLOOD CULTURE FOR BACTERIA: CPT | Performed by: EMERGENCY MEDICINE

## 2025-05-20 PROCEDURE — 87086 URINE CULTURE/COLONY COUNT: CPT | Performed by: EMERGENCY MEDICINE

## 2025-05-20 PROCEDURE — 83880 ASSAY OF NATRIURETIC PEPTIDE: CPT | Performed by: EMERGENCY MEDICINE

## 2025-05-20 PROCEDURE — 93010 ELECTROCARDIOGRAM REPORT: CPT

## 2025-05-20 PROCEDURE — 71045 X-RAY EXAM CHEST 1 VIEW: CPT | Performed by: EMERGENCY MEDICINE

## 2025-05-20 PROCEDURE — 96374 THER/PROPH/DIAG INJ IV PUSH: CPT

## 2025-05-20 PROCEDURE — 93005 ELECTROCARDIOGRAM TRACING: CPT

## 2025-05-20 PROCEDURE — 36415 COLL VENOUS BLD VENIPUNCTURE: CPT

## 2025-05-20 PROCEDURE — 99285 EMERGENCY DEPT VISIT HI MDM: CPT

## 2025-05-20 PROCEDURE — 85025 COMPLETE CBC W/AUTO DIFF WBC: CPT | Performed by: EMERGENCY MEDICINE

## 2025-05-20 PROCEDURE — 81001 URINALYSIS AUTO W/SCOPE: CPT | Performed by: EMERGENCY MEDICINE

## 2025-05-20 PROCEDURE — 84484 ASSAY OF TROPONIN QUANT: CPT | Performed by: EMERGENCY MEDICINE

## 2025-05-20 PROCEDURE — 74177 CT ABD & PELVIS W/CONTRAST: CPT | Performed by: EMERGENCY MEDICINE

## 2025-05-20 PROCEDURE — 83605 ASSAY OF LACTIC ACID: CPT | Performed by: EMERGENCY MEDICINE

## 2025-05-20 PROCEDURE — 87077 CULTURE AEROBIC IDENTIFY: CPT | Performed by: EMERGENCY MEDICINE

## 2025-05-20 PROCEDURE — 83690 ASSAY OF LIPASE: CPT | Performed by: EMERGENCY MEDICINE

## 2025-05-20 PROCEDURE — 0241U SARS-COV-2/FLU A AND B/RSV BY PCR (GENEXPERT): CPT | Performed by: EMERGENCY MEDICINE

## 2025-05-20 RX ORDER — FUROSEMIDE 10 MG/ML
40 INJECTION INTRAMUSCULAR; INTRAVENOUS ONCE
Status: COMPLETED | OUTPATIENT
Start: 2025-05-20 | End: 2025-05-20

## 2025-05-20 NOTE — ED PROVIDER NOTES
Patient Seen in: [facility] Emergency Department      History     Chief Complaint   Patient presents with    Abdomen/Flank Pain     Stated Complaint: abd pain    Subjective:   HPI  History of Present Illness            89-year-old gentleman presenting with abdominal pain and cough.  Patient had a feeling of difficulty breathing but was able with coming here to the emergency department with feeling he is able to cough cold large more sputum now he has significant discomfort in epigastric region prompting his visit here.  He denies shortness of breath chest pain or any other exacerbating relieving factors associate symptoms       Objective:     Past Medical History:    BPH (benign prostatic hyperplasia)    Coronary atherosclerosis    Depression    Essential hypertension    Hearing impaired person, bilateral    High blood pressure    High cholesterol    History of stomach ulcers    Hyperlipidemia    Stroke (HCC)    Urinary retention              Past Surgical History:   Procedure Laterality Date    Cath bare metal stent (bms)      Colonoscopy      Surgical stent      wife states Lft side of heart- [facility]                Social History     Socioeconomic History    Marital status:    Tobacco Use    Smoking status: Former     Types: Cigarettes     Passive exposure: Never    Smokeless tobacco: Never    Tobacco comments:     QUIT SMOKING IN 2000   Vaping Use    Vaping status: Never Used   Substance and Sexual Activity    Alcohol use: Not Currently    Drug use: Never     Social Drivers of Health     Food Insecurity: No Food Insecurity (4/22/2025)    NCSS - Food Insecurity     Worried About Running Out of Food in the Last Year: No     Ran Out of Food in the Last Year: No   Transportation Needs: No Transportation Needs (4/22/2025)    NCSS - Transportation     Lack of Transportation: No   Housing Stability: Not At Risk (4/22/2025)    NCSS - Housing/Utilities     Has Housing: Yes     Worried About  Losing Housing: No     Unable to Get Utilities: No                                Physical Exam     ED Triage Vitals [05/20/25 0251]   /73   Pulse 73   Resp 20   Temp 98.4 °F (36.9 °C)   Temp src Oral   SpO2 95 %   O2 Device None (Room air)       Current Vitals:   Vital Signs  BP: 131/71  Pulse: 54  Resp: 18  Temp: 98.4 °F (36.9 °C)  Temp src: Oral  MAP (mmHg): 87    Oxygen Therapy  SpO2: 90 %  O2 Device: None (Room air)          Physical Exam     awake alert patient appears no distress HEENT exam is normal lungs were clear cardiovascular exam regular rhythm abdomen soft nontender extremities no COVID cyanosis or edema no rash        ED Course     Labs Reviewed   COMP METABOLIC PANEL (14) - Abnormal; Notable for the following components:       Result Value    Glucose 111 (*)     Calcium, Total 8.6 (*)     Alkaline Phosphatase 121 (*)     All other components within normal limits   CBC WITH DIFFERENTIAL WITH PLATELET - Abnormal; Notable for the following components:    HGB 11.2 (*)     HCT 33.9 (*)     Lymphocyte Absolute 4.18 (*)     All other components within normal limits   URINALYSIS WITH CULTURE REFLEX - Abnormal; Notable for the following components:    Protein Urine Trace (*)     Leukocyte Esterase Urine 75 (*)     WBC Urine >50 (*)     Squamous Epi. Cells Few (*)     All other components within normal limits   PRO BETA NATRIURETIC PEPTIDE - Abnormal; Notable for the following components:    Pro-Beta Natriuretic Peptide 2,546 (*)     All other components within normal limits   LIPASE - Normal   TROPONIN I HIGH SENSITIVITY - Normal   LACTIC ACID, PLASMA - Normal   SARS-COV-2/FLU A AND B/RSV BY PCR (GENEXPERT) - Normal    Narrative:     This test is intended for the qualitative detection and differentiation of SARS-CoV-2, influenza A, influenza B, and respiratory syncytial virus (RSV) viral RNA in nasopharyngeal or nares swabs from individuals suspected of respiratory viral infection consistent with  COVID-19 by their healthcare provider. Signs and symptoms of respiratory viral infection due to SARS-CoV-2, influenza, and RSV can be similar.    Test performed using the Xpert Xpress SARS-CoV-2/FLU/RSV (real time RT-PCR)  assay on the GeneXpert instrument, [employer], [place] [address].   This test is being used under the Food and Drug Administration's Emergency Use Authorization.    The authorized Fact Sheet for Healthcare Providers for this assay is available upon request from the laboratory.   BLOOD CULTURE   BLOOD CULTURE   URINE CULTURE, ROUTINE     EKG    Rate, intervals and axes as noted on EKG Report.  Rate: 63  Rhythm: Sinus Rhythm  Reading: Left bundle branch block consistent with old EKG           ED Course as of 05/20/25 0541  ------------------------------------------------------------  Time: 05/20 0425  Value: XR CHEST AP PORTABLE  (CPT=71045)  Comment: Independent interpretation by ED physician of chest x-ray shows a possible retrocardiac infiltrate versus atelectasis  ------------------------------------------------------------  Time: 05/20 0439  Value: Pro Beta Natriuretic Peptide(!)  Comment: Elevated  ------------------------------------------------------------  Time: 05/20 0439  Value: Lipase  Comment: Unremarkable  ------------------------------------------------------------  Time: 05/20 0439  Value: CBC With Differential With Platelet(!)  Comment: Slight anemia present  ------------------------------------------------------------  Time: 05/20 0439  Value: Comp Metabolic Panel (14)(!)  Comment: Unremarkable  ------------------------------------------------------------  Time: 05/20 0440  Value: Troponin I (High Sensitivity)  Comment: Unremarkable  ------------------------------------------------------------  Time: 05/20 0440  Value: Lactic Acid, Plasma  Comment: Unremarkable  ------------------------------------------------------------  Time: 05/20 0537  Value: SARS-CoV-2/Flu A and B/RSV by PCR  (GeneXpert)  Comment: Unremarkable     Results            Differential diagnosis includes pneumonia, perforated viscus                    MDM              Medical Decision Making  89-year-old gentleman presenting with cough and abdominal pain IV established cardiac monitor shows sinus rhythm pulse ox shows no signs of hypoxia.  Patient is currently on antibiotics for day 3 UTI urine does show signs of possible UTI we will discontinue this his BNP level is a little elevated otherwise his laboratory results are within acceptable his independent interpretation by ED physician chest x-ray shows some scarring and CT scan independent interpretation by ED physician CT scan shows no acute intra-abdominal disease patient was given one-time dose of Lasix here will be discharged home he is to return to the emergency department for worsening symptoms electrolyte cardiology recommend close follow-up primary doctor and is to return for worsening symptoms  The patient was screened and evaluated during this visit.  As a treating physician attending to the patient, I determined, within reasonable clinical confidence and prior to discharge, that an emergency medical condition was not or was no longer present.  There was no indication for further evaluation, treatment or admission on an emergency basis.    The usual and customary discharge instructions were discussed given the patient's ER course.  We discussed signs and symptoms that should prompt the patient's immediate return to the emergency department.  Reasonable over-the-counter and prescription treatment options and physician follow-up plan was discussed.  Patient was discharged home in good condition  This note was prepared using Dragon Medical voice recognition dictation software.  As a result errors may occur.  When identified to these areas have been corrected.  While every attempt is made to correct errors during dictation discrepancies may still exist.  Please contact if  there are any errors    Problems Addressed:  Acute cough: acute illness or injury    Amount and/or Complexity of Data Reviewed  Labs: ordered. Decision-making details documented in ED Course.  Radiology: ordered and independent interpretation performed. Decision-making details documented in ED Course.  ECG/medicine tests: ordered and independent interpretation performed. Decision-making details documented in ED Course.        Disposition and Plan     Clinical Impression:  1. Acute cough         Disposition:  Discharge  5/20/2025  5:41 am    Follow-up:  No follow-up provider specified.        Medications Prescribed:  Current Discharge Medication List                Supplementary Documentation:

## 2025-05-20 NOTE — PROGRESS NOTES
HUNTER request (discharged 05/17 (ED))    Dr Paulo James  Internal Medicine  [place] [address]  [phone]    Attempt #1:  Left message on voicemail for patient's wife, Radha to call transitions specialist back to schedule follow up appointments. Provided Transitions specialist scheduling phone number [phone].

## 2025-05-20 NOTE — ED QUICK NOTES
Pt wife endorses patient swallowing something and unable to cough It up. Per wife, pt coughed in triage and coughed \"something\" out. PT c/o of nausea

## 2025-05-21 LAB
ATRIAL RATE: 63 BPM
P AXIS: 8 DEGREES
P-R INTERVAL: 176 MS
Q-T INTERVAL: 476 MS
QRS DURATION: 148 MS
QTC CALCULATION (BEZET): 487 MS
R AXIS: -35 DEGREES
T AXIS: 151 DEGREES
VENTRICULAR RATE: 63 BPM

## 2025-05-21 NOTE — PROGRESS NOTES
HUNTER assist.    Paulo James MD  Internal Medicine  [address]  [address]  [place] [address]  [phone]    Attempt #2:  Left message on voicemail for patient to call transitions specialist back to schedule follow up appointments. Provided Transitions specialist scheduling phone number [phone].

## 2025-05-22 ENCOUNTER — PATIENT OUTREACH (OUTPATIENT)
Dept: CASE MANAGEMENT | Age: OVER 89
End: 2025-05-22

## 2025-05-22 RX ORDER — PANTOPRAZOLE SODIUM 40 MG/1
40 TABLET, DELAYED RELEASE ORAL
COMMUNITY
Start: 2025-05-20

## 2025-05-22 RX ORDER — DICYCLOMINE HYDROCHLORIDE 10 MG/1
10 CAPSULE ORAL
COMMUNITY
Start: 2025-05-20

## 2025-05-22 RX ORDER — LEVOFLOXACIN 250 MG/1
250 TABLET, FILM COATED ORAL DAILY
Qty: 5 TABLET | Refills: 0 | Status: SHIPPED | OUTPATIENT
Start: 2025-05-22 | End: 2025-05-27

## 2025-05-22 NOTE — PROGRESS NOTES
HUNTER Follow-up Assessment    General:  Assessment completed with: Spouse or significant other  Patient Subjective: The spouse reported the patient has nasal congestion. The spouse reported the patient's Ear Nose and Throat specialist will not have an appointment available until June. The spouse reported continued productive cough. The spouse denies any hemoptysis, headache, difficulty swallowing/throat pain, fever, wheezing,  difficulty breathing/shortness of breath, or chest pain. The spouse reported the patient has been evaluated by cardiology for chest discomfort from April this week. The spouse reported abdominal discomfort has improved and is now less frequent. The patient has been tolerating meals well at home. The spouse denies any urinary frequency/urgency, hematuria, dysuria, or malodorous/cloudy urine.  Chief Complaint: Acute cough    Progress/Care Plan:  Is the patient progressing as planned?: No  Barriers: Other (The patient returned to the emergency department for a cough and abdominal pain on 5/20/2025.)  Care Plan Update: The patient was evaluated in the emergency department on 5/20/2025 for abdominal pain and a productive cough.  A chest x-ray and CT of the abdomen/pelvis were completed showing no acute concerns. The patient was then evaluated by the primary care physician, Dr. James, and prescriptions were sent for pantoprazole 40 mg daily and dicyclomine 10 mg three times daily as needed. The patient has continued with the antibiotic as prescribed for the urinary tract infection. The patient was also seen by cardiology, Dr. Gilbert, on 5/21/2025.  The patient will continue with aspirin, Plavix, atorvastatin, and metoprolol. The patient was recommended to follow up with Dr. Escobar.  New Care Plan: The patient/spouse will continue to monitor symptoms closely. The patient will rest and push fluids within cardiology restrictions. The patient will continue a low sodium diet. The patient will continue with  medications as prescribed. The patient will complete activity as tolerated and follow fall precautions using a walker for additional stability.  Frequency/Follow Up Plan: The spouse agreed to a follow up call next week.     Notes:  Navigator Notes: Patient symptoms were assessed, education was completed for signs/symptoms to monitor, and reviewed when to contact providers versus go to the emergency department/call 911. Reviewed the importance of going to the emergency department if experiencing chest pain, intense abdominal pain that is not relieved with dicyclomine, and difficulty with breathing. NCM did stress the importance of completing the antibiotic medication as prescribed regardless of symptom improvement. Nurse care manager educated the spouse on the medication dicyclomine and reason for the prescription. Nurse care manager did review/stress the importance of fall precautions and the use of the walker. Recommended the patient rest during the day as needed and complete activity as tolerated.

## 2025-05-22 NOTE — PROGRESS NOTES
HUNTER request (discharged 05/17 (ED))     Dr Paulo James  Internal Medicine  [facility] [address] [address]  [phone]  Patient had existing appointment Tue 05/20 @9:45am  Closing encounter

## 2025-05-25 ENCOUNTER — HOSPITAL ENCOUNTER (EMERGENCY)
Facility: HOSPITAL | Age: OVER 89
Discharge: HOME | End: 2025-05-25
Attending: EMERGENCY MEDICINE
Payer: COMMERCIAL

## 2025-05-25 ENCOUNTER — HOSPITAL ENCOUNTER (OUTPATIENT)
Age: OVER 89
Discharge: ANOTHER HOSPITAL | End: 2025-05-25
Payer: COMMERCIAL

## 2025-05-25 VITALS
TEMPERATURE: 98 F | DIASTOLIC BLOOD PRESSURE: 57 MMHG | HEART RATE: 68 BPM | OXYGEN SATURATION: 100 % | RESPIRATION RATE: 18 BRPM | SYSTOLIC BLOOD PRESSURE: 127 MMHG

## 2025-05-25 VITALS
OXYGEN SATURATION: 97 % | RESPIRATION RATE: 20 BRPM | HEART RATE: 70 BPM | DIASTOLIC BLOOD PRESSURE: 58 MMHG | TEMPERATURE: 98 F | SYSTOLIC BLOOD PRESSURE: 151 MMHG

## 2025-05-25 DIAGNOSIS — R06.02 SHORTNESS OF BREATH: ICD-10-CM

## 2025-05-25 DIAGNOSIS — R05.8 PRODUCTIVE COUGH: Primary | ICD-10-CM

## 2025-05-25 DIAGNOSIS — B34.9 VIRAL SYNDROME: Primary | ICD-10-CM

## 2025-05-25 PROCEDURE — 99282 EMERGENCY DEPT VISIT SF MDM: CPT

## 2025-05-25 PROCEDURE — 99215 OFFICE O/P EST HI 40 MIN: CPT | Performed by: NURSE PRACTITIONER

## 2025-05-25 PROCEDURE — 99283 EMERGENCY DEPT VISIT LOW MDM: CPT

## 2025-05-25 PROCEDURE — 93000 ELECTROCARDIOGRAM COMPLETE: CPT | Performed by: NURSE PRACTITIONER

## 2025-05-25 NOTE — ED PROVIDER NOTES
Patient Seen in: [facility] Emergency Department      History     Chief Complaint   Patient presents with    Cough/URI     Stated Complaint: cough    Subjective:     HPI    89-year-old male with history of CVA, hypertension, and recent cystitis, who was sent from the immediate care center because he had a cough and orthopnea.  His wife reports that he was coughing throughout the nigh Currently taking Levaquin for a UTI. No swelling in legs noted.  He was sent by nurse practitioner at [facility] for evaluation by a physician.    Objective:   Past Medical History:    BPH (benign prostatic hyperplasia)    Coronary atherosclerosis    Depression    Essential hypertension    Hearing impaired person, bilateral    High blood pressure    High cholesterol    History of stomach ulcers    Hyperlipidemia    Stroke (HCC)    Urinary retention              Past Surgical History:   Procedure Laterality Date    Cath bare metal stent (bms)      Colonoscopy      Surgical stent      wife states Lft side of heart- [facility]                Social History     Socioeconomic History    Marital status:    Tobacco Use    Smoking status: Former     Types: Cigarettes     Passive exposure: Never    Smokeless tobacco: Never    Tobacco comments:     QUIT SMOKING IN 2000   Vaping Use    Vaping status: Never Used   Substance and Sexual Activity    Alcohol use: Not Currently    Drug use: Never     Social Drivers of Health     Food Insecurity: No Food Insecurity (4/22/2025)    NCSS - Food Insecurity     Worried About Running Out of Food in the Last Year: No     Ran Out of Food in the Last Year: No   Transportation Needs: No Transportation Needs (4/22/2025)    NCSS - Transportation     Lack of Transportation: No   Housing Stability: Not At Risk (4/22/2025)    NCSS - Housing/Utilities     Has Housing: Yes     Worried About Losing Housing: No     Unable to Get Utilities: No              Review of  Systems    Positive for stated complaint: cough  Other systems are as noted in HPI.  Constitutional and vital signs reviewed.      All other systems reviewed and negative except as noted above.    Physical Exam     ED Triage Vitals   BP 05/25/25 1026 127/57   Pulse 05/25/25 1025 68   Resp 05/25/25 1025 18   Temp 05/25/25 1026 98.1 °F (36.7 °C)   Temp src 05/25/25 1026 Oral   SpO2 05/25/25 1025 100 %   O2 Device 05/25/25 1025 None (Room air)       Current:/57   Pulse 68   Temp 98.1 °F (36.7 °C) (Oral)   Resp 18   SpO2 100%       General:  Vitals as listed.  No acute distress   HEENT: Sclerae anicteric.  Conjunctivae show no pallor.  Oropharynx clear, mucous membranes moist   Lungs: good air exchange and clear with no rales  Heart: regular rate rhythm and no murmur   Abdomen: Soft and nontender.  No abdominal masses.  No peritoneal signs   Extremities: no edema, normal peripheral pulses   Neuro: Alert and chronically noncommunicative due to stroke    ED COURSE and MDM     Sources of the medical history included the patient's wife    I reviewed prior external notes including CT scan of the abdomen/pelvis done 5/20/2025 showed bladder wall thickening.  Urinalysis showed pyuria.  Culture grew 10-50,000 CFU per mL of Pseudomonas aeruginosa, which was treated with Levaquin on 5/22/2025 for 5-day course    I discouraged the patient's wife from continuing take him to the emergency department on a very frequent basis, often every few days.  I sent a note to his primary care physician to determine if they can also reduce his ED visit frequency.    I have discussed with the patient the results of testing, differential diagnosis, and treatment plan. They expressed clear understanding of these instructions and agrees to the plan provided.    Disposition and Plan     Clinical Impression:  1. Viral syndrome         Disposition:  Discharge  5/25/2025 10:29 am    Follow-up:  Paulo James MD  [address] [place] [address]  [address] [place] [id number]  [phone]    Follow up in 1 week(s)  As needed        Medications Prescribed:  Current Discharge Medication List

## 2025-05-25 NOTE — ED PROVIDER NOTES
Patient Seen in: [facility]        History  Chief Complaint   Patient presents with    Cough     Stated Complaint: Cough    Subjective:   HPI            Deven Atkinson is pleasant 89 year old male who presents for evaluation of productive cough x 2 days.  No fever.  Intermittent chest pain typically with coughing.  He does feel short of breath.  No swelling to the lower extremities.  Patient has history of HTN HLD stroke CHF CAD with multivessel disease.  History of PCI performed to the LAD and ramus.  Wife states cough and SOB is worse when he is lying flat.      Objective:     Past Medical History:    BPH (benign prostatic hyperplasia)    Coronary atherosclerosis    Depression    Essential hypertension    Hearing impaired person, bilateral    High blood pressure    High cholesterol    History of stomach ulcers    Hyperlipidemia    Stroke (HCC)    Urinary retention              Past Surgical History:   Procedure Laterality Date    Cath bare metal stent (bms)      Colonoscopy      Surgical stent      wife states Lft side of heart- [place]                Social History     Socioeconomic History    Marital status:    Tobacco Use    Smoking status: Former     Types: Cigarettes     Passive exposure: Never    Smokeless tobacco: Never    Tobacco comments:     QUIT SMOKING IN 2000   Vaping Use    Vaping status: Never Used   Substance and Sexual Activity    Alcohol use: Not Currently    Drug use: Never     Social Drivers of Health     Food Insecurity: No Food Insecurity (4/22/2025)    NCSS - Food Insecurity     Worried About Running Out of Food in the Last Year: No     Ran Out of Food in the Last Year: No   Transportation Needs: No Transportation Needs (4/22/2025)    NCSS - Transportation     Lack of Transportation: No   Housing Stability: Not At Risk (4/22/2025)    NCSS - Housing/Utilities     Has Housing: Yes     Worried About Losing Housing: No     Unable to Get Utilities: No               Review of Systems    Positive for stated complaint: Cough  Other systems are as noted in HPI.  Constitutional and vital signs reviewed.      All other systems reviewed and negative except as noted above.                  Physical Exam    ED Triage Vitals [05/25/25 0859]   /58   Pulse 70   Resp 20   Temp 98.2 °F (36.8 °C)   Temp src Oral   SpO2 97 %   O2 Device None (Room air)       Current Vitals:   Vital Signs  BP: 151/58  Pulse: 70  Resp: 20  Temp: 98.2 °F (36.8 °C)  Temp src: Oral    Oxygen Therapy  SpO2: 97 %  O2 Device: None (Room air)            Physical Exam  Vitals and nursing note reviewed.   Constitutional:       Appearance: Normal appearance.   HENT:      Right Ear: Tympanic membrane normal.      Left Ear: Tympanic membrane normal.      Mouth/Throat:      Pharynx: No posterior oropharyngeal erythema.   Cardiovascular:      Rate and Rhythm: Normal rate and regular rhythm.      Pulses: Normal pulses.      Heart sounds: Normal heart sounds.   Pulmonary:      Breath sounds: Rhonchi and rales present.   Musculoskeletal:      Cervical back: Normal range of motion.   Skin:     General: Skin is warm and dry.   Neurological:      Mental Status: He is alert.             ED Course  Labs Reviewed - No data to display  EKG    Rate, intervals and axes as noted on EKG Report.  Rate: 72  Rhythm: Sinus Rhythm LBBB  Reading: No stemi                                MDM     89-year-old male presents to the immediate care with wet cough, shortness of breath and intermittent chest pain typically with coughing  Considered URI, COVID, influenza pneumonia patient has history of chronic heart failure with preserved ejection fraction CAD and stent placement advised evaluation in the ER for higher level of care        Medical Decision Making  Problems Addressed:  Productive cough: acute illness or injury with systemic symptoms that poses a threat to life or bodily functions  Shortness of breath: acute illness or injury  with systemic symptoms that poses a threat to life or bodily functions        Disposition and Plan     Clinical Impression:  1. Productive cough    2. Shortness of breath         Disposition:  Ic to ed  5/25/2025  9:37 am    Follow-up:  No follow-up provider specified.        Medications Prescribed:  Discharge Medication List as of 5/25/2025  9:37 AM                Supplementary Documentation:

## 2025-05-25 NOTE — ED INITIAL ASSESSMENT (HPI)
Patient sent over for cough and shortness of breath from [facility] x 4 days. Patient denies pain.

## 2025-05-26 ENCOUNTER — APPOINTMENT (OUTPATIENT)
Dept: GENERAL RADIOLOGY | Age: OVER 89
End: 2025-05-26
Attending: NURSE PRACTITIONER
Payer: COMMERCIAL

## 2025-05-26 ENCOUNTER — HOSPITAL ENCOUNTER (OUTPATIENT)
Age: OVER 89
Discharge: HOME | End: 2025-05-26
Payer: COMMERCIAL

## 2025-05-26 VITALS
SYSTOLIC BLOOD PRESSURE: 146 MMHG | RESPIRATION RATE: 18 BRPM | DIASTOLIC BLOOD PRESSURE: 65 MMHG | OXYGEN SATURATION: 98 % | HEART RATE: 77 BPM | TEMPERATURE: 97 F

## 2025-05-26 DIAGNOSIS — S99.922A INJURY OF TOE ON LEFT FOOT, INITIAL ENCOUNTER: Primary | ICD-10-CM

## 2025-05-26 DIAGNOSIS — S93.509A SPRAIN OF TOE, INITIAL ENCOUNTER: ICD-10-CM

## 2025-05-26 DIAGNOSIS — S90.415A ABRASION OF TOE OF LEFT FOOT, INITIAL ENCOUNTER: ICD-10-CM

## 2025-05-26 PROCEDURE — 99213 OFFICE O/P EST LOW 20 MIN: CPT | Performed by: NURSE PRACTITIONER

## 2025-05-26 PROCEDURE — 73660 X-RAY EXAM OF TOE(S): CPT | Performed by: NURSE PRACTITIONER

## 2025-05-26 PROCEDURE — 29550 STRAPPING OF TOES: CPT | Performed by: NURSE PRACTITIONER

## 2025-05-26 RX ORDER — IBUPROFEN 200 MG
CAPSULE ORAL ONCE
Status: COMPLETED | OUTPATIENT
Start: 2025-05-26 | End: 2025-05-26

## 2025-05-26 NOTE — DISCHARGE INSTRUCTIONS
X-ray obtained.  X-ray does not show any fracture, dislocation, subluxation.  There is some arthritis to middle aspect of great toe.    You may keep dressing on for 2 days and then remove to change dressing.  Do not get dressing wet for at least 24 to 48 hours.  After removal of dressing you may apply over-the-counter antibiotic ointment twice a day and keep covered while wearing shoes and socks but can leave open to air while at home.    You may continue to tape first toe to second toe for the next 7 to 10 days.    You can apply ice 4 times a day for 15 minutes.  Take Tylenol as needed for any pain.    If no improvement of toe pain in 2 weeks, please see your primary care doctor.

## 2025-05-26 NOTE — ED INITIAL ASSESSMENT (HPI)
Family reports patient bumped his left great toe today sustaining a small abrasion. Wife feels IC can do a better job bandaging toe

## 2025-05-26 NOTE — ED PROVIDER NOTES
Patient Seen in: Immediate Care [facility]        History  Chief Complaint   Patient presents with    Leg or Foot Injury     Stated Complaint: Cut on toe nail    Subjective: This is a 89-year-old male, past medical history of hypertension, hyperlipidemia, CVA, CAD, presents to immediate care clinic for wound to left great toe.  Per wife who is serving as lakshmirin , patient stubbed his left toe this morning.  He does have small abrasion with bleeding controlled to cuticle of the first great toe.  Positive bony tenderness.  No obvious asymmetry or deformity.  No wound care or pain medication prior to arrival.  Patient is ambulatory with rolling walker, gait steady.  Alert to baseline  The history is provided by the patient and the spouse. The history is limited by a language barrier.                     Objective:     Past Medical History:    BPH (benign prostatic hyperplasia)    Coronary atherosclerosis    Depression    Essential hypertension    Hearing impaired person, bilateral    High blood pressure    High cholesterol    History of stomach ulcers    Hyperlipidemia    Stroke (HCC)    Urinary retention              Past Surgical History:   Procedure Laterality Date    Cath bare metal stent (bms)      Colonoscopy      Surgical stent      wife states Lft side of heart- [facility]                Social History     Socioeconomic History    Marital status:    Tobacco Use    Smoking status: Former     Types: Cigarettes     Passive exposure: Never    Smokeless tobacco: Never    Tobacco comments:     QUIT SMOKING IN 2000   Vaping Use    Vaping status: Never Used   Substance and Sexual Activity    Alcohol use: Not Currently    Drug use: Never     Social Drivers of Health     Food Insecurity: No Food Insecurity (4/22/2025)    NCSS - Food Insecurity     Worried About Running Out of Food in the Last Year: No     Ran Out of Food in the Last Year: No   Transportation Needs: No Transportation  Needs (4/22/2025)    NCSS - Transportation     Lack of Transportation: No   Housing Stability: Not At Risk (4/22/2025)    NCSS - Housing/Utilities     Has Housing: Yes     Worried About Losing Housing: No     Unable to Get Utilities: No              Review of Systems   Constitutional: Negative.    Musculoskeletal:  Positive for arthralgias. Negative for myalgias, neck pain and neck stiffness.   Skin:  Positive for wound. Negative for color change, pallor and rash.       Positive for stated complaint: Cut on toe nail  Other systems are as noted in HPI.  Constitutional and vital signs reviewed.      All other systems reviewed and negative except as noted above.                  Physical Exam    ED Triage Vitals   BP 05/26/25 1134 146/65   Pulse 05/26/25 1134 77   Resp 05/26/25 1134 18   Temp 05/26/25 1137 97.2 °F (36.2 °C)   Temp src 05/26/25 1137 Temporal   SpO2 05/26/25 1134 98 %   O2 Device 05/26/25 1134 None (Room air)       Current Vitals:   Vital Signs  BP: 146/65  Pulse: 77  Resp: 18  Temp: 97.2 °F (36.2 °C)  Temp src: Temporal    Oxygen Therapy  SpO2: 98 %  O2 Device: None (Room air)            Physical Exam  Constitutional:       General: He is not in acute distress.     Appearance: Normal appearance. He is not ill-appearing.   Musculoskeletal:         General: Tenderness and signs of injury present. No swelling or deformity.        Feet:    Feet:      Left foot:      Toenail Condition: Left toenails are abnormally thick.   Skin:     General: Skin is warm.      Capillary Refill: Capillary refill takes less than 2 seconds.      Findings: Abrasion present.   Neurological:      General: No focal deficit present.      Mental Status: He is alert and oriented to person, place, and time.                 ED Course  Labs Reviewed - No data to display     XR TOE(S) (MIN 2 VIEWS), LEFT 1ST (CPT=73660)  Result Date: 5/26/2025  CONCLUSION:  1. No acute osseous findings.  2. Mild osteoarthritic changes greatest 1st MTP  joint. 3. No radiopaque foreign body is identified.    LOCATION:  [facility]   Dictated by (CST): Aaron Nolan MD on 5/26/2025 at 12:27 PM     Finalized by (CST): Aaron Nolan MD on 5/26/2025 at 12:28 PM                           MDM     Differentials considered include: Toe sprain versus toe contusion versus toe abrasion versus toe fracture.    Patient does have bony tenderness to first great toe.  Pain with flexion and extension.  Will obtain toe x-ray.    Wound irrigated and cleansed.  Bacitracin, Band-Aid, Coban applied with buddy tape to second digit.    X-ray without any acute fracture.  There is mild arthritis to middle aspect of toe where patient is tender.    Educated patient and wife that there is no fracture likely toe sprain, contusion with abrasion.    Antibiotic ointment, Band-Aid, Coban buddy tape placed to first toe.    Wife is aware of wound care instructions.  She is aware to leave this dressing on for at least 2 days, when removed, she may change and apply new dressing daily.  Recommend that patient wear supportive protective footwear and not sandals or slides or slippers where he can reinjure toe.  Wife verbalized understanding.    They are aware to follow-up with primary care doctor if no improvement in symptoms in 2 weeks            Medical Decision Making  Amount and/or Complexity of Data Reviewed  Radiology: ordered and independent interpretation performed. Decision-making details documented in ED Course.        Disposition and Plan     Clinical Impression:  1. Injury of toe on left foot, initial encounter    2. Sprain of toe, initial encounter    3. Abrasion of toe of left foot, initial encounter         Disposition:  Discharge  5/26/2025 12:37 pm    Follow-up:  Paulo James MD  [address] [place] [address]  [phone]      As needed          Medications Prescribed:  Current Discharge Medication List                Supplementary Documentation:

## 2025-05-27 ENCOUNTER — PATIENT OUTREACH (OUTPATIENT)
Dept: CASE MANAGEMENT | Age: OVER 89
End: 2025-05-27

## 2025-05-27 LAB
ATRIAL RATE: 72 BPM
P-R INTERVAL: 174 MS
Q-T INTERVAL: 454 MS
QRS DURATION: 144 MS
QTC CALCULATION (BEZET): 497 MS
R AXIS: -45 DEGREES
T AXIS: 139 DEGREES
VENTRICULAR RATE: 72 BPM

## 2025-05-27 RX ORDER — MUPIROCIN 20 MG/G
OINTMENT TOPICAL 3 TIMES DAILY
COMMUNITY
Start: 2025-05-24

## 2025-05-27 NOTE — PROGRESS NOTES
HUNTER request (discharged 05/26 (ED)    Dr Paulo James  Internal Medicine  [facility]  [address] [place] [address]  [phone]  Office is closed; will call back Wed @9:00am to schedule appointment (pt would like kalin, 5/28, 6/2, 6/3, 6/12 & 6/18 are no good)

## 2025-05-27 NOTE — PROGRESS NOTES
Transitions of Care team has contacted patient and patient needs additional appointments.  Please contact spouse for assistance with scheduling a follow-up appointment for primary care physician .      ER Follow-up appointment needed by 6/1/2025.  Please advise.    BOOK BY DATE: 6/1/2025    Paulo Sanchez  [address] [place] [address]  [phone]     Thank you!
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
## 2025-05-28 NOTE — PROGRESS NOTES
HUNTER request (discharged 05/26 (ED)     Dr Paulo James  Internal Medicine  [facility]  [address] [place] [address]  [phone]  Apt made:  Wed 06/04 @9:00am  Left Voicemail with appointment information; if still need assistance to call [phone]  Closing encounter

## 2025-05-29 ENCOUNTER — HOSPITAL ENCOUNTER (OUTPATIENT)
Age: OVER 89
Discharge: HOME | End: 2025-05-29
Payer: COMMERCIAL

## 2025-05-29 VITALS
OXYGEN SATURATION: 97 % | SYSTOLIC BLOOD PRESSURE: 147 MMHG | TEMPERATURE: 98 F | DIASTOLIC BLOOD PRESSURE: 62 MMHG | RESPIRATION RATE: 20 BRPM | HEART RATE: 78 BPM

## 2025-05-29 DIAGNOSIS — S81.802A WOUND OF LEFT LOWER EXTREMITY, INITIAL ENCOUNTER: Primary | ICD-10-CM

## 2025-05-29 PROCEDURE — 99213 OFFICE O/P EST LOW 20 MIN: CPT | Performed by: NURSE PRACTITIONER

## 2025-05-29 RX ORDER — MUPIROCIN 20 MG/G
1 OINTMENT TOPICAL 3 TIMES DAILY
Qty: 1 EACH | Refills: 0 | Status: SHIPPED | OUTPATIENT
Start: 2025-05-29 | End: 2025-06-12

## 2025-05-29 NOTE — DISCHARGE INSTRUCTIONS
Clean the wound with soap and water and pat to dry prior to each application of the antibiotic ointment.  Follow-up with your primary care provider.  Go to the emergency department for new or worsening symptoms, including fevers, increasing redness.

## 2025-05-29 NOTE — ED PROVIDER NOTES
Patient Seen in: Immediate Care [facility]        History  Chief Complaint   Patient presents with    Toe Pain     Stated Complaint: change dressing on feet    Subjective:   89-year-old male with a left great toe wound been there for 4 days.  Wife who speaks English states that he was seen here for the same on May 26.  The wound has been there for a few days, states that it is because the patient was not wearing the proper footwear.  No fevers.Patient gave verbal consent to use haiku harinder in order to take an image and attach it with her chart.  Please see attached image.    He had an x-ray when he was seen here on May 26 which was negative.                            Objective:     Past Medical History:    BPH (benign prostatic hyperplasia)    Coronary atherosclerosis    Depression    Essential hypertension    Hearing impaired person, bilateral    High blood pressure    High cholesterol    History of stomach ulcers    Hyperlipidemia    Stroke (HCC)    Urinary retention              Past Surgical History:   Procedure Laterality Date    Cath bare metal stent (bms)      Colonoscopy      Surgical stent      wife states Lft side of heart- [facility]                Social History     Socioeconomic History    Marital status:    Tobacco Use    Smoking status: Former     Types: Cigarettes     Passive exposure: Never    Smokeless tobacco: Never    Tobacco comments:     QUIT SMOKING IN 2000   Vaping Use    Vaping status: Never Used   Substance and Sexual Activity    Alcohol use: Not Currently    Drug use: Never     Social Drivers of Health     Food Insecurity: No Food Insecurity (4/22/2025)    NCSS - Food Insecurity     Worried About Running Out of Food in the Last Year: No     Ran Out of Food in the Last Year: No   Transportation Needs: No Transportation Needs (4/22/2025)    NCSS - Transportation     Lack of Transportation: No   Housing Stability: Not At Risk (4/22/2025)    NCSS - Housing/Utilities      Has Housing: Yes     Worried About Losing Housing: No     Unable to Get Utilities: No              Review of Systems   Constitutional: Negative.    Skin:         Left great toe wound   All other systems reviewed and are negative.      Positive for stated complaint: change dressing on feet  Other systems are as noted in HPI.  Constitutional and vital signs reviewed.      All other systems reviewed and negative except as noted above.                  Physical Exam    ED Triage Vitals [05/29/25 1142]   /62   Pulse 78   Resp 20   Temp 98.1 °F (36.7 °C)   Temp src Oral   SpO2 97 %   O2 Device None (Room air)       Current Vitals:   Vital Signs  BP: 147/62  Pulse: 78  Resp: 20  Temp: 98.1 °F (36.7 °C)  Temp src: Oral    Oxygen Therapy  SpO2: 97 %  O2 Device: None (Room air)            Physical Exam  Vitals and nursing note reviewed.   Constitutional:       General: He is not in acute distress.     Appearance: Normal appearance. He is not ill-appearing, toxic-appearing or diaphoretic.   Pulmonary:      Effort: No respiratory distress.   Skin:     Capillary Refill: Capillary refill takes less than 2 seconds.      Comments: Please see the attached image.   Neurological:      Mental Status: He is alert and oriented to person, place, and time.   Psychiatric:         Behavior: Behavior normal.                 ED Course  Labs Reviewed - No data to display                         MDM             Medical Decision Making  89-year-old male with a small wound to the left great toe, without skin lesion for a few days.  No vascular deficits.  Was seen here for the same on May 26, according to wife, wound has not improved.  No systemic symptoms.  Did offer oral antibiotics which the patient and wife refused.  States she wants to try topical antibiotic ointment instead.  Will prescribe Bactroban.  Wound care instructions provided.    Supportive/home management of diagnosis/illness/injury discussed. Red flag symptoms discussed.   Signs and symptoms/criteria that would necessitate reevaluation, including ER evaluation discussed.  Patient and/or responsible adult verbalize and agree with management and plan of care.    Speech recognition software was used during this dictation.  There may be minor errors in transcription.      Risk  Prescription drug management.        Disposition and Plan     Clinical Impression:  1. Wound of left lower extremity, initial encounter         Disposition:  Discharge  5/29/2025 12:08 pm    Follow-up:  Paulo James MD  [address] [address]  [phone]    Schedule an appointment as soon as possible for a visit       The emergency department    Go to   If symptoms worsen          Medications Prescribed:  Current Discharge Medication List        START taking these medications    Details   !! mupirocin 2 % External Ointment Apply 1 Application topically 3 (three) times daily for 14 days.  Qty: 1 each, Refills: 0       !! - Potential duplicate medications found. Please discuss with provider.                Supplementary Documentation:

## 2025-06-02 ENCOUNTER — PATIENT OUTREACH (OUTPATIENT)
Dept: CASE MANAGEMENT | Age: OVER 89
End: 2025-06-02

## 2025-06-24 VITALS
TEMPERATURE: 97 F | OXYGEN SATURATION: 95 % | RESPIRATION RATE: 16 BRPM | SYSTOLIC BLOOD PRESSURE: 136 MMHG | DIASTOLIC BLOOD PRESSURE: 76 MMHG | WEIGHT: 160 LBS | BODY MASS INDEX: 24 KG/M2 | HEART RATE: 85 BPM

## 2025-06-24 PROCEDURE — 99284 EMERGENCY DEPT VISIT MOD MDM: CPT

## 2025-06-24 PROCEDURE — 99283 EMERGENCY DEPT VISIT LOW MDM: CPT

## 2025-06-24 RX ORDER — ONDANSETRON 4 MG/1
TABLET, FILM COATED ORAL
Status: DISCONTINUED
Start: 2025-06-24 | End: 2025-06-25

## 2025-06-25 ENCOUNTER — HOSPITAL ENCOUNTER (EMERGENCY)
Facility: HOSPITAL | Age: OVER 89
Discharge: HOME | End: 2025-06-25
Attending: EMERGENCY MEDICINE
Payer: COMMERCIAL

## 2025-06-25 ENCOUNTER — APPOINTMENT (OUTPATIENT)
Dept: GENERAL RADIOLOGY | Facility: HOSPITAL | Age: OVER 89
End: 2025-06-25
Attending: EMERGENCY MEDICINE
Payer: COMMERCIAL

## 2025-06-25 DIAGNOSIS — S70.02XA CONTUSION OF LEFT HIP, INITIAL ENCOUNTER: ICD-10-CM
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
## 2025-06-25 PROCEDURE — 73502 X-RAY EXAM HIP UNI 2-3 VIEWS: CPT | Performed by: EMERGENCY MEDICINE

## 2025-06-25 NOTE — ED INITIAL ASSESSMENT (HPI)
Pt arrives to ed w c/o falling today this AM. Family reports fall on left hip and now has bruising. Pt reports he was dizzy before he fell. Wife states \"he was on the toilet and missed his target when he fell\". Denies hitting head or LOC. +blood thinners. Pt denies dizziness currently.

## 2025-06-25 NOTE — ED PROVIDER NOTES
Patient Seen in: [facility] Emergency Department       The following individual(s) verbally consented to be recorded using ambient AI listening technology and understand that they can each withdraw their consent to this listening technology at any point by asking the clinician to turn off or pause the recording:    Patient name: Deven Atkinson  Additional names: Patient's wife      History  Chief Complaint   Patient presents with    Trauma    Leg or Foot Injury     Stated Complaint: FALL THIS MORNING, LEFT HIP PAIN    Subjective:   HPI     Deven Atkinson is a 89 year old male who presents with hip pain following a fall.    He experienced a fall at home when he missed the toilet seat while attempting to sit down in the dark. He did not turn on the light, which led to him sitting on the side of the toilet and subsequently falling, landing on his hip.    Following the fall, he was able to walk, indicating some mobility despite the incident. No head injury was reported during the fall.        Objective:     Past Medical History:    BPH (benign prostatic hyperplasia)    Coronary atherosclerosis    Depression    Essential hypertension    Hearing impaired person, bilateral    High blood pressure    High cholesterol    History of stomach ulcers    Hyperlipidemia    Stroke (HCC)    Urinary retention              No pertinent past surgical history.              No pertinent social history.                              Physical Exam    ED Triage Vitals [06/24/25 2128]   /76   Pulse 85   Resp 16   Temp 97 °F (36.1 °C)   Temp src Temporal   SpO2 95 %   O2 Device None (Room air)       Current Vitals:   Vital Signs  BP: 136/76  Pulse: 85  Resp: 16  Temp: 97 °F (36.1 °C)  Temp src: Temporal    Oxygen Therapy  SpO2: 95 %  O2 Device: None (Room air)        Pertinent physical exam:      GENERAL: Alert, cooperative, well developed, no acute distress.  HEENT: Normocephalic, normal oropharynx, moist mucous membranes.  CHEST: Clear  to auscultation bilaterally, no wheezes, rhonchi, or crackles.  CARDIOVASCULAR: Normal heart rate and rhythm, S1 and S2 normal without murmurs.  ABDOMEN: Soft, non-tender, non-distended, without organomegaly, normal bowel sounds.  EXTREMITIES: No cyanosis or edema.  MUSCULOSKELETAL: Left hip with normal range of motion, no ecchymosis or contusion. Normal range of motion of the knee.  NEUROLOGICAL: Cranial nerves grossly intact, moves all extremities without gross motor or sensory deficit.       Physical Exam        ED Course  Labs Reviewed - No data to display       Patient was evaluated the emergency department had an x-ray of the left hip.  X-ray showed no acute fracture.  Patient will be discharged home needs a fall precautions return if any worsening issues.  Was not requesting any pain medication and moving hip without difficulty                  MDM     Differential diagnosis reflecting the complexity of care include: Hip contusion, hip fracture, mechanical fall, loss of balance    Comorbidities that add complexity to management include: High cholesterol stroke hypertension        My independent interpretation of studies of: X-ray of hip shows no acute fracture.    Diagnostic tests and medications considered but not ordered were: CT scan of the head was considered but patient did not hit head      Shared decision making was done by myself and patient and his wife.  Patient be discharged home ice and ibuprofen.  Return if worse            Medical Decision Making      Disposition and Plan     Clinical Impression:  1. Fall, initial encounter    2. Contusion of left hip, initial encounter         Disposition:  Discharge  6/25/2025  2:33 am    Follow-up:  Paulo James MD  [address] [place] [address]  [phone]    Follow up            Medications Prescribed:  Current Discharge Medication List                Supplementary Documentation: Patient was screened and evaluated during this  visit.  As the treating physician attending to the patient, I determined within reasonable clinical confidence and prior to discharge, that an emergency medical condition was not or was no longer present.  There was no indication for further evaluation, treatment, or admission on an emergency basis.  Comprehensive verbal and written discharge and follow-up instructions were provided to help prevent relapse or worsening.  Patient was instructed to follow-up with primary care provider for further evaluation treatment, return immediately to ER for worsening, concerning, new, or changing/persisting symptoms.  I discussed the case with the patient and they had no questions, complaints, or concerns.  Patient was comfortable going home.      Dictation Disclaimer Note:   To increase efficiency this document may have been prepared using voice recognition technology. Every effort has been made to correct any errors made during preparation of this note. However, if a word or phrase is confusing, or does not make sense, this is likely due to a recognition error within the program which was not discovered during editing. Please do not hesitate to contact to address any significant errors.    Note to Patient:   The 21st Century Cures Act makes medical notes like these available to patients in the interest of transparency. Please be advised this is a medical document. Medical documents are intended to carry relevant information, facts as evident, and the clinical opinion of the practitioner. The medical note is intended as peer to peer communication and may appear blunt or direct. It is written in medical language and may contain abbreviations or verbiage that are unfamiliar.

## 2025-07-08 ENCOUNTER — APPOINTMENT (OUTPATIENT)
Dept: GENERAL RADIOLOGY | Facility: HOSPITAL | Age: OVER 89
End: 2025-07-08
Payer: COMMERCIAL

## 2025-07-08 ENCOUNTER — HOSPITAL ENCOUNTER (EMERGENCY)
Facility: HOSPITAL | Age: OVER 89
Discharge: HOME | End: 2025-07-08
Attending: EMERGENCY MEDICINE
Payer: COMMERCIAL

## 2025-07-08 ENCOUNTER — APPOINTMENT (OUTPATIENT)
Dept: CT IMAGING | Facility: HOSPITAL | Age: OVER 89
End: 2025-07-08
Attending: EMERGENCY MEDICINE
Payer: COMMERCIAL

## 2025-07-08 VITALS
DIASTOLIC BLOOD PRESSURE: 58 MMHG | HEART RATE: 61 BPM | BODY MASS INDEX: 24.44 KG/M2 | HEIGHT: 69 IN | OXYGEN SATURATION: 99 % | TEMPERATURE: 97 F | SYSTOLIC BLOOD PRESSURE: 126 MMHG | RESPIRATION RATE: 23 BRPM | WEIGHT: 165 LBS

## 2025-07-08 DIAGNOSIS — S20.211A CONTUSION OF RIGHT CHEST WALL, INITIAL ENCOUNTER: ICD-10-CM

## 2025-07-08 DIAGNOSIS — S29.8XXA BLUNT TRAUMA TO CHEST, INITIAL ENCOUNTER: Primary | ICD-10-CM

## 2025-07-08 LAB
ALBUMIN SERPL-MCNC: 4.2 G/DL (ref 3.2–4.8)
ALBUMIN/GLOB SERPL: 1.3 {RATIO} (ref 1–2)
ALP LIVER SERPL-CCNC: 128 U/L (ref 45–117)
ALT SERPL-CCNC: 17 U/L (ref 10–49)
ANION GAP SERPL CALC-SCNC: 9 MMOL/L (ref 0–18)
APTT PPP: 27.8 SECONDS (ref 23–36)
AST SERPL-CCNC: 24 U/L (ref ?–34)
ATRIAL RATE: 76 BPM
BASOPHILS # BLD AUTO: 0.07 X10(3) UL (ref 0–0.2)
BASOPHILS NFR BLD AUTO: 0.9 %
BILIRUB SERPL-MCNC: 0.5 MG/DL (ref 0.2–0.9)
BUN BLD-MCNC: 13 MG/DL (ref 9–23)
CALCIUM BLD-MCNC: 9.1 MG/DL (ref 8.7–10.6)
CHLORIDE SERPL-SCNC: 108 MMOL/L (ref 98–112)
CO2 SERPL-SCNC: 23 MMOL/L (ref 21–32)
CREAT BLD-MCNC: 1.1 MG/DL (ref 0.7–1.3)
EGFRCR SERPLBLD CKD-EPI 2021: 62 ML/MIN/1.73M2 (ref 60–?)
EOSINOPHIL # BLD AUTO: 0.06 X10(3) UL (ref 0–0.7)
EOSINOPHIL NFR BLD AUTO: 0.7 %
ERYTHROCYTE [DISTWIDTH] IN BLOOD BY AUTOMATED COUNT: 16.1 %
GLOBULIN PLAS-MCNC: 3.3 G/DL (ref 2–3.5)
GLUCOSE BLD-MCNC: 142 MG/DL (ref 70–99)
HCT VFR BLD AUTO: 37.1 % (ref 39–53)
HGB BLD-MCNC: 12.7 G/DL (ref 13–17.5)
IMM GRANULOCYTES # BLD AUTO: 0.03 X10(3) UL (ref 0–1)
IMM GRANULOCYTES NFR BLD: 0.4 %
INR BLD: 1 (ref 0.8–1.2)
LYMPHOCYTES # BLD AUTO: 3.63 X10(3) UL (ref 1–4)
LYMPHOCYTES NFR BLD AUTO: 44.2 %
MCH RBC QN AUTO: 27.4 PG (ref 26–34)
MCHC RBC AUTO-ENTMCNC: 34.2 G/DL (ref 31–37)
MCV RBC AUTO: 80 FL (ref 80–100)
MONOCYTES # BLD AUTO: 0.4 X10(3) UL (ref 0.1–1)
MONOCYTES NFR BLD AUTO: 4.9 %
NEUTROPHILS # BLD AUTO: 4.03 X10 (3) UL (ref 1.5–7.7)
NEUTROPHILS # BLD AUTO: 4.03 X10(3) UL (ref 1.5–7.7)
NEUTROPHILS NFR BLD AUTO: 48.9 %
OSMOLALITY SERPL CALC.SUM OF ELEC: 293 MOSM/KG (ref 275–295)
P-R INTERVAL: 168 MS
PLATELET # BLD AUTO: 208 10(3)UL (ref 150–450)
POTASSIUM SERPL-SCNC: 3.4 MMOL/L (ref 3.5–5.1)
PROT SERPL-MCNC: 7.5 G/DL (ref 5.7–8.2)
PROTHROMBIN TIME: 13.3 SECONDS (ref 11.6–14.8)
Q-T INTERVAL: 426 MS
QRS DURATION: 144 MS
QTC CALCULATION (BEZET): 479 MS
R AXIS: -35 DEGREES
RBC # BLD AUTO: 4.64 X10(6)UL (ref 3.8–5.8)
SODIUM SERPL-SCNC: 140 MMOL/L (ref 136–145)
T AXIS: 141 DEGREES
TROPONIN I SERPL HS-MCNC: 20 NG/L (ref ?–53)
VENTRICULAR RATE: 76 BPM
WBC # BLD AUTO: 8.2 X10(3) UL (ref 4–11)

## 2025-07-08 PROCEDURE — 71045 X-RAY EXAM CHEST 1 VIEW: CPT | Performed by: EMERGENCY MEDICINE

## 2025-07-08 PROCEDURE — 36415 COLL VENOUS BLD VENIPUNCTURE: CPT

## 2025-07-08 PROCEDURE — 93005 ELECTROCARDIOGRAM TRACING: CPT

## 2025-07-08 PROCEDURE — 85730 THROMBOPLASTIN TIME PARTIAL: CPT | Performed by: EMERGENCY MEDICINE

## 2025-07-08 PROCEDURE — 84484 ASSAY OF TROPONIN QUANT: CPT

## 2025-07-08 PROCEDURE — 85025 COMPLETE CBC W/AUTO DIFF WBC: CPT

## 2025-07-08 PROCEDURE — 80053 COMPREHEN METABOLIC PANEL: CPT | Performed by: EMERGENCY MEDICINE

## 2025-07-08 PROCEDURE — 80053 COMPREHEN METABOLIC PANEL: CPT

## 2025-07-08 PROCEDURE — 84484 ASSAY OF TROPONIN QUANT: CPT | Performed by: EMERGENCY MEDICINE

## 2025-07-08 PROCEDURE — 99284 EMERGENCY DEPT VISIT MOD MDM: CPT

## 2025-07-08 PROCEDURE — 93010 ELECTROCARDIOGRAM REPORT: CPT

## 2025-07-08 PROCEDURE — 71260 CT THORAX DX C+: CPT | Performed by: EMERGENCY MEDICINE

## 2025-07-08 PROCEDURE — 85025 COMPLETE CBC W/AUTO DIFF WBC: CPT | Performed by: EMERGENCY MEDICINE

## 2025-07-08 PROCEDURE — 85610 PROTHROMBIN TIME: CPT | Performed by: EMERGENCY MEDICINE

## 2025-07-08 PROCEDURE — 99285 EMERGENCY DEPT VISIT HI MDM: CPT

## 2025-07-08 RX ORDER — POTASSIUM CHLORIDE 1500 MG/1
20 TABLET, EXTENDED RELEASE ORAL ONCE
Status: COMPLETED | OUTPATIENT
Start: 2025-07-08 | End: 2025-07-08

## 2025-07-08 NOTE — ED INITIAL ASSESSMENT (HPI)
Pt fell 2 nights ago now to ED c/o non-radiating CP. CP started this afternoon. Denies N/V. Denies other pain at this time.
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
## 2025-07-09 NOTE — DISCHARGE INSTRUCTIONS
Tylenol for pain at home  Rest at home  Return to the ER if symptoms worsen or if any other problems arise

## 2025-07-18 ENCOUNTER — HOSPITAL ENCOUNTER (EMERGENCY)
Facility: HOSPITAL | Age: OVER 89
Discharge: HOME | End: 2025-07-18
Attending: EMERGENCY MEDICINE
Payer: COMMERCIAL

## 2025-07-18 ENCOUNTER — APPOINTMENT (OUTPATIENT)
Dept: GENERAL RADIOLOGY | Age: OVER 89
End: 2025-07-18
Attending: PHYSICIAN ASSISTANT
Payer: COMMERCIAL

## 2025-07-18 ENCOUNTER — APPOINTMENT (OUTPATIENT)
Dept: CT IMAGING | Facility: HOSPITAL | Age: OVER 89
End: 2025-07-18
Attending: EMERGENCY MEDICINE
Payer: COMMERCIAL

## 2025-07-18 ENCOUNTER — HOSPITAL ENCOUNTER (OUTPATIENT)
Age: OVER 89
Discharge: HOME | End: 2025-07-18
Payer: COMMERCIAL

## 2025-07-18 VITALS
DIASTOLIC BLOOD PRESSURE: 65 MMHG | SYSTOLIC BLOOD PRESSURE: 112 MMHG | HEART RATE: 73 BPM | OXYGEN SATURATION: 98 % | RESPIRATION RATE: 22 BRPM | TEMPERATURE: 98 F

## 2025-07-18 VITALS
WEIGHT: 160 LBS | OXYGEN SATURATION: 100 % | BODY MASS INDEX: 25.11 KG/M2 | DIASTOLIC BLOOD PRESSURE: 70 MMHG | RESPIRATION RATE: 23 BRPM | TEMPERATURE: 98 F | HEIGHT: 67 IN | HEART RATE: 66 BPM | SYSTOLIC BLOOD PRESSURE: 153 MMHG

## 2025-07-18 DIAGNOSIS — K59.00 CONSTIPATION, UNSPECIFIED CONSTIPATION TYPE: ICD-10-CM

## 2025-07-18 DIAGNOSIS — R10.9 ABDOMINAL PAIN OF UNKNOWN ETIOLOGY: ICD-10-CM

## 2025-07-18 DIAGNOSIS — S80.212A ABRASION OF LEFT KNEE, INITIAL ENCOUNTER: ICD-10-CM

## 2025-07-18 DIAGNOSIS — S50.12XA HEMATOMA OF LEFT FOREARM: ICD-10-CM
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
## 2025-07-18 DIAGNOSIS — S01.01XA LACERATION OF SCALP, INITIAL ENCOUNTER: ICD-10-CM

## 2025-07-18 DIAGNOSIS — S50.12XA CONTUSION OF LEFT FOREARM, INITIAL ENCOUNTER: ICD-10-CM

## 2025-07-18 DIAGNOSIS — S09.90XA INJURY OF HEAD, INITIAL ENCOUNTER: ICD-10-CM

## 2025-07-18 LAB
ALBUMIN SERPL-MCNC: 4.2 G/DL (ref 3.2–4.8)
ALBUMIN/GLOB SERPL: 1.4 (ref 1–2)
ALP LIVER SERPL-CCNC: 143 U/L (ref 45–117)
ALT SERPL-CCNC: 24 U/L (ref 10–49)
ANION GAP SERPL CALC-SCNC: 9 MMOL/L (ref 0–18)
AST SERPL-CCNC: 31 U/L (ref ?–34)
BASOPHILS # BLD AUTO: 0.08 X10(3) UL (ref 0–0.2)
BASOPHILS NFR BLD AUTO: 0.9 %
BILIRUB SERPL-MCNC: 0.5 MG/DL (ref 0.2–0.9)
BILIRUB UR QL STRIP.AUTO: NEGATIVE
BUN BLD-MCNC: 11 MG/DL (ref 9–23)
CALCIUM BLD-MCNC: 9 MG/DL (ref 8.7–10.6)
CHLORIDE SERPL-SCNC: 108 MMOL/L (ref 98–112)
CLARITY UR REFRACT.AUTO: CLEAR
CO2 SERPL-SCNC: 26 MMOL/L (ref 21–32)
COLOR UR AUTO: YELLOW
CREAT BLD-MCNC: 1.07 MG/DL (ref 0.7–1.3)
EGFRCR SERPLBLD CKD-EPI 2021: 64 ML/MIN/1.73M2 (ref 60–?)
EOSINOPHIL # BLD AUTO: 0.17 X10(3) UL (ref 0–0.7)
EOSINOPHIL NFR BLD AUTO: 2 %
ERYTHROCYTE [DISTWIDTH] IN BLOOD BY AUTOMATED COUNT: 16 %
GLOBULIN PLAS-MCNC: 3.1 G/DL (ref 2–3.5)
GLUCOSE BLD-MCNC: 129 MG/DL (ref 70–99)
GLUCOSE UR STRIP.AUTO-MCNC: NORMAL MG/DL
HCT VFR BLD AUTO: 36.5 % (ref 39–53)
HGB BLD-MCNC: 12.2 G/DL (ref 13–17.5)
IMM GRANULOCYTES # BLD AUTO: 0.02 X10(3) UL (ref 0–1)
IMM GRANULOCYTES NFR BLD: 0.2 %
KETONES UR STRIP.AUTO-MCNC: NEGATIVE MG/DL
LEUKOCYTE ESTERASE UR QL STRIP.AUTO: NEGATIVE
LIPASE SERPL-CCNC: 35 U/L (ref 12–53)
LYMPHOCYTES # BLD AUTO: 4.15 X10(3) UL (ref 1–4)
LYMPHOCYTES NFR BLD AUTO: 48.3 %
MCH RBC QN AUTO: 27.4 PG (ref 26–34)
MCHC RBC AUTO-ENTMCNC: 33.4 G/DL (ref 31–37)
MCV RBC AUTO: 81.8 FL (ref 80–100)
MONOCYTES # BLD AUTO: 0.45 X10(3) UL (ref 0.1–1)
MONOCYTES NFR BLD AUTO: 5.2 %
NEUTROPHILS # BLD AUTO: 3.73 X10 (3) UL (ref 1.5–7.7)
NEUTROPHILS # BLD AUTO: 3.73 X10(3) UL (ref 1.5–7.7)
NEUTROPHILS NFR BLD AUTO: 43.4 %
NITRITE UR QL STRIP.AUTO: NEGATIVE
OSMOLALITY SERPL CALC.SUM OF ELEC: 297 MOSM/KG (ref 275–295)
PH UR STRIP.AUTO: 5.5 (ref 5–8)
PLATELET # BLD AUTO: 251 10(3)UL (ref 150–450)
POTASSIUM SERPL-SCNC: 3.8 MMOL/L (ref 3.5–5.1)
PROT SERPL-MCNC: 7.3 G/DL (ref 5.7–8.2)
RBC # BLD AUTO: 4.46 X10(6)UL (ref 3.8–5.8)
RBC UR QL AUTO: NEGATIVE
SODIUM SERPL-SCNC: 143 MMOL/L (ref 136–145)
SP GR UR STRIP.AUTO: 1.02 (ref 1–1.03)
UROBILINOGEN UR STRIP.AUTO-MCNC: NORMAL MG/DL
WBC # BLD AUTO: 8.6 X10(3) UL (ref 4–11)

## 2025-07-18 PROCEDURE — 73560 X-RAY EXAM OF KNEE 1 OR 2: CPT | Performed by: PHYSICIAN ASSISTANT

## 2025-07-18 PROCEDURE — 12002 RPR S/N/AX/GEN/TRNK2.6-7.5CM: CPT

## 2025-07-18 PROCEDURE — 74176 CT ABD & PELVIS W/O CONTRAST: CPT | Performed by: EMERGENCY MEDICINE

## 2025-07-18 PROCEDURE — 83690 ASSAY OF LIPASE: CPT | Performed by: EMERGENCY MEDICINE

## 2025-07-18 PROCEDURE — 81003 URINALYSIS AUTO W/O SCOPE: CPT | Performed by: EMERGENCY MEDICINE

## 2025-07-18 PROCEDURE — 99213 OFFICE O/P EST LOW 20 MIN: CPT | Performed by: PHYSICIAN ASSISTANT

## 2025-07-18 PROCEDURE — 99285 EMERGENCY DEPT VISIT HI MDM: CPT

## 2025-07-18 PROCEDURE — 80053 COMPREHEN METABOLIC PANEL: CPT | Performed by: EMERGENCY MEDICINE

## 2025-07-18 PROCEDURE — 70450 CT HEAD/BRAIN W/O DYE: CPT | Performed by: EMERGENCY MEDICINE

## 2025-07-18 PROCEDURE — 96372 THER/PROPH/DIAG INJ SC/IM: CPT

## 2025-07-18 PROCEDURE — 85025 COMPLETE CBC W/AUTO DIFF WBC: CPT | Performed by: EMERGENCY MEDICINE

## 2025-07-18 PROCEDURE — 99284 EMERGENCY DEPT VISIT MOD MDM: CPT

## 2025-07-18 PROCEDURE — 36415 COLL VENOUS BLD VENIPUNCTURE: CPT

## 2025-07-18 PROCEDURE — 73090 X-RAY EXAM OF FOREARM: CPT | Performed by: PHYSICIAN ASSISTANT

## 2025-07-18 RX ORDER — POLYETHYLENE GLYCOL 3350 17 G/17G
17 POWDER, FOR SOLUTION ORAL DAILY PRN
Qty: 12 EACH | Refills: 0 | Status: ON HOLD | OUTPATIENT
Start: 2025-07-18 | End: 2025-08-17

## 2025-07-18 RX ORDER — DICYCLOMINE HYDROCHLORIDE 10 MG/ML
20 INJECTION INTRAMUSCULAR ONCE
Status: COMPLETED | OUTPATIENT
Start: 2025-07-18 | End: 2025-07-18

## 2025-07-18 NOTE — ED PROVIDER NOTES
Patient Seen in: [facility] Emergency Department        History  Chief Complaint   Patient presents with    Abdomen/Flank Pain    Fall     Stated Complaint: stomach cramps bringing him in initially and fell when trying to come to the ER    Subjective:   HPI            89-year-old gentleman, here for evaluation of abdominal pain.  Wife provides all the history, they declined a Mandarin  she prefers to provide the history.  States today he complained of abdominal pain seem to be in significant discomfort so she decided to bring him to the ER.  As they were getting ready to go she went to check on him and found that he had slipped and struck the top of his head on a doorway.  Patient denies any other complaints or injuries at this time.  Is ambulatory.  No reported vomiting diarrhea patient states he had a small bowel movement yesterday.          Objective:     No pertinent past medical history.            No pertinent past surgical history.              No pertinent social history.                              Physical Exam    ED Triage Vitals [07/18/25 0615]   /64   Pulse 67   Resp 20   Temp 97.6 °F (36.4 °C)   Temp src Temporal   SpO2 97 %   O2 Device None (Room air)       Current Vitals:   Vital Signs  BP: 160/63  Pulse: 64  Resp: 21  Temp: 97.6 °F (36.4 °C)  Temp src: Temporal  MAP (mmHg): 88    Oxygen Therapy  SpO2: 100 %  O2 Device: None (Room air)            Physical Exam    Physical Exam  Vitals signs and nursing note reviewed.   General:  Patient laying supine in the bed in no acute distress  Head: Normocephalic, 4 cm full-thickness laceration to the top of the scalp  HEENT:  Mucous membranes are moist.   Cardiovascular:  Normal rate and regular rhythm.  No Edema  Pulmonary:  Pulmonary effort is normal.  Normal breath sounds. No wheezing, rhonchi or rales.   Abdominal: Soft nontender nondistended, normal bowel sounds, no guarding no rebound tenderness  Skin: Warm and dry  Neurological:  Awake alert, speech is normal        ED Course  Labs Reviewed   COMP METABOLIC PANEL (14) - Abnormal; Notable for the following components:       Result Value    Glucose 129 (*)     Calculated Osmolality 297 (*)     Alkaline Phosphatase 143 (*)     All other components within normal limits   CBC WITH DIFFERENTIAL WITH PLATELET - Abnormal; Notable for the following components:    HGB 12.2 (*)     HCT 36.5 (*)     Lymphocyte Absolute 4.15 (*)     All other components within normal limits   URINALYSIS WITH CULTURE REFLEX - Abnormal; Notable for the following components:    Protein Urine Trace (*)     All other components within normal limits   LIPASE - Normal        CT BRAIN OR HEAD (CPT=70450)  Result Date: 7/18/2025  PROCEDURE: CT BRAIN OR HEAD (CPT=70450) INDICATIONS: fall with head injury PATIENT STATED HISTORY: fall, laceration to rt forhead COMPARISON: 3/25/2025 TECHNIQUE: Axial CT images of the brain were obtained without the administration of IV contrast. Coronal and Sagittal reconstructions were obtained. CT dose reduction techniques were utilized during this examination. Dose information is transmitted to the ACR (American College of Radiology) NRDR (National Radiology Data Registry) which includes the Dose Index Registry. FINDINGS: The ventricles are symmetric in size There are patchy areas of low attenuation in the periventricular and deep white matter which are nonspecific but most consistent with small vessel ischemic changes. There is no evidence of hemorrhage, mass, midline shift, or extra-axial fluid collection. The visualized paranasal sinuses show near complete opacification of the right maxillary sinus with hyperdense material.. No evidence of depressed skull fracture.     CONCLUSION: 1. No acute intracranial findings 2. Chronic microvascular ischemic changes. 3. Stable near complete opacification right maxillary sinus with increased density which may represent inspissated material versus fungal  disease 4. Small scalp laceration along the anterior frontal convexity. Electronically Verified and Signed by Attending Radiologist: Aaron Nolan MD 7/18/2025 7:25 AM Workstation: [id number]    CT ABDOMEN+PELVIS(CPT=74176)  Result Date: 7/18/2025  PROCEDURE: CT ABDOMEN+PELVIS(CPT=74176) INDICATIONS: abdominal cramping PATIENT STATED HISTORY: fall, abd pain COMPARISON: 5/20/2025 TECHNIQUE: Axial CT images of the abdomen and pelvis with coronal and sagittal reconstructions were obtained without the administration of IV contrast. CT dose reduction techniques were utilized during this examination. Dose information is transmitted to  the ACR (American College of Radiology) NRDR (National Radiology Data Registry) which includes the Dose Index Registry. FINDINGS: LUNG BASES: Dependent atelectasis. Small hiatal hernia. LIVER: Evaluation limited without IV contrast. Normal in shape and contour. BILIARY: No intrahepatic biliary dilatation. Gallbladder is unremarkable in appearance. SPLEEN: Normal in size and shape PANCREAS: No peripancreatic inflammatory stranding. No mass or fluid collection identified. ADRENALS: No mass identified KIDNEYS: Kidneys are symmetrical in size. BOWEL: Bowel is normal in caliber. No evidence for obstruction. The appendix is normal in appearance. Moderate amount of fecal material noted within the colon. Colonic diverticulosis without evidence of diverticulitis. PELVIS: Bladder wall thickening. Nodule anterior to the bladder measuring 1.5 x 1.3 cm. Fat-containing left inguinal hernia. No free pelvic fluid. Prostatic calcifications. VASCULAR: Aorta is normal in caliber. Mild atheromatous calcifications. BONES: No acute fractures. Multilevel degenerative changes of the lumbar spine     CONCLUSION: 1. Bladder wall thickening. Correlation with urinalysis is recommended. 2. Stable 1.5 cm nodule within the anterior right pelvis. 3. Colonic diverticulosis without diverticulitis. 4. Small hiatal hernia.  Electronically Verified and Signed by Attending Radiologist: Aaron Nolan MD 7/18/2025 7:21 AM Workstation: [id number]    CT CHEST(CONTRAST ONLY) (CPT=71260)  Result Date: 7/8/2025  PROCEDURE: CT CHEST(CONTRAST ONLY) (CPT=71260) INDICATIONS: fall, bruising PATIENT STATED HISTORY: Patient fell 2 nights ago now to ED c/o non-radiating Chest pain. Chest pain started this afternoon. COMPARISON: Chest x-ray 7/8/2025 TECHNIQUE: Axial CT images through the chest with coronal and sagittal reconstructions were obtained after the uneventful administration of IV contrast. CT dose reduction techniques were utilized during this examination. Dose information is transmitted to the ACR (American College of Radiology) NRDR (National Radiology Data Registry) which includes the Dose Index Registry. CONTRAST: IOPAMIDOL 76% IV SOLN FOR POWER INJECTOR:75 mL : FINDINGS: CHEST: LUNGS: Central airways appear patent. Motion artifact limits assessment of the lungs. Mild basilar atelectasis is present. Previously questioned retrocardiac opacity most likely corresponded to atelectasis and motion. Scattered micronodules are present. A right lower lobe micronodule measures 5 mm on series 4 image 99. There is a 5 mm micronodule of the right lower lobe on image 94. Right lower lobe micronodule measures 3 mm on image 80. MEDIASTINUM/JOCY: Right paratracheal probable lymph node measures up to 1.8 x 1.9 cm. Other subcentimeter lymph nodes are present within the mediastinum. PLEURA: No pleural effusions CARDIAC: Moderate calcific atherosclerosis of the coronary arteries is present. CHEST WALL: Normal. LIMITED ABDOMEN: Normal VASCULAR: Aorta is normal in caliber BONES: No acute fractures.      CONCLUSION: Previously questioned airspace opacity is not well appreciated in this exam and most likely represented a combination of atelectasis and motion. Right paratracheal probable lymph node appears enlarged. Continued follow-up is recommended.  Correlation with PET/CT may also be helpful. Micronodules are present measuring up to 5 mm. According to Fleischner guidelines, solid nodules measuring less than 6 mm require no further followup in low risk patients. In high-risk patients with a history of smoking or other risk factors, consider CT  at 12 months. For nodules between 6-8 mm in low risk patients, followup at 6-12 months is recommended with consideration given to further followup at 18-24 months. In high-risk patients, a followup CT at 6-12 months and then 18-24 months is suggested. For nodules measuring greater than 8 mm, a followup CT at 3 months, PET/CT, or biopsy is recommended in all patients. Electronically Verified and Signed by Attending Radiologist: Oleg Morley MD 7/8/2025 7:00 PM Workstation: [id number]    XR CHEST AP PORTABLE  (CPT=71045)  Result Date: 7/8/2025  PROCEDURE: XR CHEST AP PORTABLE  (CPT=71045) INDICATIONS: trip and fall 2 days ago, pain to chest COMPARISON: 05/20/2025 XR CHEST AP PORTABLE  (CPT=71045) FINDINGS: Battery pack overlying the left side of the chest is again noted. Retrocardiac opacity may be artifactual. No pneumothorax. Heart size is normal. Mediastinal and hilar structures are normal. Dextroscoliosis of the thoracic spine.     CONCLUSION: Retrocardiac opacity may be artifactual. This could also be due to atelectasis versus consolidation. No pneumothorax. Electronically Verified and Signed by Attending Radiologist: Minh Giraldo MD 7/8/2025 4:21 PM Workstation: [id number]    XR HIP W OR WO PELVIS 2 OR 3 VIEWS, LEFT (CPT=73502)  Result Date: 6/25/2025  PROCEDURE: XR HIP W OR WO PELVIS 2 OR 3 VIEWS, LEFT (CPT=73502) INDICATIONS: FALL THIS MORNING, LEFT HIP PAIN COMPARISON: There are no comparisons for this exam.  TECHNIQUE: Multiple views of the left hip were obtained. FINDINGS: No acute fractures or osseous lesions are identified. Femoral acetabular relationships are within normal limits. Osteoarthritic changes  within the hips. Degenerative changes of the lumbar spine.     CONCLUSION: 1. No acute fractures. 2. Osteoarthritic changes within the hips. 3. Preliminary report was provided by [facility] at time of examination. Final report confirms findings on preliminary report without discrepancy. Electronically Verified and Signed by Attending Radiologist: Aaron Nolan MD 6/25/2025 5:48 AM Workstation: [id number]    The wound was cleansed and irrigated copiously.  There was no visible foreign body within the wound. The wound was closed using a simple closure with skin adhesive.  The quality of the closure was adequate         MDM     89-year-old gentleman here for evaluation of abdominal discomfort did have a mechanical fall on his way to the ER and sustained a laceration to the top of his head.  Differential includes acute intracranial hemorrhage, bowel obstruction, scalp laceration.  Laceration was repaired with Dermabond, with good cosmesis, CT abdomen pelvis was obtained which shows no evidence of bowel obstruction does show significant constipation however.  Patient wife at bedside states he does have MiraLAX at home that he can use, he has no other complaints or concerns at this time.  He is tolerating p.o. wife and patient are in agreement with plan.    I independently viewed and interpreted the following imaging: CT abdomen pelvis without evidence of bowel obstruction        [facility]    Disposition and Plan     Clinical Impression:  1. Fall, initial encounter    2. Injury of head, initial encounter    3. Laceration of scalp, initial encounter    4. Abdominal pain of unknown etiology    5. Constipation, unspecified constipation type         Disposition:  Discharge  7/18/2025  8:04 am    Follow-up:  No follow-up provider specified.        Medications Prescribed:  Current Discharge Medication List        START taking these medications    Details   !! polyethylene glycol, PEG 3350, 17 g Oral Powd Pack Take 17 g by  mouth daily as needed.  Qty: 12 each, Refills: 0       !! - Potential duplicate medications found. Please discuss with provider.                Supplementary Documentation:

## 2025-07-18 NOTE — ED QUICK NOTES
Care assumed.    Pt resting on cart, awake/alert/oriented x3, not aware of time at baseline pts wife reports. Pts wife declined interpretor services preferred interpreting for pt as she is pts caregiver.  Pt complaining of abdominal pain Dr Ferreira aware, pts wife reports pt had normal BM yesterday afternoon.  Pt in nad, continuous ecg/bp/spo2 monitors connected to pt. Fall precautions in place, call light in reach, awaiting ct and lab results. Pts wife updated. Care continues

## 2025-07-18 NOTE — ED PROVIDER NOTES
Patient Seen in: [facility]        History  Chief Complaint   Patient presents with    Fall     Stated Complaint: wound from fall; lt leg and elbow    Subjective:   HPI          Mechanical fall just prior to arrival.  Patient complains of left knee and left forearm pain.  Denies hitting his head.  Anticoagulated on Plavix.  Patient is adamant that he did not hit his head.  He points to the areas of pain and points to left anterior knee and left forearm.  He has a small abrasion to left anterior knee and contusion to left forearm.  Per wife, patient's behavior is at baseline.          Objective:     Past Medical History:    BPH (benign prostatic hyperplasia)    Coronary atherosclerosis    Depression    Essential hypertension    Hearing impaired person, bilateral    High blood pressure    High cholesterol    History of stomach ulcers    Hyperlipidemia    Stroke (HCC)    Urinary retention              Past Surgical History:   Procedure Laterality Date    Cath bare metal stent (bms)      Colonoscopy      Surgical stent      wife states Lft side of heart- [facility]                Social History     Socioeconomic History    Marital status:    Tobacco Use    Smoking status: Former     Types: Cigarettes     Passive exposure: Never    Smokeless tobacco: Never    Tobacco comments:     QUIT SMOKING IN 2000   Vaping Use    Vaping status: Former   Substance and Sexual Activity    Alcohol use: Not Currently    Drug use: Never     Social Drivers of Health     Food Insecurity: No Food Insecurity (4/22/2025)    NCSS - Food Insecurity     Worried About Running Out of Food in the Last Year: No     Ran Out of Food in the Last Year: No   Transportation Needs: No Transportation Needs (4/22/2025)    NCSS - Transportation     Lack of Transportation: No   Housing Stability: Not At Risk (4/22/2025)    NCSS - Housing/Utilities     Has Housing: Yes     Worried About Losing Housing: No     Unable to Get  Utilities: No              Review of Systems    Positive for stated complaint: wound from fall; lt leg and elbow  Other systems are as noted in HPI.  Constitutional and vital signs reviewed.      All other systems reviewed and negative except as noted above.                  Physical Exam    ED Triage Vitals [07/18/25 1853]   /65   Pulse 73   Resp 22   Temp 98.1 °F (36.7 °C)   Temp src    SpO2 98 %   O2 Device None (Room air)       Current Vitals:   Vital Signs  BP: 112/65  Pulse: 73  Resp: 22  Temp: 98.1 °F (36.7 °C)    Oxygen Therapy  SpO2: 98 %  O2 Device: None (Room air)            Physical Exam  Vitals and nursing note reviewed.   Constitutional:       General: He is not in acute distress.     Appearance: Normal appearance. He is not ill-appearing, toxic-appearing or diaphoretic.   HENT:      Head:      Comments: Laceration with dermabond to frontal scalp from fall prior to this evening's fall (evaluated in ED today)  Cardiovascular:      Rate and Rhythm: Normal rate.   Pulmonary:      Effort: Pulmonary effort is normal. No respiratory distress.      Breath sounds: Normal breath sounds.   Skin:     Findings: Abrasion (superficial abrasion less than 1cm in diameter to left anterior knee) and bruising (hematoma and surrrouding brusing to left dorsal forearm) present.   Neurological:      Mental Status: He is alert. Mental status is at baseline.         ED Course  Labs Reviewed - No data to display  XR FOREARM (2 VIEWS), LEFT (CPT=73090)  Result Date: 7/18/2025  PROCEDURE: XR FOREARM (2 VIEWS), LEFT (CPT=73090) INDICATIONS: wound from fall; lt leg and elbow PATIENT STATED HISTORY: Left posterior elbow laceration. Pt. fell today. COMPARISON: TECHNIQUE: FINDINGS: BONES: No acute fracture or dislocation is evident.  The joint spaces are preserved. EFFUSION: None visible.     CONCLUSION: Osseous structures are intact. Continued clinical correlation recommended. Electronically Verified and Signed by Attending  Radiologist: Suni Curry MD 7/18/2025 6:37 PM Workstation: [id number]    XR KNEE (1 OR 2 VIEWS), LEFT (CPT=73560)  Result Date: 7/18/2025  PROCEDURE: XR KNEE (1 OR 2 VIEWS), LEFT (CPT=73560) INDICATIONS: wound from fall; lt leg and elbow PATIENT STATED HISTORY: Left anterior knee laceration. Pt. fell this afternoon. COMPARISON: TECHNIQUE: FINDINGS: BONES: No acute fracture or dislocation is evident. Mild degenerative change. Mild enthesopathy involves the superior patella. SOFT TISSUES: Vascular calcifications. EFFUSION: None visible.     CONCLUSION: Osseous structures are intact. Continued clinical correlation recommended. Electronically Verified and Signed by Attending Radiologist: Suni Curry MD 7/18/2025 6:36 PM Workstation: [id number]         [facility]     Differential diagnosis considered but not limited to contusion, sprain/strain, fracture    Physical exam as above. X-ray left knee and left forearm negative for acute osseous findings.  Conservative initial management discussed.  Patient to continue follow-up with PCP as needed.  Return for any new or worsening symptoms.    I discussed this case with collaborating physician Dr. Urban who is in agreement with plan of care.        Medical Decision Making  Amount and/or Complexity of Data Reviewed  Radiology: ordered. Decision-making details documented in ED Course.        Disposition and Plan     Clinical Impression:  1. Fall, initial encounter    2. Contusion of left forearm, initial encounter    3. Hematoma of left forearm    4. Abrasion of left knee, initial encounter         Disposition:  Discharge  7/18/2025  6:51 pm    Follow-up:  Paulo James MD  [address] [address]  [phone]    Schedule an appointment as soon as possible for a visit             Medications Prescribed:  Discharge Medication List as of 7/18/2025  6:57 PM                Supplementary Documentation:

## 2025-07-18 NOTE — ED INITIAL ASSESSMENT (HPI)
Pt speaks Mandarin, info obtained via [employer] #[id number], spoke to Sara. Per pt, he is here for stomach cramps onset this am. He denies n/v/d, pt denies urinary sxs.     Pt states he hit his head on the bathroom door on way here, (+) bleeding lac on top of head. Pt denies falling, he denies LOC. Pt denies dizziness/lightheadedness. He denies visual changes. Pt takes Plavix

## 2025-07-23 ENCOUNTER — HOSPITAL ENCOUNTER (EMERGENCY)
Facility: HOSPITAL | Age: OVER 89
Discharge: HOME | End: 2025-07-23
Attending: EMERGENCY MEDICINE

## 2025-07-23 ENCOUNTER — APPOINTMENT (OUTPATIENT)
Dept: GENERAL RADIOLOGY | Facility: HOSPITAL | Age: OVER 89
End: 2025-07-23

## 2025-07-23 VITALS
HEART RATE: 75 BPM | DIASTOLIC BLOOD PRESSURE: 90 MMHG | SYSTOLIC BLOOD PRESSURE: 134 MMHG | OXYGEN SATURATION: 96 % | TEMPERATURE: 98 F | RESPIRATION RATE: 16 BRPM

## 2025-07-23 DIAGNOSIS — R07.9 CHEST PAIN OF UNCERTAIN ETIOLOGY: Primary | ICD-10-CM

## 2025-07-23 LAB
ALBUMIN SERPL-MCNC: 4.2 G/DL (ref 3.2–4.8)
ALBUMIN/GLOB SERPL: 1.4 (ref 1–2)
ALP LIVER SERPL-CCNC: 165 U/L (ref 45–117)
ALT SERPL-CCNC: 15 U/L (ref 10–49)
ANION GAP SERPL CALC-SCNC: 9 MMOL/L (ref 0–18)
AST SERPL-CCNC: 20 U/L (ref ?–34)
ATRIAL RATE: 59 BPM
BASOPHILS # BLD AUTO: 0.05 X10(3) UL (ref 0–0.2)
BASOPHILS NFR BLD AUTO: 0.6 %
BILIRUB SERPL-MCNC: 0.7 MG/DL (ref 0.2–0.9)
BUN BLD-MCNC: 12 MG/DL (ref 9–23)
CALCIUM BLD-MCNC: 8.7 MG/DL (ref 8.7–10.6)
CHLORIDE SERPL-SCNC: 106 MMOL/L (ref 98–112)
CO2 SERPL-SCNC: 24 MMOL/L (ref 21–32)
CREAT BLD-MCNC: 1.04 MG/DL (ref 0.7–1.3)
EGFRCR SERPLBLD CKD-EPI 2021: 66 ML/MIN/1.73M2 (ref 60–?)
EOSINOPHIL # BLD AUTO: 0.11 X10(3) UL (ref 0–0.7)
EOSINOPHIL NFR BLD AUTO: 1.4 %
ERYTHROCYTE [DISTWIDTH] IN BLOOD BY AUTOMATED COUNT: 15.6 %
GLOBULIN PLAS-MCNC: 3 G/DL (ref 2–3.5)
GLUCOSE BLD-MCNC: 115 MG/DL (ref 70–99)
HCT VFR BLD AUTO: 34.1 % (ref 39–53)
HGB BLD-MCNC: 11.7 G/DL (ref 13–17.5)
IMM GRANULOCYTES # BLD AUTO: 0.02 X10(3) UL (ref 0–1)
IMM GRANULOCYTES NFR BLD: 0.3 %
LYMPHOCYTES # BLD AUTO: 3.91 X10(3) UL (ref 1–4)
LYMPHOCYTES NFR BLD AUTO: 49.5 %
MCH RBC QN AUTO: 27.9 PG (ref 26–34)
MCHC RBC AUTO-ENTMCNC: 34.3 G/DL (ref 31–37)
MCV RBC AUTO: 81.2 FL (ref 80–100)
MONOCYTES # BLD AUTO: 0.48 X10(3) UL (ref 0.1–1)
MONOCYTES NFR BLD AUTO: 6.1 %
NEUTROPHILS # BLD AUTO: 3.33 X10 (3) UL (ref 1.5–7.7)
NEUTROPHILS # BLD AUTO: 3.33 X10(3) UL (ref 1.5–7.7)
NEUTROPHILS NFR BLD AUTO: 42.1 %
OSMOLALITY SERPL CALC.SUM OF ELEC: 289 MOSM/KG (ref 275–295)
P AXIS: -9 DEGREES
P-R INTERVAL: 200 MS
PLATELET # BLD AUTO: 254 10(3)UL (ref 150–450)
POTASSIUM SERPL-SCNC: 3.6 MMOL/L (ref 3.5–5.1)
PROT SERPL-MCNC: 7.2 G/DL (ref 5.7–8.2)
Q-T INTERVAL: 490 MS
QRS DURATION: 150 MS
QTC CALCULATION (BEZET): 485 MS
R AXIS: -41 DEGREES
RBC # BLD AUTO: 4.2 X10(6)UL (ref 3.8–5.8)
SODIUM SERPL-SCNC: 139 MMOL/L (ref 136–145)
T AXIS: 159 DEGREES
TROPONIN I SERPL HS-MCNC: 15 NG/L (ref ?–53)
VENTRICULAR RATE: 59 BPM
WBC # BLD AUTO: 7.9 X10(3) UL (ref 4–11)

## 2025-07-23 PROCEDURE — 80053 COMPREHEN METABOLIC PANEL: CPT | Performed by: EMERGENCY MEDICINE

## 2025-07-23 PROCEDURE — 36415 COLL VENOUS BLD VENIPUNCTURE: CPT

## 2025-07-23 PROCEDURE — 99284 EMERGENCY DEPT VISIT MOD MDM: CPT

## 2025-07-23 PROCEDURE — 80053 COMPREHEN METABOLIC PANEL: CPT

## 2025-07-23 PROCEDURE — 99285 EMERGENCY DEPT VISIT HI MDM: CPT

## 2025-07-23 PROCEDURE — 84484 ASSAY OF TROPONIN QUANT: CPT

## 2025-07-23 PROCEDURE — 85025 COMPLETE CBC W/AUTO DIFF WBC: CPT

## 2025-07-23 PROCEDURE — 84484 ASSAY OF TROPONIN QUANT: CPT | Performed by: EMERGENCY MEDICINE

## 2025-07-23 PROCEDURE — 93005 ELECTROCARDIOGRAM TRACING: CPT

## 2025-07-23 PROCEDURE — 85025 COMPLETE CBC W/AUTO DIFF WBC: CPT | Performed by: EMERGENCY MEDICINE

## 2025-07-23 PROCEDURE — 71045 X-RAY EXAM CHEST 1 VIEW: CPT

## 2025-07-23 PROCEDURE — 93010 ELECTROCARDIOGRAM REPORT: CPT

## 2025-07-23 NOTE — ED QUICK NOTES
Rounding completed    Pt assisted to use bedside commode. Redirected back into ED Cart and connected to cardiac monitoring. Bed in lowest position, wheels of cart remain locked. Plan of care ongoing.

## 2025-07-23 NOTE — ED INITIAL ASSESSMENT (HPI)
Arrives via EMS from home c/o CP. Pain is worse on inspiration. Pain started this morning per EMS report. Pt has hx of angiogram in April. Pt takes aspirin at home and on plavix.      used upon arrival Lars [id number], pt is not providing much information. Wife is in report. Per EMS, pt was acting appropriately towards baseline per wife.

## 2025-07-23 NOTE — ED PROVIDER NOTES
Patient Seen in: [facility]        History  Chief Complaint   Patient presents with    Chest Pain Angina     Stated Complaint: CP    Subjective:   HPI            89-year-old male with coronary artery disease brought in by feelings for ration of left-sided chest pain.  His right reports that an hour ago he complained of chest pain.  He is listed as a DNR comfort care.  He was admitted to this hospital in April of this year after an elevated cardiac enzyme and had a catheterization that showed significant coronary artery disease.  Medical management was opted for as they did not feel he was a surgical candidate.      Objective:     Past Medical History:    BPH (benign prostatic hyperplasia)    Coronary atherosclerosis    Depression    Essential hypertension    Hearing impaired person, bilateral    High blood pressure    High cholesterol    History of stomach ulcers    Hyperlipidemia    Stroke (HCC)    Urinary retention              Past Surgical History:   Procedure Laterality Date    Cath bare metal stent (bms)      Colonoscopy      Surgical stent      wife states Lft side of heart- [facility]                Social History     Socioeconomic History    Marital status:    Tobacco Use    Smoking status: Former     Types: Cigarettes     Passive exposure: Never    Smokeless tobacco: Never    Tobacco comments:     QUIT SMOKING IN 2000   Vaping Use    Vaping status: Former   Substance and Sexual Activity    Alcohol use: Not Currently    Drug use: Never     Social Drivers of Health     Food Insecurity: No Food Insecurity (4/22/2025)    NCSS - Food Insecurity     Worried About Running Out of Food in the Last Year: No     Ran Out of Food in the Last Year: No   Transportation Needs: No Transportation Needs (4/22/2025)    NCSS - Transportation     Lack of Transportation: No   Housing Stability: Not At Risk (4/22/2025)    NCSS - Housing/Utilities     Has Housing: Yes     Worried About  Losing Housing: No     Unable to Get Utilities: No                                Physical Exam    ED Triage Vitals   BP 07/23/25 1035 137/71   Pulse 07/23/25 1035 60   Resp 07/23/25 1035 18   Temp 07/23/25 1037 97.6 °F (36.4 °C)   Temp src 07/23/25 1037 Temporal   SpO2 07/23/25 1035 100 %   O2 Device 07/23/25 1035 None (Room air)       Current Vitals:   Vital Signs  BP: 134/90  Pulse: 69  Resp: 19  Temp: 97.6 °F (36.4 °C)  Temp src: Temporal  MAP (mmHg): 100    Oxygen Therapy  SpO2: 98 %  O2 Device: None (Room air)            Physical Exam  General:  Vitals as listed.  Elderly and frail.  No acute distress.  Lungs: good air exchange and clear   Heart: regular rate rhythm and no murmur   Abdomen: Soft and nontender.  No abdominal masses.  No peritoneal signs   Extremities: no edema, normal peripheral pulses   Neuro: Alert oriented and nonfocal   Skin: no rashes or nodules        ED Course  Labs Reviewed   COMP METABOLIC PANEL (14) - Abnormal; Notable for the following components:       Result Value    Glucose 115 (*)     Alkaline Phosphatase 165 (*)     All other components within normal limits   CBC WITH DIFFERENTIAL WITH PLATELET - Abnormal; Notable for the following components:    HGB 11.7 (*)     HCT 34.1 (*)     All other components within normal limits   TROPONIN I HIGH SENSITIVITY - Normal   RAINBOW DRAW LAVENDER   RAINBOW DRAW LIGHT GREEN   RAINBOW DRAW BLUE     EKG    Rate, intervals and axes as noted on EKG Report.  Rate: 59  Rhythm: Sinus Rhythm  Reading: Sinus bradycardia.  LBBB.  T wave inversions in the lateral leads that were seen 2 weeks ago are no longer evident              XR CHEST AP PORTABLE  (CPT=71045)  Result Date: 7/23/2025  PROCEDURE: XR CHEST AP PORTABLE  (CPT=71045) INDICATIONS: CP COMPARISON: 7/8/2025. FINDINGS: There is left basilar atelectasis and/or scarring, better characterized on the recent prior chest CT from 7/8/2025. Cardiac monitoring device noted. There is persistent blunting  of the left costophrenic angle. No measurable pneumothorax. Cardiac silhouette is enlarged, unchanged.     CONCLUSION: See above. Electronically Verified and Signed by Attending Radiologist: LeRoy Stromberg MD 7/23/2025 11:51 AM Workstation: [id number]    XR FOREARM (2 VIEWS), LEFT (CPT=73090)  Result Date: 7/18/2025  PROCEDURE: XR FOREARM (2 VIEWS), LEFT (CPT=73090) INDICATIONS: wound from fall; lt leg and elbow PATIENT STATED HISTORY: Left posterior elbow laceration. Pt. fell today. COMPARISON: TECHNIQUE: FINDINGS: BONES: No acute fracture or dislocation is evident.  The joint spaces are preserved. EFFUSION: None visible.     CONCLUSION: Osseous structures are intact. Continued clinical correlation recommended. Electronically Verified and Signed by Attending Radiologist: Suni Curry MD 7/18/2025 6:37 PM Workstation: [id number]    XR KNEE (1 OR 2 VIEWS), LEFT (CPT=73560)  Result Date: 7/18/2025  PROCEDURE: XR KNEE (1 OR 2 VIEWS), LEFT (CPT=73560) INDICATIONS: wound from fall; lt leg and elbow PATIENT STATED HISTORY: Left anterior knee laceration. Pt. fell this afternoon. COMPARISON: TECHNIQUE: FINDINGS: BONES: No acute fracture or dislocation is evident. Mild degenerative change. Mild enthesopathy involves the superior patella. SOFT TISSUES: Vascular calcifications. EFFUSION: None visible.     CONCLUSION: Osseous structures are intact. Continued clinical correlation recommended. Electronically Verified and Signed by Attending Radiologist: Suni Curry MD 7/18/2025 6:36 PM Workstation: [id number]    CT BRAIN OR HEAD (CPT=70450)  Result Date: 7/18/2025  PROCEDURE: CT BRAIN OR HEAD (CPT=70450) INDICATIONS: fall with head injury PATIENT STATED HISTORY: fall, laceration to rt forhead COMPARISON: 3/25/2025 TECHNIQUE: Axial CT images of the brain were obtained without the administration of IV contrast. Coronal and Sagittal reconstructions were obtained. CT dose reduction techniques were utilized during this examination. Dose  information is transmitted to the ACR (American College of Radiology) NRDR (National Radiology Data Registry) which includes the Dose Index Registry. FINDINGS: The ventricles are symmetric in size There are patchy areas of low attenuation in the periventricular and deep white matter which are nonspecific but most consistent with small vessel ischemic changes. There is no evidence of hemorrhage, mass, midline shift, or extra-axial fluid collection. The visualized paranasal sinuses show near complete opacification of the right maxillary sinus with hyperdense material.. No evidence of depressed skull fracture.     CONCLUSION: 1. No acute intracranial findings 2. Chronic microvascular ischemic changes. 3. Stable near complete opacification right maxillary sinus with increased density which may represent inspissated material versus fungal disease 4. Small scalp laceration along the anterior frontal convexity. Electronically Verified and Signed by Attending Radiologist: Aaron Nolan MD 7/18/2025 7:25 AM Workstation: [id number]    CT ABDOMEN+PELVIS(CPT=74176)  Result Date: 7/18/2025  PROCEDURE: CT ABDOMEN+PELVIS(CPT=74176) INDICATIONS: abdominal cramping PATIENT STATED HISTORY: fall, abd pain COMPARISON: 5/20/2025 TECHNIQUE: Axial CT images of the abdomen and pelvis with coronal and sagittal reconstructions were obtained without the administration of IV contrast. CT dose reduction techniques were utilized during this examination. Dose information is transmitted to  the ACR (American College of Radiology) NRDR (National Radiology Data Registry) which includes the Dose Index Registry. FINDINGS: LUNG BASES: Dependent atelectasis. Small hiatal hernia. LIVER: Evaluation limited without IV contrast. Normal in shape and contour. BILIARY: No intrahepatic biliary dilatation. Gallbladder is unremarkable in appearance. SPLEEN: Normal in size and shape PANCREAS: No peripancreatic inflammatory stranding. No mass or fluid collection  identified. ADRENALS: No mass identified KIDNEYS: Kidneys are symmetrical in size. BOWEL: Bowel is normal in caliber. No evidence for obstruction. The appendix is normal in appearance. Moderate amount of fecal material noted within the colon. Colonic diverticulosis without evidence of diverticulitis. PELVIS: Bladder wall thickening. Nodule anterior to the bladder measuring 1.5 x 1.3 cm. Fat-containing left inguinal hernia. No free pelvic fluid. Prostatic calcifications. VASCULAR: Aorta is normal in caliber. Mild atheromatous calcifications. BONES: No acute fractures. Multilevel degenerative changes of the lumbar spine     CONCLUSION: 1. Bladder wall thickening. Correlation with urinalysis is recommended. 2. Stable 1.5 cm nodule within the anterior right pelvis. 3. Colonic diverticulosis without diverticulitis. 4. Small hiatal hernia. Electronically Verified and Signed by Attending Radiologist: Aaron Nolan MD 7/18/2025 7:21 AM Workstation: [id number]    CT CHEST(CONTRAST ONLY) (CPT=71260)  Result Date: 7/8/2025  PROCEDURE: CT CHEST(CONTRAST ONLY) (CPT=71260) INDICATIONS: fall, bruising PATIENT STATED HISTORY: Patient fell 2 nights ago now to ED c/o non-radiating Chest pain. Chest pain started this afternoon. COMPARISON: Chest x-ray 7/8/2025 TECHNIQUE: Axial CT images through the chest with coronal and sagittal reconstructions were obtained after the uneventful administration of IV contrast. CT dose reduction techniques were utilized during this examination. Dose information is transmitted to the ACR (American College of Radiology) NRDR (National Radiology Data Registry) which includes the Dose Index Registry. CONTRAST: IOPAMIDOL 76% IV SOLN FOR POWER INJECTOR:75 mL : FINDINGS: CHEST: LUNGS: Central airways appear patent. Motion artifact limits assessment of the lungs. Mild basilar atelectasis is present. Previously questioned retrocardiac opacity most likely corresponded to atelectasis and motion. Scattered  micronodules are present. A right lower lobe micronodule measures 5 mm on series 4 image 99. There is a 5 mm micronodule of the right lower lobe on image 94. Right lower lobe micronodule measures 3 mm on image 80. MEDIASTINUM/JOCY: Right paratracheal probable lymph node measures up to 1.8 x 1.9 cm. Other subcentimeter lymph nodes are present within the mediastinum. PLEURA: No pleural effusions CARDIAC: Moderate calcific atherosclerosis of the coronary arteries is present. CHEST WALL: Normal. LIMITED ABDOMEN: Normal VASCULAR: Aorta is normal in caliber BONES: No acute fractures.      CONCLUSION: Previously questioned airspace opacity is not well appreciated in this exam and most likely represented a combination of atelectasis and motion. Right paratracheal probable lymph node appears enlarged. Continued follow-up is recommended. Correlation with PET/CT may also be helpful. Micronodules are present measuring up to 5 mm. According to Fleischner guidelines, solid nodules measuring less than 6 mm require no further followup in low risk patients. In high-risk patients with a history of smoking or other risk factors, consider CT  at 12 months. For nodules between 6-8 mm in low risk patients, followup at 6-12 months is recommended with consideration given to further followup at 18-24 months. In high-risk patients, a followup CT at 6-12 months and then 18-24 months is suggested. For nodules measuring greater than 8 mm, a followup CT at 3 months, PET/CT, or biopsy is recommended in all patients. Electronically Verified and Signed by Attending Radiologist: Oleg Morley MD 7/8/2025 7:00 PM Workstation: [id number]    XR CHEST AP PORTABLE  (CPT=71045)  Result Date: 7/8/2025  PROCEDURE: XR CHEST AP PORTABLE  (CPT=71045) INDICATIONS: trip and fall 2 days ago, pain to chest COMPARISON: 05/20/2025 XR CHEST AP PORTABLE  (CPT=71045) FINDINGS: Battery pack overlying the left side of the chest is again noted. Retrocardiac opacity may be  artifactual. No pneumothorax. Heart size is normal. Mediastinal and hilar structures are normal. Dextroscoliosis of the thoracic spine.     CONCLUSION: Retrocardiac opacity may be artifactual. This could also be due to atelectasis versus consolidation. No pneumothorax. Electronically Verified and Signed by Attending Radiologist: Minh Giraldo MD 7/8/2025 4:21 PM Workstation: [id number]    XR HIP W OR WO PELVIS 2 OR 3 VIEWS, LEFT (CPT=73502)  Result Date: 6/25/2025  PROCEDURE: XR HIP W OR WO PELVIS 2 OR 3 VIEWS, LEFT (CPT=73502) INDICATIONS: FALL THIS MORNING, LEFT HIP PAIN COMPARISON: There are no comparisons for this exam.  TECHNIQUE: Multiple views of the left hip were obtained. FINDINGS: No acute fractures or osseous lesions are identified. Femoral acetabular relationships are within normal limits. Osteoarthritic changes within the hips. Degenerative changes of the lumbar spine.     CONCLUSION: 1. No acute fractures. 2. Osteoarthritic changes within the hips. 3. Preliminary report was provided by [facility] at time of examination. Final report confirms findings on preliminary report without discrepancy. Electronically Verified and Signed by Attending Radiologist: Aaron Nolan MD 6/25/2025 5:48 AM Workstation: [id number]                      MDM     89-year-old male with CAD presents reporting left-sided chest pain.    Differential includes but is not limited to CAD, cardiac ischemia, a life/function threat.    CBC, CMP, EKG, chest x-ray, troponin ordered for further evaluation.    My independent interpretation of chest x-ray is that there is no obvious pneumothorax.  Reviewed radiology documentation.    Cardiac enzyme within normal limits.  EKG with no ST elevation.  Discussed with Dr. Jennings from [facility] cardiology who says that based on the patient's significant coronary artery disease hospitalization will always be the recommendation though surgical interventions are not an option.  I discussed at  length with the patient's wife that he is currently listed as a DNR comfort care and there need to be decisions made about what type of medical interventions he would want.  His enzymes are in the normal range at this time.  Recommended hospitalization based on cardiology recommendations.  Patient says he wants to go home.  His wife says that based on his comfort care she would like to bring him home as well and they will talk with their home health about future care.            Medical Decision Making      Disposition and Plan     Clinical Impression:  1. Chest pain of uncertain etiology         Disposition:  Discharge  7/23/2025 12:56 pm    Follow-up:  Paulo James MD  [address] [place] [id number]  [phone]    Follow up            Medications Prescribed:  Current Discharge Medication List                Supplementary Documentation:

## 2025-07-23 NOTE — ED QUICK NOTES
Pt cleared for discharge by Kyle ANGELA. This RN provided pt and spouse with discharge teaching, pt and spouse verbalized understanding of information. VSS prior to dispo, peripheral line removed. No further questions regarding discharge. Pt being transported home via [facility]. Wife will be at home to meet up with pt.

## 2025-07-28 ENCOUNTER — APPOINTMENT (OUTPATIENT)
Dept: CT IMAGING | Facility: HOSPITAL | Age: OVER 89
End: 2025-07-28
Attending: STUDENT IN AN ORGANIZED HEALTH CARE EDUCATION/TRAINING PROGRAM

## 2025-07-28 PROCEDURE — 70450 CT HEAD/BRAIN W/O DYE: CPT | Performed by: STUDENT IN AN ORGANIZED HEALTH CARE EDUCATION/TRAINING PROGRAM

## 2025-07-29 PROBLEM — G30.9 ALZHEIMER'S DEMENTIA WITH BEHAVIORAL DISTURBANCE (HCC): Status: ACTIVE | Noted: 2025-07-29

## 2025-07-29 PROBLEM — F29 LATE-LIFE PSYCHOSIS (HCC): Status: ACTIVE | Noted: 2025-07-29

## 2025-07-29 PROBLEM — F33.0 MAJOR DEPRESSIVE DISORDER, RECURRENT EPISODE, MILD: Status: ACTIVE | Noted: 2025-07-29

## 2025-07-29 PROBLEM — F02.818 ALZHEIMER'S DEMENTIA WITH BEHAVIORAL DISTURBANCE (HCC): Status: ACTIVE | Noted: 2025-07-29

## 2025-07-29 PROBLEM — F03.918 DEMENTIA WITH BEHAVIORAL DISTURBANCE (HCC): Status: ACTIVE | Noted: 2025-07-29

## 2025-08-01 PROBLEM — F03.918 DEMENTIA WITH BEHAVIORAL DISTURBANCE (HCC): Status: RESOLVED | Noted: 2025-07-29 | Resolved: 2025-08-01

## 2025-08-03 ENCOUNTER — HOSPITAL ENCOUNTER (OUTPATIENT)
Facility: HOSPITAL | Age: OVER 89
Setting detail: OBSERVATION
LOS: 1 days | Discharge: HOME | End: 2025-08-05
Attending: EMERGENCY MEDICINE | Admitting: HOSPITALIST

## 2025-08-03 DIAGNOSIS — F03.918 AGGRESSIVE BEHAVIOR DUE TO DEMENTIA (HCC): Primary | ICD-10-CM

## 2025-08-03 DIAGNOSIS — U07.1 COVID-19: ICD-10-CM

## 2025-08-03 LAB
ALBUMIN SERPL-MCNC: 4 G/DL (ref 3.2–4.8)
ALBUMIN/GLOB SERPL: 1.3 (ref 1–2)
ALP LIVER SERPL-CCNC: 167 U/L (ref 45–117)
ALT SERPL-CCNC: 18 U/L (ref 10–49)
ANION GAP SERPL CALC-SCNC: 13 MMOL/L (ref 0–18)
AST SERPL-CCNC: 25 U/L (ref ?–34)
BASOPHILS # BLD AUTO: 0.05 X10(3) UL (ref 0–0.2)
BASOPHILS NFR BLD AUTO: 0.6 %
BILIRUB SERPL-MCNC: 0.4 MG/DL (ref 0.2–0.9)
BUN BLD-MCNC: 14 MG/DL (ref 9–23)
CALCIUM BLD-MCNC: 8.9 MG/DL (ref 8.7–10.6)
CHLORIDE SERPL-SCNC: 107 MMOL/L (ref 98–112)
CO2 SERPL-SCNC: 19 MMOL/L (ref 21–32)
CREAT BLD-MCNC: 1.19 MG/DL (ref 0.7–1.3)
EGFRCR SERPLBLD CKD-EPI 2021: 56 ML/MIN/1.73M2 (ref 60–?)
EOSINOPHIL # BLD AUTO: 0.04 X10(3) UL (ref 0–0.7)
EOSINOPHIL NFR BLD AUTO: 0.5 %
ERYTHROCYTE [DISTWIDTH] IN BLOOD BY AUTOMATED COUNT: 14.8 %
EST. AVERAGE GLUCOSE BLD GHB EST-MCNC: 114 MG/DL (ref 68–126)
GLOBULIN PLAS-MCNC: 3 G/DL (ref 2–3.5)
GLUCOSE BLD-MCNC: 181 MG/DL (ref 70–99)
HBA1C MFR BLD: 5.6 % (ref ?–5.7)
HCT VFR BLD AUTO: 35.4 % (ref 39–53)
HGB BLD-MCNC: 11.5 G/DL (ref 13–17.5)
IMM GRANULOCYTES # BLD AUTO: 0.02 X10(3) UL (ref 0–1)
IMM GRANULOCYTES NFR BLD: 0.2 %
LYMPHOCYTES # BLD AUTO: 3.43 X10(3) UL (ref 1–4)
LYMPHOCYTES NFR BLD AUTO: 41.2 %
MCH RBC QN AUTO: 27.6 PG (ref 26–34)
MCHC RBC AUTO-ENTMCNC: 32.5 G/DL (ref 31–37)
MCV RBC AUTO: 84.9 FL (ref 80–100)
MONOCYTES # BLD AUTO: 0.43 X10(3) UL (ref 0.1–1)
MONOCYTES NFR BLD AUTO: 5.2 %
NEUTROPHILS # BLD AUTO: 4.36 X10 (3) UL (ref 1.5–7.7)
NEUTROPHILS # BLD AUTO: 4.36 X10(3) UL (ref 1.5–7.7)
NEUTROPHILS NFR BLD AUTO: 52.3 %
OSMOLALITY SERPL CALC.SUM OF ELEC: 293 MOSM/KG (ref 275–295)
PLATELET # BLD AUTO: 225 10(3)UL (ref 150–450)
POTASSIUM SERPL-SCNC: 4 MMOL/L (ref 3.5–5.1)
PROT SERPL-MCNC: 7 G/DL (ref 5.7–8.2)
RBC # BLD AUTO: 4.17 X10(6)UL (ref 3.8–5.8)
SODIUM SERPL-SCNC: 139 MMOL/L (ref 136–145)
WBC # BLD AUTO: 8.3 X10(3) UL (ref 4–11)

## 2025-08-03 PROCEDURE — 99223 1ST HOSP IP/OBS HIGH 75: CPT | Performed by: HOSPITALIST

## 2025-08-03 RX ORDER — ATORVASTATIN CALCIUM 80 MG/1
80 TABLET, FILM COATED ORAL NIGHTLY
Status: DISCONTINUED | OUTPATIENT
Start: 2025-08-03 | End: 2025-08-05

## 2025-08-03 RX ORDER — DICYCLOMINE HYDROCHLORIDE 10 MG/1
10 CAPSULE ORAL
Status: DISCONTINUED | OUTPATIENT
Start: 2025-08-04 | End: 2025-08-03

## 2025-08-03 RX ORDER — POLYETHYLENE GLYCOL 3350 17 G/17G
17 POWDER, FOR SOLUTION ORAL DAILY PRN
Status: DISCONTINUED | OUTPATIENT
Start: 2025-08-03 | End: 2025-08-05

## 2025-08-03 RX ORDER — MIRTAZAPINE 15 MG/1
15 TABLET, FILM COATED ORAL NIGHTLY
Status: DISCONTINUED | OUTPATIENT
Start: 2025-08-03 | End: 2025-08-05

## 2025-08-03 RX ORDER — HALOPERIDOL 0.5 MG/1
0.5 TABLET ORAL EVERY 4 HOURS PRN
Status: DISCONTINUED | OUTPATIENT
Start: 2025-08-03 | End: 2025-08-05

## 2025-08-03 RX ORDER — SUCRALFATE 1 G/1
1 TABLET ORAL
Status: DISCONTINUED | OUTPATIENT
Start: 2025-08-04 | End: 2025-08-03

## 2025-08-03 RX ORDER — HALOPERIDOL 5 MG/ML
0.5 INJECTION INTRAMUSCULAR EVERY 4 HOURS PRN
Status: DISCONTINUED | OUTPATIENT
Start: 2025-08-03 | End: 2025-08-05

## 2025-08-03 RX ORDER — ACETAMINOPHEN 325 MG/1
325 TABLET ORAL EVERY 4 HOURS PRN
COMMUNITY
End: 2025-08-19 | Stop reason: CLARIF

## 2025-08-03 RX ORDER — ECHINACEA PURPUREA EXTRACT 125 MG
1 TABLET ORAL
Status: DISCONTINUED | OUTPATIENT
Start: 2025-08-03 | End: 2025-08-05

## 2025-08-03 RX ORDER — HALOPERIDOL 0.5 MG/1
0.5 TABLET ORAL EVERY 4 HOURS PRN
COMMUNITY
End: 2025-08-05

## 2025-08-03 RX ORDER — ASPIRIN 81 MG/1
81 TABLET, CHEWABLE ORAL EVERY MORNING
Status: DISCONTINUED | OUTPATIENT
Start: 2025-08-04 | End: 2025-08-03

## 2025-08-03 RX ORDER — ATORVASTATIN CALCIUM 80 MG/1
80 TABLET, FILM COATED ORAL DAILY
Status: DISCONTINUED | OUTPATIENT
Start: 2025-08-03 | End: 2025-08-03

## 2025-08-03 RX ORDER — METOPROLOL SUCCINATE 25 MG/1
25 TABLET, EXTENDED RELEASE ORAL
Status: DISCONTINUED | OUTPATIENT
Start: 2025-08-04 | End: 2025-08-03

## 2025-08-03 RX ORDER — ACETAMINOPHEN 500 MG
1000 TABLET ORAL EVERY 4 HOURS PRN
Status: DISCONTINUED | OUTPATIENT
Start: 2025-08-03 | End: 2025-08-05

## 2025-08-03 RX ORDER — MAGNESIUM HYDROXIDE/ALUMINUM HYDROXICE/SIMETHICONE 120; 1200; 1200 MG/30ML; MG/30ML; MG/30ML
30 SUSPENSION ORAL EVERY 4 HOURS PRN
Status: ON HOLD | COMMUNITY

## 2025-08-03 RX ORDER — SENNOSIDES 8.6 MG
17.2 TABLET ORAL NIGHTLY PRN
Status: DISCONTINUED | OUTPATIENT
Start: 2025-08-03 | End: 2025-08-05

## 2025-08-03 RX ORDER — MIRTAZAPINE 15 MG/1
15 TABLET, FILM COATED ORAL NIGHTLY
Status: DISCONTINUED | OUTPATIENT
Start: 2025-08-03 | End: 2025-08-03

## 2025-08-03 RX ORDER — POLYETHYLENE GLYCOL 3350 17 G/17G
17 POWDER, FOR SOLUTION ORAL DAILY PRN
Status: DISCONTINUED | OUTPATIENT
Start: 2025-08-03 | End: 2025-08-03

## 2025-08-03 RX ORDER — CETIRIZINE HYDROCHLORIDE 5 MG/1
5 TABLET ORAL DAILY
Status: DISCONTINUED | OUTPATIENT
Start: 2025-08-04 | End: 2025-08-05

## 2025-08-03 RX ORDER — LORAZEPAM 0.5 MG/1
0.5 TABLET ORAL EVERY 4 HOURS PRN
Status: DISCONTINUED | OUTPATIENT
Start: 2025-08-03 | End: 2025-08-05

## 2025-08-03 RX ORDER — SODIUM CHLORIDE 9 MG/ML
1000 INJECTION, SOLUTION INTRAVENOUS ONCE
Status: COMPLETED | OUTPATIENT
Start: 2025-08-03 | End: 2025-08-04

## 2025-08-03 RX ORDER — FUROSEMIDE 40 MG/1
40 TABLET ORAL DAILY
Status: DISCONTINUED | OUTPATIENT
Start: 2025-08-03 | End: 2025-08-03

## 2025-08-03 RX ORDER — CLOPIDOGREL BISULFATE 75 MG/1
75 TABLET ORAL DAILY
Status: DISCONTINUED | OUTPATIENT
Start: 2025-08-03 | End: 2025-08-03

## 2025-08-03 RX ORDER — DULOXETIN HYDROCHLORIDE 60 MG/1
60 CAPSULE, DELAYED RELEASE ORAL DAILY
Status: DISCONTINUED | OUTPATIENT
Start: 2025-08-04 | End: 2025-08-05

## 2025-08-03 RX ORDER — SPIRONOLACTONE 25 MG/1
25 TABLET ORAL DAILY
Status: DISCONTINUED | OUTPATIENT
Start: 2025-08-03 | End: 2025-08-03

## 2025-08-03 RX ORDER — PANTOPRAZOLE SODIUM 40 MG/1
40 TABLET, DELAYED RELEASE ORAL
Status: DISCONTINUED | OUTPATIENT
Start: 2025-08-04 | End: 2025-08-03

## 2025-08-03 RX ORDER — FUROSEMIDE 40 MG/1
40 TABLET ORAL DAILY
Status: DISCONTINUED | OUTPATIENT
Start: 2025-08-04 | End: 2025-08-05

## 2025-08-03 RX ORDER — DULOXETIN HYDROCHLORIDE 60 MG/1
60 CAPSULE, DELAYED RELEASE ORAL DAILY
Status: DISCONTINUED | OUTPATIENT
Start: 2025-08-03 | End: 2025-08-03

## 2025-08-03 RX ORDER — HALOPERIDOL 1 MG/1
1 TABLET ORAL EVERY 4 HOURS PRN
Status: DISCONTINUED | OUTPATIENT
Start: 2025-08-03 | End: 2025-08-04

## 2025-08-03 RX ORDER — RISPERIDONE 0.25 MG/1
0.25 TABLET ORAL 2 TIMES DAILY
Status: DISCONTINUED | OUTPATIENT
Start: 2025-08-03 | End: 2025-08-05

## 2025-08-03 RX ORDER — DICYCLOMINE HYDROCHLORIDE 10 MG/1
10 CAPSULE ORAL
Status: DISCONTINUED | OUTPATIENT
Start: 2025-08-04 | End: 2025-08-05

## 2025-08-03 RX ORDER — BISACODYL 10 MG
10 SUPPOSITORY, RECTAL RECTAL
Status: DISCONTINUED | OUTPATIENT
Start: 2025-08-03 | End: 2025-08-05

## 2025-08-03 RX ORDER — HALOPERIDOL 5 MG/ML
0.5 INJECTION INTRAMUSCULAR EVERY 4 HOURS PRN
Status: DISCONTINUED | OUTPATIENT
Start: 2025-08-03 | End: 2025-08-03

## 2025-08-03 RX ORDER — FLUTICASONE PROPIONATE 50 MCG
2 SPRAY, SUSPENSION (ML) NASAL NIGHTLY
Status: DISCONTINUED | OUTPATIENT
Start: 2025-08-03 | End: 2025-08-05

## 2025-08-03 RX ORDER — BISACODYL 10 MG
10 SUPPOSITORY, RECTAL RECTAL
COMMUNITY
End: 2025-08-10 | Stop reason: CLARIF

## 2025-08-03 RX ORDER — HALOPERIDOL 5 MG/ML
1 INJECTION INTRAMUSCULAR EVERY 4 HOURS PRN
Status: DISCONTINUED | OUTPATIENT
Start: 2025-08-03 | End: 2025-08-04

## 2025-08-03 RX ORDER — ASPIRIN 81 MG/1
81 TABLET, CHEWABLE ORAL EVERY MORNING
Status: DISCONTINUED | OUTPATIENT
Start: 2025-08-04 | End: 2025-08-05

## 2025-08-03 RX ORDER — HALOPERIDOL 0.5 MG/1
0.5 TABLET ORAL EVERY 4 HOURS PRN
Status: DISCONTINUED | OUTPATIENT
Start: 2025-08-03 | End: 2025-08-03

## 2025-08-03 RX ORDER — CLOPIDOGREL BISULFATE 75 MG/1
75 TABLET ORAL DAILY
Status: DISCONTINUED | OUTPATIENT
Start: 2025-08-04 | End: 2025-08-05

## 2025-08-03 RX ORDER — CETIRIZINE HYDROCHLORIDE 5 MG/1
5 TABLET ORAL DAILY
Status: ON HOLD | COMMUNITY

## 2025-08-03 RX ORDER — SPIRONOLACTONE 25 MG/1
25 TABLET ORAL DAILY
Status: DISCONTINUED | OUTPATIENT
Start: 2025-08-04 | End: 2025-08-05

## 2025-08-03 RX ORDER — SODIUM PHOSPHATE, DIBASIC AND SODIUM PHOSPHATE, MONOBASIC 7; 19 G/230ML; G/230ML
1 ENEMA RECTAL ONCE AS NEEDED
Status: DISCONTINUED | OUTPATIENT
Start: 2025-08-03 | End: 2025-08-05

## 2025-08-03 RX ORDER — ALPRAZOLAM 0.5 MG
0.5 TABLET ORAL
Status: DISCONTINUED | OUTPATIENT
Start: 2025-08-03 | End: 2025-08-03

## 2025-08-03 RX ORDER — SUCRALFATE 1 G/1
1 TABLET ORAL
Status: DISCONTINUED | OUTPATIENT
Start: 2025-08-04 | End: 2025-08-05

## 2025-08-03 RX ORDER — ENOXAPARIN SODIUM 100 MG/ML
40 INJECTION SUBCUTANEOUS DAILY
Status: DISCONTINUED | OUTPATIENT
Start: 2025-08-04 | End: 2025-08-05

## 2025-08-03 RX ORDER — RISPERIDONE 0.25 MG/1
0.25 TABLET ORAL NIGHTLY
Status: DISCONTINUED | OUTPATIENT
Start: 2025-08-04 | End: 2025-08-03

## 2025-08-03 RX ORDER — ONDANSETRON 2 MG/ML
4 INJECTION INTRAMUSCULAR; INTRAVENOUS EVERY 6 HOURS PRN
Status: DISCONTINUED | OUTPATIENT
Start: 2025-08-03 | End: 2025-08-05

## 2025-08-03 RX ORDER — PANTOPRAZOLE SODIUM 40 MG/1
40 TABLET, DELAYED RELEASE ORAL
Status: DISCONTINUED | OUTPATIENT
Start: 2025-08-04 | End: 2025-08-05

## 2025-08-03 RX ORDER — METOPROLOL SUCCINATE 25 MG/1
25 TABLET, EXTENDED RELEASE ORAL
Status: DISCONTINUED | OUTPATIENT
Start: 2025-08-04 | End: 2025-08-05

## 2025-08-04 PROBLEM — F03.918 AGGRESSIVE BEHAVIOR DUE TO DEMENTIA (HCC): Status: RESOLVED | Noted: 2025-08-03 | Resolved: 2025-08-04

## 2025-08-04 PROBLEM — F29 PSYCHOSIS (HCC): Status: ACTIVE | Noted: 2025-07-29

## 2025-08-04 LAB
ANION GAP SERPL CALC-SCNC: 10 MMOL/L (ref 0–18)
ATRIAL RATE: 78 BPM
BASOPHILS # BLD AUTO: 0.08 X10(3) UL (ref 0–0.2)
BASOPHILS NFR BLD AUTO: 0.8 %
BUN BLD-MCNC: 19 MG/DL (ref 9–23)
CALCIUM BLD-MCNC: 9.2 MG/DL (ref 8.7–10.6)
CHLORIDE SERPL-SCNC: 107 MMOL/L (ref 98–112)
CO2 SERPL-SCNC: 26 MMOL/L (ref 21–32)
CREAT BLD-MCNC: 1.06 MG/DL (ref 0.7–1.3)
EGFRCR SERPLBLD CKD-EPI 2021: 65 ML/MIN/1.73M2 (ref 60–?)
EOSINOPHIL # BLD AUTO: 0.05 X10(3) UL (ref 0–0.7)
EOSINOPHIL NFR BLD AUTO: 0.5 %
ERYTHROCYTE [DISTWIDTH] IN BLOOD BY AUTOMATED COUNT: 14.6 %
GLUCOSE BLD-MCNC: 129 MG/DL (ref 70–99)
HCT VFR BLD AUTO: 36.4 % (ref 39–53)
HGB BLD-MCNC: 12.1 G/DL (ref 13–17.5)
IMM GRANULOCYTES # BLD AUTO: 0.02 X10(3) UL (ref 0–1)
IMM GRANULOCYTES NFR BLD: 0.2 %
LYMPHOCYTES # BLD AUTO: 4.3 X10(3) UL (ref 1–4)
LYMPHOCYTES NFR BLD AUTO: 42.5 %
MAGNESIUM SERPL-MCNC: 1.9 MG/DL (ref 1.6–2.6)
MCH RBC QN AUTO: 27.6 PG (ref 26–34)
MCHC RBC AUTO-ENTMCNC: 33.2 G/DL (ref 31–37)
MCV RBC AUTO: 82.9 FL (ref 80–100)
MONOCYTES # BLD AUTO: 0.77 X10(3) UL (ref 0.1–1)
MONOCYTES NFR BLD AUTO: 7.6 %
NEUTROPHILS # BLD AUTO: 4.89 X10 (3) UL (ref 1.5–7.7)
NEUTROPHILS # BLD AUTO: 4.89 X10(3) UL (ref 1.5–7.7)
NEUTROPHILS NFR BLD AUTO: 48.4 %
OSMOLALITY SERPL CALC.SUM OF ELEC: 300 MOSM/KG (ref 275–295)
P AXIS: 52 DEGREES
P-R INTERVAL: 232 MS
PLATELET # BLD AUTO: 233 10(3)UL (ref 150–450)
POTASSIUM SERPL-SCNC: 3.6 MMOL/L (ref 3.5–5.1)
Q-T INTERVAL: 440 MS
QRS DURATION: 152 MS
QTC CALCULATION (BEZET): 501 MS
R AXIS: -34 DEGREES
RBC # BLD AUTO: 4.39 X10(6)UL (ref 3.8–5.8)
SODIUM SERPL-SCNC: 143 MMOL/L (ref 136–145)
T AXIS: 118 DEGREES
VENTRICULAR RATE: 78 BPM
WBC # BLD AUTO: 10.1 X10(3) UL (ref 4–11)

## 2025-08-04 PROCEDURE — 99232 SBSQ HOSP IP/OBS MODERATE 35: CPT | Performed by: OTHER

## 2025-08-04 PROCEDURE — 99232 SBSQ HOSP IP/OBS MODERATE 35: CPT | Performed by: HOSPITALIST

## 2025-08-04 RX ORDER — RISPERIDONE 0.25 MG/1
0.25 TABLET ORAL 2 TIMES DAILY
Qty: 60 TABLET | Refills: 0 | Status: ON HOLD | OUTPATIENT
Start: 2025-08-04 | End: 2025-08-14

## 2025-08-05 VITALS
HEART RATE: 75 BPM | SYSTOLIC BLOOD PRESSURE: 150 MMHG | TEMPERATURE: 98 F | BODY MASS INDEX: 25 KG/M2 | WEIGHT: 155 LBS | RESPIRATION RATE: 16 BRPM | DIASTOLIC BLOOD PRESSURE: 75 MMHG | OXYGEN SATURATION: 94 %

## 2025-08-05 PROCEDURE — 99239 HOSP IP/OBS DSCHRG MGMT >30: CPT | Performed by: HOSPITALIST

## 2025-08-05 PROCEDURE — 99231 SBSQ HOSP IP/OBS SF/LOW 25: CPT | Performed by: OTHER

## 2025-08-05 RX ORDER — RISPERIDONE 0.25 MG/1
0.25 TABLET ORAL 2 TIMES DAILY
COMMUNITY
End: 2025-08-06

## 2025-08-06 ENCOUNTER — PATIENT OUTREACH (OUTPATIENT)
Age: OVER 89
End: 2025-08-06

## 2025-08-10 ENCOUNTER — HOSPITAL ENCOUNTER (OUTPATIENT)
Facility: HOSPITAL | Age: OVER 89
Setting detail: OBSERVATION
Discharge: NURSING, REHAB OR LONG-TERM CARE | End: 2025-08-15
Attending: EMERGENCY MEDICINE | Admitting: INTERNAL MEDICINE

## 2025-08-10 ENCOUNTER — APPOINTMENT (OUTPATIENT)
Dept: CT IMAGING | Facility: HOSPITAL | Age: OVER 89
End: 2025-08-10
Attending: EMERGENCY MEDICINE

## 2025-08-10 ENCOUNTER — APPOINTMENT (OUTPATIENT)
Dept: GENERAL RADIOLOGY | Facility: HOSPITAL | Age: OVER 89
End: 2025-08-10
Attending: INTERNAL MEDICINE

## 2025-08-10 ENCOUNTER — APPOINTMENT (OUTPATIENT)
Dept: GENERAL RADIOLOGY | Facility: HOSPITAL | Age: OVER 89
End: 2025-08-10
Attending: EMERGENCY MEDICINE

## 2025-08-10 DIAGNOSIS — S09.90XA INJURY OF HEAD, INITIAL ENCOUNTER: ICD-10-CM

## 2025-08-10 DIAGNOSIS — F41.1 GENERALIZED ANXIETY DISORDER: ICD-10-CM

## 2025-08-10 DIAGNOSIS — R53.1 WEAKNESS GENERALIZED: Primary | ICD-10-CM

## 2025-08-10 DIAGNOSIS — U09.9 COVID-19 LONG HAULER: ICD-10-CM

## 2025-08-10 PROBLEM — Z86.16 HISTORY OF COVID-19: Status: ACTIVE | Noted: 2025-08-10

## 2025-08-10 LAB
ALBUMIN SERPL-MCNC: 4 G/DL (ref 3.2–4.8)
ALBUMIN/GLOB SERPL: 1.2 (ref 1–2)
ALP LIVER SERPL-CCNC: 122 U/L (ref 45–117)
ALT SERPL-CCNC: 17 U/L (ref 10–49)
ANION GAP SERPL CALC-SCNC: 13 MMOL/L (ref 0–18)
AST SERPL-CCNC: 42 U/L (ref ?–34)
ATRIAL RATE: 71 BPM
BASOPHILS # BLD AUTO: 0.02 X10(3) UL (ref 0–0.2)
BASOPHILS NFR BLD AUTO: 0.2 %
BILIRUB SERPL-MCNC: 1 MG/DL (ref 0.2–0.9)
BUN BLD-MCNC: 19 MG/DL (ref 9–23)
CALCIUM BLD-MCNC: 8.3 MG/DL (ref 8.7–10.6)
CHLORIDE SERPL-SCNC: 103 MMOL/L (ref 98–112)
CK SERPL-CCNC: 151 U/L (ref 46–171)
CO2 SERPL-SCNC: 21 MMOL/L (ref 21–32)
CREAT BLD-MCNC: 1.23 MG/DL (ref 0.7–1.3)
EGFRCR SERPLBLD CKD-EPI 2021: 54 ML/MIN/1.73M2 (ref 60–?)
EOSINOPHIL # BLD AUTO: 0 X10(3) UL (ref 0–0.7)
EOSINOPHIL NFR BLD AUTO: 0 %
ERYTHROCYTE [DISTWIDTH] IN BLOOD BY AUTOMATED COUNT: 14.6 %
GLOBULIN PLAS-MCNC: 3.3 G/DL (ref 2–3.5)
GLUCOSE BLD-MCNC: 130 MG/DL (ref 70–99)
HCT VFR BLD AUTO: 30.9 % (ref 39–53)
HGB BLD-MCNC: 10.5 G/DL (ref 13–17.5)
IMM GRANULOCYTES # BLD AUTO: 0.04 X10(3) UL (ref 0–1)
IMM GRANULOCYTES NFR BLD: 0.4 %
LACTATE SERPL-SCNC: 1 MMOL/L (ref 0.5–2)
LYMPHOCYTES # BLD AUTO: 3.18 X10(3) UL (ref 1–4)
LYMPHOCYTES NFR BLD AUTO: 32.6 %
MCH RBC QN AUTO: 28 PG (ref 26–34)
MCHC RBC AUTO-ENTMCNC: 34 G/DL (ref 31–37)
MCV RBC AUTO: 82.4 FL (ref 80–100)
MONOCYTES # BLD AUTO: 0.58 X10(3) UL (ref 0.1–1)
MONOCYTES NFR BLD AUTO: 5.9 %
NEUTROPHILS # BLD AUTO: 5.93 X10 (3) UL (ref 1.5–7.7)
NEUTROPHILS # BLD AUTO: 5.93 X10(3) UL (ref 1.5–7.7)
NEUTROPHILS NFR BLD AUTO: 60.9 %
NT-PROBNP SERPL-MCNC: 5093 PG/ML (ref ?–450)
OSMOLALITY SERPL CALC.SUM OF ELEC: 288 MOSM/KG (ref 275–295)
P AXIS: -10 DEGREES
P-R INTERVAL: 128 MS
PLATELET # BLD AUTO: 202 10(3)UL (ref 150–450)
POTASSIUM SERPL-SCNC: 3.5 MMOL/L (ref 3.5–5.1)
PROT SERPL-MCNC: 7.3 G/DL (ref 5.7–8.2)
Q-T INTERVAL: 448 MS
QRS DURATION: 150 MS
QTC CALCULATION (BEZET): 486 MS
R AXIS: -34 DEGREES
RBC # BLD AUTO: 3.75 X10(6)UL (ref 3.8–5.8)
SODIUM SERPL-SCNC: 137 MMOL/L (ref 136–145)
T AXIS: 148 DEGREES
VENTRICULAR RATE: 71 BPM
WBC # BLD AUTO: 9.8 X10(3) UL (ref 4–11)

## 2025-08-10 PROCEDURE — 71045 X-RAY EXAM CHEST 1 VIEW: CPT | Performed by: EMERGENCY MEDICINE

## 2025-08-10 PROCEDURE — 70450 CT HEAD/BRAIN W/O DYE: CPT | Performed by: EMERGENCY MEDICINE

## 2025-08-10 PROCEDURE — 99223 1ST HOSP IP/OBS HIGH 75: CPT | Performed by: INTERNAL MEDICINE

## 2025-08-10 PROCEDURE — 73523 X-RAY EXAM HIPS BI 5/> VIEWS: CPT | Performed by: INTERNAL MEDICINE

## 2025-08-10 RX ORDER — SUCRALFATE 1 G/1
1 TABLET ORAL
Status: DISCONTINUED | OUTPATIENT
Start: 2025-08-11 | End: 2025-08-15

## 2025-08-10 RX ORDER — GUAIFENESIN 600 MG/1
600 TABLET, EXTENDED RELEASE ORAL 2 TIMES DAILY
Status: DISCONTINUED | OUTPATIENT
Start: 2025-08-10 | End: 2025-08-15

## 2025-08-10 RX ORDER — POLYETHYLENE GLYCOL 3350 17 G/17G
17 POWDER, FOR SOLUTION ORAL DAILY PRN
Status: DISCONTINUED | OUTPATIENT
Start: 2025-08-10 | End: 2025-08-15

## 2025-08-10 RX ORDER — METOPROLOL SUCCINATE 25 MG/1
25 TABLET, EXTENDED RELEASE ORAL
Status: DISCONTINUED | OUTPATIENT
Start: 2025-08-11 | End: 2025-08-15

## 2025-08-10 RX ORDER — MAGNESIUM HYDROXIDE/ALUMINUM HYDROXICE/SIMETHICONE 120; 1200; 1200 MG/30ML; MG/30ML; MG/30ML
30 SUSPENSION ORAL EVERY 4 HOURS PRN
Status: DISCONTINUED | OUTPATIENT
Start: 2025-08-10 | End: 2025-08-15

## 2025-08-10 RX ORDER — PANTOPRAZOLE SODIUM 40 MG/1
40 TABLET, DELAYED RELEASE ORAL
Status: DISCONTINUED | OUTPATIENT
Start: 2025-08-11 | End: 2025-08-11

## 2025-08-10 RX ORDER — CETIRIZINE HYDROCHLORIDE 5 MG/1
5 TABLET ORAL DAILY
Status: DISCONTINUED | OUTPATIENT
Start: 2025-08-11 | End: 2025-08-15

## 2025-08-10 RX ORDER — ALBUTEROL SULFATE 90 UG/1
4 INHALANT RESPIRATORY (INHALATION) EVERY 4 HOURS PRN
Status: DISCONTINUED | OUTPATIENT
Start: 2025-08-10 | End: 2025-08-15

## 2025-08-10 RX ORDER — ATORVASTATIN CALCIUM 80 MG/1
80 TABLET, FILM COATED ORAL DAILY
Status: DISCONTINUED | OUTPATIENT
Start: 2025-08-11 | End: 2025-08-15

## 2025-08-10 RX ORDER — SPIRONOLACTONE 25 MG/1
25 TABLET ORAL DAILY
Status: DISCONTINUED | OUTPATIENT
Start: 2025-08-11 | End: 2025-08-15

## 2025-08-10 RX ORDER — RISPERIDONE 0.25 MG/1
0.25 TABLET ORAL 2 TIMES DAILY
Status: DISCONTINUED | OUTPATIENT
Start: 2025-08-10 | End: 2025-08-12

## 2025-08-10 RX ORDER — ENOXAPARIN SODIUM 100 MG/ML
40 INJECTION SUBCUTANEOUS DAILY
Status: DISCONTINUED | OUTPATIENT
Start: 2025-08-11 | End: 2025-08-15

## 2025-08-10 RX ORDER — DICYCLOMINE HYDROCHLORIDE 10 MG/1
10 CAPSULE ORAL
Status: DISCONTINUED | OUTPATIENT
Start: 2025-08-11 | End: 2025-08-15

## 2025-08-10 RX ORDER — DULOXETIN HYDROCHLORIDE 30 MG/1
60 CAPSULE, DELAYED RELEASE ORAL DAILY
Status: DISCONTINUED | OUTPATIENT
Start: 2025-08-11 | End: 2025-08-15

## 2025-08-10 RX ORDER — ASPIRIN 81 MG/1
81 TABLET, CHEWABLE ORAL EVERY MORNING
Status: DISCONTINUED | OUTPATIENT
Start: 2025-08-11 | End: 2025-08-15

## 2025-08-10 RX ORDER — CLOPIDOGREL BISULFATE 75 MG/1
75 TABLET ORAL DAILY
Status: DISCONTINUED | OUTPATIENT
Start: 2025-08-11 | End: 2025-08-15

## 2025-08-10 RX ORDER — MIRTAZAPINE 15 MG/1
15 TABLET, FILM COATED ORAL NIGHTLY
Status: DISCONTINUED | OUTPATIENT
Start: 2025-08-10 | End: 2025-08-15

## 2025-08-10 RX ORDER — ONDANSETRON 2 MG/ML
4 INJECTION INTRAMUSCULAR; INTRAVENOUS EVERY 6 HOURS PRN
Status: DISCONTINUED | OUTPATIENT
Start: 2025-08-10 | End: 2025-08-15

## 2025-08-10 RX ORDER — ACETAMINOPHEN 500 MG
500 TABLET ORAL EVERY 4 HOURS PRN
Status: DISCONTINUED | OUTPATIENT
Start: 2025-08-10 | End: 2025-08-15

## 2025-08-11 LAB
ALBUMIN SERPL-MCNC: 3.6 G/DL (ref 3.2–4.8)
ALBUMIN/GLOB SERPL: 1.2 (ref 1–2)
ALP LIVER SERPL-CCNC: 102 U/L (ref 45–117)
ALT SERPL-CCNC: 15 U/L (ref 10–49)
ANION GAP SERPL CALC-SCNC: 10 MMOL/L (ref 0–18)
AST SERPL-CCNC: 36 U/L (ref ?–34)
ATRIAL RATE: 69 BPM
BASOPHILS # BLD AUTO: 0.01 X10(3) UL (ref 0–0.2)
BASOPHILS NFR BLD AUTO: 0.2 %
BILIRUB SERPL-MCNC: 0.6 MG/DL (ref 0.2–0.9)
BUN BLD-MCNC: 16 MG/DL (ref 9–23)
CALCIUM BLD-MCNC: 8.1 MG/DL (ref 8.7–10.6)
CHLORIDE SERPL-SCNC: 105 MMOL/L (ref 98–112)
CO2 SERPL-SCNC: 24 MMOL/L (ref 21–32)
CREAT BLD-MCNC: 0.94 MG/DL (ref 0.7–1.3)
CRP SERPL-MCNC: 13.4 MG/DL (ref ?–0.5)
D DIMER PPP FEU-MCNC: 1.29 UG/ML FEU (ref ?–0.95)
DEPRECATED HBV CORE AB SER IA-ACNC: 177 NG/ML (ref 50–336)
EGFRCR SERPLBLD CKD-EPI 2021: 75 ML/MIN/1.73M2 (ref 60–?)
EOSINOPHIL # BLD AUTO: 0.01 X10(3) UL (ref 0–0.7)
EOSINOPHIL NFR BLD AUTO: 0.2 %
ERYTHROCYTE [DISTWIDTH] IN BLOOD BY AUTOMATED COUNT: 14.8 %
GLOBULIN PLAS-MCNC: 2.9 G/DL (ref 2–3.5)
GLUCOSE BLD-MCNC: 104 MG/DL (ref 70–99)
HCT VFR BLD AUTO: 26.6 % (ref 39–53)
HGB BLD-MCNC: 9.1 G/DL (ref 13–17.5)
IMM GRANULOCYTES # BLD AUTO: 0.02 X10(3) UL (ref 0–1)
IMM GRANULOCYTES NFR BLD: 0.3 %
LDH SERPL L TO P-CCNC: 253 U/L (ref 120–246)
LYMPHOCYTES # BLD AUTO: 2.64 X10(3) UL (ref 1–4)
LYMPHOCYTES NFR BLD AUTO: 42.6 %
MCH RBC QN AUTO: 27.7 PG (ref 26–34)
MCHC RBC AUTO-ENTMCNC: 34.2 G/DL (ref 31–37)
MCV RBC AUTO: 81.1 FL (ref 80–100)
MONOCYTES # BLD AUTO: 0.39 X10(3) UL (ref 0.1–1)
MONOCYTES NFR BLD AUTO: 6.3 %
NEUTROPHILS # BLD AUTO: 3.12 X10 (3) UL (ref 1.5–7.7)
NEUTROPHILS # BLD AUTO: 3.12 X10(3) UL (ref 1.5–7.7)
NEUTROPHILS NFR BLD AUTO: 50.4 %
OSMOLALITY SERPL CALC.SUM OF ELEC: 289 MOSM/KG (ref 275–295)
P AXIS: -29 DEGREES
P-R INTERVAL: 138 MS
PLATELET # BLD AUTO: 202 10(3)UL (ref 150–450)
POTASSIUM SERPL-SCNC: 3.2 MMOL/L (ref 3.5–5.1)
PROT SERPL-MCNC: 6.5 G/DL (ref 5.7–8.2)
Q-T INTERVAL: 456 MS
QRS DURATION: 152 MS
QTC CALCULATION (BEZET): 488 MS
R AXIS: -33 DEGREES
RBC # BLD AUTO: 3.28 X10(6)UL (ref 3.8–5.8)
SODIUM SERPL-SCNC: 139 MMOL/L (ref 136–145)
T AXIS: 152 DEGREES
VENTRICULAR RATE: 69 BPM
WBC # BLD AUTO: 6.2 X10(3) UL (ref 4–11)

## 2025-08-11 PROCEDURE — 99232 SBSQ HOSP IP/OBS MODERATE 35: CPT | Performed by: INTERNAL MEDICINE

## 2025-08-11 RX ORDER — HALOPERIDOL 5 MG/ML
INJECTION INTRAMUSCULAR
Status: COMPLETED
Start: 2025-08-11 | End: 2025-08-11

## 2025-08-11 RX ORDER — POTASSIUM CHLORIDE 1.5 G/1.58G
40 POWDER, FOR SOLUTION ORAL EVERY 4 HOURS
Status: DISCONTINUED | OUTPATIENT
Start: 2025-08-11 | End: 2025-08-11

## 2025-08-11 RX ORDER — HALOPERIDOL 5 MG/ML
1 INJECTION INTRAMUSCULAR EVERY 6 HOURS PRN
Status: DISCONTINUED | OUTPATIENT
Start: 2025-08-11 | End: 2025-08-14

## 2025-08-12 PROBLEM — F05 DELIRIUM DUE TO KNOWN PHYSIOLOGICAL CONDITION: Status: ACTIVE | Noted: 2025-08-12

## 2025-08-12 LAB
ALBUMIN SERPL-MCNC: 3.6 G/DL (ref 3.2–4.8)
ALBUMIN/GLOB SERPL: 1.2 (ref 1–2)
ALP LIVER SERPL-CCNC: 101 U/L (ref 45–117)
ALT SERPL-CCNC: 14 U/L (ref 10–49)
ANION GAP SERPL CALC-SCNC: 12 MMOL/L (ref 0–18)
AST SERPL-CCNC: 32 U/L (ref ?–34)
BASOPHILS # BLD AUTO: 0.01 X10(3) UL (ref 0–0.2)
BASOPHILS NFR BLD AUTO: 0.2 %
BILIRUB SERPL-MCNC: 0.7 MG/DL (ref 0.2–0.9)
BUN BLD-MCNC: 16 MG/DL (ref 9–23)
CALCIUM BLD-MCNC: 8.5 MG/DL (ref 8.7–10.6)
CHLORIDE SERPL-SCNC: 109 MMOL/L (ref 98–112)
CO2 SERPL-SCNC: 22 MMOL/L (ref 21–32)
CREAT BLD-MCNC: 0.99 MG/DL (ref 0.7–1.3)
CRP SERPL-MCNC: 9.8 MG/DL (ref ?–0.5)
D DIMER PPP FEU-MCNC: 1.57 UG/ML FEU (ref ?–0.95)
DEPRECATED HBV CORE AB SER IA-ACNC: 198 NG/ML (ref 50–336)
EGFRCR SERPLBLD CKD-EPI 2021: 70 ML/MIN/1.73M2 (ref 60–?)
EOSINOPHIL # BLD AUTO: 0.01 X10(3) UL (ref 0–0.7)
EOSINOPHIL NFR BLD AUTO: 0.2 %
ERYTHROCYTE [DISTWIDTH] IN BLOOD BY AUTOMATED COUNT: 14.6 %
GLOBULIN PLAS-MCNC: 3.1 G/DL (ref 2–3.5)
GLUCOSE BLD-MCNC: 107 MG/DL (ref 70–99)
HCT VFR BLD AUTO: 28.9 % (ref 39–53)
HGB BLD-MCNC: 9.7 G/DL (ref 13–17.5)
IMM GRANULOCYTES # BLD AUTO: 0.02 X10(3) UL (ref 0–1)
IMM GRANULOCYTES NFR BLD: 0.5 %
LDH SERPL L TO P-CCNC: 251 U/L (ref 120–246)
LYMPHOCYTES # BLD AUTO: 1.78 X10(3) UL (ref 1–4)
LYMPHOCYTES NFR BLD AUTO: 40.1 %
MCH RBC QN AUTO: 27.6 PG (ref 26–34)
MCHC RBC AUTO-ENTMCNC: 33.6 G/DL (ref 31–37)
MCV RBC AUTO: 82.3 FL (ref 80–100)
MONOCYTES # BLD AUTO: 0.41 X10(3) UL (ref 0.1–1)
MONOCYTES NFR BLD AUTO: 9.2 %
NEUTROPHILS # BLD AUTO: 2.21 X10 (3) UL (ref 1.5–7.7)
NEUTROPHILS # BLD AUTO: 2.21 X10(3) UL (ref 1.5–7.7)
NEUTROPHILS NFR BLD AUTO: 49.8 %
OSMOLALITY SERPL CALC.SUM OF ELEC: 298 MOSM/KG (ref 275–295)
PLATELET # BLD AUTO: 243 10(3)UL (ref 150–450)
POTASSIUM SERPL-SCNC: 3.4 MMOL/L (ref 3.5–5.1)
POTASSIUM SERPL-SCNC: 3.4 MMOL/L (ref 3.5–5.1)
PROT SERPL-MCNC: 6.7 G/DL (ref 5.7–8.2)
RBC # BLD AUTO: 3.51 X10(6)UL (ref 3.8–5.8)
SODIUM SERPL-SCNC: 143 MMOL/L (ref 136–145)
WBC # BLD AUTO: 4.4 X10(3) UL (ref 4–11)

## 2025-08-12 PROCEDURE — 99232 SBSQ HOSP IP/OBS MODERATE 35: CPT | Performed by: INTERNAL MEDICINE

## 2025-08-12 PROCEDURE — 99232 SBSQ HOSP IP/OBS MODERATE 35: CPT | Performed by: OTHER

## 2025-08-12 RX ORDER — RISPERIDONE 0.25 MG/1
0.25 TABLET ORAL 3 TIMES DAILY
Status: DISCONTINUED | OUTPATIENT
Start: 2025-08-12 | End: 2025-08-15

## 2025-08-13 LAB
ALBUMIN SERPL-MCNC: 3.7 G/DL (ref 3.2–4.8)
ALBUMIN/GLOB SERPL: 1.2 (ref 1–2)
ALP LIVER SERPL-CCNC: 103 U/L (ref 45–117)
ALT SERPL-CCNC: 14 U/L (ref 10–49)
ANION GAP SERPL CALC-SCNC: 14 MMOL/L (ref 0–18)
AST SERPL-CCNC: 32 U/L (ref ?–34)
BASOPHILS # BLD AUTO: 0.02 X10(3) UL (ref 0–0.2)
BASOPHILS NFR BLD AUTO: 0.4 %
BILIRUB SERPL-MCNC: 0.7 MG/DL (ref 0.2–0.9)
BUN BLD-MCNC: 14 MG/DL (ref 9–23)
CALCIUM BLD-MCNC: 8.7 MG/DL (ref 8.7–10.6)
CHLORIDE SERPL-SCNC: 110 MMOL/L (ref 98–112)
CO2 SERPL-SCNC: 21 MMOL/L (ref 21–32)
CREAT BLD-MCNC: 0.91 MG/DL (ref 0.7–1.3)
CRP SERPL-MCNC: 7.8 MG/DL (ref ?–0.5)
D DIMER PPP FEU-MCNC: 0.98 UG/ML FEU (ref ?–0.95)
DEPRECATED HBV CORE AB SER IA-ACNC: 183 NG/ML (ref 50–336)
EGFRCR SERPLBLD CKD-EPI 2021: 78 ML/MIN/1.73M2 (ref 60–?)
EOSINOPHIL # BLD AUTO: 0.01 X10(3) UL (ref 0–0.7)
EOSINOPHIL NFR BLD AUTO: 0.2 %
ERYTHROCYTE [DISTWIDTH] IN BLOOD BY AUTOMATED COUNT: 14.7 %
GLOBULIN PLAS-MCNC: 3.1 G/DL (ref 2–3.5)
GLUCOSE BLD-MCNC: 113 MG/DL (ref 70–99)
HCT VFR BLD AUTO: 30.3 % (ref 39–53)
HGB BLD-MCNC: 10.1 G/DL (ref 13–17.5)
IMM GRANULOCYTES # BLD AUTO: 0.03 X10(3) UL (ref 0–1)
IMM GRANULOCYTES NFR BLD: 0.5 %
LDH SERPL L TO P-CCNC: 242 U/L (ref 120–246)
LYMPHOCYTES # BLD AUTO: 2.57 X10(3) UL (ref 1–4)
LYMPHOCYTES NFR BLD AUTO: 45.1 %
MCH RBC QN AUTO: 27.5 PG (ref 26–34)
MCHC RBC AUTO-ENTMCNC: 33.3 G/DL (ref 31–37)
MCV RBC AUTO: 82.6 FL (ref 80–100)
MONOCYTES # BLD AUTO: 0.56 X10(3) UL (ref 0.1–1)
MONOCYTES NFR BLD AUTO: 9.8 %
NEUTROPHILS # BLD AUTO: 2.51 X10 (3) UL (ref 1.5–7.7)
NEUTROPHILS # BLD AUTO: 2.51 X10(3) UL (ref 1.5–7.7)
NEUTROPHILS NFR BLD AUTO: 44 %
OSMOLALITY SERPL CALC.SUM OF ELEC: 301 MOSM/KG (ref 275–295)
PLATELET # BLD AUTO: 260 10(3)UL (ref 150–450)
POTASSIUM SERPL-SCNC: 3.9 MMOL/L (ref 3.5–5.1)
POTASSIUM SERPL-SCNC: 3.9 MMOL/L (ref 3.5–5.1)
PROT SERPL-MCNC: 6.8 G/DL (ref 5.7–8.2)
RBC # BLD AUTO: 3.67 X10(6)UL (ref 3.8–5.8)
SODIUM SERPL-SCNC: 145 MMOL/L (ref 136–145)
WBC # BLD AUTO: 5.7 X10(3) UL (ref 4–11)

## 2025-08-13 PROCEDURE — 99233 SBSQ HOSP IP/OBS HIGH 50: CPT | Performed by: HOSPITALIST

## 2025-08-13 PROCEDURE — 99232 SBSQ HOSP IP/OBS MODERATE 35: CPT

## 2025-08-13 RX ORDER — SODIUM CHLORIDE 9 MG/ML
INJECTION, SOLUTION INTRAVENOUS CONTINUOUS
Status: DISCONTINUED | OUTPATIENT
Start: 2025-08-13 | End: 2025-08-15

## 2025-08-14 LAB
ALBUMIN SERPL-MCNC: 3.4 G/DL (ref 3.2–4.8)
ALBUMIN/GLOB SERPL: 1.2 (ref 1–2)
ALP LIVER SERPL-CCNC: 97 U/L (ref 45–117)
ALT SERPL-CCNC: 18 U/L (ref 10–49)
ANION GAP SERPL CALC-SCNC: 12 MMOL/L (ref 0–18)
AST SERPL-CCNC: 39 U/L (ref ?–34)
BILIRUB SERPL-MCNC: 0.6 MG/DL (ref 0.2–0.9)
BUN BLD-MCNC: 12 MG/DL (ref 9–23)
CALCIUM BLD-MCNC: 8.3 MG/DL (ref 8.7–10.6)
CHLORIDE SERPL-SCNC: 112 MMOL/L (ref 98–112)
CO2 SERPL-SCNC: 22 MMOL/L (ref 21–32)
CREAT BLD-MCNC: 0.84 MG/DL (ref 0.7–1.3)
EGFRCR SERPLBLD CKD-EPI 2021: 80 ML/MIN/1.73M2 (ref 60–?)
GLOBULIN PLAS-MCNC: 2.9 G/DL (ref 2–3.5)
GLUCOSE BLD-MCNC: 121 MG/DL (ref 70–99)
OSMOLALITY SERPL CALC.SUM OF ELEC: 303 MOSM/KG (ref 275–295)
POTASSIUM SERPL-SCNC: 3.7 MMOL/L (ref 3.5–5.1)
PROT SERPL-MCNC: 6.3 G/DL (ref 5.7–8.2)
SODIUM SERPL-SCNC: 146 MMOL/L (ref 136–145)

## 2025-08-14 PROCEDURE — 99232 SBSQ HOSP IP/OBS MODERATE 35: CPT

## 2025-08-14 PROCEDURE — 99233 SBSQ HOSP IP/OBS HIGH 50: CPT | Performed by: HOSPITALIST

## 2025-08-14 RX ORDER — RISPERIDONE 0.25 MG/1
0.25 TABLET ORAL 3 TIMES DAILY
Qty: 90 TABLET | Refills: 0 | Status: ON HOLD | OUTPATIENT
Start: 2025-08-14 | End: 2025-08-27

## 2025-08-14 RX ORDER — POTASSIUM CHLORIDE 1.5 G/1.58G
40 POWDER, FOR SOLUTION ORAL ONCE
Status: COMPLETED | OUTPATIENT
Start: 2025-08-14 | End: 2025-08-14

## 2025-08-15 ENCOUNTER — APPOINTMENT (OUTPATIENT)
Dept: CT IMAGING | Facility: HOSPITAL | Age: OVER 89
End: 2025-08-15
Attending: HOSPITALIST

## 2025-08-15 ENCOUNTER — MOBILE ENCOUNTER (OUTPATIENT)
Dept: FAMILY MEDICINE CLINIC | Facility: CLINIC | Age: OVER 89
End: 2025-08-15

## 2025-08-15 VITALS
BODY MASS INDEX: 25 KG/M2 | WEIGHT: 152.13 LBS | SYSTOLIC BLOOD PRESSURE: 143 MMHG | HEART RATE: 81 BPM | TEMPERATURE: 99 F | OXYGEN SATURATION: 95 % | DIASTOLIC BLOOD PRESSURE: 59 MMHG | RESPIRATION RATE: 16 BRPM

## 2025-08-15 DIAGNOSIS — I25.10 CORONARY ARTERY DISEASE INVOLVING NATIVE CORONARY ARTERY OF NATIVE HEART WITHOUT ANGINA PECTORIS: ICD-10-CM

## 2025-08-15 DIAGNOSIS — F02.818 ALZHEIMER'S DEMENTIA WITH BEHAVIORAL DISTURBANCE (HCC): ICD-10-CM

## 2025-08-15 DIAGNOSIS — R29.6 FALLS: ICD-10-CM

## 2025-08-15 DIAGNOSIS — G30.9 ALZHEIMER'S DEMENTIA WITH BEHAVIORAL DISTURBANCE (HCC): ICD-10-CM

## 2025-08-15 DIAGNOSIS — E78.5 HYPERLIPIDEMIA, UNSPECIFIED HYPERLIPIDEMIA TYPE: ICD-10-CM

## 2025-08-15 DIAGNOSIS — U09.9 COVID-19 LONG HAULER: ICD-10-CM

## 2025-08-15 DIAGNOSIS — D64.9 ANEMIA, UNSPECIFIED TYPE: ICD-10-CM

## 2025-08-15 DIAGNOSIS — R53.1 WEAKNESS GENERALIZED: Primary | ICD-10-CM

## 2025-08-15 DIAGNOSIS — I10 BENIGN ESSENTIAL HTN: ICD-10-CM

## 2025-08-15 LAB
ALBUMIN SERPL-MCNC: 3.5 G/DL (ref 3.2–4.8)
ALBUMIN/GLOB SERPL: 1.2 (ref 1–2)
ALP LIVER SERPL-CCNC: 103 U/L (ref 45–117)
ALT SERPL-CCNC: 27 U/L (ref 10–49)
ANION GAP SERPL CALC-SCNC: 13 MMOL/L (ref 0–18)
AST SERPL-CCNC: 53 U/L (ref ?–34)
BILIRUB SERPL-MCNC: 0.7 MG/DL (ref 0.2–0.9)
BUN BLD-MCNC: 7 MG/DL (ref 9–23)
CALCIUM BLD-MCNC: 8.4 MG/DL (ref 8.7–10.6)
CHLORIDE SERPL-SCNC: 115 MMOL/L (ref 98–112)
CO2 SERPL-SCNC: 18 MMOL/L (ref 21–32)
CREAT BLD-MCNC: 0.83 MG/DL (ref 0.7–1.3)
EGFRCR SERPLBLD CKD-EPI 2021: 81 ML/MIN/1.73M2 (ref 60–?)
GLOBULIN PLAS-MCNC: 2.9 G/DL (ref 2–3.5)
GLUCOSE BLD-MCNC: 110 MG/DL (ref 70–99)
OSMOLALITY SERPL CALC.SUM OF ELEC: 301 MOSM/KG (ref 275–295)
POTASSIUM SERPL-SCNC: 4 MMOL/L (ref 3.5–5.1)
POTASSIUM SERPL-SCNC: 4 MMOL/L (ref 3.5–5.1)
PROT SERPL-MCNC: 6.4 G/DL (ref 5.7–8.2)
SODIUM SERPL-SCNC: 146 MMOL/L (ref 136–145)

## 2025-08-15 PROCEDURE — 99232 SBSQ HOSP IP/OBS MODERATE 35: CPT | Performed by: OTHER

## 2025-08-15 PROCEDURE — 99239 HOSP IP/OBS DSCHRG MGMT >30: CPT | Performed by: HOSPITALIST

## 2025-08-15 PROCEDURE — 70450 CT HEAD/BRAIN W/O DYE: CPT | Performed by: HOSPITALIST

## 2025-08-15 RX ORDER — ALPRAZOLAM 0.25 MG
0.25 TABLET ORAL 3 TIMES DAILY PRN
Status: DISCONTINUED | OUTPATIENT
Start: 2025-08-15 | End: 2025-08-15

## 2025-08-15 RX ORDER — ALPRAZOLAM 0.25 MG
0.25 TABLET ORAL 3 TIMES DAILY PRN
Qty: 90 TABLET | Refills: 0 | Status: ON HOLD | OUTPATIENT
Start: 2025-08-15 | End: 2025-08-27

## 2025-08-16 ENCOUNTER — APPOINTMENT (OUTPATIENT)
Dept: GENERAL RADIOLOGY | Facility: HOSPITAL | Age: OVER 89
End: 2025-08-16
Attending: EMERGENCY MEDICINE

## 2025-08-16 ENCOUNTER — HOSPITAL ENCOUNTER (EMERGENCY)
Facility: HOSPITAL | Age: OVER 89
Discharge: HOME | End: 2025-08-16
Attending: EMERGENCY MEDICINE

## 2025-08-16 ENCOUNTER — HOSPITAL ENCOUNTER (EMERGENCY)
Facility: HOSPITAL | Age: OVER 89
Discharge: HOME | End: 2025-08-17
Attending: EMERGENCY MEDICINE

## 2025-08-16 ENCOUNTER — APPOINTMENT (OUTPATIENT)
Dept: CT IMAGING | Facility: HOSPITAL | Age: OVER 89
End: 2025-08-16

## 2025-08-16 ENCOUNTER — APPOINTMENT (OUTPATIENT)
Dept: CT IMAGING | Facility: HOSPITAL | Age: OVER 89
End: 2025-08-16
Attending: EMERGENCY MEDICINE

## 2025-08-16 VITALS
SYSTOLIC BLOOD PRESSURE: 150 MMHG | RESPIRATION RATE: 16 BRPM | TEMPERATURE: 98 F | DIASTOLIC BLOOD PRESSURE: 67 MMHG | HEART RATE: 71 BPM | OXYGEN SATURATION: 100 % | WEIGHT: 152.13 LBS | HEIGHT: 67 IN | BODY MASS INDEX: 23.88 KG/M2

## 2025-08-16 DIAGNOSIS — S51.011A SKIN TEAR OF RIGHT ELBOW WITHOUT COMPLICATION, INITIAL ENCOUNTER: ICD-10-CM
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
## 2025-08-16 LAB
ALBUMIN SERPL-MCNC: 3.6 G/DL (ref 3.2–4.8)
ALBUMIN/GLOB SERPL: 1.2 (ref 1–2)
ALP LIVER SERPL-CCNC: 109 U/L (ref 45–117)
ALT SERPL-CCNC: 30 U/L (ref 10–49)
ANION GAP SERPL CALC-SCNC: 13 MMOL/L (ref 0–18)
AST SERPL-CCNC: 46 U/L (ref ?–34)
BASOPHILS # BLD AUTO: 0.02 X10(3) UL (ref 0–0.2)
BASOPHILS NFR BLD AUTO: 0.4 %
BILIRUB SERPL-MCNC: 0.8 MG/DL (ref 0.2–0.9)
BUN BLD-MCNC: 9 MG/DL (ref 9–23)
CALCIUM BLD-MCNC: 8.5 MG/DL (ref 8.7–10.6)
CHLORIDE SERPL-SCNC: 114 MMOL/L (ref 98–112)
CO2 SERPL-SCNC: 20 MMOL/L (ref 21–32)
CREAT BLD-MCNC: 0.92 MG/DL (ref 0.7–1.3)
EGFRCR SERPLBLD CKD-EPI 2021: 77 ML/MIN/1.73M2 (ref 60–?)
EOSINOPHIL # BLD AUTO: 0.03 X10(3) UL (ref 0–0.7)
EOSINOPHIL NFR BLD AUTO: 0.6 %
ERYTHROCYTE [DISTWIDTH] IN BLOOD BY AUTOMATED COUNT: 14.6 %
GLOBULIN PLAS-MCNC: 3.1 G/DL (ref 2–3.5)
GLUCOSE BLD-MCNC: 119 MG/DL (ref 70–99)
HCT VFR BLD AUTO: 30 % (ref 39–53)
HGB BLD-MCNC: 9.7 G/DL (ref 13–17.5)
IMM GRANULOCYTES # BLD AUTO: 0.03 X10(3) UL (ref 0–1)
IMM GRANULOCYTES NFR BLD: 0.6 %
LYMPHOCYTES # BLD AUTO: 2.06 X10(3) UL (ref 1–4)
LYMPHOCYTES NFR BLD AUTO: 38.5 %
MCH RBC QN AUTO: 26.9 PG (ref 26–34)
MCHC RBC AUTO-ENTMCNC: 32.3 G/DL (ref 31–37)
MCV RBC AUTO: 83.1 FL (ref 80–100)
MONOCYTES # BLD AUTO: 0.33 X10(3) UL (ref 0.1–1)
MONOCYTES NFR BLD AUTO: 6.2 %
NEUTROPHILS # BLD AUTO: 2.88 X10 (3) UL (ref 1.5–7.7)
NEUTROPHILS # BLD AUTO: 2.88 X10(3) UL (ref 1.5–7.7)
NEUTROPHILS NFR BLD AUTO: 53.7 %
OSMOLALITY SERPL CALC.SUM OF ELEC: 304 MOSM/KG (ref 275–295)
PLATELET # BLD AUTO: 284 10(3)UL (ref 150–450)
POTASSIUM SERPL-SCNC: 3.8 MMOL/L (ref 3.5–5.1)
PROT SERPL-MCNC: 6.7 G/DL (ref 5.7–8.2)
RBC # BLD AUTO: 3.61 X10(6)UL (ref 3.8–5.8)
SODIUM SERPL-SCNC: 147 MMOL/L (ref 136–145)
WBC # BLD AUTO: 5.4 X10(3) UL (ref 4–11)

## 2025-08-16 PROCEDURE — 72125 CT NECK SPINE W/O DYE: CPT | Performed by: EMERGENCY MEDICINE

## 2025-08-16 PROCEDURE — 70450 CT HEAD/BRAIN W/O DYE: CPT

## 2025-08-16 PROCEDURE — 80053 COMPREHEN METABOLIC PANEL: CPT | Performed by: EMERGENCY MEDICINE

## 2025-08-16 PROCEDURE — 93010 ELECTROCARDIOGRAM REPORT: CPT

## 2025-08-16 PROCEDURE — 70450 CT HEAD/BRAIN W/O DYE: CPT | Performed by: EMERGENCY MEDICINE

## 2025-08-16 PROCEDURE — 99285 EMERGENCY DEPT VISIT HI MDM: CPT

## 2025-08-16 PROCEDURE — 93005 ELECTROCARDIOGRAM TRACING: CPT

## 2025-08-16 PROCEDURE — 99284 EMERGENCY DEPT VISIT MOD MDM: CPT

## 2025-08-16 PROCEDURE — 71045 X-RAY EXAM CHEST 1 VIEW: CPT | Performed by: EMERGENCY MEDICINE

## 2025-08-16 PROCEDURE — 36415 COLL VENOUS BLD VENIPUNCTURE: CPT

## 2025-08-16 PROCEDURE — 85025 COMPLETE CBC W/AUTO DIFF WBC: CPT | Performed by: EMERGENCY MEDICINE

## 2025-08-17 VITALS
SYSTOLIC BLOOD PRESSURE: 137 MMHG | BODY MASS INDEX: 24 KG/M2 | HEART RATE: 95 BPM | RESPIRATION RATE: 16 BRPM | OXYGEN SATURATION: 96 % | WEIGHT: 152.13 LBS | TEMPERATURE: 98 F | DIASTOLIC BLOOD PRESSURE: 66 MMHG

## 2025-08-18 ENCOUNTER — PATIENT OUTREACH (OUTPATIENT)
Age: OVER 89
End: 2025-08-18

## 2025-08-18 LAB
ATRIAL RATE: 69 BPM
P AXIS: 8 DEGREES
P-R INTERVAL: 160 MS
Q-T INTERVAL: 434 MS
QRS DURATION: 136 MS
QTC CALCULATION (BEZET): 465 MS
R AXIS: -30 DEGREES
T AXIS: 123 DEGREES
VENTRICULAR RATE: 69 BPM

## 2025-08-19 ENCOUNTER — HOSPITAL ENCOUNTER (INPATIENT)
Facility: HOSPITAL | Age: OVER 89
LOS: 8 days | Discharge: HOSPICE | End: 2025-08-28
Attending: EMERGENCY MEDICINE | Admitting: HOSPITALIST

## 2025-08-19 ENCOUNTER — HOSPITAL ENCOUNTER (EMERGENCY)
Facility: HOSPITAL | Age: OVER 89
Discharge: OTHER INSTITUTION | End: 2025-08-19

## 2025-08-19 DIAGNOSIS — U07.1 COVID-19: ICD-10-CM

## 2025-08-19 DIAGNOSIS — R46.89 AGGRESSIVE BEHAVIOR: ICD-10-CM

## 2025-08-19 DIAGNOSIS — E87.0 HYPERNATREMIA: Primary | ICD-10-CM

## 2025-08-19 DIAGNOSIS — F41.1 GENERALIZED ANXIETY DISORDER: ICD-10-CM

## 2025-08-19 LAB
ALBUMIN SERPL-MCNC: 3.9 G/DL (ref 3.2–4.8)
ALBUMIN/GLOB SERPL: 1.2 (ref 1–2)
ALP LIVER SERPL-CCNC: 121 U/L (ref 45–117)
ALT SERPL-CCNC: 32 U/L (ref 10–49)
AMPHET UR QL SCN: NEGATIVE
ANION GAP SERPL CALC-SCNC: 12 MMOL/L (ref 0–18)
APAP SERPL-MCNC: 7.5 UG/ML (ref 10–20)
AST SERPL-CCNC: 36 U/L (ref ?–34)
ATRIAL RATE: 70 BPM
BASOPHILS # BLD AUTO: 0.02 X10(3) UL (ref 0–0.2)
BASOPHILS NFR BLD AUTO: 0.2 %
BENZODIAZ UR QL SCN: NEGATIVE
BILIRUB SERPL-MCNC: 0.9 MG/DL (ref 0.2–0.9)
BILIRUB UR QL STRIP.AUTO: NEGATIVE
BUN BLD-MCNC: 14 MG/DL (ref 9–23)
CALCIUM BLD-MCNC: 9 MG/DL (ref 8.7–10.6)
CANNABINOIDS UR QL SCN: NEGATIVE
CHLORIDE SERPL-SCNC: 112 MMOL/L (ref 98–112)
CLARITY UR REFRACT.AUTO: CLEAR
CO2 SERPL-SCNC: 26 MMOL/L (ref 21–32)
COCAINE UR QL: NEGATIVE
CREAT BLD-MCNC: 0.98 MG/DL (ref 0.7–1.3)
CREAT UR-SCNC: 41.9 MG/DL
EGFRCR SERPLBLD CKD-EPI 2021: 71 ML/MIN/1.73M2 (ref 60–?)
EOSINOPHIL # BLD AUTO: 0.03 X10(3) UL (ref 0–0.7)
EOSINOPHIL NFR BLD AUTO: 0.3 %
ERYTHROCYTE [DISTWIDTH] IN BLOOD BY AUTOMATED COUNT: 15.4 %
ETHANOL SERPL-MCNC: <3 MG/DL (ref ?–3)
FENTANYL UR QL SCN: NEGATIVE
GLOBULIN PLAS-MCNC: 3.3 G/DL (ref 2–3.5)
GLUCOSE BLD-MCNC: 128 MG/DL (ref 70–99)
GLUCOSE UR STRIP.AUTO-MCNC: NORMAL MG/DL
HCT VFR BLD AUTO: 28.9 % (ref 39–53)
HGB BLD-MCNC: 9.3 G/DL (ref 13–17.5)
IMM GRANULOCYTES # BLD AUTO: 0.07 X10(3) UL (ref 0–1)
IMM GRANULOCYTES NFR BLD: 0.7 %
KETONES UR STRIP.AUTO-MCNC: NEGATIVE MG/DL
LEUKOCYTE ESTERASE UR QL STRIP.AUTO: NEGATIVE
LYMPHOCYTES # BLD AUTO: 3.77 X10(3) UL (ref 1–4)
LYMPHOCYTES NFR BLD AUTO: 38.4 %
MCH RBC QN AUTO: 27.6 PG (ref 26–34)
MCHC RBC AUTO-ENTMCNC: 32.2 G/DL (ref 31–37)
MCV RBC AUTO: 85.8 FL (ref 80–100)
MDMA UR QL SCN: NEGATIVE
MONOCYTES # BLD AUTO: 0.47 X10(3) UL (ref 0.1–1)
MONOCYTES NFR BLD AUTO: 4.8 %
NEUTROPHILS # BLD AUTO: 5.47 X10 (3) UL (ref 1.5–7.7)
NEUTROPHILS # BLD AUTO: 5.47 X10(3) UL (ref 1.5–7.7)
NEUTROPHILS NFR BLD AUTO: 55.6 %
NITRITE UR QL STRIP.AUTO: NEGATIVE
OPIATES UR QL SCN: NEGATIVE
OSMOLALITY SERPL CALC.SUM OF ELEC: 312 MOSM/KG (ref 275–295)
OXYCODONE UR QL SCN: NEGATIVE
P-R INTERVAL: 174 MS
PH UR STRIP.AUTO: 5.5 (ref 5–8)
PLATELET # BLD AUTO: 397 10(3)UL (ref 150–450)
POTASSIUM SERPL-SCNC: 3.7 MMOL/L (ref 3.5–5.1)
PROT SERPL-MCNC: 7.2 G/DL (ref 5.7–8.2)
PROT UR STRIP.AUTO-MCNC: NEGATIVE MG/DL
Q-T INTERVAL: 446 MS
QRS DURATION: 140 MS
QTC CALCULATION (BEZET): 481 MS
R AXIS: -14 DEGREES
RBC # BLD AUTO: 3.37 X10(6)UL (ref 3.8–5.8)
RBC UR QL AUTO: NEGATIVE
SALICYLATES SERPL-MCNC: <3 MG/DL (ref 3–20)
SARS-COV-2 RNA RESP QL NAA+PROBE: DETECTED
SODIUM SERPL-SCNC: 150 MMOL/L (ref 136–145)
SP GR UR STRIP.AUTO: 1.01 (ref 1–1.03)
T AXIS: 173 DEGREES
UROBILINOGEN UR STRIP.AUTO-MCNC: NORMAL MG/DL
VENTRICULAR RATE: 70 BPM
WBC # BLD AUTO: 9.8 X10(3) UL (ref 4–11)

## 2025-08-19 RX ORDER — ASPIRIN 81 MG/1
81 TABLET, CHEWABLE ORAL EVERY MORNING
Status: DISCONTINUED | OUTPATIENT
Start: 2025-08-19 | End: 2025-08-28

## 2025-08-19 RX ORDER — MAGNESIUM HYDROXIDE/ALUMINUM HYDROXICE/SIMETHICONE 120; 1200; 1200 MG/30ML; MG/30ML; MG/30ML
30 SUSPENSION ORAL EVERY 4 HOURS PRN
Status: DISCONTINUED | OUTPATIENT
Start: 2025-08-19 | End: 2025-08-28

## 2025-08-19 RX ORDER — DICYCLOMINE HYDROCHLORIDE 10 MG/1
10 CAPSULE ORAL EVERY 8 HOURS PRN
Status: DISCONTINUED | OUTPATIENT
Start: 2025-08-19 | End: 2025-08-28

## 2025-08-19 RX ORDER — DULOXETIN HYDROCHLORIDE 60 MG/1
60 CAPSULE, DELAYED RELEASE ORAL DAILY
Status: DISCONTINUED | OUTPATIENT
Start: 2025-08-20 | End: 2025-08-28

## 2025-08-19 RX ORDER — ACETAMINOPHEN 325 MG/1
650 TABLET ORAL EVERY 4 HOURS PRN
Status: DISCONTINUED | OUTPATIENT
Start: 2025-08-19 | End: 2025-08-19 | Stop reason: ALTCHOICE

## 2025-08-19 RX ORDER — DOCUSATE SODIUM 100 MG/1
100 CAPSULE, LIQUID FILLED ORAL 2 TIMES DAILY PRN
Status: DISCONTINUED | OUTPATIENT
Start: 2025-08-19 | End: 2025-08-28

## 2025-08-19 RX ORDER — SODIUM PHOSPHATE, DIBASIC AND SODIUM PHOSPHATE, MONOBASIC 7; 19 G/230ML; G/230ML
1 ENEMA RECTAL ONCE AS NEEDED
Status: DISCONTINUED | OUTPATIENT
Start: 2025-08-19 | End: 2025-08-28

## 2025-08-19 RX ORDER — POLYETHYLENE GLYCOL 3350 17 G/17G
17 POWDER, FOR SOLUTION ORAL DAILY PRN
Status: DISCONTINUED | OUTPATIENT
Start: 2025-08-19 | End: 2025-08-28

## 2025-08-19 RX ORDER — ECHINACEA PURPUREA EXTRACT 125 MG
1 TABLET ORAL
Status: DISCONTINUED | OUTPATIENT
Start: 2025-08-19 | End: 2025-08-28

## 2025-08-19 RX ORDER — POTASSIUM CHLORIDE 14.9 MG/ML
20 INJECTION INTRAVENOUS ONCE
Status: COMPLETED | OUTPATIENT
Start: 2025-08-19 | End: 2025-08-19

## 2025-08-19 RX ORDER — DEXTROSE MONOHYDRATE AND SODIUM CHLORIDE 5; .45 G/100ML; G/100ML
INJECTION, SOLUTION INTRAVENOUS CONTINUOUS
Status: DISCONTINUED | OUTPATIENT
Start: 2025-08-19 | End: 2025-08-19

## 2025-08-19 RX ORDER — DICYCLOMINE HYDROCHLORIDE 10 MG/1
10 CAPSULE ORAL EVERY 8 HOURS PRN
COMMUNITY

## 2025-08-19 RX ORDER — RISPERIDONE 0.25 MG/1
0.25 TABLET ORAL 3 TIMES DAILY
Status: DISCONTINUED | OUTPATIENT
Start: 2025-08-19 | End: 2025-08-20

## 2025-08-19 RX ORDER — POLYETHYLENE GLYCOL 3350 17 G/17G
17 POWDER, FOR SOLUTION ORAL DAILY PRN
COMMUNITY

## 2025-08-19 RX ORDER — ENOXAPARIN SODIUM 100 MG/ML
40 INJECTION SUBCUTANEOUS DAILY
Status: DISCONTINUED | OUTPATIENT
Start: 2025-08-19 | End: 2025-08-28

## 2025-08-19 RX ORDER — CETIRIZINE HYDROCHLORIDE 5 MG/1
5 TABLET ORAL DAILY
Status: DISCONTINUED | OUTPATIENT
Start: 2025-08-19 | End: 2025-08-28

## 2025-08-19 RX ORDER — DEXTROSE MONOHYDRATE 50 MG/ML
INJECTION, SOLUTION INTRAVENOUS CONTINUOUS
Status: DISCONTINUED | OUTPATIENT
Start: 2025-08-19 | End: 2025-08-20

## 2025-08-19 RX ORDER — ACETAMINOPHEN 325 MG/1
325 TABLET ORAL EVERY 4 HOURS PRN
Status: DISCONTINUED | OUTPATIENT
Start: 2025-08-19 | End: 2025-08-28

## 2025-08-19 RX ORDER — CLOPIDOGREL BISULFATE 75 MG/1
75 TABLET ORAL DAILY
Status: DISCONTINUED | OUTPATIENT
Start: 2025-08-19 | End: 2025-08-28

## 2025-08-19 RX ORDER — SODIUM CHLORIDE 9 MG/ML
INJECTION, SOLUTION INTRAVENOUS CONTINUOUS
Status: DISCONTINUED | OUTPATIENT
Start: 2025-08-19 | End: 2025-08-19

## 2025-08-19 RX ORDER — FUROSEMIDE 40 MG/1
40 TABLET ORAL DAILY
Status: DISCONTINUED | OUTPATIENT
Start: 2025-08-19 | End: 2025-08-21

## 2025-08-19 RX ORDER — ALPRAZOLAM 0.25 MG
0.25 TABLET ORAL 3 TIMES DAILY PRN
Status: DISCONTINUED | OUTPATIENT
Start: 2025-08-19 | End: 2025-08-25

## 2025-08-19 RX ORDER — MIRTAZAPINE 15 MG/1
15 TABLET, FILM COATED ORAL NIGHTLY
Status: DISCONTINUED | OUTPATIENT
Start: 2025-08-19 | End: 2025-08-28

## 2025-08-19 RX ORDER — ONDANSETRON 2 MG/ML
4 INJECTION INTRAMUSCULAR; INTRAVENOUS EVERY 6 HOURS PRN
Status: DISCONTINUED | OUTPATIENT
Start: 2025-08-19 | End: 2025-08-28

## 2025-08-19 RX ORDER — ATORVASTATIN CALCIUM 80 MG/1
80 TABLET, FILM COATED ORAL DAILY
Status: DISCONTINUED | OUTPATIENT
Start: 2025-08-19 | End: 2025-08-28

## 2025-08-19 RX ORDER — PANTOPRAZOLE SODIUM 40 MG/1
40 TABLET, DELAYED RELEASE ORAL
Status: DISCONTINUED | OUTPATIENT
Start: 2025-08-20 | End: 2025-08-28

## 2025-08-19 RX ORDER — SPIRONOLACTONE 25 MG/1
25 TABLET ORAL DAILY
Status: DISCONTINUED | OUTPATIENT
Start: 2025-08-19 | End: 2025-08-28

## 2025-08-19 RX ORDER — ACETAMINOPHEN 325 MG/1
650 TABLET ORAL EVERY 4 HOURS PRN
COMMUNITY

## 2025-08-19 RX ORDER — BISACODYL 10 MG
10 SUPPOSITORY, RECTAL RECTAL
Status: DISCONTINUED | OUTPATIENT
Start: 2025-08-19 | End: 2025-08-28

## 2025-08-19 RX ORDER — SUCRALFATE 1 G/1
1 TABLET ORAL
Status: DISCONTINUED | OUTPATIENT
Start: 2025-08-19 | End: 2025-08-28

## 2025-08-19 RX ORDER — MAGNESIUM HYDROXIDE/ALUMINUM HYDROXICE/SIMETHICONE 120; 1200; 1200 MG/30ML; MG/30ML; MG/30ML
30 SUSPENSION ORAL EVERY 4 HOURS PRN
Status: DISCONTINUED | OUTPATIENT
Start: 2025-08-19 | End: 2025-08-19 | Stop reason: ALTCHOICE

## 2025-08-19 RX ORDER — ACETAMINOPHEN 325 MG/1
650 TABLET ORAL EVERY 4 HOURS PRN
Status: DISCONTINUED | OUTPATIENT
Start: 2025-08-19 | End: 2025-08-28

## 2025-08-19 RX ORDER — METOPROLOL SUCCINATE 25 MG/1
25 TABLET, EXTENDED RELEASE ORAL
Status: DISCONTINUED | OUTPATIENT
Start: 2025-08-20 | End: 2025-08-28

## 2025-08-20 PROBLEM — F02.818 DEMENTIA, ALZHEIMER'S, WITH BEHAVIOR DISTURBANCE (HCC): Status: ACTIVE | Noted: 2025-07-29

## 2025-08-20 PROBLEM — Z51.5 PALLIATIVE CARE ENCOUNTER: Status: ACTIVE | Noted: 2025-08-20

## 2025-08-20 PROBLEM — G30.9 DEMENTIA, ALZHEIMER'S, WITH BEHAVIOR DISTURBANCE (HCC): Status: ACTIVE | Noted: 2025-07-29

## 2025-08-20 PROBLEM — Z71.89 GOALS OF CARE, COUNSELING/DISCUSSION: Status: ACTIVE | Noted: 2025-08-20

## 2025-08-20 LAB
ANION GAP SERPL CALC-SCNC: 12 MMOL/L (ref 0–18)
BUN BLD-MCNC: 9 MG/DL (ref 9–23)
CALCIUM BLD-MCNC: 8.5 MG/DL (ref 8.7–10.6)
CHLORIDE SERPL-SCNC: 108 MMOL/L (ref 98–112)
CO2 SERPL-SCNC: 25 MMOL/L (ref 21–32)
CREAT BLD-MCNC: 0.76 MG/DL (ref 0.7–1.3)
EGFRCR SERPLBLD CKD-EPI 2021: 83 ML/MIN/1.73M2 (ref 60–?)
GLUCOSE BLD-MCNC: 132 MG/DL (ref 70–99)
OSMOLALITY SERPL CALC.SUM OF ELEC: 301 MOSM/KG (ref 275–295)
POTASSIUM SERPL-SCNC: 3.4 MMOL/L (ref 3.5–5.1)
POTASSIUM SERPL-SCNC: 3.4 MMOL/L (ref 3.5–5.1)
POTASSIUM SERPL-SCNC: 4 MMOL/L (ref 3.5–5.1)
SODIUM SERPL-SCNC: 145 MMOL/L (ref 136–145)

## 2025-08-20 PROCEDURE — 99232 SBSQ HOSP IP/OBS MODERATE 35: CPT | Performed by: HOSPITALIST

## 2025-08-20 PROCEDURE — 99222 1ST HOSP IP/OBS MODERATE 55: CPT | Performed by: INTERNAL MEDICINE

## 2025-08-20 PROCEDURE — 99232 SBSQ HOSP IP/OBS MODERATE 35: CPT | Performed by: OTHER

## 2025-08-20 PROCEDURE — 99222 1ST HOSP IP/OBS MODERATE 55: CPT | Performed by: NURSE PRACTITIONER

## 2025-08-20 RX ORDER — POTASSIUM CHLORIDE, DEXTROSE MONOHYDRATE 150; 5 MG/100ML; G/100ML
INJECTION, SOLUTION INTRAVENOUS CONTINUOUS
Status: DISCONTINUED | OUTPATIENT
Start: 2025-08-20 | End: 2025-08-26

## 2025-08-20 RX ORDER — RISPERIDONE 0.25 MG/1
0.5 TABLET ORAL 3 TIMES DAILY
Status: DISCONTINUED | OUTPATIENT
Start: 2025-08-20 | End: 2025-08-25

## 2025-08-20 RX ORDER — POTASSIUM CHLORIDE 1500 MG/1
40 TABLET, EXTENDED RELEASE ORAL EVERY 4 HOURS
Status: COMPLETED | OUTPATIENT
Start: 2025-08-20 | End: 2025-08-20

## 2025-08-21 LAB
ANION GAP SERPL CALC-SCNC: 12 MMOL/L (ref 0–18)
BUN BLD-MCNC: <5 MG/DL (ref 9–23)
CALCIUM BLD-MCNC: 8.9 MG/DL (ref 8.7–10.6)
CHLORIDE SERPL-SCNC: 108 MMOL/L (ref 98–112)
CO2 SERPL-SCNC: 23 MMOL/L (ref 21–32)
CREAT BLD-MCNC: 0.75 MG/DL (ref 0.7–1.3)
EGFRCR SERPLBLD CKD-EPI 2021: 83 ML/MIN/1.73M2 (ref 60–?)
GLUCOSE BLD-MCNC: 110 MG/DL (ref 70–99)
POTASSIUM SERPL-SCNC: 4 MMOL/L (ref 3.5–5.1)
SODIUM SERPL-SCNC: 143 MMOL/L (ref 136–145)

## 2025-08-21 PROCEDURE — 99232 SBSQ HOSP IP/OBS MODERATE 35: CPT | Performed by: INTERNAL MEDICINE

## 2025-08-21 PROCEDURE — 99231 SBSQ HOSP IP/OBS SF/LOW 25: CPT | Performed by: OTHER

## 2025-08-21 PROCEDURE — 99233 SBSQ HOSP IP/OBS HIGH 50: CPT | Performed by: HOSPITALIST

## 2025-08-22 PROBLEM — I50.20 HFREF (HEART FAILURE WITH REDUCED EJECTION FRACTION) (HCC): Status: ACTIVE | Noted: 2025-08-22

## 2025-08-22 LAB
ANION GAP SERPL CALC-SCNC: 8 MMOL/L (ref 0–18)
BUN BLD-MCNC: 7 MG/DL (ref 9–23)
CALCIUM BLD-MCNC: 9 MG/DL (ref 8.7–10.6)
CHLORIDE SERPL-SCNC: 108 MMOL/L (ref 98–112)
CO2 SERPL-SCNC: 26 MMOL/L (ref 21–32)
CREAT BLD-MCNC: 0.81 MG/DL (ref 0.7–1.3)
EGFRCR SERPLBLD CKD-EPI 2021: 81 ML/MIN/1.73M2 (ref 60–?)
GLUCOSE BLD-MCNC: 115 MG/DL (ref 70–99)
OSMOLALITY SERPL CALC.SUM OF ELEC: 293 MOSM/KG (ref 275–295)
POTASSIUM SERPL-SCNC: 4.2 MMOL/L (ref 3.5–5.1)
SODIUM SERPL-SCNC: 142 MMOL/L (ref 136–145)

## 2025-08-22 PROCEDURE — 99231 SBSQ HOSP IP/OBS SF/LOW 25: CPT | Performed by: OTHER

## 2025-08-22 PROCEDURE — 99233 SBSQ HOSP IP/OBS HIGH 50: CPT | Performed by: HOSPITALIST

## 2025-08-22 PROCEDURE — 99232 SBSQ HOSP IP/OBS MODERATE 35: CPT | Performed by: INTERNAL MEDICINE

## 2025-08-23 LAB
ANION GAP SERPL CALC-SCNC: 12 MMOL/L (ref 0–18)
BUN BLD-MCNC: 6 MG/DL (ref 9–23)
CALCIUM BLD-MCNC: 9.1 MG/DL (ref 8.7–10.6)
CHLORIDE SERPL-SCNC: 107 MMOL/L (ref 98–112)
CO2 SERPL-SCNC: 22 MMOL/L (ref 21–32)
CREAT BLD-MCNC: 0.81 MG/DL (ref 0.7–1.3)
EGFRCR SERPLBLD CKD-EPI 2021: 81 ML/MIN/1.73M2 (ref 60–?)
GLUCOSE BLD-MCNC: 118 MG/DL (ref 70–99)
OSMOLALITY SERPL CALC.SUM OF ELEC: 291 MOSM/KG (ref 275–295)
POTASSIUM SERPL-SCNC: 4.6 MMOL/L (ref 3.5–5.1)
SODIUM SERPL-SCNC: 141 MMOL/L (ref 136–145)

## 2025-08-23 PROCEDURE — 99232 SBSQ HOSP IP/OBS MODERATE 35: CPT | Performed by: HOSPITALIST

## 2025-08-23 PROCEDURE — 99232 SBSQ HOSP IP/OBS MODERATE 35: CPT | Performed by: INTERNAL MEDICINE

## 2025-08-24 LAB
ANION GAP SERPL CALC-SCNC: 11 MMOL/L (ref 0–18)
BUN BLD-MCNC: 7 MG/DL (ref 9–23)
CALCIUM BLD-MCNC: 8.9 MG/DL (ref 8.7–10.6)
CHLORIDE SERPL-SCNC: 106 MMOL/L (ref 98–112)
CO2 SERPL-SCNC: 23 MMOL/L (ref 21–32)
CREAT BLD-MCNC: 0.81 MG/DL (ref 0.7–1.3)
EGFRCR SERPLBLD CKD-EPI 2021: 81 ML/MIN/1.73M2 (ref 60–?)
GLUCOSE BLD-MCNC: 115 MG/DL (ref 70–99)
OSMOLALITY SERPL CALC.SUM OF ELEC: 289 MOSM/KG (ref 275–295)
POTASSIUM SERPL-SCNC: 4.5 MMOL/L (ref 3.5–5.1)
SODIUM SERPL-SCNC: 140 MMOL/L (ref 136–145)

## 2025-08-24 PROCEDURE — 99231 SBSQ HOSP IP/OBS SF/LOW 25: CPT | Performed by: HOSPITALIST

## 2025-08-25 PROCEDURE — 99232 SBSQ HOSP IP/OBS MODERATE 35: CPT

## 2025-08-25 PROCEDURE — 99231 SBSQ HOSP IP/OBS SF/LOW 25: CPT | Performed by: HOSPITALIST

## 2025-08-25 RX ORDER — RISPERIDONE 0.25 MG/1
0.75 TABLET ORAL NIGHTLY
Status: DISCONTINUED | OUTPATIENT
Start: 2025-08-25 | End: 2025-08-26

## 2025-08-25 RX ORDER — ALPRAZOLAM 0.5 MG
0.5 TABLET ORAL 3 TIMES DAILY PRN
Status: DISCONTINUED | OUTPATIENT
Start: 2025-08-25 | End: 2025-08-28

## 2025-08-25 RX ORDER — RISPERIDONE 0.25 MG/1
0.5 TABLET ORAL 2 TIMES DAILY
Status: DISCONTINUED | OUTPATIENT
Start: 2025-08-25 | End: 2025-08-28

## 2025-08-26 PROCEDURE — 99231 SBSQ HOSP IP/OBS SF/LOW 25: CPT | Performed by: HOSPITALIST

## 2025-08-26 PROCEDURE — 99232 SBSQ HOSP IP/OBS MODERATE 35: CPT

## 2025-08-26 RX ORDER — RISPERIDONE 0.25 MG/1
1 TABLET ORAL NIGHTLY
Status: DISCONTINUED | OUTPATIENT
Start: 2025-08-26 | End: 2025-08-28

## 2025-08-27 PROCEDURE — 99233 SBSQ HOSP IP/OBS HIGH 50: CPT | Performed by: HOSPITALIST

## 2025-08-27 PROCEDURE — 99232 SBSQ HOSP IP/OBS MODERATE 35: CPT

## 2025-08-27 RX ORDER — RISPERIDONE 0.5 MG/1
TABLET ORAL
Qty: 120 TABLET | Refills: 0 | Status: SHIPPED | OUTPATIENT
Start: 2025-08-27

## 2025-08-27 RX ORDER — ALPRAZOLAM 0.5 MG
0.5 TABLET ORAL 3 TIMES DAILY PRN
Qty: 30 TABLET | Refills: 0 | Status: SHIPPED | OUTPATIENT
Start: 2025-08-27

## 2025-08-28 VITALS
HEART RATE: 62 BPM | OXYGEN SATURATION: 100 % | BODY MASS INDEX: 20 KG/M2 | SYSTOLIC BLOOD PRESSURE: 105 MMHG | WEIGHT: 128.81 LBS | RESPIRATION RATE: 16 BRPM | DIASTOLIC BLOOD PRESSURE: 53 MMHG | TEMPERATURE: 97 F

## (undated) DEVICE — CUTTING LOOP, BIPOLAR, 24/26 FR.: Brand: N.A.

## (undated) DEVICE — CUTTING LOOP, BIPOLAR, 0.30MM, 24/26 FR.: Brand: N.A.

## (undated) DEVICE — SOLUTION IV 1000ML 0.9% NACL PRESERVATIVE

## (undated) DEVICE — PACK CUSTOM CYSTO

## (undated) DEVICE — SYRINGE,TOOMEY,IRRIGATION,70CC,STERILE: Brand: MEDLINE

## (undated) DEVICE — SOLUTION IRRIG 1000ML 0.9% NACL USP BTL

## (undated) DEVICE — MEDI-VAC NON-CONDUCTIVE SUCTION TUBING: Brand: CARDINAL HEALTH

## (undated) DEVICE — GAMMEX® PI HYBRID SIZE 7, STERILE POWDER-FREE SURGICAL GLOVE, POLYISOPRENE AND NEOPRENE BLEND: Brand: GAMMEX

## (undated) DEVICE — UROLOGY DRAIN BAG

## (undated) DEVICE — 24FR BIPLR COAG ELECTRDE, STERILE: Brand: N.A.

## (undated) DEVICE — Device

## (undated) DEVICE — APPX 0.91 ML, GRADUATED ADAPTER WITH CLAVE®: Brand: ICU MEDICAL

## (undated) DEVICE — SOLUTION IRRIG 1000ML ST H2O AQUALITE PLAS

## (undated) DEVICE — SOLUTION IRRIG 1000ML 0.9% NACL USP BG

## (undated) DEVICE — URINE DRAINAGE DUAL FUNCTION BAG, NEEDLE SAMPLING, ANTI-REFLUX DEVICE, DRAIN TUBE, 4000 ML: Brand: DOVER

## (undated) DEVICE — SOLUTION IRRIG 3000ML 0.9% NACL FLX CONT

## (undated) DEVICE — BLADDER EVACUATOR: Brand: UROVAC BLADDER EVACUATOR

## (undated) NOTE — Clinical Note
Transitions of Care follow up call from the emergency department visit on 5/20/2025 was completed. Thank you.

## (undated) NOTE — Clinical Note
FYI Transitions of Care 7 day follow up and emergency department follow up call was completed. Thank you.

## (undated) NOTE — LETTER
[facility]  [address], [place], [place]    Authorization for Surgical Operation and Procedure                               I hereby authorize Ryan Yeung MD, my physician and his/her assistants (if applicable), which may include medical students, residents, and/or fellows, to perform the following surgical operation/ procedure and administer such anesthesia as may be determined necessary by my physician: Operation/Procedure name (s)                                                                                              on Deven Atkinson   2.   I recognize that during the surgical operation/procedure, unforeseen conditions may necessitate additional or different procedures than those listed above.  I, therefore, further authorize and request that the above-named surgeon, assistants, or designees perform such procedures as are, in their judgment, necessary and desirable.    3.   My surgeon/physician has discussed prior to my surgery the potential benefits, risks and side effects of this procedure; the likelihood of achieving goals; and potential problems that might occur during recuperation.  They also discussed reasonable alternatives to the procedure, including risks, benefits, and side effects related to the alternatives and risks related to not receiving this procedure.  I have had all my questions answered and I acknowledge that no guarantee has been made as to the result that may be obtained.    4.   Should the need arise during my operation/procedure, which includes change of level of care prior to discharge, I also consent to the administration of blood and/or blood products.  Further, I understand that despite careful testing and screening of blood or blood products by collecting agencies, I may still be subject to ill effects as a result of receiving a blood transfusion and/or blood products.  The following are some, but not all, of the potential risks that can occur: fever and  allergic reactions, hemolytic reactions, transmission of diseases such as Hepatitis, AIDS and Cytomegalovirus (CMV) and fluid overload.  In the event that I wish to have an autologous transfusion of my own blood, or a directed donor transfusion, I will discuss this with my physician.  Check only if Refusing Blood or Blood Products  I understand refusal of blood or blood products as deemed necessary by my physician may have serious consequences to my condition to include possible death. I hereby assume responsibility for my refusal and release the hospital, its personnel, and my physicians from any responsibility for the consequences of my refusal.    o  Refuse   5.   I authorize the use of any specimen, organs, tissues, body parts or foreign objects that may be removed from my body during the operation/procedure for diagnosis, research or teaching purposes and their subsequent disposal by hospital authorities.  I also authorize the release of specimen test results and/or written reports to my treating physician on the hospital medical staff or other referring or consulting physicians involved in my care, at the discretion of the Pathologist or my treating physician.    6.   I consent to the photographing or videotaping of the operations or procedures to be performed, including appropriate portions of my body for medical, scientific, or educational purposes, provided my identity is not revealed by the pictures or by descriptive texts accompanying them.  If the procedure has been photographed/videotaped, the surgeon will obtain the original picture, image, videotape or CD.  The hospital will not be responsible for storage, release or maintenance of the picture, image, tape or CD.    7.   I consent to the presence of a  or observers in the operating room as deemed necessary by my physician or their designees.    8.   I recognize that in the event my procedure results in extended X-Ray/fluoroscopy  time, I may develop a skin reaction.    9. If I have a Do Not Attempt Resuscitation (DNAR) order in place, that status will be suspended while in the operating room, procedural suite, and during the recovery period unless otherwise explicitly stated by me (or a person authorized to consent on my behalf). The surgeon or my attending physician will determine when the applicable recovery period ends for purposes of reinstating the DNAR order.  10. Patients having a sterilization procedure: I understand that if the procedure is successful the results will be permanent and it will therefore be impossible for me to inseminate, conceive, or bear children.  I also understand that the procedure is intended to result in sterility, although the result has not been guaranteed.   11. I acknowledge that my physician has explained sedation/analgesia administration to me including the risk and benefits I consent to the administration of sedation/analgesia as may be necessary or desirable in the judgment of my physician.    I CERTIFY THAT I HAVE READ AND FULLY UNDERSTAND THE ABOVE CONSENT TO OPERATION and/or OTHER PROCEDURE.     ____________________________________  _________________________________        ______________________________  Signature of Patient    Signature of Responsible Person                Printed Name of Responsible Person                                      ____________________________________  _____________________________                ________________________________  Signature of Witness        Date  Time         Relationship to Patient    STATEMENT OF PHYSICIAN My signature below affirms that prior to the time of the procedure; I have explained to the patient and/or his/her legal representative, the risks and benefits involved in the proposed treatment and any reasonable alternative to the proposed treatment. I have also explained the risks and benefits involved in refusal of the proposed treatment and  alternatives to the proposed treatment and have answered the patient's questions. If I have a significant financial interest in a co-management agreement or a significant financial interest in any product or implant, or other significant relationship used in this procedure/surgery, I have disclosed this and had a discussion with my patient.     _____________________________________________________              _____________________________  (Signature of Physician)                                                                                         (Date)                                   (Time)  Patient Name: Deven Mata Demetrio      : 1930      Printed: 2025     Medical Record #: [id number]                                      Page 1 of 1

## (undated) NOTE — Clinical Note
Initial assessment completed with patient.  Appointment  message sent to the scheduling team for assistance.  Patient agrees to additional follow-up calls from nurse care manager.  Thank you!